# Patient Record
Sex: FEMALE | Race: WHITE | NOT HISPANIC OR LATINO | Employment: FULL TIME | URBAN - METROPOLITAN AREA
[De-identification: names, ages, dates, MRNs, and addresses within clinical notes are randomized per-mention and may not be internally consistent; named-entity substitution may affect disease eponyms.]

---

## 2017-02-06 ENCOUNTER — RADIANT APPOINTMENT (OUTPATIENT)
Dept: GENERAL RADIOLOGY | Facility: CLINIC | Age: 62
End: 2017-02-06
Attending: FAMILY MEDICINE
Payer: COMMERCIAL

## 2017-02-06 ENCOUNTER — OFFICE VISIT (OUTPATIENT)
Dept: FAMILY MEDICINE | Facility: CLINIC | Age: 62
End: 2017-02-06
Payer: COMMERCIAL

## 2017-02-06 VITALS
DIASTOLIC BLOOD PRESSURE: 70 MMHG | WEIGHT: 193.2 LBS | OXYGEN SATURATION: 98 % | BODY MASS INDEX: 30.32 KG/M2 | HEIGHT: 67 IN | SYSTOLIC BLOOD PRESSURE: 130 MMHG | TEMPERATURE: 97.4 F | HEART RATE: 106 BPM

## 2017-02-06 DIAGNOSIS — M25.562 ACUTE PAIN OF LEFT KNEE: Primary | ICD-10-CM

## 2017-02-06 DIAGNOSIS — M17.11 PRIMARY OSTEOARTHRITIS OF RIGHT KNEE: ICD-10-CM

## 2017-02-06 DIAGNOSIS — M25.561 ACUTE PAIN OF RIGHT KNEE: ICD-10-CM

## 2017-02-06 DIAGNOSIS — M25.562 ACUTE PAIN OF LEFT KNEE: ICD-10-CM

## 2017-02-06 PROCEDURE — 99214 OFFICE O/P EST MOD 30 MIN: CPT | Performed by: FAMILY MEDICINE

## 2017-02-06 PROCEDURE — 73560 X-RAY EXAM OF KNEE 1 OR 2: CPT | Mod: LT

## 2017-02-06 ASSESSMENT — ANXIETY QUESTIONNAIRES
3. WORRYING TOO MUCH ABOUT DIFFERENT THINGS: NOT AT ALL
GAD7 TOTAL SCORE: 1
5. BEING SO RESTLESS THAT IT IS HARD TO SIT STILL: SEVERAL DAYS
2. NOT BEING ABLE TO STOP OR CONTROL WORRYING: NOT AT ALL
6. BECOMING EASILY ANNOYED OR IRRITABLE: NOT AT ALL
1. FEELING NERVOUS, ANXIOUS, OR ON EDGE: NOT AT ALL
7. FEELING AFRAID AS IF SOMETHING AWFUL MIGHT HAPPEN: NOT AT ALL
IF YOU CHECKED OFF ANY PROBLEMS ON THIS QUESTIONNAIRE, HOW DIFFICULT HAVE THESE PROBLEMS MADE IT FOR YOU TO DO YOUR WORK, TAKE CARE OF THINGS AT HOME, OR GET ALONG WITH OTHER PEOPLE: SOMEWHAT DIFFICULT

## 2017-02-06 ASSESSMENT — PATIENT HEALTH QUESTIONNAIRE - PHQ9: 5. POOR APPETITE OR OVEREATING: NOT AT ALL

## 2017-02-06 NOTE — PROGRESS NOTES
SUBJECTIVE:                                                    Lola Magaña is a 62 year old female who presents to clinic today for the following health issues:      Musculoskeletal problem/pain      Duration: ongoing x 6 months     Description  Location: bilateral knee, slipped yesterday and injured left knee     Intensity:  Severe at worse, Current pain  of left knee- 10/10., Current pain of right knee- 0/10    Accompanying signs and symptoms: swelling    History  Previous similar problem: no   Previous evaluation:  Did PT about 6-8 months ago     Precipitating or alleviating factors:  Trauma or overuse: YES- slipped yesterday  Aggravating factors include: climbing stairs    Therapies tried and outcome: heat, massage, stretching, acetaminophen and NSAID - ibuprofen        Lola has bilateral leg pain L>R, described as shooting pain into patella, also has sharp pain (states feels like wood splitting) in shin bone when weight bearing, no pain while leg is at rest. If weight bearing, the pain shoots into her shin bone. She has been to physical therapy for this leg pain and states the tightness in her legs have improved. Pain prevents her from having a good nights sleep. Mentions she slipped on ice yesterday and subjectively hyperextended her left leg, now unable to bend her leg and cannot go up and down stairs. Before the fall, her pain level is somewhat still the same but more in the left leg. She also mentions having meniscus surgery about 5 years ago. She mentions that she is unable to sleep with her leg straight and needs to keep it slightly bent. She has no past history of knee x-rays.     Of note, she had a previously low hemoglobin but states that she regularly donates blood. She takes iron and calcium supplement daily (not at the same time due to interaction).     Problem list and histories reviewed & adjusted, as indicated.  Additional history: as documented    Patient Active Problem List   Diagnosis      "Chronic rhinitis     Esophageal reflux     Menopause     Menopausal syndrome (hot flashes)     Bilateral leg cramps     Chronic radicular low back pain     Anemia, unspecified type     Past Surgical History   Procedure Laterality Date     Tubal ligation       Hc tooth extraction w/forcep       Arthroscopy knee Right      Vulvar biopsy       lesion       Social History   Substance Use Topics     Smoking status: Never Smoker      Smokeless tobacco: Never Used     Alcohol Use: 0.0 oz/week     0 Standard drinks or equivalent per week      Comment: per week, 2-3 drinks 1-2 times per week     Family History   Problem Relation Age of Onset     CANCER Father      liver     Hyperlipidemia Father      Colon Polyps Brother      MENTAL ILLNESS Sister      Depression & anxiety         BP Readings from Last 3 Encounters:   02/06/17 130/70   06/30/16 136/78   05/25/16 144/72    Wt Readings from Last 3 Encounters:   02/06/17 193 lb 3.2 oz (87.635 kg)   06/30/16 194 lb (87.998 kg)   05/25/16 191 lb (86.637 kg)                  Labs reviewed in EPIC  Problem list, Medication list, Allergies, and Medical/Social/Surgical histories reviewed in [facility] and updated as appropriate.    ROS:  Constitutional, HEENT, cardiovascular, pulmonary, GI, , musculoskeletal, neuro, skin, endocrine and psych systems are negative, except as otherwise noted.    This document serves as a record of the services and decisions personally performed and made by Nasrin Dumont MD. It was created on his/her behalf by Jillian Azul, a trained medical scribe. The creation of this document is based the provider's statements to the medical scribe.  Sai Azul 2:53 PM, February 6, 2017    OBJECTIVE:                                                    /70 mmHg  Pulse 106  Temp(Src) 97.4  F (36.3  C) (Oral)  Ht 5' 6.73\" (1.695 m)  Wt 193 lb 3.2 oz (87.635 kg)  BMI 30.50 kg/m2  SpO2 98%  Body mass index is 30.5 kg/(m^2).  GENERAL: healthy, alert and no " distress  MS: RLE exam shows knee with no effusion, no warmth, full flexion, no laxity with varius valgus stress. , Lachman negative, Matt negative. LLE exam shows knee with slight warmth on lateral side of knee, small effusion    Diagnostic Test Results:  Xray knee bilateral-   1. Tricompartmental right knee osteoarthritis with moderate to severe  medial compartmental joint space loss.  2. No acute bone abnormality in either knee.     ASSESSMENT/PLAN:                                                          ICD-10-CM    1. Acute pain of left knee M25.562 XR Knee Bilateral 1/2 Views     order for DME     SPORTS MEDICINE REFERRAL   2. Acute pain of right knee M25.561 XR Knee Bilateral 1/2 Views     SPORTS MEDICINE REFERRAL   3. Primary osteoarthritis of right knee M17.11 SPORTS MEDICINE REFERRAL     Acute left knee pain: fairly normal xray, suspect hamstring tendons injury vs meniscal tear vs other. No significant effusion. Knee immobilizer, ice, topical pain meds. See ortho to discuss  Chronic right knee pain with OA, severe. Not that bothersome today. Have eval by ortho- may benefit from steroid vs synvisc injection.     Patient Instructions     Use ice to the knee 3 times a day for 10 min  Icy hot or bianca snider to help with pain  Use the knee immobilizer-- take it off to stretch periodically  Follow up with the sports medicine specialist in the next 1 week to check the left knee and help you make a plan.   You can use tylenol 1000 mg (2 over the counter pills) three times a day and ibuprofen 400 mg (2 over the counter pills) 3 times a day as needed for pain.   Nasrin Dumont MD       The information in this document, created by the medical scribe for me, accurately reflects the services I personally performed and the decisions made by me. I have reviewed and approved this document for accuracy prior to leaving the patient care area.  Nasrin Dumont MD  2:53 PM, 02/06/2017]    Nasrin Dumont MD  [facility]

## 2017-02-06 NOTE — MR AVS SNAPSHOT
After Visit Summary   2/6/2017    Lola Magaña    MRN: [id number]           Patient Information     Date Of Birth          1955        Visit Information        Provider Department      2/6/2017 2:45 PM Nasrin Dumont MD [facility]        Today's Diagnoses     Acute pain of left knee    -  1     Acute pain of right knee         Primary osteoarthritis of right knee           Care Instructions    Use ice to the knee 3 times a day for 10 min  Icy hot or bianca snider to help with pain  Use the knee immobilizer-- take it off to stretch periodically  Follow up with the sports medicine specialist in the next 1 week to check the left knee and help you make a plan.   You can use tylenol 1000 mg (2 over the counter pills) three times a day and ibuprofen 400 mg (2 over the counter pills) 3 times a day as needed for pain.   Nasrin Dumont MD     [facility]    If you have any questions regarding to your visit please contact your care team:       Team Purple:   Clinic Hours Telephone Number   [address]REW [address] Dr., Dr.   7am-7pm  Monday - Thursday   7am-5pm  Fridays  [phone]  (Appointment scheduling available 24/7)    Questions about your Visit?   Team Line:  [phone]   [facility] [place] [facility] 11am-9pm Monday-Friday Saturday-Sunday- 9am-5pm   [place] - 5pm-9pm Monday-Friday Saturday-Sunday- 9am-5pm  [phone] - [place]   [phone] [facility]       What options do I have for visits at the clinic other than the traditional office visit?  To expand how we care for you, many of our providers are utilizing electronic visits (e-visits) and telephone visits, when medically appropriate, for interactions with their patients rather than a visit in the clinic.   We also offer nurse visits for many medical concerns. Just like any other service, we will bill your insurance company for this  type of visit based on time spent on the phone with your provider. Not all insurance companies cover these visits. Please check with your medical insurance if this type of visit is covered. You will be responsible for any charges that are not paid by your insurance.      E-visits via Weibuhart:  generally incur a $35.00 fee.  Telephone visits:  Time spent on the phone: *charged based on time that is spent on the phone in increments of 10 minutes. Estimated cost:   5-10 mins $30.00   11-20 mins. $59.00   21-30 mins. $85.00     Use [employer] (secure email communication and access to your chart) to send your primary care provider a message or make an appointment. Ask someone on your Team how to sign up for [employer].  For a Price Quote for your services, please call our [employer] Price Line at [phone].  As always, Thank you for trusting us with your health care needs!    Discharged by Fay Clark CMA          Follow-ups after your visit        Additional Services     SPORTS MEDICINE REFERRAL       Your provider has referred you to:  [facility]: [facility] [phone]   [email or web address]    Please be aware that coverage of these services is subject to the terms and limitations of your health insurance plan.  Call member services at your health plan with any benefit or coverage questions.      Please bring the following to your appointment:    >>   Any x-rays, CTs or MRIs which have been performed.  Contact the facility where they were done to arrange for  prior to your scheduled appointment.    >>   List of current medications   >>   This referral request   >>   Any documents/labs given to you for this referral                  Who to contact     If you have questions or need follow up information about today's clinic visit or your schedule please contact [facility] directly at [phone].  Normal or  "non-critical lab and imaging results will be communicated to you by MyChart, letter or phone within 4 business days after the clinic has received the results. If you do not hear from us within 7 days, please contact the clinic through ViClone or phone. If you have a critical or abnormal lab result, we will notify you by phone as soon as possible.  Submit refill requests through ViClone or call your pharmacy and they will forward the refill request to us. Please allow 3 business days for your refill to be completed.          Additional Information About Your Visit        ViClone Information     ViClone gives you secure access to your electronic health record. If you see a primary care provider, you can also send messages to your care team and make appointments. If you have questions, please call your primary care clinic.  If you do not have a primary care provider, please call [phone] and they will assist you.        Care EveryWhere ID     This is your Care EveryWhere ID. This could be used by other organizations to access your [facility] medical records  [id number]        Your Vitals Were     Pulse Temperature Height BMI (Body Mass Index) Pulse Oximetry       106 97.4  F (36.3  C) (Oral) 5' 6.73\" (1.695 m) 30.50 kg/m2 98%        Blood Pressure from Last 3 Encounters:   02/06/17 130/70   06/30/16 136/78   05/25/16 144/72    Weight from Last 3 Encounters:   02/06/17 193 lb 3.2 oz (87.635 kg)   06/30/16 194 lb (87.998 kg)   05/25/16 191 lb (86.637 kg)              We Performed the Following     SPORTS MEDICINE REFERRAL          Today's Medication Changes          These changes are accurate as of: 2/6/17  3:54 PM.  If you have any questions, ask your nurse or doctor.               Start taking these medicines.        Dose/Directions    order for DME   Used for:  Acute pain of left knee   Started by:  Nasrin Dumont MD        Knee immobilizer   Quantity:  1 Device   Refills:  0            Where to get your " medicines      Some of these will need a paper prescription and others can be bought over the counter.  Ask your nurse if you have questions.     Bring a paper prescription for each of these medications    - order for DME             Primary Care Provider Office Phone # Fax #    Nasrin Dumont -[phone]       [address]  [facility] [address]        Thank you!     Thank you for choosing [facility]  for your care. Our goal is always to provide you with excellent care. Hearing back from our patients is one way we can continue to improve our services. Please take a few minutes to complete the written survey that you may receive in the mail after your visit with us. Thank you!             Your Updated Medication List - Protect others around you: Learn how to safely use, store and throw away your medicines at www.disposemymeds.org.          This list is accurate as of: 2/6/17  3:54 PM.  Always use your most recent med list.                   Brand Name Dispense Instructions for use    fluticasone 50 MCG/ACT spray    FLONASE     Spray 2 sprays into both nostrils daily       gabapentin 300 MG capsule    NEURONTIN    90 capsule    Take 1 capsule (300 mg) by mouth At Bedtime       OMEPRAZOLE PO      Take by mouth daily       order for DME     1 Device    Knee immobilizer

## 2017-02-06 NOTE — NURSING NOTE
"Chief Complaint   Patient presents with     Knee Pain     bilateral knee pain - ongoing for 6 months      Fall     slipped  yesterday- injured left knee        Initial /70 mmHg  Pulse 106  Temp(Src) 97.4  F (36.3  C) (Oral)  Ht 5' 6.73\" (1.695 m)  Wt 193 lb 3.2 oz (87.635 kg)  BMI 30.50 kg/m2  SpO2 98% Estimated body mass index is 30.5 kg/(m^2) as calculated from the following:    Height as of this encounter: 5' 6.73\" (1.695 m).    Weight as of this encounter: 193 lb 3.2 oz (87.635 kg).  Medication Reconciliation: complete  An YOSVANY Ruvalcaba    "

## 2017-02-06 NOTE — PATIENT INSTRUCTIONS
Use ice to the knee 3 times a day for 10 min  Icy hot or bianca snider to help with pain  Use the knee immobilizer-- take it off to stretch periodically  Follow up with the sports medicine specialist in the next 1 week to check the left knee and help you make a plan.   You can use tylenol 1000 mg (2 over the counter pills) three times a day and ibuprofen 400 mg (2 over the counter pills) 3 times a day as needed for pain.   Nasrin Dumont MD     [facility]    If you have any questions regarding to your visit please contact your care team:       Team Purple:   Clinic Hours Telephone Number   ANDREW Alvarez Dr., Dr.   7am-7pm  Monday - Thursday   7am-5pm  Fridays  [phone]  (Appointment scheduling available 24/7)    Questions about your Visit?   Team Line:  [phone]   [facility] [place] [facility] - 11am-9pm Monday-Friday Saturday-Sunday- 9am-5pm   [place] - 5pm-9pm Monday-Friday Saturday-Sunday- 9am-5pm  [phone] - [place]   [phone] - [place]       What options do I have for visits at the clinic other than the traditional office visit?  To expand how we care for you, many of our providers are utilizing electronic visits (e-visits) and telephone visits, when medically appropriate, for interactions with their patients rather than a visit in the clinic.   We also offer nurse visits for many medical concerns. Just like any other service, we will bill your insurance company for this type of visit based on time spent on the phone with your provider. Not all insurance companies cover these visits. Please check with your medical insurance if this type of visit is covered. You will be responsible for any charges that are not paid by your insurance.      E-visits via [email or web address]:  generally incur a $35.00 fee.  Telephone visits:  Time spent on the phone: *charged based on time that is spent on the phone in increments of  10 minutes. Estimated cost:   5-10 mins $30.00   11-20 mins. $59.00   21-30 mins. $85.00     Use Click [employer] (secure email communication and access to your chart) to send your primary care provider a message or make an appointment. Ask someone on your Team how to sign up for [employer].  For a Price Quote for your services, please call our [employer] Line at [phone].  As always, Thank you for trusting us with your health care needs!    Discharged by Fay Clark CMA

## 2017-02-07 ASSESSMENT — ANXIETY QUESTIONNAIRES: GAD7 TOTAL SCORE: 1

## 2017-02-07 ASSESSMENT — PATIENT HEALTH QUESTIONNAIRE - PHQ9: SUM OF ALL RESPONSES TO PHQ QUESTIONS 1-9: 7

## 2017-02-14 ENCOUNTER — OFFICE VISIT (OUTPATIENT)
Dept: ORTHOPEDICS | Facility: CLINIC | Age: 62
End: 2017-02-14
Payer: COMMERCIAL

## 2017-02-14 VITALS — SYSTOLIC BLOOD PRESSURE: 153 MMHG | DIASTOLIC BLOOD PRESSURE: 84 MMHG | HEIGHT: 67 IN | HEART RATE: 86 BPM

## 2017-02-14 DIAGNOSIS — S83.8X2A SPRAIN OF OTHER LIGAMENT OF LEFT KNEE, INITIAL ENCOUNTER: ICD-10-CM

## 2017-02-14 DIAGNOSIS — M17.12 PRIMARY OSTEOARTHRITIS OF LEFT KNEE: ICD-10-CM

## 2017-02-14 DIAGNOSIS — M17.11 TRICOMPARTMENT OSTEOARTHRITIS OF RIGHT KNEE: Primary | ICD-10-CM

## 2017-02-14 PROCEDURE — 99204 OFFICE O/P NEW MOD 45 MIN: CPT | Performed by: PHYSICAL MEDICINE & REHABILITATION

## 2017-02-14 RX ORDER — MELOXICAM 15 MG/1
15 TABLET ORAL DAILY
Qty: 30 TABLET | Refills: 1 | Status: SHIPPED | OUTPATIENT
Start: 2017-02-14 | End: 2017-08-03

## 2017-02-14 NOTE — MR AVS SNAPSHOT
After Visit Summary   2/14/2017    Lola Magaña    MRN: [id number]           Patient Information     Date Of Birth          1955        Visit Information        Provider Department      2/14/2017 6:00 PM Jeannette Armendariz MD [facility]        Today's Diagnoses     Tricompartment osteoarthritis of right knee    -  1    Primary osteoarthritis of left knee        Sprain of other ligament of left knee, initial encounter          Care Instructions    Today's Plan of Care:  -Topical Treatments: Ice 15-20 minutes for pain relief as needed  -Prescription Medication as directed: meloxicam. Please take this medication with food.  DO NOT take any other nonsteroidal anti-inflammatory drugs (NSAIDs) such as Advil, ibuprofen, Aleve, naproxen, etc while on this medication. You may take Tylenol with this medication.    We also discussed other future treatment options:  -Rehab: Physical Therapy    Follow Up:  3 weeks or sooner if symptoms fail to improve or worsen. Call if you have any questions or concerns.             Follow-ups after your visit        Who to contact     If you have questions or need follow up information about today's clinic visit or your schedule please contact [facility] directly at [phone].  Normal or non-critical lab and imaging results will be communicated to you by MyChart, letter or phone within 4 business days after the clinic has received the results. If you do not hear from us within 7 days, please contact the clinic through Samba Energyhart or phone. If you have a critical or abnormal lab result, we will notify you by phone as soon as possible.  Submit refill requests through Bright Things or call your pharmacy and they will forward the refill request to us. Please allow 3 business days for your refill to be completed.          Additional Information About Your Visit        MyChart Information     Bright Things gives you  "secure access to your electronic health record. If you see a primary care provider, you can also send messages to your care team and make appointments. If you have questions, please call your primary care clinic.  If you do not have a primary care provider, please call [phone] and they will assist you.        Care EveryWhere ID     This is your Care EveryWhere ID. This could be used by other organizations to access your [facility] medical records  [id number]        Your Vitals Were     Pulse Height                86 5' 6.73\" (1.695 m)           Blood Pressure from Last 3 Encounters:   02/14/17 153/84   02/06/17 130/70   06/30/16 136/78    Weight from Last 3 Encounters:   02/06/17 193 lb 3.2 oz (87.6 kg)   06/30/16 194 lb (88 kg)   05/25/16 191 lb (86.6 kg)              Today, you had the following     No orders found for display         Today's Medication Changes          These changes are accurate as of: 2/14/17  6:23 PM.  If you have any questions, ask your nurse or doctor.               Start taking these medicines.        Dose/Directions    meloxicam 15 MG tablet   Commonly known as:  MOBIC   Used for:  Tricompartment osteoarthritis of right knee, Primary osteoarthritis of left knee   Started by:  Jeannette Armendariz MD        Dose:  15 mg   Take 1 tablet (15 mg) by mouth daily   Quantity:  30 tablet   Refills:  1            Where to get your medicines      These medications were sent to [place] [address] [place] [facility] [address]  [address] [facility] [address]     Phone:  [phone]     meloxicam 15 MG tablet                Primary Care Provider Office Phone # Fax #    Nasrin Dumont -[phone]       [address] [facility] [address]        Thank you!     Thank you for choosing [facility]  for your care. Our goal is always to provide you with excellent care. Hearing back from our patients is one way we " can continue to improve our services. Please take a few minutes to complete the written survey that you may receive in the mail after your visit with us. Thank you!             Your Updated Medication List - Protect others around you: Learn how to safely use, store and throw away your medicines at www.disposemymeds.org.          This list is accurate as of: 2/14/17  6:23 PM.  Always use your most recent med list.                   Brand Name Dispense Instructions for use    fluticasone 50 MCG/ACT spray    FLONASE     Spray 2 sprays into both nostrils daily       gabapentin 300 MG capsule    NEURONTIN    90 capsule    Take 1 capsule (300 mg) by mouth At Bedtime       meloxicam 15 MG tablet    MOBIC    30 tablet    Take 1 tablet (15 mg) by mouth daily       OMEPRAZOLE PO      Take by mouth daily       order for DME     1 Device    Knee immobilizer

## 2017-02-14 NOTE — PROGRESS NOTES
Sports Medicine Clinic Visit    PCP: Nasrin Dumont    CC: Patient presents with:  Knee Pain: bilateral      HPI:  Lola Magaña is a 62 year old female who is seen in consultation at the request of Nasrin Dumont MD. She notes pain that began years ago but the pain has gotten worse over the past 6 months ago. She did have a hyperextension injury of the left knee 9 days ago. Today she reports more pain in the left knee. Pain is located on the left posterior knee. She reports previous pain through the joint line as well as a shooting pain through the shin with standing for long periods of time.  She rates the pain at a 10/10 at its worst and a 0/10 currently.  Symptoms are relieved with ice, Tylenol, Ibuprofen and bracing and worsened by standing, stairs and walking in the past. She endorses popping and denies swelling, bruising, grinding, catching, locking, instability, numbness, tingling, weakness, pain in other joints and fever, chills.  Other treatment has included physical therapy (6-8 months) with no relief of her knee pain.  She notes difficulty with stairs.       Review of Systems:  Musculoskeletal: as above  Remainder of review of systems is negative including constitutional, eyes, ENT, CV, pulmonary, GI, , endocrine, skin, hematologic, and neurologic except as noted in HPI or medical history.    History reviewed. No pertinent past surgical/medical/family/social history.    Past Medical History   Diagnosis Date     Colon polyp      GERD (gastroesophageal reflux disease)      Rhinitis      Past Surgical History   Procedure Laterality Date     Tubal ligation       Hc tooth extraction w/forcep       Arthroscopy knee Right      Vulvar biopsy       lesion     Family History   Problem Relation Age of Onset     CANCER Father      liver     Hyperlipidemia Father      MENTAL ILLNESS Sister      Depression & anxiety     Colon Polyps Brother      Social History     Social History     Marital status:       "Spouse name: N/A     Number of children: N/A     Years of education: N/A     Occupational History     Not on file.     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  At baseline, she does not need assistance with ambulation      Current Outpatient Prescriptions   Medication     meloxicam (MOBIC) 15 MG tablet     gabapentin (NEURONTIN) 300 MG capsule     OMEPRAZOLE PO     fluticasone (FLONASE) 50 MCG/ACT nasal spray     order for DME     No current facility-administered medications for this visit.      No Known Allergies      Objective:  /84  Pulse 86  Ht 5' 6.73\" (1.695 m)    General: healthy, alert and in no distress    Head: Normocephalic, atraumatic  Eyes: no scleral icterus or conjunctival erythema   Oropharynx:  Mucous membranes moist  Skin: no erythema, ecchymosis, petechiae, or jaundice  CV: regular rhythm by palpation, 2+ distal pulses, no pedal edema    Resp: normal respiratory effort without conversational dyspnea   Psych: normal mood and affect    Gait: Non-antalgic, appropriate coordination and balance   Neuro: normal light touch sensory exam of the extremities. Motor strength as noted below    Musculoskeletal:    Bilateral Knee exam    Inspection:  No erythema, ecchymosis, warmth, or edema    Palpation: No tenderness to palpation of either knee    Knee ROM: Full active and passive ROM without pain    Hip ROM: Full active and passive ROM without pain    Patellar Motion:      Normal patellar tracking noted through range of motion    Strength:  5/5 Hip " flexion/abduction/adduction, knee extension/flexion, ankle plantarflexion/dorsiflexion, great toe extension, toe flexion    Special Tests: Negative log roll, negative Efe's compression test, negative anterior/posterior drawer, negative Lachman's, no varus/valgus instability at 0 and 30 degrees, Rose's test negative for pain or popping at the lateral/medial joint line    Radiology:  Independent visualization of images performed.    Recent Results (from the past 744 hour(s))   XR Knee Bilateral 1/2 Views    Narrative    XR KNEE BILATERAL 1/2 VW 2/6/2017 3:30 PM    COMPARISON: None.    HISTORY: Bilateral knee pain.    FINDINGS:  Right knee: Tricompartmental osteoarthritis with moderate to severe  medial compartmental joint space loss and a moderate effusion. No  fractures are seen.    Left knee: Minimal degenerative changes are seen without significant  joint space loss. No fractures are seen. No significant effusion.      Impression    IMPRESSION:   1. Tricompartmental right knee osteoarthritis with moderate to severe  medial compartmental joint space loss.  2. No acute bone abnormality in either knee.    WILFRIDO RUIZ       Assessment:  1. Tricompartment osteoarthritis of right knee    2. Primary osteoarthritis of left knee    3. Sprain of other ligament of left knee, initial encounter        Plan:  Discussed the assessment with the patient. We discussed the following treatment options: symptom treatment, activity modification/rest, imaging and rehab. Following discussion, plan:  -Lola is improving since her hyperextension injury of the left knee 9 days ago.  She has been wearing a brace which seems to be helping.  -She does have bilateral knee osteoarthritis which causes her pain.  Recommend starting meloxicam daily.  Informed her that she should take this with food and not take any other NSAIDs while on this.  -Ice for 15-20 minutes after activity as needed for pain/soreness.  -We also discussed physical  therapy and steroid injections in the future if the above are ineffective.    Lisseth Armendariz MD, CAQ  [facility]

## 2017-02-15 NOTE — PATIENT INSTRUCTIONS
Today's Plan of Care:  -Topical Treatments: Ice 15-20 minutes for pain relief as needed  -Prescription Medication as directed: meloxicam. Please take this medication with food.  DO NOT take any other nonsteroidal anti-inflammatory drugs (NSAIDs) such as Advil, ibuprofen, Aleve, naproxen, etc while on this medication. You may take Tylenol with this medication.    We also discussed other future treatment options:  -Rehab: Physical Therapy    Follow Up:  3 weeks or sooner if symptoms fail to improve or worsen. Call if you have any questions or concerns.

## 2017-02-15 NOTE — NURSING NOTE
"Chief Complaint   Patient presents with     Knee Pain     bilateral       Initial Ht 5' 6.73\" (1.695 m) Estimated body mass index is 30.5 kg/(m^2) as calculated from the following:    Height as of 2/6/17: 5' 6.73\" (1.695 m).    Weight as of 2/6/17: 193 lb 3.2 oz (87.6 kg).  Medication Reconciliation: complete      Abena Wells M.Ed., ATC    "

## 2017-03-07 ENCOUNTER — OFFICE VISIT (OUTPATIENT)
Dept: ORTHOPEDICS | Facility: CLINIC | Age: 62
End: 2017-03-07
Payer: COMMERCIAL

## 2017-03-07 VITALS
HEART RATE: 75 BPM | BODY MASS INDEX: 31.55 KG/M2 | SYSTOLIC BLOOD PRESSURE: 162 MMHG | HEIGHT: 67 IN | DIASTOLIC BLOOD PRESSURE: 84 MMHG | WEIGHT: 201 LBS

## 2017-03-07 DIAGNOSIS — M17.12 PRIMARY OSTEOARTHRITIS OF LEFT KNEE: ICD-10-CM

## 2017-03-07 DIAGNOSIS — M17.11 TRICOMPARTMENT OSTEOARTHRITIS OF RIGHT KNEE: Primary | ICD-10-CM

## 2017-03-07 DIAGNOSIS — S83.8X2D SPRAIN OF OTHER LIGAMENT OF LEFT KNEE, SUBSEQUENT ENCOUNTER: ICD-10-CM

## 2017-03-07 PROCEDURE — 99213 OFFICE O/P EST LOW 20 MIN: CPT | Performed by: PHYSICAL MEDICINE & REHABILITATION

## 2017-03-07 NOTE — PROGRESS NOTES
Sports Medicine Clinic Visit - Interim History March 7, 2017    Initial Visit Date 2/14/2017    PCP: Nasrin Dumont    Lola Magaña is a 62 year old female who is seen in f/u up for bilateral knee pain. Since last visit on 2/14/2017 patient has been doing very well. She rates the pain at a 0/10 currently.  Symptoms are relieved with meloxicam and worsened by  Nothing. She continues to have some clicking in the back of her left knee.       Review of Systems  Musculoskeletal: as above  Remainder of review of systems is negative including constitutional, eyes, ENT, CV, pulmonary, GI, , endocrine, skin, hematologic, and neurologic except as noted in HPI or medical history.    History reviewed. No pertinent past surgical/medical/family/social history.    Past Medical History   Diagnosis Date     Colon polyp      GERD (gastroesophageal reflux disease)      Rhinitis      Past Surgical History   Procedure Laterality Date     Tubal ligation       Hc tooth extraction w/forcep       Arthroscopy knee Right      Vulvar biopsy       lesion     Family History   Problem Relation Age of Onset     CANCER Father      liver     Hyperlipidemia Father      MENTAL ILLNESS Sister      Depression & anxiety     Colon Polyps Brother      Social History     Social History     Marital status:      Spouse name: N/A     Number of children: N/A     Years of education: N/A     Occupational History     Not on file.     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  At baseline, she does not need assistance with  "ambulation    Current Outpatient Prescriptions   Medication     meloxicam (MOBIC) 15 MG tablet     order for DME     gabapentin (NEURONTIN) 300 MG capsule     OMEPRAZOLE PO     fluticasone (FLONASE) 50 MCG/ACT nasal spray     No current facility-administered medications for this visit.      No Known Allergies      Objective:  /84  Pulse 75  Ht 5' 6.73\" (1.695 m)  Wt 201 lb (91.2 kg)  BMI 31.74 kg/m2    General: healthy, alert and in no distress    Head: Normocephalic, atraumatic  Eyes: no scleral icterus or conjunctival erythema   Oropharynx:  Mucous membranes moist  Skin: no erythema, ecchymosis, petechiae, or jaundice  CV: regular rhythm by palpation, 2+ distal pulses, no pedal edema    Resp: normal respiratory effort without conversational dyspnea   Psych: normal mood and affect    Gait: Non-antalgic, appropriate coordination and balance   Neuro: normal light touch sensory exam of the extremities. Motor strength as noted below    Musculoskeletal:    Bilateral Knee exam    Inspection:  No warmth or edema.  Bandaid over left knee due to fall.    Palpation: No tenderness to palpation of either knee    Patellar Motion:      Normal patellar tracking noted through range of motion    Strength:  5/5 Hip flexion/abduction/adduction, knee extension/flexion, ankle plantarflexion/dorsiflexion, great toe extension, toe flexion      Radiology:  No imaging today    Assessment:  1. Tricompartment osteoarthritis of right knee    2. Primary osteoarthritis of left knee    3. Sprain of other ligament of left knee, subsequent encounter        Plan:  Discussed the assessment with the patient. We discussed the following treatment options: symptom treatment, activity modification/rest, imaging and rehab. Following discussion, plan:  -Lola is doing much better and feels the meloxicam has been quite helpful.    -Continue meloxicam 15 mg daily with food.  -Avoid activities that aggravate the pain.  -Ice for 15-20 minutes as needed " for soreness.    -Follow up in 3 months or sooner if symptoms resume.  Will plan on refilling meloxicam in the interim.      Lisseth Armendariz MD, CAQ  [facility]

## 2017-03-07 NOTE — MR AVS SNAPSHOT
After Visit Summary   3/7/2017    Lola Magaña    MRN: [id number]           Patient Information     Date Of Birth          1955        Visit Information        Provider Department      3/7/2017 5:40 PM Jeannette Armendariz MD [facility] [place]        Today's Diagnoses     Tricompartment osteoarthritis of right knee    -  1    Primary osteoarthritis of left knee        Sprain of other ligament of left knee, subsequent encounter          Care Instructions    Continue meloxicam 15 mg daily with food.  Avoid activities that aggravate the pain.  Ice for 15-20 minutes as needed for soreness.    Follow up in 3 months or sooner if symptoms come back.          Follow-ups after your visit        Who to contact     If you have questions or need follow up information about today's clinic visit or your schedule please contact [facility] [place] directly at [phone].  Normal or non-critical lab and imaging results will be communicated to you by YUPPTVhart, letter or phone within 4 business days after the clinic has received the results. If you do not hear from us within 7 days, please contact the clinic through BookBottlest or phone. If you have a critical or abnormal lab result, we will notify you by phone as soon as possible.  Submit refill requests through Tiger Pistol or call your pharmacy and they will forward the refill request to us. Please allow 3 business days for your refill to be completed.          Additional Information About Your Visit        MyChart Information     Tiger Pistol gives you secure access to your electronic health record. If you see a primary care provider, you can also send messages to your care team and make appointments. If you have questions, please call your primary care clinic.  If you do not have a primary care provider, please call [phone] and they will assist you.        Care EveryWhere ID     This is your Care EveryWhere ID.  "This could be used by other organizations to access your [place] medical records  [id number]        Your Vitals Were     Pulse Height BMI (Body Mass Index)             75 5' 6.73\" (1.695 m) 31.74 kg/m2          Blood Pressure from Last 3 Encounters:   03/07/17 162/84   02/14/17 153/84   02/06/17 130/70    Weight from Last 3 Encounters:   03/07/17 201 lb (91.2 kg)   02/06/17 193 lb 3.2 oz (87.6 kg)   06/30/16 194 lb (88 kg)              Today, you had the following     No orders found for display       Primary Care Provider Office Phone # Fax #    Nasrin Dumont [phone]       [place] [id number] [facility] [address]        Thank you!     Thank you for choosing [facility]  for your care. Our goal is always to provide you with excellent care. Hearing back from our patients is one way we can continue to improve our services. Please take a few minutes to complete the written survey that you may receive in the mail after your visit with us. Thank you!             Your Updated Medication List - Protect others around you: Learn how to safely use, store and throw away your medicines at www.disposemymeds.org.          This list is accurate as of: 3/7/17  6:03 PM.  Always use your most recent med list.                   Brand Name Dispense Instructions for use    fluticasone 50 MCG/ACT spray    FLONASE     Spray 2 sprays into both nostrils daily       gabapentin 300 MG capsule    NEURONTIN    90 capsule    Take 1 capsule (300 mg) by mouth At Bedtime       meloxicam 15 MG tablet    MOBIC    30 tablet    Take 1 tablet (15 mg) by mouth daily       OMEPRAZOLE PO      Take by mouth daily       order for DME     1 Device    Knee immobilizer         "

## 2017-03-08 NOTE — PATIENT INSTRUCTIONS
Continue meloxicam 15 mg daily with food.  Avoid activities that aggravate the pain.  Ice for 15-20 minutes as needed for soreness.    Follow up in 3 months or sooner if symptoms come back.

## 2017-03-09 ENCOUNTER — TELEPHONE (OUTPATIENT)
Dept: ORTHOPEDICS | Facility: OTHER | Age: 62
End: 2017-03-09

## 2017-03-09 NOTE — TELEPHONE ENCOUNTER
Spoke with pt regarding her PCPs concern of NSAID's due to her hypertension. The pt is concerned that her pain will increase once she weans off of the meloxicam. I reassured her that we can address that if her pain does return with plans of keeping her hypertension in mind. She has 1 week of meloxicam left. The pt will begin every other day of meloxicam for 4 days and then every 2 days until she runs out of her prescription.     She will contact her PCP to discuss hypertension management. However reluctant, she was in agreement with this plan and thankful for the call.     Abena Wells M.Ed., ATC

## 2017-03-10 ENCOUNTER — TELEPHONE (OUTPATIENT)
Dept: FAMILY MEDICINE | Facility: CLINIC | Age: 62
End: 2017-03-10

## 2017-03-10 NOTE — TELEPHONE ENCOUNTER
Please call pt  Blood pressure was high with sports medicine, also high at last visit with me    BP Readings from Last 3 Encounters:   03/07/17 162/84   02/14/17 153/84   02/06/17 130/70     Recommend visit to check blood pressure and discuss starting meds for hypertension.  Please schedule 15 min with me in next week or 2 OR can see another provider (Dr. Elizabeth Gomes or Samira)  .[email or web address]

## 2017-03-14 ENCOUNTER — OFFICE VISIT (OUTPATIENT)
Dept: FAMILY MEDICINE | Facility: CLINIC | Age: 62
End: 2017-03-14
Payer: COMMERCIAL

## 2017-03-14 VITALS
HEIGHT: 67 IN | HEART RATE: 90 BPM | BODY MASS INDEX: 30.92 KG/M2 | SYSTOLIC BLOOD PRESSURE: 124 MMHG | DIASTOLIC BLOOD PRESSURE: 72 MMHG | TEMPERATURE: 98 F | WEIGHT: 197 LBS

## 2017-03-14 DIAGNOSIS — M17.12 PRIMARY OSTEOARTHRITIS OF LEFT KNEE: ICD-10-CM

## 2017-03-14 DIAGNOSIS — R03.0 ELEVATED BLOOD PRESSURE READING WITHOUT DIAGNOSIS OF HYPERTENSION: Primary | ICD-10-CM

## 2017-03-14 PROCEDURE — 99213 OFFICE O/P EST LOW 20 MIN: CPT | Performed by: NURSE PRACTITIONER

## 2017-03-14 ASSESSMENT — PAIN SCALES - GENERAL: PAINLEVEL: NO PAIN (0)

## 2017-03-14 NOTE — MR AVS SNAPSHOT
After Visit Summary   3/14/2017    Lola Magaña    MRN: [id number]           Patient Information     Date Of Birth          1955        Visit Information        Provider Department      3/14/2017 2:00 PM Samira Francis APRN [facility]        Today's Diagnoses     Primary osteoarthritis of left knee    -  1      Care Instructions    [facility]    If you have any questions regarding to your visit please contact your care team:     Team Pink:   Clinic Hours Telephone Number   Internal Medicine:  Dr. Ligia Francis, NP       7am-7pm  Monday - Thursday   7am-5pm  Fridays  [phone]  (Appointment scheduling available 24/7)    Questions about your visit?  Team Line  [phone]   Urgent Care - [place] [facility] - 11am-9pm Monday-Friday Saturday-Sunday- 9am-5pm   [place] - 5pm-9pm Monday-Friday Saturday-Sunday- 9am-5pm  [phone] - [facility]  [phone] - [place]       What options do I have for visits at the clinic other than the traditional office visit?  To expand how we care for you, many of our providers are utilizing electronic visits (e-visits) and telephone visits, when medically appropriate, for interactions with their patients rather than a visit in the clinic.   We also offer nurse visits for many medical concerns. Just like any other service, we will bill your insurance company for this type of visit based on time spent on the phone with your provider. Not all insurance companies cover these visits. Please check with your medical insurance if this type of visit is covered. You will be responsible for any charges that are not paid by your insurance.      E-visits via [employer]  generally incur a $35.00 fee.  Telephone visits:  Time spent on the phone: *charged based on time that is spent on the phone in increments of 10 minutes. Estimated cost:   5-10 mins $30.00   11-20 mins.  $59.00   21-30 mins. $85.00   Use Zigmot (secure email communication and access to your chart) to send your primary care provider a message or make an appointment. Ask someone on your Team how to sign up for Zigmot.    For a Price Quote for your services, please call our Consumer Price Line at [phone].    As always, Thank you for trusting us with your health care needs!    Discharged By:  YOSVANY RANGEL          Follow-ups after your visit        Your next 10 appointments already scheduled     Jun 24, 2017 11:00 AM CDT   Return Visit with Jeannette Armendariz MD   [facility] [place] ([facility] [place])    [address] [address]  [place] [address] [phone]              Who to contact     If you have questions or need follow up information about today's clinic visit or your schedule please contact [facility] [place] [facility]"" directly at [phone].  Normal or non-critical lab and imaging results will be communicated to you by Movinto Funhart, letter or phone within 4 business days after the clinic has received the results. If you do not hear from us within 7 days, please contact the clinic through Zigmot or phone. If you have a critical or abnormal lab result, we will notify you by phone as soon as possible.  Submit refill requests through [employer] or call your pharmacy and they will forward the refill request to us. Please allow 3 business days for your refill to be completed.          Additional Information About Your Visit        Crossing Automation Information     [employer]ation gives you secure access to your electronic health record. If you see a primary care provider, you can also send messages to your care team and make appointments. If you have questions, please call your primary care clinic.  If you do not have a primary care provider, please call [phone] and they will assist you.        Care EveryWhere ID     This is your Care EveryWhere ID. This could be used by other  "organizations to access your [place] medical records  [id number]        Your Vitals Were     Pulse Temperature Height BMI (Body Mass Index)          90 98  F (36.7  C) (Oral) 5' 6.5\" (1.689 m) 31.32 kg/m2         Blood Pressure from Last 3 Encounters:   03/14/17 124/72   03/07/17 162/84   02/14/17 153/84    Weight from Last 3 Encounters:   03/14/17 197 lb (89.4 kg)   03/07/17 201 lb (91.2 kg)   02/06/17 193 lb 3.2 oz (87.6 kg)              Today, you had the following     No orders found for display         Today's Medication Changes          These changes are accurate as of: 3/14/17  2:28 PM.  If you have any questions, ask your nurse or doctor.               Start taking these medicines.        Dose/Directions    diclofenac 1 % Gel topical gel   Commonly known as:  VOLTAREN   Used for:  Primary osteoarthritis of left knee   Started by:  Samira Francis APRN CNP        Apply 4 grams to knees four times daily as needed using enclosed dosing card.   Quantity:  100 g   Refills:  1            Where to get your medicines      These medications were sent to [place] [address] [place] [facility] [address] [facility] [place] [address]     Phone:  [phone]     diclofenac 1 % Gel topical gel                Primary Care Provider Office Phone # Fax #    Nasrin Dumont [phone]       [address] [address]        Thank you!     Thank you for choosing [facility]  for your care. Our goal is always to provide you with excellent care. Hearing back from our patients is one way we can continue to improve our services. Please take a few minutes to complete the written survey that you may receive in the mail after your visit with us. Thank you!             Your Updated Medication List - Protect others around you: Learn how to safely use, store and throw away your medicines at www.disposemymeds.org.          This list is accurate " as of: 3/14/17  2:28 PM.  Always use your most recent med list.                   Brand Name Dispense Instructions for use    diclofenac 1 % Gel topical gel    VOLTAREN    100 g    Apply 4 grams to knees four times daily as needed using enclosed dosing card.       fluticasone 50 MCG/ACT spray    FLONASE     Spray 2 sprays into both nostrils daily       gabapentin 300 MG capsule    NEURONTIN    90 capsule    Take 1 capsule (300 mg) by mouth At Bedtime       meloxicam 15 MG tablet    MOBIC    30 tablet    Take 1 tablet (15 mg) by mouth daily       OMEPRAZOLE PO      Take by mouth daily       order for DME     1 Device    Knee immobilizer

## 2017-03-14 NOTE — NURSING NOTE
"Chief Complaint   Patient presents with     Hypertension     Elevated Blood Pressure when seen by Orthopedist       Initial /72  Pulse 90  Temp 98  F (36.7  C) (Oral)  Ht 5' 6.5\" (1.689 m)  Wt 197 lb (89.4 kg)  BMI 31.32 kg/m2 Estimated body mass index is 31.32 kg/(m^2) as calculated from the following:    Height as of this encounter: 5' 6.5\" (1.689 m).    Weight as of this encounter: 197 lb (89.4 kg).  Medication Reconciliation: complete   Bree Mark MA    "

## 2017-03-14 NOTE — PATIENT INSTRUCTIONS
[facility]    If you have any questions regarding to your visit please contact your care team:     Team Pink:   Clinic Hours Telephone Number   Internal Medicine:  Dr. Ligia Francis NP       7am-7pm  Monday - Thursday   7am-5pm  Fridays  [phone]  (Appointment scheduling available 24/7)    Questions about your visit?  Team Line  [phone]   Urgent Care - [place] [facility] - 11am-9pm Monday-Friday Saturday-Sunday- 9am-5pm   [place] - 5pm-9pm Monday-Friday Saturday-Sunday- 9am-5pm  [phone] - [place]   [phone] - [place]       What options do I have for visits at the clinic other than the traditional office visit?  To expand how we care for you, many of our providers are utilizing electronic visits (e-visits) and telephone visits, when medically appropriate, for interactions with their patients rather than a visit in the clinic.   We also offer nurse visits for many medical concerns. Just like any other service, we will bill your insurance company for this type of visit based on time spent on the phone with your provider. Not all insurance companies cover these visits. Please check with your medical insurance if this type of visit is covered. You will be responsible for any charges that are not paid by your insurance.      E-visits via [employer]:  generally incur a $35.00 fee.  Telephone visits:  Time spent on the phone: *charged based on time that is spent on the phone in increments of 10 minutes. Estimated cost:   5-10 mins $30.00   11-20 mins. $59.00   21-30 mins. $85.00   Use [employer] (secure email communication and access to your chart) to send your primary care provider a message or make an appointment. Ask someone on your Team how to sign up for [employer].    For a Price Quote for your services, please call our Consumer Price Line at [phone].    As always, Thank you for trusting us with your health care  needs!    Discharged By:  YOSVANY RANGEL

## 2017-03-14 NOTE — PROGRESS NOTES
SUBJECTIVE:                                                    Lola Magaña is a 62 year old female who presents to clinic today for the following health issues:    Chief Complaint   Patient presents with     Hypertension     Elevated Blood Pressure when seen by Orthopedist     Patient was seen at Sports Medicine for knee pain.  She was started on meloxicam for pain and her blood pressure was noted to be elevated.  Meloxicam was stopped and patient was asked to follow-up with primary care.  Patient denies chest pain, shortness of breath, headache, lower extremity edema.  She wonders what else she can take for pain to help with inflammation.    Problem list and histories reviewed & adjusted, as indicated.  Additional history: as documented    Patient Active Problem List   Diagnosis     Chronic rhinitis     Esophageal reflux     Menopause     Menopausal syndrome (hot flashes)     Bilateral leg cramps     Chronic radicular low back pain     Anemia, unspecified type     Past Surgical History   Procedure Laterality Date     Tubal ligation       Hc tooth extraction w/forcep       Arthroscopy knee Right      Vulvar biopsy       lesion       Social History   Substance Use Topics     Smoking status: Never Smoker     Smokeless tobacco: Never Used     Alcohol use 0.0 oz/week     0 Standard drinks or equivalent per week      Comment: per week, 2-3 drinks 1-2 times per week     Family History   Problem Relation Age of Onset     CANCER Father      liver     Hyperlipidemia Father      MENTAL ILLNESS Sister      Depression & anxiety     Colon Polyps Brother          Current Outpatient Prescriptions   Medication Sig Dispense Refill     diclofenac (VOLTAREN) 1 % GEL topical gel Apply 4 grams to knees four times daily as needed using enclosed dosing card. 100 g 1     gabapentin (NEURONTIN) 300 MG capsule Take 1 capsule (300 mg) by mouth At Bedtime 90 capsule 1     OMEPRAZOLE PO Take by mouth daily       fluticasone (FLONASE) 50  "MCG/ACT nasal spray Spray 2 sprays into both nostrils daily       meloxicam (MOBIC) 15 MG tablet Take 1 tablet (15 mg) by mouth daily (Patient not taking: Reported on 3/14/2017) 30 tablet 1     order for DME Knee immobilizer (Patient not taking: Reported on 2/14/2017) 1 Device 0     No Known Allergies  BP Readings from Last 3 Encounters:   03/14/17 124/72   03/07/17 162/84   02/14/17 153/84    Wt Readings from Last 3 Encounters:   03/14/17 197 lb (89.4 kg)   03/07/17 201 lb (91.2 kg)   02/06/17 193 lb 3.2 oz (87.6 kg)                  Labs reviewed in EPIC    Reviewed and updated as needed this visit by clinical staff       Reviewed and updated as needed this visit by Provider         ROS:  Constitutional, HEENT, cardiovascular, pulmonary, gi and gu systems are negative, except as otherwise noted.    OBJECTIVE:                                                    /72  Pulse 90  Temp 98  F (36.7  C) (Oral)  Ht 5' 6.5\" (1.689 m)  Wt 197 lb (89.4 kg)  BMI 31.32 kg/m2  Body mass index is 31.32 kg/(m^2).  GENERAL: healthy, alert and no distress  RESP: lungs clear to auscultation - no rales, rhonchi or wheezes  CV: regular rate and rhythm, normal S1 S2, no S3 or S4, no murmur, click or rub, no peripheral edema and peripheral pulses strong  MS: no gross musculoskeletal defects noted, no edema    Diagnostic Test Results:  none      ASSESSMENT/PLAN:                                                      1. Elevated blood pressure reading without diagnosis of hypertension  Possibly due to meloxicam use.  Patient to enroll in pharmacy blood pressure program and recheck again in 2-3 weeks.    2. Primary osteoarthritis of left knee  Will try topical meds to see if we can improve pain without increasing blood pressure.  - diclofenac (VOLTAREN) 1 % GEL topical gel; Apply 4 grams to knees four times daily as needed using enclosed dosing card.  Dispense: 100 g; Refill: 1    FUTURE APPOINTMENTS:       - Follow-up for annual visit " or as needed    Samira Francis, MONI PATRICK  [facility]

## 2017-06-05 DIAGNOSIS — J31.0 CHRONIC RHINITIS: Primary | ICD-10-CM

## 2017-06-05 NOTE — TELEPHONE ENCOUNTER
fluticasone (FLONASE) 50 MCG/ACT nasal spray      Last Written Prescription Date: 2/6/17  Last Fill Quantity: 0,  # refills: 0   Last Office Visit with [facility], [facility] or [facility] prescribing provider: 2/6/17

## 2017-06-06 RX ORDER — FLUTICASONE PROPIONATE 50 MCG
2 SPRAY, SUSPENSION (ML) NASAL DAILY
Qty: 1 BOTTLE | Refills: 3 | Status: SHIPPED | OUTPATIENT
Start: 2017-06-06 | End: 2021-03-26

## 2017-06-06 NOTE — TELEPHONE ENCOUNTER
Routing refill request to provider for review/approval because:  Medication is reported/historical      Lizbeth Virk RN - BC

## 2017-06-07 DIAGNOSIS — K21.9 GASTROESOPHAGEAL REFLUX DISEASE, ESOPHAGITIS PRESENCE NOT SPECIFIED: Primary | ICD-10-CM

## 2017-06-07 RX ORDER — NICOTINE POLACRILEX 4 MG/1
20 GUM, CHEWING ORAL DAILY
Qty: 90 TABLET | Refills: 3 | Status: CANCELLED | OUTPATIENT
Start: 2017-06-07

## 2017-06-07 NOTE — TELEPHONE ENCOUNTER
OMEPRAZOLE DR 20 mg capsule      Last Written Prescription Date: unknown  Last Fill Quantity: unknown,  # refills: unknown   Last Office Visit with [facility], [facility] or [facility] prescribing provider: 3/14/17    *Previously prescribed by another provider and now has Dr. Dumont as primary.

## 2017-06-08 RX ORDER — LANSOPRAZOLE 30 MG/1
30 CAPSULE, DELAYED RELEASE ORAL DAILY
Qty: 90 CAPSULE | Refills: 0 | Status: SHIPPED | OUTPATIENT
Start: 2017-06-08 | End: 2017-08-03

## 2017-06-08 NOTE — TELEPHONE ENCOUNTER
OK to fill x 90 d, but omprazole is not covered by insurance  Prevacid is-- recommend change to this  Recommend physical with me-- can address refills at that time  Please schedule now  Nasrin Dumont MD

## 2017-06-09 ENCOUNTER — TELEPHONE (OUTPATIENT)
Dept: FAMILY MEDICINE | Facility: CLINIC | Age: 62
End: 2017-06-09

## 2017-06-09 NOTE — TELEPHONE ENCOUNTER
Has not tried other medication  Is currently on fluticasone  Diagnosis is chronic rhinitis  Nasrin Dumont MD

## 2017-06-09 NOTE — TELEPHONE ENCOUNTER
Received fax from pharmacy stating patient requires Prior Authorization for fluticasone (FLONASE) 50 MCG/ACT spray    Insurance information:  Name: [place]  Phone number: [phone]  ID number: [id number]    Provider to address. Message route to Dr. Leigh. Initiate prior authorization or change medication?  Please advise.      **If a prior authorization is to be initiated, please list the following:    -Any medications the patient has tried and failed or any contraindications. **    -Is the patient currently on this medication, or has tried before? **    -What is the diagnosis? **    - Justification or other information that me by helpful. **

## 2017-06-12 ENCOUNTER — RADIANT APPOINTMENT (OUTPATIENT)
Dept: MAMMOGRAPHY | Facility: CLINIC | Age: 62
End: 2017-06-12
Attending: FAMILY MEDICINE
Payer: COMMERCIAL

## 2017-06-12 DIAGNOSIS — Z12.31 VISIT FOR SCREENING MAMMOGRAM: ICD-10-CM

## 2017-06-12 PROCEDURE — G0202 SCR MAMMO BI INCL CAD: HCPCS | Mod: TC

## 2017-06-13 NOTE — TELEPHONE ENCOUNTER
Tried to do PA thru CoverMyMeds sent to [facility] and they sent back PA not needed. Called Pharmacy and informed them of this information they will call insurance and see what else is needed and will re-send if needed.  Nadya Mann,

## 2017-06-26 DIAGNOSIS — R25.2 BILATERAL LEG CRAMPS: ICD-10-CM

## 2017-06-26 DIAGNOSIS — M54.16 CHRONIC RADICULAR LOW BACK PAIN: ICD-10-CM

## 2017-06-26 DIAGNOSIS — K21.9 GASTROESOPHAGEAL REFLUX DISEASE, ESOPHAGITIS PRESENCE NOT SPECIFIED: ICD-10-CM

## 2017-06-26 DIAGNOSIS — G89.29 CHRONIC RADICULAR LOW BACK PAIN: ICD-10-CM

## 2017-06-26 DIAGNOSIS — M53.3 SACRO ILIAL PAIN: ICD-10-CM

## 2017-06-26 RX ORDER — GABAPENTIN 300 MG/1
300 CAPSULE ORAL AT BEDTIME
Qty: 90 CAPSULE | Refills: 1 | Status: SHIPPED | OUTPATIENT
Start: 2017-06-26 | End: 2017-12-31

## 2017-06-26 NOTE — TELEPHONE ENCOUNTER
Reason for Call:  Other call back    Detailed comments: [facility] pharmacy called to request an refill for the prescription Gabapentin as the patient is currently out, fax number of     Phone Number Patient can be reached at: Other phone number:  [phone]    Best Time: today    Can we leave a detailed message on this number? NO    Call taken on 6/26/2017 at 12:25 PM by Diamond Silverman

## 2017-06-26 NOTE — TELEPHONE ENCOUNTER
gabapentin (NEURONTIN) 300 MG capsule  Last Written Prescription Date: 12/27/2016  Last Fill Quantity: 90,  # refills: 1   Last Office Visit with [facility], [facility] or [facility] prescribing provider: 3/14/2017                                         Next 5 appointments (look out 90 days)     Aug 03, 2017  1:00 PM CDT   PHYSICAL with Nasrin Dumont MD   [facility] [place] ([facility])    [address]  [place] [address]   [phone]

## 2017-06-26 NOTE — TELEPHONE ENCOUNTER
Routing refill request to provider for review/approval because:  Drug not on the [facility] refill protocol       Lizbeth Virk RN - BC

## 2017-07-24 ENCOUNTER — TRANSFERRED RECORDS (OUTPATIENT)
Dept: HEALTH INFORMATION MANAGEMENT | Facility: CLINIC | Age: 62
End: 2017-07-24

## 2017-07-24 NOTE — PATIENT INSTRUCTIONS
Preventive Health Recommendations  Female Ages 50 - 64    Yearly exam: See your health care provider every year in order to  o Review health changes.   o Discuss preventive care.    o Review your medicines if your doctor has prescribed any.      Get a Pap test every three years (unless you have an abnormal result and your provider advises testing more often).    If you get Pap tests with HPV test, you only need to test every 5 years, unless you have an abnormal result.     You do not need a Pap test if your uterus was removed (hysterectomy) and you have not had cancer.    You should be tested each year for STDs (sexually transmitted diseases) if you're at risk.     Have a mammogram every 1 to 2 years.    Have a colonoscopy at age 50, or have a yearly FIT test (stool test). These exams screen for colon cancer.      Have a cholesterol test every 5 years, or more often if advised.    Have a diabetes test (fasting glucose) every three years. If you are at risk for diabetes, you should have this test more often.     If you are at risk for osteoporosis (brittle bone disease), think about having a bone density scan (DEXA).    Shots: Get a flu shot each year. Get a tetanus shot every 10 years.    Nutrition:     Eat at least 5 servings of fruits and vegetables each day.    Eat whole-grain bread, whole-wheat pasta and brown rice instead of white grains and rice.    Talk to your provider about Calcium and Vitamin D.     Lifestyle    Exercise at least 150 minutes a week (30 minutes a day, 5 days a week). This will help you control your weight and prevent disease.    Limit alcohol to one drink per day.    No smoking.     Wear sunscreen to prevent skin cancer.     See your dentist every six months for an exam and cleaning.    See your eye doctor every 1 to 2 years.      We will do blood work today.    PGen Hypertension Study Summary; we will get you signed up today       PGen Hypertension Study   Visit 1 patient  information    Thank you for your interest in the [facility] hypertension research study. In two weeks, your hypertension medication will be prescribed through a free follow-up virtual encounter (via phone or through [employer]).    In the coming days you will be contacted by a research team member to schedule your next office visit. After it is scheduled, be sure to let the research coordinator know as soon as possible if you cannot make it so that the visit can be rescheduled for you.      Please contact the research coordinator if you receive care for your high blood pressure outside of your study visits.    If you have any questions, please contact the research coordinator at [phone]. If you experience any symptoms please call the [facility] 24/7 triage  number at [phone]. If your phone number, email or address changes please alert the research coordinator.      In the meantime, we ask that you complete the online surveys emailed out to you, if completing online, or mailed out to you, if completing paper surveys, before your next visit.    [facility]    If you have any questions regarding to your visit please contact your care team:       Team Purple:   Clinic Hours Telephone Number   Dr. Katerina Dumont     7am-7pm  Monday - Thursday   7am-5pm  Fridays  [phone]  (Appointment scheduling available 24/7)    Questions about your Visit?   Team Line:  [phone]   Urgent Care - [facility] - 11am-9pm Monday-Friday Saturday-Sunday- 9am-5pm   [place] - 5pm-9pm Monday-Friday Saturday-Sunday- 9am-5pm  [phone] - [place]   [phone] - [place]       What options do I have for visits at the clinic other than the traditional office visit?  To expand how we care for you, many of our providers are utilizing electronic visits (e-visits) and telephone visits, when medically appropriate, for interactions with their patients  rather than a visit in the clinic.   We also offer nurse visits for many medical concerns. Just like any other service, we will bill your insurance company for this type of visit based on time spent on the phone with your provider. Not all insurance companies cover these visits. Please check with your medical insurance if this type of visit is covered. You will be responsible for any charges that are not paid by your insurance.      E-visits via [employer]:  generally incur a $35.00 fee.  Telephone visits:  Time spent on the phone: *charged based on time that is spent on the phone in increments of 10 minutes. Estimated cost:   5-10 mins $30.00   11-20 mins. $59.00   21-30 mins. $85.00     Use [employer] (secure email communication and access to your chart) to send your primary care provider a message or make an appointment. Ask someone on your Team how to sign up for [employer].  For a Price Quote for your services, please call our Consumer Price Line at [phone].  As always, Thank you for trusting us with your health care needs!    Discharged By: Zahira

## 2017-08-03 ENCOUNTER — OFFICE VISIT (OUTPATIENT)
Dept: FAMILY MEDICINE | Facility: CLINIC | Age: 62
End: 2017-08-03
Payer: COMMERCIAL

## 2017-08-03 VITALS
HEIGHT: 67 IN | HEART RATE: 99 BPM | SYSTOLIC BLOOD PRESSURE: 144 MMHG | DIASTOLIC BLOOD PRESSURE: 82 MMHG | OXYGEN SATURATION: 98 % | WEIGHT: 191 LBS | BODY MASS INDEX: 29.98 KG/M2 | TEMPERATURE: 97.2 F

## 2017-08-03 DIAGNOSIS — M17.12 PRIMARY OSTEOARTHRITIS OF LEFT KNEE: ICD-10-CM

## 2017-08-03 DIAGNOSIS — D64.9 ANEMIA, UNSPECIFIED TYPE: ICD-10-CM

## 2017-08-03 DIAGNOSIS — I10 HYPERTENSION GOAL BP (BLOOD PRESSURE) < 140/90: ICD-10-CM

## 2017-08-03 DIAGNOSIS — Z12.4 SCREENING FOR MALIGNANT NEOPLASM OF CERVIX: ICD-10-CM

## 2017-08-03 DIAGNOSIS — Z00.01 ENCOUNTER FOR ROUTINE ADULT HEALTH EXAMINATION WITH ABNORMAL FINDINGS: Primary | ICD-10-CM

## 2017-08-03 LAB
ANION GAP SERPL CALCULATED.3IONS-SCNC: 8 MMOL/L (ref 3–14)
BASOPHILS # BLD AUTO: 0 10E9/L (ref 0–0.2)
BASOPHILS NFR BLD AUTO: 0.4 %
BUN SERPL-MCNC: 17 MG/DL (ref 7–30)
CALCIUM SERPL-MCNC: 9.6 MG/DL (ref 8.5–10.1)
CHLORIDE SERPL-SCNC: 105 MMOL/L (ref 94–109)
CO2 SERPL-SCNC: 28 MMOL/L (ref 20–32)
CREAT SERPL-MCNC: 0.69 MG/DL (ref 0.52–1.04)
DIFFERENTIAL METHOD BLD: NORMAL
EOSINOPHIL # BLD AUTO: 0.2 10E9/L (ref 0–0.7)
EOSINOPHIL NFR BLD AUTO: 3.1 %
ERYTHROCYTE [DISTWIDTH] IN BLOOD BY AUTOMATED COUNT: 13.1 % (ref 10–15)
GFR SERPL CREATININE-BSD FRML MDRD: 87 ML/MIN/1.7M2
GLUCOSE SERPL-MCNC: 108 MG/DL (ref 70–99)
HCT VFR BLD AUTO: 38.6 % (ref 35–47)
HGB BLD-MCNC: 13 G/DL (ref 11.7–15.7)
LYMPHOCYTES # BLD AUTO: 1.7 10E9/L (ref 0.8–5.3)
LYMPHOCYTES NFR BLD AUTO: 22.3 %
MCH RBC QN AUTO: 31.9 PG (ref 26.5–33)
MCHC RBC AUTO-ENTMCNC: 33.7 G/DL (ref 31.5–36.5)
MCV RBC AUTO: 95 FL (ref 78–100)
MONOCYTES # BLD AUTO: 0.6 10E9/L (ref 0–1.3)
MONOCYTES NFR BLD AUTO: 7.5 %
NEUTROPHILS # BLD AUTO: 5.1 10E9/L (ref 1.6–8.3)
NEUTROPHILS NFR BLD AUTO: 66.7 %
PLATELET # BLD AUTO: 257 10E9/L (ref 150–450)
POTASSIUM SERPL-SCNC: 4 MMOL/L (ref 3.4–5.3)
RBC # BLD AUTO: 4.07 10E12/L (ref 3.8–5.2)
SODIUM SERPL-SCNC: 141 MMOL/L (ref 133–144)
WBC # BLD AUTO: 7.6 10E9/L (ref 4–11)

## 2017-08-03 PROCEDURE — 99396 PREV VISIT EST AGE 40-64: CPT | Performed by: FAMILY MEDICINE

## 2017-08-03 PROCEDURE — 85025 COMPLETE CBC W/AUTO DIFF WBC: CPT | Performed by: FAMILY MEDICINE

## 2017-08-03 PROCEDURE — 36415 COLL VENOUS BLD VENIPUNCTURE: CPT | Performed by: FAMILY MEDICINE

## 2017-08-03 PROCEDURE — 87624 HPV HI-RISK TYP POOLED RSLT: CPT | Performed by: FAMILY MEDICINE

## 2017-08-03 PROCEDURE — 80048 BASIC METABOLIC PNL TOTAL CA: CPT | Performed by: FAMILY MEDICINE

## 2017-08-03 PROCEDURE — G0145 SCR C/V CYTO,THINLAYER,RESCR: HCPCS | Performed by: FAMILY MEDICINE

## 2017-08-03 ASSESSMENT — PAIN SCALES - GENERAL: PAINLEVEL: NO PAIN (0)

## 2017-08-03 NOTE — PROGRESS NOTES
SUBJECTIVE:   CC: Lola Magaña is an 62 year old woman who presents for preventive health visit.     Physical   Annual:     Getting at least 3 servings of Calcium per day::  Yes    Bi-annual eye exam::  Yes    Dental care twice a year::  Yes    Sleep apnea or symptoms of sleep apnea::  None    Diet::  Regular (no restrictions)    Taking medications regularly::  Yes    Medication side effects::  None, No muscle aches and No significant flushing    Additional concerns today::  YES      MS/knee pain: patient reports that 2 weeks ago as she was getting on a motorcycle bike, heard a pop out on her L knee. Went into , heard the knee catching a few times, resolved on it's own. Still unable to extend her knee inward, otherwise knee is not bothering her much.   Patient is not on steroid anti-inflammatory medications because she states that they raise her BP. Only using Voltaren gel to her knee.        Today's PHQ-2 Score:   PHQ-2 ( 1999 Pfizer) 8/3/2017   Q1: Little interest or pleasure in doing things 0   Q2: Feeling down, depressed or hopeless 0   PHQ-2 Score 0   Q1: Little interest or pleasure in doing things Not at all   Q2: Feeling down, depressed or hopeless Not at all   PHQ-2 Score 0       Abuse: Current or Past(Physical, Sexual or Emotional)- No  Do you feel safe in your environment - Yes    Social History   Substance Use Topics     Smoking status: Never Smoker     Smokeless tobacco: Never Used     Alcohol use 0.0 oz/week     0 Standard drinks or equivalent per week      Comment: per week, 2-3 drinks 1-2 times per week     The patient does not drink >3 drinks per day nor >7 drinks per week.    Reviewed orders with patient.  Reviewed health maintenance and updated orders accordingly - Yes  BP Readings from Last 3 Encounters:   08/03/17 144/82   03/14/17 124/72   03/07/17 162/84    Wt Readings from Last 3 Encounters:   08/03/17 191 lb (86.6 kg)   03/14/17 197 lb (89.4 kg)   03/07/17 201 lb (91.2 kg)             Patient Active Problem List   Diagnosis     Chronic rhinitis     Esophageal reflux     Menopause     Menopausal syndrome (hot flashes)     Bilateral leg cramps     Chronic radicular low back pain     Hypertension goal BP (blood pressure) < 140/90     Past Surgical History:   Procedure Laterality Date     ARTHROSCOPY KNEE Right      HC TOOTH EXTRACTION W/FORCEP       TUBAL LIGATION       vulvar biopsy      lesion       Social History   Substance Use Topics     Smoking status: Never Smoker     Smokeless tobacco: Never Used     Alcohol use 0.0 oz/week     0 Standard drinks or equivalent per week      Comment: per week, 2-3 drinks 1-2 times per week     Family History   Problem Relation Age of Onset     CANCER Father      liver     Hyperlipidemia Father      MENTAL ILLNESS Sister      Depression & anxiety     Colon Polyps Brother          Current Outpatient Prescriptions   Medication Sig Dispense Refill     diclofenac (VOLTAREN) 1 % GEL topical gel Apply 4 grams to knees four times daily as needed using enclosed dosing card. 100 g 1     gabapentin (NEURONTIN) 300 MG capsule Take 1 capsule (300 mg) by mouth At Bedtime 90 capsule 1     fluticasone (FLONASE) 50 MCG/ACT spray Spray 2 sprays into both nostrils daily 1 Bottle 3     OMEPRAZOLE PO Take by mouth daily       No Known Allergies  Recent Labs   Lab Test  08/03/17   1406  05/25/16   1343  04/09/14   LDL   --    --    --   101   HDL   --    --    --   77   TRIG   --    --    --   81   ALT   --   25   --    --    CR  0.69  0.63   < >   --    GFRESTIMATED  87  >90  Non  GFR Calc     < >   --    GFRESTBLACK  >90   GFR Calc    >90   GFR Calc     < >   --    POTASSIUM  4.0  4.1   < >   --    TSH   --   0.40   --    --     < > = values in this interval not displayed.      Patient over age 50, mutual decision to screen reflected in health maintenance.      Pertinent mammograms are reviewed under the imaging tab.  History of  "abnormal Pap smear: NO - age 30- 65 PAP every 3 years recommended  Last 3 Pap Results: No results found for: PAP    Reviewed and updated as needed this visit by clinical staffTobacco  Allergies  Meds         Reviewed and updated as needed this visit by Provider              ROS:  C: NEGATIVE for fever, chills, change in weight  I: NEGATIVE for worrisome rashes, moles or lesions  E: NEGATIVE for vision changes or irritation  ENT: NEGATIVE for ear, mouth and throat problems  R: NEGATIVE for significant cough or SOB  B: NEGATIVE for masses, tenderness or discharge  CV: NEGATIVE for chest pain, palpitations or peripheral edema  GI: NEGATIVE for nausea, abdominal pain, heartburn, or change in bowel habits  : NEGATIVE for unusual urinary or vaginal symptoms. No vaginal bleeding.  MUSCULOSKELETAL:as above  N: NEGATIVE for weakness, dizziness or paresthesias  P: NEGATIVE for changes in mood or affect      This document serves as a record of the services and decisions personally performed and made by Nasrin Dumont MD. It was created on her behalf by Serenity Yeh, a trained medical scribe. The creation of this document is based the provider's statements to the medical scribe.    Serenity Yeh August 3, 2017 1:14 PM    OBJECTIVE:   /82  Pulse 99  Temp 97.2  F (36.2  C) (Oral)  Ht 5' 6.65\" (1.693 m)  Wt 191 lb (86.6 kg)  SpO2 98%  BMI 30.23 kg/m2  EXAM:  GENERAL: healthy, alert, no distress and over weight  EYES: Eyes grossly normal to inspection, PERRL and conjunctivae and sclerae normal  HENT: ear canals and TM's normal, nose and mouth without ulcers or lesions  NECK: no adenopathy, no asymmetry, masses, or scars and thyroid normal to palpation  RESP: lungs clear to auscultation - no rales, rhonchi or wheezes  BREAST: normal without masses, tenderness or nipple discharge and no palpable axillary masses or adenopathy  CV: regular rate and rhythm, normal S1 S2, no S3 or S4, no murmur, click or rub, no peripheral " "edema and peripheral pulses strong  ABDOMEN: soft, nontender, no hepatosplenomegaly, no masses and bowel sounds normal   (female): normal female external genitalia, normal urethral meatus, vaginal mucosal atrophy and normal cervix/adnexa/uterus without masses or discharge  MS: left knee with mild effusion, full rom, minimal ttp.   SKIN: no suspicious lesions or rashes  NEURO: Normal strength and tone, mentation intact and speech normal  PSYCH: mentation appears normal, affect normal/bright    ASSESSMENT/PLAN:       ICD-10-CM    1. Encounter for routine adult health examination with abnormal findings Z00.01    2. Hypertension goal BP (blood pressure) < 140/90 I10 BASIC METABOLIC PANEL   3. Primary osteoarthritis of left knee M17.12 diclofenac (VOLTAREN) 1 % GEL topical gel   4. Anemia, unspecified type D64.9 CBC with platelets and differential   5. Screening for malignant neoplasm of cervix Z12.4 Pap imaged thin layer screen with HPV - recommended age 30 - 65 years (select HPV order below)     HPV High Risk Types DNA Cervical       New diagnosis of hypertension today  After discussion of benefits and risks, agrees to PGen study. Management deferred at this time, until results known  OA left knee-- stable.   Anemia in past-- recheck today  Pap today.       COUNSELING:  Special attention given to:        Regular exercise       Healthy diet/nutrition       Vaccines utd        Colon cancer screening              reports that she has never smoked. She has never used smokeless tobacco.  Estimated body mass index is 30.23 kg/(m^2) as calculated from the following:    Height as of this encounter: 5' 6.65\" (1.693 m).    Weight as of this encounter: 191 lb (86.6 kg).   Weight management plan: Discussed healthy diet and exercise guidelines and patient will follow up in 12 months in clinic to re-evaluate.    Counseling Resources:  ATP IV Guidelines  Pooled Cohorts Equation Calculator  Breast Cancer Risk Calculator  FRAX Risk " Assessment  ICSI Preventive Guidelines  Dietary Guidelines for Americans, 2010  USDA's MyPlate  ASA Prophylaxis  Lung CA Screening    The information in this document, created by the medical scribe for me, accurately reflects the services I personally performed and the decisions made by me. I have reviewed and approved this document for accuracy prior to leaving the patient care area.    Nasrin Dumont MD  [facility]

## 2017-08-03 NOTE — MR AVS SNAPSHOT
After Visit Summary   8/3/2017    Lola Magaña    MRN: [id number]           Patient Information     Date Of Birth          1955        Visit Information        Provider Department      8/3/2017 1:00 PM Nasrin Dumont MD [facility]        Today's Diagnoses     Screening for malignant neoplasm of cervix    -  1    Primary osteoarthritis of left knee        Hypertension goal BP (blood pressure) < 140/90        Anemia, unspecified type          Care Instructions      Preventive Health Recommendations  Female Ages 50 - 64    Yearly exam: See your health care provider every year in order to  o Review health changes.   o Discuss preventive care.    o Review your medicines if your doctor has prescribed any.      Get a Pap test every three years (unless you have an abnormal result and your provider advises testing more often).    If you get Pap tests with HPV test, you only need to test every 5 years, unless you have an abnormal result.     You do not need a Pap test if your uterus was removed (hysterectomy) and you have not had cancer.    You should be tested each year for STDs (sexually transmitted diseases) if you're at risk.     Have a mammogram every 1 to 2 years.    Have a colonoscopy at age 50, or have a yearly FIT test (stool test). These exams screen for colon cancer.      Have a cholesterol test every 5 years, or more often if advised.    Have a diabetes test (fasting glucose) every three years. If you are at risk for diabetes, you should have this test more often.     If you are at risk for osteoporosis (brittle bone disease), think about having a bone density scan (DEXA).    Shots: Get a flu shot each year. Get a tetanus shot every 10 years.    Nutrition:     Eat at least 5 servings of fruits and vegetables each day.    Eat whole-grain bread, whole-wheat pasta and brown rice instead of white grains and rice.    Talk to your provider about Calcium and Vitamin D.      Lifestyle    Exercise at least 150 minutes a week (30 minutes a day, 5 days a week). This will help you control your weight and prevent disease.    Limit alcohol to one drink per day.    No smoking.     Wear sunscreen to prevent skin cancer.     See your dentist every six months for an exam and cleaning.    See your eye doctor every 1 to 2 years.      We will do blood work today.    [facility] Hypertension Study Summary; we will get you signed up today       [facility]   Visit 1 patient information    Thank you for your interest in the [facility] hypertension research study. In two weeks, your hypertension medication will be prescribed through a free follow-up virtual encounter (via phone or through [employer]).    In the coming days you will be contacted by a research team member to schedule your next office visit. After it is scheduled, be sure to let the research coordinator know as soon as possible if you cannot make it so that the visit can be rescheduled for you.      Please contact the research coordinator if you receive care for your high blood pressure outside of your study visits.    If you have any questions, please contact the research coordinator at [phone]. If you experience any symptoms please call the [place] 24/7 triage  number at [phone]. If your phone number, email or address changes please alert the research coordinator.      In the meantime, we ask that you complete the online surveys emailed out to you, if completing online, or mailed out to you, if completing paper surveys, before your next visit.    [facility]    If you have any questions regarding to your visit please contact your care team:       Team Purple:   Clinic Hours Telephone Number   Dr. Katerina Dumont     7am-7pm  Monday - Thursday   7am-5pm  Fridays  [phone]  (Appointment scheduling available 24/7)    Questions about your Visit?   Team Line:  [phone]    Urgent Care - [place] and [place] - 11am-9pm Monday-Friday Saturday-Sunday- 9am-5pm   [place] - 5pm-9pm Monday-Friday Saturday-Sunday- 9am-5pm  [phone] - Barb   [phone] - [place]       What options do I have for visits at the clinic other than the traditional office visit?  To expand how we care for you, many of our providers are utilizing electronic visits (e-visits) and telephone visits, when medically appropriate, for interactions with their patients rather than a visit in the clinic.   We also offer nurse visits for many medical concerns. Just like any other service, we will bill your insurance company for this type of visit based on time spent on the phone with your provider. Not all insurance companies cover these visits. Please check with your medical insurance if this type of visit is covered. You will be responsible for any charges that are not paid by your insurance.      E-visits via [employer]:  generally incur a $35.00 fee.  Telephone visits:  Time spent on the phone: *charged based on time that is spent on the phone in increments of 10 minutes. Estimated cost:   5-10 mins $30.00   11-20 mins. $59.00   21-30 mins. $85.00     Use [employer] (secure email communication and access to your chart) to send your primary care provider a message or make an appointment. Ask someone on your Team how to sign up for [employer].  For a Price Quote for your services, please call our Consumer Price Line at [phone].  As always, Thank you for trusting us with your health care needs!    Discharged By: An            Follow-ups after your visit        Who to contact     If you have questions or need follow up information about today's clinic visit or your schedule please contact [facility] directly at [phone].  Normal or non-critical lab and imaging results will be communicated to you by MyChart, letter or phone within 4 business days after the clinic has received the  "results. If you do not hear from us within 7 days, please contact the clinic through [employer] or phone. If you have a critical or abnormal lab result, we will notify you by phone as soon as possible.  Submit refill requests through [employer] or call your pharmacy and they will forward the refill request to us. Please allow 3 business days for your refill to be completed.          Additional Information About Your Visit        [employer] Information     [employer] gives you secure access to your electronic health record. If you see a primary care provider, you can also send messages to your care team and make appointments. If you have questions, please call your primary care clinic.  If you do not have a primary care provider, please call [phone] and they will assist you.        Care EveryWhere ID     This is your Care EveryWhere ID. This could be used by other organizations to access your [facility] medical records  [id number]        Your Vitals Were     Pulse Temperature Height Pulse Oximetry BMI (Body Mass Index)       99 97.2  F (36.2  C) (Oral) 5' 6.65\" (1.693 m) 98% 30.23 kg/m2        Blood Pressure from Last 3 Encounters:   08/03/17 144/82   03/14/17 124/72   03/07/17 162/84    Weight from Last 3 Encounters:   08/03/17 191 lb (86.6 kg)   03/14/17 197 lb (89.4 kg)   03/07/17 201 lb (91.2 kg)              We Performed the Following     BASIC METABOLIC PANEL     CBC with platelets and differential     HPV High Risk Types DNA Cervical     Pap imaged thin layer screen with HPV - recommended age 30 - 65 years (select HPV order below)          Today's Medication Changes          These changes are accurate as of: 8/3/17  2:04 PM.  If you have any questions, ask your nurse or doctor.               Stop taking these medicines if you haven't already. Please contact your care team if you have questions.     LANsoprazole 30 MG CR capsule   Commonly known as:  PREVACID   Stopped by:  Nasrin Dumont MD           meloxicam 15 " MG tablet   Commonly known as:  MOBIC   Stopped by:  Nasrin Dumont MD           order for DME   Stopped by:  Nasrin Dumont MD                Where to get your medicines      These medications were sent to [facility] [address] [facility] - [place], [place] - [address]  [address] [place] [address]     Phone:  [phone]     diclofenac 1 % Gel topical gel                Primary Care Provider Office Phone # Fax #    Nasrin Dumont [phone]       [facility] [address]  [place] [address]        Equal Access to Services     [facility]: Hadii aad ku hadasho Soomaali, waaxda luqadaha, qaybta kaalmada adeegyada, waxay idiin hayaan adetiffany szymanski . So [facility] [phone].    ATENCIÓN: Si habla español, tiene a gonzalez disposición servicios gratuitos de asistencia lingüística. [facility] [phone].    We comply with applicable federal civil rights laws and [place] laws. We do not discriminate on the basis of race, color, national origin, age, disability sex, sexual orientation or gender identity.            Thank you!     Thank you for choosing [facility]  for your care. Our goal is always to provide you with excellent care. Hearing back from our patients is one way we can continue to improve our services. Please take a few minutes to complete the written survey that you may receive in the mail after your visit with us. Thank you!             Your Updated Medication List - Protect others around you: Learn how to safely use, store and throw away your medicines at www.disposemymeds.org.          This list is accurate as of: 8/3/17  2:04 PM.  Always use your most recent med list.                   Brand Name Dispense Instructions for use Diagnosis    diclofenac 1 % Gel topical gel    VOLTAREN    100 g    Apply 4 grams to knees four times daily as needed using enclosed dosing card.    Primary osteoarthritis of left knee       fluticasone 50 MCG/ACT spray     FLONASE    1 Bottle    Spray 2 sprays into both nostrils daily    Chronic rhinitis       gabapentin 300 MG capsule    NEURONTIN    90 capsule    Take 1 capsule (300 mg) by mouth At Bedtime    Bilateral leg cramps, Chronic radicular low back pain, Sacro ilial pain       OMEPRAZOLE PO      Take by mouth daily

## 2017-08-03 NOTE — NURSING NOTE
"Chief Complaint   Patient presents with     Physical       Initial /82  Pulse 99  Temp 97.2  F (36.2  C) (Oral)  Ht 5' 6.65\" (1.693 m)  Wt 191 lb (86.6 kg)  SpO2 98%  BMI 30.23 kg/m2 Estimated body mass index is 30.23 kg/(m^2) as calculated from the following:    Height as of this encounter: 5' 6.65\" (1.693 m).    Weight as of this encounter: 191 lb (86.6 kg).  Medication Reconciliation: complete    "

## 2017-08-07 LAB
COPATH REPORT: NORMAL
PAP: NORMAL

## 2017-08-08 LAB
FINAL DIAGNOSIS: NORMAL
HPV HR 12 DNA CVX QL NAA+PROBE: NEGATIVE
HPV16 DNA SPEC QL NAA+PROBE: NEGATIVE
HPV18 DNA SPEC QL NAA+PROBE: NEGATIVE
SPECIMEN DESCRIPTION: NORMAL

## 2017-08-11 ENCOUNTER — OFFICE VISIT (OUTPATIENT)
Dept: ORTHOPEDICS | Facility: CLINIC | Age: 62
End: 2017-08-11
Payer: COMMERCIAL

## 2017-08-11 VITALS
WEIGHT: 191 LBS | HEART RATE: 88 BPM | SYSTOLIC BLOOD PRESSURE: 148 MMHG | BODY MASS INDEX: 29.98 KG/M2 | DIASTOLIC BLOOD PRESSURE: 83 MMHG | HEIGHT: 67 IN

## 2017-08-11 DIAGNOSIS — M17.12 PRIMARY OSTEOARTHRITIS OF LEFT KNEE: ICD-10-CM

## 2017-08-11 DIAGNOSIS — S83.412A SPRAIN OF MEDIAL COLLATERAL LIGAMENT OF LEFT KNEE, INITIAL ENCOUNTER: Primary | ICD-10-CM

## 2017-08-11 PROCEDURE — 99213 OFFICE O/P EST LOW 20 MIN: CPT | Performed by: PHYSICAL MEDICINE & REHABILITATION

## 2017-08-11 NOTE — MR AVS SNAPSHOT
After Visit Summary   8/11/2017    Lola Magaña    MRN: [id number]           Patient Information     Date Of Birth          1955        Visit Information        Provider Department      8/11/2017 3:40 PM Jeannette Armendariz MD [facility]        Care Instructions    Today's Plan of Care:  -Topical Treatments: Ice as needed  -Continue with Voltaren gel as needed  -Recommend light cardiovascular exercise (walking, stationary biking, elliptical, aquatic exercise)  -Follow Up: as needed            Follow-ups after your visit        Who to contact     If you have questions or need follow up information about today's clinic visit or your schedule please contact [facility] directly at [phone].  Normal or non-critical lab and imaging results will be communicated to you by MyChart, letter or phone within 4 business days after the clinic has received the results. If you do not hear from us within 7 days, please contact the clinic through [employer] or phone. If you have a critical or abnormal lab result, we will notify you by phone as soon as possible.  Submit refill requests through [employer] or call your pharmacy and they will forward the refill request to us. Please allow 3 business days for your refill to be completed.          Additional Information About Your Visit        MyChart Information     [employer] gives you secure access to your electronic health record. If you see a primary care provider, you can also send messages to your care team and make appointments. If you have questions, please call your primary care clinic.  If you do not have a primary care provider, please call [phone] and they will assist you.        Care EveryWhere ID     This is your Care EveryWhere ID. This could be used by other organizations to access your [facility] medical records  [id number]        Your Vitals Were     Pulse Height BMI (Body Mass  "Index)             88 5' 6.5\" (1.689 m) 30.37 kg/m2          Blood Pressure from Last 3 Encounters:   08/11/17 148/83   08/03/17 144/82   03/14/17 124/72    Weight from Last 3 Encounters:   08/11/17 191 lb (86.6 kg)   08/03/17 191 lb (86.6 kg)   03/14/17 197 lb (89.4 kg)              Today, you had the following     No orders found for display       Primary Care Provider Office Phone # Fax #    Nasrin Dumont [phone] [address] [facility] [address]        Equal Access to Services     [facility] : Hadii beny rubin [place], wahudson arellano, qaybta kaalmada maria del carmenda, nhung szymanski . So [facility] [phone].    ATENCIÓN: Si habla español, tiene a gonzalez disposición servicios gratuitos de asistencia lingüística. Llame al [phone].    We comply with applicable federal civil rights laws and [place] laws. We do not discriminate on the basis of race, color, national origin, age, disability sex, sexual orientation or gender identity.            Thank you!     Thank you for choosing [facility] [place]  for your care. Our goal is always to provide you with excellent care. Hearing back from our patients is one way we can continue to improve our services. Please take a few minutes to complete the written survey that you may receive in the mail after your visit with us. Thank you!             Your Updated Medication List - Protect others around you: Learn how to safely use, store and throw away your medicines at www.disposemymeds.org.          This list is accurate as of: 8/11/17  4:25 PM.  Always use your most recent med list.                   Brand Name Dispense Instructions for use Diagnosis    diclofenac 1 % Gel topical gel    VOLTAREN    100 g    Apply 4 grams to knees four times daily as needed using enclosed dosing card.    Primary osteoarthritis of left knee       fluticasone 50 MCG/ACT spray    FLONASE    1 Bottle    Spray 2 " sprays into both nostrils daily    Chronic rhinitis       gabapentin 300 MG capsule    NEURONTIN    90 capsule    Take 1 capsule (300 mg) by mouth At Bedtime    Bilateral leg cramps, Chronic radicular low back pain, Sacro ilial pain       OMEPRAZOLE PO      Take by mouth daily

## 2017-08-11 NOTE — PATIENT INSTRUCTIONS
Today's Plan of Care:  -Topical Treatments: Ice as needed  -Continue with Voltaren gel as needed  -Recommend light cardiovascular exercise (walking, stationary biking, elliptical, aquatic exercise)  -Follow Up: as needed

## 2017-08-11 NOTE — NURSING NOTE
"Chief Complaint   Patient presents with     Musculoskeletal Problem     left knee pain       Initial /83  Pulse 88  Ht 5' 6.5\" (1.689 m)  Wt 191 lb (86.6 kg)  BMI 30.37 kg/m2 Estimated body mass index is 30.37 kg/(m^2) as calculated from the following:    Height as of this encounter: 5' 6.5\" (1.689 m).    Weight as of this encounter: 191 lb (86.6 kg).  Medication Reconciliation: complete     Shamir Qureshi MS, ATC      "

## 2017-08-11 NOTE — PROGRESS NOTES
Sports Medicine Clinic Visit - Interim History August 11, 2017    Initial Visit Date 3/7/2017  Initial Injury Date 2/2017    PCP: Nasrin Dumont    Lola Magaña is a 62 year old female who is seen in f/u up for left knee pain. Since last visit on 3/7/2017 patient had been feeling very good until about 2.5 weeks ago.  Was swinging leg over motorcycle and felt pop over lateral side, was seen in Urgent Care, not margarito instability or catching/clicking noted.  Had two days of shooting pain over medial lower leg starting five days ago, has spontaneously resolved two days ago.  She rates the pain at a  0/10 currently.  Symptoms are relieved with Ibuprofen and Voltaren gel and worsened by nothing in particular.     Review of Systems  Musculoskeletal: as above  Remainder of review of systems is negative including constitutional, eyes, ENT, CV, pulmonary, GI, , endocrine, skin, hematologic, and neurologic except as noted in HPI or medical history.    History reviewed. No pertinent past surgical/medical/family/social history.    Past Medical History:   Diagnosis Date     Anemia, unspecified type 5/27/2016    Regularly gives blood. Now on iron regularly      Colon polyp      GERD (gastroesophageal reflux disease)      Rhinitis      Past Surgical History:   Procedure Laterality Date     ARTHROSCOPY KNEE Right      HC TOOTH EXTRACTION W/FORCEP       TUBAL LIGATION       vulvar biopsy      lesion     Family History   Problem Relation Age of Onset     CANCER Father      liver     Hyperlipidemia Father      MENTAL ILLNESS Sister      Depression & anxiety     Colon Polyps Brother      Social History     Social History     Marital status:      Spouse name: N/A     Number of children: N/A     Years of education: N/A     Occupational History     Not on file.     Social History Main Topics     Smoking status: Never Smoker     Smokeless tobacco: Never Used     Alcohol use 0.0 oz/week     0 Standard drinks or equivalent per  "week      Comment: per week, 2-3 drinks 1-2 times per week     Drug use: No     Sexual activity: Yes     Partners: Male     Birth control/ protection: Female Surgical     Other Topics Concern     Not on file     Social History Narrative       Current Outpatient Prescriptions   Medication     diclofenac (VOLTAREN) 1 % GEL topical gel     gabapentin (NEURONTIN) 300 MG capsule     fluticasone (FLONASE) 50 MCG/ACT spray     OMEPRAZOLE PO     No current facility-administered medications for this visit.      No Known Allergies      Objective:  /83  Pulse 88  Ht 5' 6.5\" (1.689 m)  Wt 191 lb (86.6 kg)  BMI 30.37 kg/m2    General: Alert and in no distress    Head: Normocephalic, atraumatic  Eyes: no scleral icterus or conjunctival erythema   Oropharynx:  Mucous membranes moist  Skin: no erythema, ecchymosis, petechiae, or jaundice  CV: regular rhythm by palpation, 2+ distal pulses  Resp: normal respiratory effort without conversational dyspnea   Psych: normal mood and affect    Gait: Non-antalgic, appropriate coordination and balance   Neuro: Motor strength and sensation as noted below    Musculoskeletal:    Bilateral Knee exam    Inspection:  No erythema, ecchymosis, warmth, or edema    Palpation: No tenderness to palpation of either knee    Knee ROM: Full active and passive ROM without pain    Hip ROM: Full active and passive ROM without pain    Patellar Motion:      Normal patellar tracking noted through range of motion    Strength:  5/5 Hip flexion/abduction/adduction, knee extension/flexion, ankle plantarflexion/dorsiflexion, great toe extension, toe flexion    Special Tests: Negative log roll, negative anterior/posterior drawer, negative Lachman's, no varus/valgus instability at 0 and 30 degrees, Rose's test negative for pain or popping at the lateral/medial joint line    Sensation:  Intact to light touch in the bilateral lower extremities    Radiology:  No imaging today    Assessment:  1. Sprain of " medial collateral ligament of left knee, initial encounter    2. Primary osteoarthritis of left knee        Plan:  Discussed the assessment with the patient. We discussed the following treatment options: symptom treatment, activity modification/rest, imaging and rehab. Following discussion, plan:  -Lola has been doing better the last few days and really hasn't had any pain.  Therefore, I do not think x-rays are needed.  -Recommend continuing the Voltaren gel as needed.  -Ice for 15-20 minutes as needed for soreness.  -Recommend light cardiovascular exercise (walking, stationary biking, elliptical, aquatic exercise)  -Follow up as needed.  Please call with questions or concerns.      Lisseth Armendariz MD, CAQ  [facility]

## 2017-08-18 ENCOUNTER — TRANSFERRED RECORDS (OUTPATIENT)
Dept: HEALTH INFORMATION MANAGEMENT | Facility: CLINIC | Age: 62
End: 2017-08-18

## 2017-08-20 ENCOUNTER — TELEPHONE (OUTPATIENT)
Dept: FAMILY MEDICINE | Facility: CLINIC | Age: 62
End: 2017-08-20

## 2017-08-20 DIAGNOSIS — I10 HYPERTENSION GOAL BP (BLOOD PRESSURE) < 140/90: Primary | ICD-10-CM

## 2017-08-21 NOTE — TELEPHONE ENCOUNTER
Dr. Dumont    Your patient's PGEN results have been received and are in the process of being scanned into the media tab.  Based on this patient's genetic profile a prioritized list of blood pressure medications is suggested.  I have pended the order to reflect this standing order as per our study protocol.     If you agree with the recommendations in the order:  1.  Please sign the pended PGEN RN HTN MGNT order and route this back to Nupur Mascorro (PGEN ) so she can contact the patient.  2. Please sign the pended  prescription and send to the patient's preferred pharmacy and Nupur will advise the patient the prescription has been sent. She will also arrange for study follow-up visit.    If you do NOT agree with the standing order, please route back to me with the changes you would like to make to these orders    Marilyn Carrillo MD  PGEN study

## 2017-08-22 ENCOUNTER — MYC MEDICAL ADVICE (OUTPATIENT)
Dept: FAMILY MEDICINE | Facility: CLINIC | Age: 62
End: 2017-08-22

## 2017-08-22 RX ORDER — METOPROLOL SUCCINATE 25 MG/1
25 TABLET, EXTENDED RELEASE ORAL DAILY
Qty: 30 TABLET | Refills: 1 | Status: SHIPPED | OUTPATIENT
Start: 2017-08-22 | End: 2017-11-02

## 2017-09-05 ENCOUNTER — ALLIED HEALTH/NURSE VISIT (OUTPATIENT)
Dept: NURSING | Facility: CLINIC | Age: 62
End: 2017-09-05

## 2017-09-05 VITALS — DIASTOLIC BLOOD PRESSURE: 68 MMHG | SYSTOLIC BLOOD PRESSURE: 112 MMHG | HEART RATE: 68 BPM

## 2017-09-05 DIAGNOSIS — I10 HTN (HYPERTENSION): Primary | ICD-10-CM

## 2017-09-05 NOTE — PATIENT INSTRUCTIONS
[facility] Hypertension Study  Visit 2     Thank you for your continued participation in the hypertension study!  Please continue taking your hypertension medications as directed. Your next visit will be in four weeks and will be scheduled for you in the coming days. After it is scheduled, be sure to let the research coordinator know as soon as possible if you cannot make it so that the visit can be rescheduled for you.     Please contact the research coordinator if you receive care for your hypertension outside of your study visits.    If you have any questions, please contact the research coordinator at [phone]. If you experience any symptoms please call the [facility] 24/7 triage number at [phone]. If your phone number, email or address changes please alert the research coordinator.     In the meantime, we ask that you complete the online surveys emailed out to you, if completing online, or mailed out to you, if completing paper surveys, before your next visit.

## 2017-09-05 NOTE — MR AVS SNAPSHOT
After Visit Summary   9/5/2017    Lola Magaña    MRN: [id number]           Patient Information     Date Of Birth          1955        Visit Information        Provider Department      9/5/2017 12:00 PM FZ HC/HTN [facility]        Today's Diagnoses     HTN (hypertension)    -  1      Care Instructions    PGen Hypertension Study  Visit 2     Thank you for your continued participation in the hypertension study!  Please continue taking your hypertension medications as directed. Your next visit will be in four weeks and will be scheduled for you in the coming days. After it is scheduled, be sure to let the research coordinator know as soon as possible if you cannot make it so that the visit can be rescheduled for you.     Please contact the research coordinator if you receive care for your hypertension outside of your study visits.    If you have any questions, please contact the research coordinator at [phone]. If you experience any symptoms please call the [place] 24/7 triage number at [phone]. If your phone number, email or address changes please alert the research coordinator.     In the meantime, we ask that you complete the online surveys emailed out to you, if completing online, or mailed out to you, if completing paper surveys, before your next visit.          Follow-ups after your visit        Follow-up notes from your care team     Return for f/u with research coordinator to schedule next visit.      Who to contact     If you have questions or need follow up information about today's clinic visit or your schedule please contact [facility] directly at [phone].  Normal or non-critical lab and imaging results will be communicated to you by MyChart, letter or phone within 4 business days after the clinic has received the results. If you do not hear from us within 7 days, please contact the clinic through MyChart or phone. If you have a critical or  abnormal lab result, we will notify you by phone as soon as possible.  Submit refill requests through [employer] or call your pharmacy and they will forward the refill request to us. Please allow 3 business days for your refill to be completed.          Additional Information About Your Visit        [employer] Information     [employer] gives you secure access to your electronic health record. If you see a primary care provider, you can also send messages to your care team and make appointments. If you have questions, please call your primary care clinic.  If you do not have a primary care provider, please call [phone] and they will assist you.        Care EveryWhere ID     This is your Care EveryWhere ID. This could be used by other organizations to access your [place] medical records  [id number]        Your Vitals Were     Pulse                   68            Blood Pressure from Last 3 Encounters:   09/05/17 112/68   08/11/17 148/83   08/03/17 144/82    Weight from Last 3 Encounters:   08/11/17 191 lb (86.6 kg)   08/03/17 191 lb (86.6 kg)   03/14/17 197 lb (89.4 kg)              Today, you had the following     No orders found for display       Primary Care Provider Office Phone # Fax #    Nasrin Dumont [phone]       [address] [facility] [address]        Equal Access to Services     [facility]: Hadii aad ku hadasho Soomaali, waaxda luqadaha, qaybta kaalmada adeegyada, waxay idiin hayaan javed martino'elieser . So [facility] [phone].    ATENCIÓN: Si habla español, tiene a gonzalez disposición servicios gratuitos de asistencia lingüística. Llame al [phone].    We comply with applicable federal civil rights laws and [place] laws. We do not discriminate on the basis of race, color, national origin, age, disability sex, sexual orientation or gender identity.            Thank you!     Thank you for choosing [facility]  for your care. Our goal is always to provide you  with excellent care. Hearing back from our patients is one way we can continue to improve our services. Please take a few minutes to complete the written survey that you may receive in the mail after your visit with us. Thank you!             Your Updated Medication List - Protect others around you: Learn how to safely use, store and throw away your medicines at www.disposemymeds.org.          This list is accurate as of: 9/5/17 12:21 PM.  Always use your most recent med list.                   Brand Name Dispense Instructions for use Diagnosis    diclofenac 1 % Gel topical gel    VOLTAREN    100 g    Apply 4 grams to knees four times daily as needed using enclosed dosing card.    Primary osteoarthritis of left knee       fluticasone 50 MCG/ACT spray    FLONASE    1 Bottle    Spray 2 sprays into both nostrils daily    Chronic rhinitis       gabapentin 300 MG capsule    NEURONTIN    90 capsule    Take 1 capsule (300 mg) by mouth At Bedtime    Bilateral leg cramps, Chronic radicular low back pain, Sacro ilial pain       metoprolol 25 MG 24 hr tablet    TOPROL-XL    30 tablet    Take 1 tablet (25 mg) by mouth daily    Hypertension goal BP (blood pressure) < 140/90       OMEPRAZOLE PO      Take by mouth daily

## 2017-09-05 NOTE — PROGRESS NOTES
PGEN study visit  NEW HYPERTENSION diagnosis visit 2    SUBJECTIVE:  Lola is here for 2nd PGEN research study visit. Please refer to research tab in the header and today's flow sheet for further details. Patient had new onset hypertension at enrollment and has a goal of <  140/90 (per Problem list target chosen by PCP)     Prior research note reviewed. Patient is on blood pressure medication(s) at this time.  she has been taking Metoprolol for 2 weeks and is tolerating the medication well.       BP Readings from Last 3 Encounters:   08/11/17 148/83   08/03/17 144/82   03/14/17 124/72       Current Outpatient Prescriptions   Medication Sig Dispense Refill     metoprolol (TOPROL-XL) 25 MG 24 hr tablet Take 1 tablet (25 mg) by mouth daily 30 tablet 1     diclofenac (VOLTAREN) 1 % GEL topical gel Apply 4 grams to knees four times daily as needed using enclosed dosing card. 100 g 1     gabapentin (NEURONTIN) 300 MG capsule Take 1 capsule (300 mg) by mouth At Bedtime 90 capsule 1     fluticasone (FLONASE) 50 MCG/ACT spray Spray 2 sprays into both nostrils daily 1 Bottle 3     OMEPRAZOLE PO Take by mouth daily       Potassium   Date Value Ref Range Status   08/03/2017 4.0 3.4 - 5.3 mmol/L Final     Creatinine   Date Value Ref Range Status   08/03/2017 0.69 0.52 - 1.04 mg/dL Final     Urea Nitrogen   Date Value Ref Range Status   08/03/2017 17 7 - 30 mg/dL Final     GFR Estimate   Date Value Ref Range Status   08/03/2017 87 >60 mL/min/1.7m2 Final     Comment:     Non  GFR Calc      A baseline potassium, creatinine, BUN, GFR has been done within past 12 months      OBJECTIVE:  Patient in in no apparent distress and able to provide full history for today's encounter. she denies pain or any current illness   Vitals: There were no vitals taken for this visit.  Today's BP completed using cuff size: regular on right side arm.    Pulse Readings from Last 1 Encounters:   08/11/17 88     Is pulse 55 or greater? -  Yes    BMI= There is no height or weight on file to calculate BMI.  See PGEN flow sheet for other exam findings.       ASSESSMENT/PLAN:   Blood pressure reading today is at the provider specified goal of <140/90.      PGEN Flowsheet has been  'filed' ) for this visit (this saves flowsheet data)    Current blood pressure medication(s):  Metoprolol 25 mg daily   1.  Based on PGEN RN HTN MGMT standing order the patient will be advised continue current medication regimen unchanged.     2.  We will not be checking a metabolic lab panel today.  Patient had BMP done on 8/3/17 and is taking beta blocker     3.  Follow up instructions include:    [facility]      See avs for more details.  Full research packet also provide to patient previously  Our research team will reach out to patient to schedule follow up visit as well.     Education:  written materials handout  Limit dietary sodium intake between 1500-2000mg every day  Regular aerobic exercise.  Education material provided and patient was given an opportunity to ask questions.    Patient verbalized understanding of this plan and is agreeable to continuing with this research study        Whitney Ng

## 2017-10-03 ENCOUNTER — ALLIED HEALTH/NURSE VISIT (OUTPATIENT)
Dept: NURSING | Facility: CLINIC | Age: 62
End: 2017-10-03

## 2017-10-03 VITALS — HEART RATE: 78 BPM | SYSTOLIC BLOOD PRESSURE: 122 MMHG | DIASTOLIC BLOOD PRESSURE: 70 MMHG

## 2017-10-03 DIAGNOSIS — I10 HYPERTENSION GOAL BP (BLOOD PRESSURE) < 140/90: Primary | ICD-10-CM

## 2017-10-03 NOTE — MR AVS SNAPSHOT
After Visit Summary   10/3/2017    Lola Magaña    MRN: [id number]           Patient Information     Date Of Birth          1955        Visit Information        Provider Department      10/3/2017 12:00 PM FZ HC/HTN [facility]        Today's Diagnoses     Hypertension goal BP (blood pressure) < 140/90    -  1      Care Instructions    Continue current medication  PGen Hypertension Study   Visit 3     Thank you for your continued participation in the hypertension study!  Please continue taking your hypertension medication as directed. Your next visit will be in four weeks and will be scheduled for you in the coming days. After scheduled, be sure to let the research coordinator know as soon as possible if you cannot make it so that the visit can be rescheduled for you.     Please contact the research coordinator if you receive care for your hypertension outside of your study visits.    If you have any questions, please contact the research coordinator at [phone]. If you experience any symptoms please call the [place] 24/7 triage number at [phone]. If your phone number, email or address changes please alert the research coordinator.     In the meantime, we ask that you complete the online surveys emailed out to you, if completing online, or mailed out to you, if completing paper surveys, before your next visit.          Follow-ups after your visit        Your next 10 appointments already scheduled     Oct 31, 2017 12:00 PM CDT   Nurse Only with FZ HC/HTN   [facility] ([facility]    [address] [address]   [phone]            Dec 12, 2017 12:00 PM CST   Nurse Only with  HC/HTN   [facility] ([facility]    [address] [address]   [phone]            Jan 23, 2018 12:00 PM CST   Nurse Only with  HC/HTN   [facility]    [address] [place] [address]   [phone]            Apr 17, 2018 12:00 PM CDT   [facility] Only with [facility]Z [facility]   [facility]    [address] [place] [address]   [phone]            Aug 07, 2018 12:00 PM CDT   Nurse Only with FZ HC/HTN   [facility]    [address]  [place] [address] [phone]              Who to contact     If you have questions or need follow up information about today's clinic visit or your schedule please contact [facility] directly at [phone].  Normal or non-critical lab and imaging results will be communicated to you by MyChart, letter or phone within 4 business days after the clinic has received the results. If you do not hear from us within 7 days, please contact the clinic through [employer] or phone. If you have a critical or abnormal lab result, we will notify you by phone as soon as possible.  Submit refill requests through [employer] or call your pharmacy and they will forward the refill request to us. Please allow 3 business days for your refill to be completed.          Additional Information About Your Visit        [employer] Information     [employer] gives you secure access to your electronic health record. If you see a primary care provider, you can also send messages to your care team and make appointments. If you have questions, please call your primary care clinic.  If you do not have a primary care provider, please call [phone] and they will assist you.        Care EveryWhere ID     This is your Care EveryWhere ID. This could be used by other organizations to access your [place] medical records  [id number]        Your Vitals Were     Pulse                   78            Blood Pressure from Last 3 Encounters:   10/03/17 122/70   09/05/17 112/68   08/11/17 148/83    Weight from Last 3 Encounters:   08/11/17 191 lb (86.6 kg)   08/03/17 191 lb  (86.6 kg)   03/14/17 197 lb (89.4 kg)              Today, you had the following     No orders found for display       Primary Care Provider Office Phone # Fax #    Nasrin Dumont [phone] [address] [address]        Equal Access to Services     [facility]: Hadii aad ku hadasho Soomaali, waaxda luqadaha, qaybta kaalmada adeegyada, waxay idiin hayaan adeeg kharash la'aan . So [facility] [phone].    ATENCIÓN: Si habla español, tiene a gonzalez disposición servicios gratuitos de asistencia lingüística. Llame al [phone].    We comply with applicable federal civil rights laws and [place] laws. We do not discriminate on the basis of race, color, national origin, age, disability, sex, sexual orientation, or gender identity.            Thank you!     Thank you for choosing [facility]  for your care. Our goal is always to provide you with excellent care. Hearing back from our patients is one way we can continue to improve our services. Please take a few minutes to complete the written survey that you may receive in the mail after your visit with us. Thank you!             Your Updated Medication List - Protect others around you: Learn how to safely use, store and throw away your medicines at www.disposemymeds.org.          This list is accurate as of: 10/3/17 12:11 PM.  Always use your most recent med list.                   Brand Name Dispense Instructions for use Diagnosis    diclofenac 1 % Gel topical gel    VOLTAREN    100 g    Apply 4 grams to knees four times daily as needed using enclosed dosing card.    Primary osteoarthritis of left knee       fluticasone 50 MCG/ACT spray    FLONASE    1 Bottle    Spray 2 sprays into both nostrils daily    Chronic rhinitis       gabapentin 300 MG capsule    NEURONTIN    90 capsule    Take 1 capsule (300 mg) by mouth At Bedtime    Bilateral leg cramps, Chronic radicular low back pain, Sacro ilial pain       metoprolol  25 MG 24 hr tablet    TOPROL-XL    30 tablet    Take 1 tablet (25 mg) by mouth daily    Hypertension goal BP (blood pressure) < 140/90       OMEPRAZOLE PO      Take by mouth daily

## 2017-10-03 NOTE — PROGRESS NOTES
PGEN study visit  NEW HYPERTENSION diagnosis visit 3    SUBJECTIVE:  Lola is here for 3rd PGEN research study visit. Please refer to research tab in the header and today's flow sheet for further details. Patient had new onset hypertension at enrollment and has a goal of <  140/90 (per Problem list target chosen by PCP)     Prior research note reviewed. Patient is on blood pressure medication(s) at this time.  she has been taking Metoprolol 25mg and is tolerating the medication well.      Current diet & lifestyle:   Smoking: no  Alcohol consumption few times a week  Other pertinent history no     BP Readings from Last 3 Encounters:   09/05/17 112/68   08/11/17 148/83   08/03/17 144/82       Current Outpatient Prescriptions   Medication Sig Dispense Refill     metoprolol (TOPROL-XL) 25 MG 24 hr tablet Take 1 tablet (25 mg) by mouth daily 30 tablet 1     diclofenac (VOLTAREN) 1 % GEL topical gel Apply 4 grams to knees four times daily as needed using enclosed dosing card. 100 g 1     gabapentin (NEURONTIN) 300 MG capsule Take 1 capsule (300 mg) by mouth At Bedtime 90 capsule 1     fluticasone (FLONASE) 50 MCG/ACT spray Spray 2 sprays into both nostrils daily 1 Bottle 3     OMEPRAZOLE PO Take by mouth daily       Potassium   Date Value Ref Range Status   08/03/2017 4.0 3.4 - 5.3 mmol/L Final     Creatinine   Date Value Ref Range Status   08/03/2017 0.69 0.52 - 1.04 mg/dL Final     Urea Nitrogen   Date Value Ref Range Status   08/03/2017 17 7 - 30 mg/dL Final     GFR Estimate   Date Value Ref Range Status   08/03/2017 87 >60 mL/min/1.7m2 Final     Comment:     Non  GFR Calc      A baseline potassium, creatinine, BUN, GFR has been done within past 12 months      OBJECTIVE:  Patient in in no apparent distress and able to provide full history for today's encounter. she denies pain or any current illness   Vitals: There were no vitals taken for this visit.  Today's BP completed using cuff size: regular on  right side arm.  122/70    Pulse Readings from Last 1 Encounters:   09/05/17 68     Is pulse 55 or greater? - Yes    BMI= There is no height or weight on file to calculate BMI.  [facility].       ASSESSMENT/PLAN:   Blood pressure reading today is at the provider specified goal of <140/90.      PGEN Flowsheet has been  'filed' ) for this visit (this saves flowsheet data)      1.  Based on PGEN RN HTN MGMT standing order the patient will be advised continue current medication regimen unchanged.     2.  We will not be checking a metabolic lab panel today.      3.  Follow up instructions include:       See avs for more details.  Full research packet also provided to patient previously  Our research team will reach out to patient to schedule follow up visit as well.     Patient was counseled regarding the lifestyle changes (listed below)  to help with BP management Yes  Lifestyle changes that can help control high blood pressure:  Even though PGEN is a study to test effectiveness of genetically guided medications for managing high blood pressure, there are several things you can do to ensure your blood pressure stays in good control:    Maintain a healthy weight (BMI<26). A modest amount of weight loss can be helpful    Limit salt intake to under 2400mg daily    Follow the DASH diet (lean meats, low salt, whole grains, lots of fruits/vegies)    Stay active, try to get in 30 minutes of exercise daily.    Manage your daily stress.    Do not smoke cigarettes (or cut back)    Limit alcohol (2 drinks/day for men, 1 drink/day for women)  Was AVS  provided to patient with the relevant <dot>PGENPI dot phrase pulled into patient instructions Yes

## 2017-10-03 NOTE — PATIENT INSTRUCTIONS
Continue current medication  PGen Hypertension Study   Visit 3     Thank you for your continued participation in the hypertension study!  Please continue taking your hypertension medication as directed. Your next visit will be in four weeks and will be scheduled for you in the coming days. After scheduled, be sure to let the research coordinator know as soon as possible if you cannot make it so that the visit can be rescheduled for you.     Please contact the research coordinator if you receive care for your hypertension outside of your study visits.    If you have any questions, please contact the research coordinator at [phone]. If you experience any symptoms please call the [employer] 24/7 triage number at [phone]. If your phone number, email or address changes please alert the research coordinator.     In the meantime, we ask that you complete the online surveys emailed out to you, if completing online, or mailed out to you, if completing paper surveys, before your next visit.

## 2017-11-10 ENCOUNTER — ALLIED HEALTH/NURSE VISIT (OUTPATIENT)
Dept: FAMILY MEDICINE | Facility: CLINIC | Age: 62
End: 2017-11-10
Payer: COMMERCIAL

## 2017-11-10 VITALS — DIASTOLIC BLOOD PRESSURE: 78 MMHG | SYSTOLIC BLOOD PRESSURE: 132 MMHG

## 2017-11-10 DIAGNOSIS — I10 HYPERTENSION GOAL BP (BLOOD PRESSURE) < 140/90: Primary | ICD-10-CM

## 2017-11-10 PROCEDURE — 99207 ZZC NO CHARGE NURSE ONLY: CPT | Performed by: FAMILY MEDICINE

## 2017-11-10 NOTE — PROGRESS NOTES
Lola Magaña is enrolled/participating in the retail pharmacy Blood Pressure Goals Achievement Program (BPGAP).  Lola Magaña was evaluated at [facility] on November 10, 2017 at which time her blood pressure was:    BP Readings from Last 3 Encounters:   11/10/17 132/78   10/03/17 122/70   09/05/17 112/68     Reviewed lifestyle modifications for blood pressure control and reduction: including making healthy food choices, managing weight, getting regular exercise, smoking cessation, reducing alcohol consumption, monitoring blood pressure regularly.     Lola Magaña is not experiencing symptoms.    Follow-Up: BP is at goal of < 140/90mmHg (patient 18+ years of age with or without diabetes).  Recommended follow-up at pharmacy in 6 months.     Recommendation to Provider: Keep with current treatment     Lola Magaña was evaluated for enrollment into the PGEN study today.    Patient eligible for enrollment:  No  Patient interested in enrollment:  No    Completed by: Thank you,  Linda Mckeon, PharmD  [facility]  [phone]

## 2017-11-10 NOTE — MR AVS SNAPSHOT
After Visit Summary   11/10/2017    Lola Magaña    MRN: [id number]           Patient Information     Date Of Birth          1955        Visit Information        Provider Department      11/10/2017 3:26 PM Nasrin Dumont MD [facility]        Today's Diagnoses     Hypertension goal BP (blood pressure) < 140/90    -  1       Follow-ups after your visit        Your next 10 appointments already scheduled     Dec 22, 2017  2:30 PM CST   Nurse Only with FZ HC/HTN   [facility]    [address]  [place] [address]   [phone]            Feb 02, 2018  3:30 PM CST   Nurse Only with FZ HC/HTN   [facility] ([facility]    [address]  [place] [address]   [phone]            Apr 27, 2018  3:30 PM CDT   Nurse Only with FZ HC/HTN   [facility] ([facility]    [address]  [place] [address]   [phone]            Aug 17, 2018  3:30 PM CDT   Nurse Only with FZ HC/HTN   [facility] ([facility]    [address]  [place] [address]   [phone]              Who to contact     If you have questions or need follow up information about today's clinic visit or your schedule please contact [facility] directly at [phone].  Normal or non-critical lab and imaging results will be communicated to you by MyChart, letter or phone within 4 business days after the clinic has received the results. If you do not hear from us within 7 days, please contact the clinic through [employer] or phone. If you have a critical or abnormal lab result, we will notify you by phone as soon as possible.  Submit refill requests through [employer] or call your pharmacy and they will forward the refill request to us. Please allow 3 business days for your refill to be completed.          Additional Information About Your Visit         [employer] Information     [employer] gives you secure access to your electronic health record. If you see a primary care provider, you can also send messages to your care team and make appointments. If you have questions, please call your primary care clinic.  If you do not have a primary care provider, please call [phone] and they will assist you.        Care EveryWhere ID     This is your Care EveryWhere ID. This could be used by other organizations to access your [place] medical records  [id number]         Blood Pressure from Last 3 Encounters:   11/10/17 132/78   10/03/17 122/70   09/05/17 112/68    Weight from Last 3 Encounters:   08/11/17 191 lb (86.6 kg)   08/03/17 191 lb (86.6 kg)   03/14/17 197 lb (89.4 kg)              Today, you had the following     No orders found for display       Primary Care Provider Office Phone # Fax #    Nasrin Dumont [phone]       [facility] [address]        Equal Access to Services     [facility]: Hadii aad ku hadasho Soomaali, waaxda luqadaha, qaybta kaalmada adeegyada, waxay idiin haydeyaniran javed szymanski . So [facility] [phone].    ATENCIÓN: Si habla español, tiene a gonzalez disposición servicios gratuitos de asistencia lingüística. Llame al [phone].    We comply with applicable federal civil rights laws and [place] laws. We do not discriminate on the basis of race, color, national origin, age, disability, sex, sexual orientation, or gender identity.            Thank you!     Thank you for choosing [facility]  for your care. Our goal is always to provide you with excellent care. Hearing back from our patients is one way we can continue to improve our services. Please take a few minutes to complete the written survey that you may receive in the mail after your visit with us. Thank you!             Your Updated Medication List - Protect others around you: Learn how to safely use, store and throw away your  medicines at www.disposemymeds.org.          This list is accurate as of: 11/10/17 11:59 PM.  Always use your most recent med list.                   Brand Name Dispense Instructions for use Diagnosis    diclofenac 1 % Gel topical gel    VOLTAREN    100 g    Apply 4 grams to knees four times daily as needed using enclosed dosing card.    Primary osteoarthritis of left knee       fluticasone 50 MCG/ACT spray    FLONASE    1 Bottle    Spray 2 sprays into both nostrils daily    Chronic rhinitis       gabapentin 300 MG capsule    NEURONTIN    90 capsule    Take 1 capsule (300 mg) by mouth At Bedtime    Bilateral leg cramps, Chronic radicular low back pain, Sacro ilial pain       OMEPRAZOLE PO      Take by mouth daily

## 2017-11-20 ENCOUNTER — TELEPHONE (OUTPATIENT)
Dept: FAMILY MEDICINE | Facility: CLINIC | Age: 62
End: 2017-11-20

## 2017-11-20 DIAGNOSIS — I10 HYPERTENSION GOAL BP (BLOOD PRESSURE) < 140/90: ICD-10-CM

## 2017-11-20 RX ORDER — METOPROLOL SUCCINATE 25 MG/1
25 TABLET, EXTENDED RELEASE ORAL DAILY
Qty: 90 TABLET | Refills: 1 | Status: SHIPPED | OUTPATIENT
Start: 2017-11-20 | End: 2018-06-04

## 2017-11-20 NOTE — TELEPHONE ENCOUNTER
I did call and spoke to patient by phone just now.  Blood pressure on 11/10 by pharmacist was in range.  flowsheet completed.  Patient tolerating medications well.  No changes in lifestyl.  I sent new prescription   [facility] research team  Will contact her and arrange follow-up study visit  Marilyn Carrillo MD

## 2017-12-11 NOTE — PROGRESS NOTES
SUBJECTIVE:   Lola Magaña is a 62 year old female who presents to clinic today for the following health issues:      Patient presents with:  Knee Pain: Right Knee Pain- having shooting pain in right knee cap,have Sx on Right Knee about 5-6 yrs ago             Problem list and histories reviewed & adjusted, as indicated.  Additional history: as documented    Patient Active Problem List   Diagnosis     Chronic rhinitis     Esophageal reflux     Menopause     Menopausal syndrome (hot flashes)     Bilateral leg cramps     Chronic radicular low back pain     Hypertension goal BP (blood pressure) < 140/90     Past Surgical History:   Procedure Laterality Date     ARTHROSCOPY KNEE Right      HC TOOTH EXTRACTION W/FORCEP       TUBAL LIGATION       vulvar biopsy  2005    LORI III       Social History   Substance Use Topics     Smoking status: Never Smoker     Smokeless tobacco: Never Used     Alcohol use 0.0 oz/week     0 Standard drinks or equivalent per week      Comment: per week, 2-3 drinks 1-2 times per week     Family History   Problem Relation Age of Onset     CANCER Father      liver     Hyperlipidemia Father      MENTAL ILLNESS Sister      Depression & anxiety     Colon Polyps Brother          Current Outpatient Prescriptions   Medication Sig Dispense Refill     diclofenac (VOLTAREN) 1 % GEL topical gel Apply 4 grams to knees four times daily as needed using enclosed dosing card. 100 g 1     metoprolol (TOPROL-XL) 25 MG 24 hr tablet Take 1 tablet (25 mg) by mouth daily 90 tablet 1     gabapentin (NEURONTIN) 300 MG capsule Take 1 capsule (300 mg) by mouth At Bedtime 90 capsule 1     fluticasone (FLONASE) 50 MCG/ACT spray Spray 2 sprays into both nostrils daily 1 Bottle 3     OMEPRAZOLE PO Take by mouth daily       BP Readings from Last 3 Encounters:   12/12/17 156/70   11/10/17 132/78   10/03/17 122/70    Wt Readings from Last 3 Encounters:   12/12/17 190 lb 9.6 oz (86.5 kg)   08/11/17 191 lb (86.6 kg)   08/03/17  "191 lb (86.6 kg)                  Labs reviewed in EPIC        Reviewed and updated as needed this visit by clinical staff       Reviewed and updated as needed this visit by Provider         ROS:  This 62 year old female is here today because of right knee pain. She had surgery for her right knee pain and a baker's cyst 5 years ago at a different facility. She never did completely get rid of her pain. She has hypertension now and can't take NSAIDS so she has been given voltaren gel for her knee pain. It isn't working. 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 Right knee is getting more painful and swollen. Would like more tests. All other review of systems are negative  Personal, family, and social history reviewed with patient and revised.         OBJECTIVE:     /70  Pulse 78  Temp 97.3  F (36.3  C) (Oral)  Ht 5' 6.5\" (1.689 m)  Wt 190 lb 9.6 oz (86.5 kg)  SpO2 98%  BMI 30.3 kg/m2  Body mass index is 30.3 kg/(m^2).  Patient has very swollen and enlarged right knee from osteoarthritis. X-ray was done here 2/2017 that showed moderate to severe medial compartmental joint space loss. She does not have full flexion or extension of her right knee anymore.   She walks with a limp favoring her right knee  Well hydrated  Well nourished  Well groomed      Diagnostic Test Results:  none     ASSESSMENT/PLAN:              1. Primary osteoarthritis of right  knee  As above   - diclofenac (VOLTAREN) 1 % GEL topical gel; Apply 4 grams to knees four times daily as needed using enclosed dosing card.  Dispense: 100 g; Refill: 1  - MR Knee Right w/o Contrast; Future  - ORTHOPEDICS ADULT REFERRAL    2. Hypertension goal BP (blood pressure) < 140/90  As above   - diclofenac (VOLTAREN) 1 % GEL topical gel; Apply 4 grams to knees four times daily as needed using enclosed dosing card.  Dispense: 100 g; Refill: 1    Return to clinic if no improvement     CHRIS MORROW, " MD  [facility] [place]

## 2017-12-12 ENCOUNTER — OFFICE VISIT (OUTPATIENT)
Dept: FAMILY MEDICINE | Facility: CLINIC | Age: 62
End: 2017-12-12
Payer: COMMERCIAL

## 2017-12-12 VITALS
DIASTOLIC BLOOD PRESSURE: 70 MMHG | HEART RATE: 78 BPM | SYSTOLIC BLOOD PRESSURE: 156 MMHG | WEIGHT: 190.6 LBS | OXYGEN SATURATION: 98 % | TEMPERATURE: 97.3 F | BODY MASS INDEX: 29.91 KG/M2 | HEIGHT: 67 IN

## 2017-12-12 DIAGNOSIS — I10 HYPERTENSION GOAL BP (BLOOD PRESSURE) < 140/90: ICD-10-CM

## 2017-12-12 DIAGNOSIS — M17.11 PRIMARY OSTEOARTHRITIS OF RIGHT KNEE: Primary | ICD-10-CM

## 2017-12-12 PROCEDURE — 99213 OFFICE O/P EST LOW 20 MIN: CPT | Performed by: FAMILY MEDICINE

## 2017-12-12 NOTE — MR AVS SNAPSHOT
After Visit Summary   12/12/2017    Lola Magaña    MRN: [id number]           Patient Information     Date Of Birth          1955        Visit Information        Provider Department      12/12/2017 1:30 PM Katerina Meyer MD [facility]        Today's Diagnoses     Hypertension goal BP (blood pressure) < 140/90    -  1    Primary osteoarthritis of left knee          Care Instructions    [facility]    If you have any questions regarding to your visit please contact your care team:       Team Purple:   Clinic Hours Telephone Number   Dr. Katerina Andres   7am-7pm  Monday - Thursday   7am-5pm  Fridays  [phone]  (Appointment scheduling available 24/7)    Questions about your Visit?   Team Line:  [phone]   Urgent Care - [place] and [facility] - 11am-9pm Monday-Friday Saturday-Sunday- 9am-5pm   [place] - 5pm-9pm Monday-Friday Saturday-Sunday- 9am-5pm  [phone] - [facility] [employer] [facility]  [phone] - [place]       What options do I have for visits at the clinic other than the traditional office visit?  To expand how we care for you, many of our providers are utilizing electronic visits (e-visits) and telephone visits, when medically appropriate, for interactions with their patients rather than a visit in the clinic.   We also offer nurse visits for many medical concerns. Just like any other service, we will bill your insurance company for this type of visit based on time spent on the phone with your provider. Not all insurance companies cover these visits. Please check with your medical insurance if this type of visit is covered. You will be responsible for any charges that are not paid by your insurance.      E-visits via [employer]:  generally incur a $35.00 fee.  Telephone visits:  Time spent on the phone: *charged based on time that is spent on the phone in increments of 10 minutes.  Estimated cost:   5-10 mins $30.00   11-20 mins. $59.00   21-30 mins. $85.00     Use citiservihart (secure email communication and access to your chart) to send your primary care provider a message or make an appointment. Ask someone on your Team how to sign up for [employer].  For a Price Quote for your services, please call our Consumer Price Line at [phone].  As always, Thank you for trusting us with your health care needs!              Follow-ups after your visit        Additional Services     ORTHOPEDICS ADULT REFERRAL       Your provider has referred you to: [facility]: [facility] [phone]    [email or web address]    Please be aware that coverage of these services is subject to the terms and limitations of your health insurance plan.  Call member services at your health plan with any benefit or coverage questions.      Please bring the following to your appointment:    >>   Any x-rays, CTs or MRIs which have been performed.  Contact the facility where they were done to arrange for  prior to your scheduled appointment.    >>   List of current medications   >>   This referral request   >>   Any documents/labs given to you for this referral                  Your next 10 appointments already scheduled     Dec 22, 2017  2:30 PM CST   Nurse Only with FZ HC/HTN   [facility]    [address]  [place] [address]   [phone]            Feb 02, 2018  3:30 PM CST   Nurse Only with FZ HC/HTN   [facility]    [address]  [place] [address]   [phone]            Apr 27, 2018  3:30 PM CDT   Nurse Only with FZ HC/HTN   [facility]    [address]  [place] [address]   [phone]            Aug 17, 2018  3:30 PM CDT   Nurse Only with FZ HC/HTN   [facility]    [address]  "[place] [address]   [phone]              Future tests that were ordered for you today     Open Future Orders        Priority Expected Expires Ordered    MR Knee Right w/o Contrast Routine  12/12/2018 12/12/2017            Who to contact     If you have questions or need follow up information about today's clinic visit or your schedule please contact [facility] directly at [phone].  Normal or non-critical lab and imaging results will be communicated to you by TempMinehart, letter or phone within 4 business days after the clinic has received the results. If you do not hear from us within 7 days, please contact the clinic through [employer] or phone. If you have a critical or abnormal lab result, we will notify you by phone as soon as possible.  Submit refill requests through [employer] or call your pharmacy and they will forward the refill request to us. Please allow 3 business days for your refill to be completed.          Additional Information About Your Visit        [employer] Information     [employer] gives you secure access to your electronic health record. If you see a primary care provider, you can also send messages to your care team and make appointments. If you have questions, please call your primary care clinic.  If you do not have a primary care provider, please call [phone] and they will assist you.        Care EveryWhere ID     This is your Care EveryWhere ID. This could be used by other organizations to access your [place] medical records  [id number]        Your Vitals Were     Pulse Temperature Height Pulse Oximetry BMI (Body Mass Index)       78 97.3  F (36.3  C) (Oral) 5' 6.5\" (1.689 m) 98% 30.3 kg/m2        Blood Pressure from Last 3 Encounters:   12/12/17 156/70   11/10/17 132/78   10/03/17 122/70    Weight from Last 3 Encounters:   12/12/17 190 lb 9.6 oz (86.5 kg)   08/11/17 191 lb (86.6 kg)   08/03/17 191 lb (86.6 kg)              We Performed the Following     " ORTHOPEDICS ADULT REFERRAL          Where to get your medicines      These medications were sent to [place] [facility] [address] [facility] - [place], [place] - [address]  [address], [place] [address]     Phone:  [phone]     diclofenac 1 % Gel topical gel          Primary Care Provider Office Phone # Fax #    Nasrin Dumont [phone] [address]  [place] [address]        Equal Access to Services     [facility]: Hadii aad ku hadasho Soomaali, waaxda luqadaha, qaybta kaalmada adeegyada, waxay idiin hayaan adeeg kharash la'aan . So [facility] [phone].    ATENCIÓN: Si habla español, tiene a gonzalez disposición servicios gratuitos de asistencia lingüística. [facility] [phone].    We comply with applicable federal civil rights laws and [place] laws. We do not discriminate on the basis of race, color, national origin, age, disability, sex, sexual orientation, or gender identity.            Thank you!     Thank you for choosing [facility]  for your care. Our goal is always to provide you with excellent care. Hearing back from our patients is one way we can continue to improve our services. Please take a few minutes to complete the written survey that you may receive in the mail after your visit with us. Thank you!             Your Updated Medication List - Protect others around you: Learn how to safely use, store and throw away your medicines at www.disposemymeds.org.          This list is accurate as of: 12/12/17  1:51 PM.  Always use your most recent med list.                   Brand Name Dispense Instructions for use Diagnosis    diclofenac 1 % Gel topical gel    VOLTAREN    100 g    Apply 4 grams to knees four times daily as needed using enclosed dosing card.    Primary osteoarthritis of left knee, Hypertension goal BP (blood pressure) < 140/90       fluticasone 50 MCG/ACT spray    FLONASE    1 Bottle    Spray 2 sprays into both nostrils daily    Chronic rhinitis        gabapentin 300 MG capsule    NEURONTIN    90 capsule    Take 1 capsule (300 mg) by mouth At Bedtime    Bilateral leg cramps, Chronic radicular low back pain, Sacro ilial pain       metoprolol 25 MG 24 hr tablet    TOPROL-XL    90 tablet    Take 1 tablet (25 mg) by mouth daily    Hypertension goal BP (blood pressure) < 140/90       OMEPRAZOLE PO      Take by mouth daily

## 2017-12-12 NOTE — NURSING NOTE
"Chief Complaint   Patient presents with     Knee Pain     Right Knee Pain- having shooting pain in right knee cap,have Sx on Right Knee about 5-6 yrs ago       Initial /70  Pulse 78  Temp 97.3  F (36.3  C) (Oral)  Ht 5' 6.5\" (1.689 m)  Wt 190 lb 9.6 oz (86.5 kg)  SpO2 98%  BMI 30.3 kg/m2 Estimated body mass index is 30.3 kg/(m^2) as calculated from the following:    Height as of this encounter: 5' 6.5\" (1.689 m).    Weight as of this encounter: 190 lb 9.6 oz (86.5 kg).  Medication Reconciliation: complete     An YOSVANY Ruvalcaba    "

## 2017-12-12 NOTE — PATIENT INSTRUCTIONS
[facility]    If you have any questions regarding to your visit please contact your care team:       Team Purple:   Clinic Hours Telephone Number   Dr. Katerina Andres   7am-7pm  Monday - Thursday   7am-5pm  Fridays  [phone]  (Appointment scheduling available 24/7)    Questions about your Visit?   Team Line:  [phone]   Urgent Care - [place] and [facility] - 11am-9pm Monday-Friday Saturday-Sunday- 9am-5pm   [place] - 5pm-9pm Monday-Friday Saturday-Sunday- 9am-5pm  [phone] - [facility]  [phone] - [place]       What options do I have for visits at the clinic other than the traditional office visit?  To expand how we care for you, many of our providers are utilizing electronic visits (e-visits) and telephone visits, when medically appropriate, for interactions with their patients rather than a visit in the clinic.   We also offer nurse visits for many medical concerns. Just like any other service, we will bill your insurance company for this type of visit based on time spent on the phone with your provider. Not all insurance companies cover these visits. Please check with your medical insurance if this type of visit is covered. You will be responsible for any charges that are not paid by your insurance.      E-visits via [employer]:  generally incur a $35.00 fee.  Telephone visits:  Time spent on the phone: *charged based on time that is spent on the phone in increments of 10 minutes. Estimated cost:   5-10 mins $30.00   11-20 mins. $59.00   21-30 mins. $85.00     Use Restalohart (secure email communication and access to your chart) to send your primary care provider a message or make an appointment. Ask someone on your Team how to sign up for [employer].  For a Price Quote for your services, please call our Consumer Price Line at [phone].  As always, Thank you for trusting us with your health care needs!

## 2017-12-19 ENCOUNTER — RADIANT APPOINTMENT (OUTPATIENT)
Dept: MRI IMAGING | Facility: CLINIC | Age: 62
End: 2017-12-19
Attending: FAMILY MEDICINE
Payer: COMMERCIAL

## 2017-12-19 DIAGNOSIS — M17.11 PRIMARY OSTEOARTHRITIS OF RIGHT KNEE: ICD-10-CM

## 2017-12-19 PROCEDURE — 73721 MRI JNT OF LWR EXTRE W/O DYE: CPT | Mod: TC

## 2017-12-19 NOTE — PROGRESS NOTES
HISTORY OF PRESENT ILLNESS    Lola Magaña is a 62 year old female who is seen in consultation at the request of Nasrin Dumont  for evaluation of  right knee pain that has been present approximately  years.  No known injury.  Worse lately.  Present symptoms: pain medially , anteriorly, posteriorly and diffuse, severe pain, swelling, +catching/popping, +locking, and a feeling of giving-way .  Symptoms occur kneeling, sitting for long periods, going up and down stairs, at night and flexing knee.  The symptoms occur frequently.    Treatments tried to this point: Gabapentin for restless legs.  Occasional nsaid. Braces, ice.    Orthopedic PMH: knee arthroscopy 2005.  Previous Baker's cyst rupture 2005 as well.    Other PMH:  has a past medical history of Anemia, unspecified type (5/27/2016); Colon polyp; GERD (gastroesophageal reflux disease); and Rhinitis.    Surgical:  has a past surgical history that includes tubal ligation; TOOTH EXTRACTION W/FORCEP; Arthroscopy knee (Right); and vulvar biopsy (2005).    Family Hx:  family history includes CANCER in her father; Colon Polyps in her brother; Hyperlipidemia in her father; MENTAL ILLNESS in her sister.    Social Hx:  reports that she has never smoked. She has never used smokeless tobacco. She reports that she drinks alcohol. She reports that she does not use illicit drugs.    REVIEW OF SYSTEMS:    CONSTITUTIONAL:  NEGATIVE for fever, chills, change in weight  INTEGUMENTARY/SKIN:  NEGATIVE for worrisome rashes, moles or lesions  EYES:  NEGATIVE for vision changes or irritation  ENT/MOUTH:  NEGATIVE for ear, mouth and throat problems  RESP:  NEGATIVE for significant cough or SOB  BREAST:  NEGATIVE for masses, tenderness or discharge  CV:  NEGATIVE for chest pain, palpitations or peripheral edema  GI:  NEGATIVE for nausea, abdominal pain, heartburn, or change in bowel habits  :  Negative   MUSCULOSKELETAL:  See HPI above  NEURO:  NEGATIVE for weakness, dizziness or  "paresthesias  ENDOCRINE:  NEGATIVE for temperature intolerance, skin/hair changes  HEME/ALLERGY/IMMUNE:  NEGATIVE for bleeding problems  PSYCHIATRIC:  NEGATIVE for changes in mood or affect    PHYSICAL EXAM:  Resp 16  Ht 1.689 m (5' 6.5\")  Wt 86.2 kg (190 lb)  BMI 30.21 kg/m2   GENERAL APPEARANCE: healthy, alert and no distress   SKIN: no suspicious lesions or rashes  NEURO: Normal strength and tone, mentation intact and speech normal  VASCULAR:  good pulses, and cappillary refill   LYMPH: no lymphadenopathy   PSYCH:  mentation appears normal and affect normal/bright  RESP: no increased work of breathing     KNEE EXAM:   Gait: walks with antalgic gait favoring right side  Alignment: normal   Squat: 40 % painful.    Patellofemoral joint: no crepitations in the patellofemoral joint.  Effusion: moderate  ROM: 3-95*  Tender: medial joint line and medial facet of the patella  Masses: mild prominence popliteal fossa  Ligaments:  Lachman's Stable, Anterior and posterior drawer stable, stable to varus and valgus stress.  no pain with Varus/Valgus stress testing.    McMurrays: positive medially  Lateral retinaculum is not tight  Facet Tenderness: medial  Apprehension: negative  Q-angle: within normal limits   Tubercle-sulcus angle: normal     X-RAY:  From 2/6//2017: shows severe medial joint space narrowing, shows mild lateral joint space narrowing and shows mild narrowing of the patellofemoral joint, with tricompartmental osteophytes.  No sunrise view.    MRI:  1. Ill-defined degenerative tearing versus previous partial meniscus  ectomy posterior horn medial meniscus. Moderate medial extrusion of  the body of the medial meniscus.  I don't see a definite tear.  2. Probable ACL cyst formation or mucoid degeneration without evidence  of ACL tear.  3. Abnormal increased signal in the posterior cruciate ligament is  likely due to a sprain that could be acute or chronic.  4. Diffuse grade 4 medial compartment chondromalacia. " Small 5 mm focus  of cartilage loss posterior lateral femoral condyle.  5. Large joint effusion. Small Baker's cyst     Impression: Right Knee:  Severe Osteoarthritis involving the medial compartment mainly.    Plan:  Injection therapy: Discussed findings and diagnosis with patient.  We talked about treatment options.    Non-Operative: Based on my findings today, and discussing them with the patient, the preferred treatment is:    activity modification  NSAIDS  Strengthening  BRACING:  brace ordered.  We decided that corticosteroid injection would be a good option.  Thus, With the patient's consent, right knee(s) injected intra-articularly with 80mg of Depomedrol and 4cc of local anesthetic after sterile prep.      Return to clinic ZEYAD Oconnor MD  Dept. Orthopedic Surgery  [facility]

## 2017-12-20 ENCOUNTER — OFFICE VISIT (OUTPATIENT)
Dept: ORTHOPEDICS | Facility: CLINIC | Age: 62
End: 2017-12-20
Payer: COMMERCIAL

## 2017-12-20 VITALS — WEIGHT: 190 LBS | HEIGHT: 67 IN | RESPIRATION RATE: 16 BRPM | BODY MASS INDEX: 29.82 KG/M2

## 2017-12-20 DIAGNOSIS — M17.11 PRIMARY OSTEOARTHRITIS OF RIGHT KNEE: Primary | ICD-10-CM

## 2017-12-20 PROCEDURE — 20610 DRAIN/INJ JOINT/BURSA W/O US: CPT | Mod: RT | Performed by: ORTHOPAEDIC SURGERY

## 2017-12-20 PROCEDURE — 99243 OFF/OP CNSLTJ NEW/EST LOW 30: CPT | Mod: 25 | Performed by: ORTHOPAEDIC SURGERY

## 2017-12-20 ASSESSMENT — PAIN SCALES - GENERAL: PAINLEVEL: SEVERE PAIN (7)

## 2017-12-20 NOTE — NURSING NOTE
"A steroid and or Hylan G - F 20 injection was performed on 12/20/2017 at 1:50 PM. Risks,benefits and complications of the injection were discussed with the patient and the patient has elected to proceed after verbal consent. Using sterile technique, the area was prepped with betadine . A 22 Gauge 1.5\" was used to inject the medication(s) listed below.This was well tolerated. No apparent complications. . Did also discuss that if diabetic, recommend close monitoring of blood sugars over the next week as cortisone injections can temporarily elevate blood sugar.    The following medication(s) was given:     MEDICATION: Depo Medrol 80mg per mL  ROUTE: intraarticular  SITE:Right Knee   : HipLogic (Depo-Medrol)  DOSE: 1 ml  LOT #: [id number]  EXPIRATION DATE:  3/2020    MEDICATION: Lidocaine HCL  1% per mL  : Hospira (Marcaine,Lidoncaine)  DOSE: 4 ml  LOT #: [id number]  EXPIRATION DATE:  1 aug 2019    Harrison MIJARES    "

## 2017-12-20 NOTE — PATIENT INSTRUCTIONS
Cortisone Injections  Cortisone is a type of steroid. It can greatly reduce inflammation (swelling, redness, and irritation). Being injected with cortisone is simple and doesn t take long. But your doctor may ask you questions about your health. Certain medical conditions, such as diabetes, can be affected by cortisone.     Your pain may be relieved by a cortisone injection.    Why Have a Cortisone Injection?  Injecting cortisone can relieve pain for anything from a sports injury to arthritis. Your doctor may suggest an injection if rest, splints, or oral medication doesn t relieve your pain. Injecting cortisone is simpler than having surgery. And cortisone may provide the lasting pain relief that can help you get out and enjoy life again.  Getting the Injection  Your injection will  start by cleaning and numbing your skin at the injection site. Next, you ll be injected with local anesthetics (for short-term pain relief) and cortisone. The injection may last a few moments. A small bandage will be applied over the injection site. You ll then be ready to go home.  After Your Injection  After being injected, make sure you don t injure the treated region. But stay active. Enjoy a walk or some other mild activity. Just be careful not to strain the region that gave you trouble.  The Next Day or Two  Some patients feel more pain after being injected. This is normal, and it will go away soon. Applying ice for 20 minutes at a time to your injury may reduce the increased pain. Rest for the first day or two. You don t need to stay in bed. But avoid tasks that may strain the injured region.  If You Have Diabetes  Cortisone injections can cause blood sugar to be increased for several days after the injection. Follow your regular plan for what to do when your blood sugar is elevated.     You may have the area injected, in general, every three to four months.  This is the estimated amount of time that the body takes to metabolize  "the \"cortisone.\"  Getting injections too frequently may result in a softening of the cartilage and cause the joint to actually wear out more quickly.  "

## 2017-12-20 NOTE — NURSING NOTE
"Chief Complaint   Patient presents with     Consult     Right knee pain. Pt states in 2005 she had surgery for menisces tear and since then she has fallen a few times and her knee now is stiff, swollen, hard to extend or bend. Does not hurt so much through the day [facility]. She had just moved downtown and does a lot more walking ow then before. Therapies Voltaren gel        Initial Resp 16  Ht 1.689 m (5' 6.5\")  Wt 86.2 kg (190 lb)  BMI 30.21 kg/m2 Estimated body mass index is 30.21 kg/(m^2) as calculated from the following:    Height as of this encounter: 1.689 m (5' 6.5\").    Weight as of this encounter: 86.2 kg (190 lb).  Medication Reconciliation: david Chowdary, WILLIAM 12/20/2017 1:24 PM      "

## 2017-12-20 NOTE — LETTER
12/20/2017         RE: Lola Magaña  [address] [facility] [address]        Dear Colleague,    Thank you for referring your patient, Lola Magaña, to the [facility]. Please see a copy of my visit note below.    HISTORY OF PRESENT ILLNESS    Lola Magaña is a 62 year old female who is seen in consultation at the request of Nasrin Dumont  for evaluation of  right knee pain that has been present approximately  years.  No known injury.  Worse lately.  Present symptoms: pain medially , anteriorly, posteriorly and diffuse, severe pain, swelling, +catching/popping, +locking, and a feeling of giving-way .  Symptoms occur kneeling, sitting for long periods, going up and down stairs, at night and flexing knee.  The symptoms occur frequently.    Treatments tried to this point: Gabapentin for restless legs.  Occasional nsaid. Braces, ice.    Orthopedic PMH: knee arthroscopy 2005.  Previous Baker's cyst rupture 2005 as well.    Other PMH:  has a past medical history of Anemia, unspecified type (5/27/2016); Colon polyp; GERD (gastroesophageal reflux disease); and Rhinitis.    Surgical:  has a past surgical history that includes tubal ligation; TOOTH EXTRACTION W/FORCEP; Arthroscopy knee (Right); and vulvar biopsy (2005).    Family Hx:  family history includes CANCER in her father; Colon Polyps in her brother; Hyperlipidemia in her father; MENTAL ILLNESS in her sister.    Social Hx:  reports that she has never smoked. She has never used smokeless tobacco. She reports that she drinks alcohol. She reports that she does not use illicit drugs.    REVIEW OF SYSTEMS:    CONSTITUTIONAL:  NEGATIVE for fever, chills, change in weight  INTEGUMENTARY/SKIN:  NEGATIVE for worrisome rashes, moles or lesions  EYES:  NEGATIVE for vision changes or irritation  ENT/MOUTH:  NEGATIVE for ear, mouth and throat problems  RESP:  NEGATIVE for significant cough or SOB  BREAST:  NEGATIVE for masses, tenderness  "or discharge  CV:  NEGATIVE for chest pain, palpitations or peripheral edema  GI:  NEGATIVE for nausea, abdominal pain, heartburn, or change in bowel habits  :  Negative   MUSCULOSKELETAL:  See HPI above  NEURO:  NEGATIVE for weakness, dizziness or paresthesias  ENDOCRINE:  NEGATIVE for temperature intolerance, skin/hair changes  HEME/ALLERGY/IMMUNE:  NEGATIVE for bleeding problems  PSYCHIATRIC:  NEGATIVE for changes in mood or affect    PHYSICAL EXAM:  Resp 16  Ht 1.689 m (5' 6.5\")  Wt 86.2 kg (190 lb)  BMI 30.21 kg/m2   GENERAL APPEARANCE: healthy, alert and no distress   SKIN: no suspicious lesions or rashes  NEURO: Normal strength and tone, mentation intact and speech normal  VASCULAR:  good pulses, and cappillary refill   LYMPH: no lymphadenopathy   PSYCH:  mentation appears normal and affect normal/bright  RESP: no increased work of breathing     KNEE EXAM:   Gait: walks with antalgic gait favoring right side  Alignment: normal   Squat: 40 % painful.    Patellofemoral joint: no crepitations in the patellofemoral joint.  Effusion: moderate  ROM: 3-95*  Tender: medial joint line and medial facet of the patella  Masses: mild prominence popliteal fossa  Ligaments:  Lachman's Stable, Anterior and posterior drawer stable, stable to varus and valgus stress.  no pain with Varus/Valgus stress testing.    McMurrays: positive medially  Lateral retinaculum is not tight  Facet Tenderness: medial  Apprehension: negative  Q-angle: within normal limits   Tubercle-sulcus angle: normal     X-RAY:  From 2/6//2017: shows severe medial joint space narrowing, shows mild lateral joint space narrowing and shows mild narrowing of the patellofemoral joint, with tricompartmental osteophytes.  No sunrise view.    MRI:  1. Ill-defined degenerative tearing versus previous partial meniscus  ectomy posterior horn medial meniscus. Moderate medial extrusion of  the body of the medial meniscus.  I don't see a definite tear.  2. Probable " ACL cyst formation or mucoid degeneration without evidence  of ACL tear.  3. Abnormal increased signal in the posterior cruciate ligament is  likely due to a sprain that could be acute or chronic.  4. Diffuse grade 4 medial compartment chondromalacia. Small 5 mm focus  of cartilage loss posterior lateral femoral condyle.  5. Large joint effusion. Small Baker's cyst     Impression: Right Knee:  Severe Osteoarthritis involving the medial compartment mainly.    Plan:  Injection therapy: Discussed findings and diagnosis with patient.  We talked about treatment options.    Non-Operative: Based on my findings today, and discussing them with the patient, the preferred treatment is:    activity modification  NSAIDS  Strengthening  BRACING:  brace ordered.  We decided that corticosteroid injection would be a good option.  Thus, With the patient's consent, right knee(s) injected intra-articularly with 80mg of Depomedrol and 4cc of local anesthetic after sterile prep.      Return to clinic ZEYAD Oconnor MD  Dept. Orthopedic Surgery  [facility]     Again, thank you for allowing me to participate in the care of your patient.        Sincerely,        Buck Oconnor MD

## 2017-12-20 NOTE — MR AVS SNAPSHOT
After Visit Summary   12/20/2017    Lola Magaña    MRN: [id number]           Patient Information     Date Of Birth          1955        Visit Information        Provider Department      12/20/2017 1:30 PM Buck Oconnor MD [facility]        Today's Diagnoses     Primary osteoarthritis of right knee    -  1      Care Instructions    Cortisone Injections  Cortisone is a type of steroid. It can greatly reduce inflammation (swelling, redness, and irritation). Being injected with cortisone is simple and doesn t take long. But your doctor may ask you questions about your health. Certain medical conditions, such as diabetes, can be affected by cortisone.     Your pain may be relieved by a cortisone injection.    Why Have a Cortisone Injection?  Injecting cortisone can relieve pain for anything from a sports injury to arthritis. Your doctor may suggest an injection if rest, splints, or oral medication doesn t relieve your pain. Injecting cortisone is simpler than having surgery. And cortisone may provide the lasting pain relief that can help you get out and enjoy life again.  Getting the Injection  Your injection will  start by cleaning and numbing your skin at the injection site. Next, you ll be injected with local anesthetics (for short-term pain relief) and cortisone. The injection may last a few moments. A small bandage will be applied over the injection site. You ll then be ready to go home.  After Your Injection  After being injected, make sure you don t injure the treated region. But stay active. Enjoy a walk or some other mild activity. Just be careful not to strain the region that gave you trouble.  The Next Day or Two  Some patients feel more pain after being injected. This is normal, and it will go away soon. Applying ice for 20 minutes at a time to your injury may reduce the increased pain. Rest for the first day or two. You don t need to stay in bed. But avoid tasks  "that may strain the injured region.  If You Have Diabetes  Cortisone injections can cause blood sugar to be increased for several days after the injection. Follow your regular plan for what to do when your blood sugar is elevated.     You may have the area injected, in general, every three to four months.  This is the estimated amount of time that the body takes to metabolize the \"cortisone.\"  Getting injections too frequently may result in a softening of the cartilage and cause the joint to actually wear out more quickly.          Follow-ups after your visit        Additional Services     ORTHOTICS REFERRAL       **This referral order prints off in the [facility]  (Orthotics & Prosthetics) Central Scheduling Office**    The [facility] Central Scheduling Staff will contact the patient to schedule appointments.     Central Scheduling Contact Information: [phone] ([place])     style RIGHT knee brace to offload medial compartment.    Please be aware that coverage of these services is subject to the terms and limitations of your health insurance plan.  Call member services at your health plan with any benefit or coverage questions.      Please bring the following to your appointment:    >>   Any x-rays, CTs or MRIs which have been performed.  Contact the facility where they were done to arrange for  prior to your scheduled appointment.    >>   List of current medications   >>   This referral request   >>   Any documents/labs given to you for this referral                  Your next 10 appointments already scheduled     Dec 22, 2017  2:30 PM CST   Nurse Only with [facility]   [facility]    [address] [address]   [phone]            Feb 02, 2018  3:30 PM CST   Nurse Only with [facility]   [facility]    [address] [address]   [phone]            " "Apr 27, 2018  3:30 PM CDT   [facility] with [facility]   [facility]    [address]  [place] [id number] [phone]            Aug 17, 2018  3:30 PM CDT   Nurse Only with [facility]   [facility]    [address]  [place] [address] [phone]              Who to contact     If you have questions or need follow up information about today's clinic visit or your schedule please contact [facility] directly at [phone].  Normal or non-critical lab and imaging results will be communicated to you by [employer], letter or phone within 4 business days after the clinic has received the results. If you do not hear from us within 7 days, please contact the clinic through [employer] or phone. If you have a critical or abnormal lab result, we will notify you by phone as soon as possible.  Submit refill requests through [email or web address] or call your pharmacy and they will forward the refill request to us. Please allow 3 business days for your refill to be completed.          Additional Information About Your Visit        [email or web address] Information     [email or web address] gives you secure access to your electronic health record. If you see a primary care provider, you can also send messages to your care team and make appointments. If you have questions, please call your primary care clinic.  If you do not have a primary care provider, please call [phone] and they will assist you.        Care EveryWhere ID     This is your Care EveryWhere ID. This could be used by other organizations to access your [place] medical records  [id number]        Your Vitals Were     Respirations Height BMI (Body Mass Index)             16 1.689 m (5' 6.5\") 30.21 kg/m2          Blood Pressure from Last 3 Encounters:   12/12/17 156/70   11/10/17 132/78   10/03/17 122/70    Weight from Last 3 Encounters:   12/20/17 86.2 kg (190 lb)   12/12/17 86.5 kg (190 " lb 9.6 oz)   08/11/17 86.6 kg (191 lb)              We Performed the Following     DRAIN/INJECT LARGE JOINT/BURSA     METHYLPREDNISOLONE 80 MG INJ     ORTHOTICS REFERRAL        Primary Care Provider Office Phone # Fax #    Nasrin Dumont [phone] [address]  [place] [address]        Equal Access to Services     [facility]: Hadii aad ku hadasho Soomaali, waaxda luqadaha, qaybta kaalmada adeegyada, waxay idiin hayaan adeeg kharash la'aan ah. So [facility] [phone].    ATENCIÓN: Si habla español, tiene a gonzalez disposición servicios gratuitos de asistencia lingüística. Rossana al [phone].    We comply with applicable federal civil rights laws and [place] laws. We do not discriminate on the basis of race, color, national origin, age, disability, sex, sexual orientation, or gender identity.            Thank you!     Thank you for choosing [facility]  for your care. Our goal is always to provide you with excellent care. Hearing back from our patients is one way we can continue to improve our services. Please take a few minutes to complete the written survey that you may receive in the mail after your visit with us. Thank you!             Your Updated Medication List - Protect others around you: Learn how to safely use, store and throw away your medicines at www.disposemymeds.org.          This list is accurate as of: 12/20/17  1:58 PM.  Always use your most recent med list.                   Brand Name Dispense Instructions for use Diagnosis    diclofenac 1 % Gel topical gel    VOLTAREN    100 g    Apply 4 grams to knees four times daily as needed using enclosed dosing card.    Hypertension goal BP (blood pressure) < 140/90       fluticasone 50 MCG/ACT spray    FLONASE    1 Bottle    Spray 2 sprays into both nostrils daily    Chronic rhinitis       gabapentin 300 MG capsule    NEURONTIN    90 capsule    Take 1 capsule (300 mg) by mouth At Bedtime    Bilateral leg  cramps, Chronic radicular low back pain, Sacro ilial pain       metoprolol 25 MG 24 hr tablet    TOPROL-XL    90 tablet    Take 1 tablet (25 mg) by mouth daily    Hypertension goal BP (blood pressure) < 140/90       OMEPRAZOLE PO      Take by mouth daily

## 2017-12-22 ENCOUNTER — ALLIED HEALTH/NURSE VISIT (OUTPATIENT)
Dept: NURSING | Facility: CLINIC | Age: 62
End: 2017-12-22
Payer: COMMERCIAL

## 2017-12-22 VITALS — HEART RATE: 72 BPM | DIASTOLIC BLOOD PRESSURE: 78 MMHG | SYSTOLIC BLOOD PRESSURE: 130 MMHG

## 2017-12-22 DIAGNOSIS — I10 HYPERTENSION GOAL BP (BLOOD PRESSURE) < 140/90: Primary | ICD-10-CM

## 2017-12-22 PROCEDURE — 99207 ZZC NO CHARGE NURSE ONLY: CPT

## 2017-12-22 NOTE — MR AVS SNAPSHOT
After Visit Summary   12/22/2017    Lola Magaña    MRN: [id number]           Patient Information     Date Of Birth          1955        Visit Information        Provider Department      12/22/2017 2:30 PM FZ HC/HTN [facility]        Today's Diagnoses     Hypertension goal BP (blood pressure) < 140/90    -  1      Care Instructions    PGen Hypertension Study  Visit 5    Thank you for your continued participation in the hypertension study!  Please continue taking your hypertension medications as directed. Your next visit will be in one and a half months and will be scheduled for you in the coming days. After it is scheduled, be sure to let the research coordinator know as soon as possible if you cannot make it so that the visit can be rescheduled for you.     Please contact the research coordinator if you receive care for your hypertension outside of your study visits.    If you have any questions, please contact the research coordinator at [phone]. If you experience any symptoms please call the [place] 24/7 triage number at [phone]. If your phone number, email or address changes please alert the research coordinator.     In the meantime, we ask that you complete the online surveys emailed out to you, if completing online, or mailed out to you, if completing paper surveys, before your next visit.              Follow-ups after your visit        Your next 10 appointments already scheduled     Dec 22, 2017  2:30 PM CST   Nurse Only with FZ HC/HTN   [facility] ([facility]    [address] [address]   [phone]            Feb 02, 2018  3:30 PM CST   Nurse Only with FZ HC/HTN   [facility] ([facility]    [address] [address]   [phone]            Apr 27, 2018  3:30 PM CDT   Nurse Only with  HC/HTN   [facility] ([facility]    [address] [place] [address] [phone]            Aug 17, 2018  3:30 PM CDT   Nurse Only with FZ HC/HTN   [facility]    [address] [place] [address] [phone]              Who to contact     If you have questions or need follow up information about today's clinic visit or your schedule please contact [facility] directly at [phone].  Normal or non-critical lab and imaging results will be communicated to you by Gruppo MutuiOnlinehart, letter or phone within 4 business days after the clinic has received the results. If you do not hear from us within 7 days, please contact the clinic through [employer] or phone. If you have a critical or abnormal lab result, we will notify you by phone as soon as possible.  Submit refill requests through Playnery or call your pharmacy and they will forward the refill request to us. Please allow 3 business days for your refill to be completed.          Additional Information About Your Visit        Playnery Information     Playnery gives you secure access to your electronic health record. If you see a primary care provider, you can also send messages to your care team and make appointments. If you have questions, please call your primary care clinic.  If you do not have a primary care provider, please call [phone] and they will assist you.        Care EveryWhere ID     This is your Care EveryWhere ID. This could be used by other organizations to access your [place] medical records  [id number]        Your Vitals Were     Pulse                   72            Blood Pressure from Last 3 Encounters:   12/22/17 130/78   12/12/17 156/70   11/10/17 132/78    Weight from Last 3 Encounters:   12/20/17 190 lb (86.2 kg)   12/12/17 190 lb 9.6 oz (86.5 kg)   08/11/17 191 lb (86.6 kg)              Today, you had the following     No orders found for display       Primary Care Provider Office Phone # Fax #    Nasrin CLEVELAND  MD Tim [phone]       [facility] [address]        Equal Access to Services     YARI MALDONADO : Hadderrek beny vigil scottie Wynne, wamohanda tereqfreida, jinta katishda michelle, nhung raymundo labhavnafranklin joseph. So [facility] [place] [phone].    ATENCIÓN: Si habla español, tiene a gonzalez disposición servicios gratuitos de asistencia lingüística. Llame al [phone].    We comply with applicable federal civil rights laws and [place] laws. We do not discriminate on the basis of race, color, national origin, age, disability, sex, sexual orientation, or gender identity.            Thank you!     Thank you for choosing [facility]  for your care. Our goal is always to provide you with excellent care. Hearing back from our patients is one way we can continue to improve our services. Please take a few minutes to complete the written survey that you may receive in the mail after your visit with us. Thank you!             Your Updated Medication List - Protect others around you: Learn how to safely use, store and throw away your medicines at www.disposemymeds.org.          This list is accurate as of: 12/22/17  2:13 PM.  Always use your most recent med list.                   Brand Name Dispense Instructions for use Diagnosis    diclofenac 1 % Gel topical gel    VOLTAREN    100 g    Apply 4 grams to knees four times daily as needed using enclosed dosing card.    Hypertension goal BP (blood pressure) < 140/90       fluticasone 50 MCG/ACT spray    FLONASE    1 Bottle    Spray 2 sprays into both nostrils daily    Chronic rhinitis       gabapentin 300 MG capsule    NEURONTIN    90 capsule    Take 1 capsule (300 mg) by mouth At Bedtime    Bilateral leg cramps, Chronic radicular low back pain, Sacro ilial pain       metoprolol 25 MG 24 hr tablet    TOPROL-XL    90 tablet    Take 1 tablet (25 mg) by mouth daily    Hypertension goal BP (blood pressure) < 140/90       OMEPRAZOLE PO       Take by mouth daily

## 2017-12-22 NOTE — PATIENT INSTRUCTIONS
[facility] Hypertension Study  Visit 5    Thank you for your continued participation in the hypertension study!  Please continue taking your hypertension medications as directed. Your next visit will be in one and a half months and will be scheduled for you in the coming days. After it is scheduled, be sure to let the research coordinator know as soon as possible if you cannot make it so that the visit can be rescheduled for you.     Please contact the research coordinator if you receive care for your hypertension outside of your study visits.    If you have any questions, please contact the research coordinator at [phone]. If you experience any symptoms please call the [facility] 24/7 triage number at [phone]. If your phone number, email or address changes please alert the research coordinator.     In the meantime, we ask that you complete the online surveys emailed out to you, if completing online, or mailed out to you, if completing paper surveys, before your next visit.

## 2017-12-22 NOTE — PROGRESS NOTES
PGEN study visit  NEW HYPERTENSION diagnosis visit 5    SUBJECTIVE:  Lola is here for 5th PGEN research study visit. Please refer to research tab in the header and today's flow sheet for further details. Patient had new onset hypertension at enrollment and has a goal of <  140/90 (per Problem list target chosen by PCP)     Prior research note reviewed. Patient is on blood pressure medication(s) at this time.  she has been taking Metoprolol XL and is tolerating the medication well.      Current diet & lifestyle: no salt use   Smoking: no   Alcohol consumption 3 drinks during week  Other pertinent history:     BP Readings from Last 3 Encounters:   12/12/17 156/70   11/10/17 132/78   10/03/17 122/70       Current Outpatient Prescriptions   Medication Sig Dispense Refill     diclofenac (VOLTAREN) 1 % GEL topical gel Apply 4 grams to knees four times daily as needed using enclosed dosing card. 100 g 1     metoprolol (TOPROL-XL) 25 MG 24 hr tablet Take 1 tablet (25 mg) by mouth daily 90 tablet 1     gabapentin (NEURONTIN) 300 MG capsule Take 1 capsule (300 mg) by mouth At Bedtime 90 capsule 1     fluticasone (FLONASE) 50 MCG/ACT spray Spray 2 sprays into both nostrils daily 1 Bottle 3     OMEPRAZOLE PO Take by mouth daily       Potassium   Date Value Ref Range Status   08/03/2017 4.0 3.4 - 5.3 mmol/L Final     Creatinine   Date Value Ref Range Status   08/03/2017 0.69 0.52 - 1.04 mg/dL Final     Urea Nitrogen   Date Value Ref Range Status   08/03/2017 17 7 - 30 mg/dL Final     GFR Estimate   Date Value Ref Range Status   08/03/2017 87 >60 mL/min/1.7m2 Final     Comment:     Non  GFR Calc      A baseline potassium, creatinine, BUN, GFR has been done within past 12 months      OBJECTIVE:  Patient in in no apparent distress and able to provide full history for today's encounter. she denies pain or any current illness   Vitals: There were no vitals taken for this visit.  Today's BP completed using cuff size:  regular on right side arm.  130/78    Pulse Readings from Last 1 Encounters:   12/12/17 78     Is pulse 55 or greater? - Yes    BMI= There is no height or weight on file to calculate BMI.  See PGEN flow sheet for other exam findings.       ASSESSMENT/PLAN:   Blood pressure reading today is at the provider specified goal of <140/90.      PGEN Flowsheet has been  'filed' ) for this visit (this saves flowsheet data)      1.  Based on PGEN RN HTN MGMT standing order the patient will be advised continue current medication regimen unchanged.     2.  We will not be checking a metabolic lab panel today.      3.  Follow up instructions include:       See avs for more details.  Full research packet also provided to patient previously  Our research team will reach out to patient to schedule follow up visit as well.     Patient was counseled regarding the lifestyle changes (listed below)  to help with BP management Yes  Lifestyle changes that can help control high blood pressure:  Even though PGEN is a study to test effectiveness of genetically guided medications for managing high blood pressure, there are several things you can do to ensure your blood pressure stays in good control:    Maintain a healthy weight (BMI<26). A modest amount of weight loss can be helpful    Limit salt intake to under 2400mg daily    Follow the DASH diet (lean meats, low salt, whole grains, lots of fruits/vegies)    Stay active, try to get in 30 minutes of exercise daily.    Manage your daily stress.    Do not smoke cigarettes (or cut back)    Limit alcohol (2 drinks/day for men, 1 drink/day for women)  Was AVS  provided to patient with the relevant <dot>PGENPI dot phrase pulled into patient instructions Yes    Whitney Ng RN

## 2017-12-31 DIAGNOSIS — R25.2 BILATERAL LEG CRAMPS: ICD-10-CM

## 2017-12-31 DIAGNOSIS — M54.16 CHRONIC RADICULAR LOW BACK PAIN: ICD-10-CM

## 2017-12-31 DIAGNOSIS — G89.29 CHRONIC RADICULAR LOW BACK PAIN: ICD-10-CM

## 2017-12-31 DIAGNOSIS — M53.3 SACRO ILIAL PAIN: ICD-10-CM

## 2018-01-02 RX ORDER — GABAPENTIN 300 MG/1
CAPSULE ORAL
Qty: 90 CAPSULE | Refills: 1 | Status: SHIPPED | OUTPATIENT
Start: 2018-01-02 | End: 2018-07-16

## 2018-02-02 ENCOUNTER — ALLIED HEALTH/NURSE VISIT (OUTPATIENT)
Dept: FAMILY MEDICINE | Facility: CLINIC | Age: 63
End: 2018-02-02

## 2018-02-02 VITALS — SYSTOLIC BLOOD PRESSURE: 128 MMHG | HEART RATE: 64 BPM | DIASTOLIC BLOOD PRESSURE: 70 MMHG

## 2018-02-02 DIAGNOSIS — I10 HYPERTENSION GOAL BP (BLOOD PRESSURE) < 140/90: Primary | ICD-10-CM

## 2018-02-02 PROCEDURE — 99207 ZZC NO CHARGE NURSE ONLY: CPT | Performed by: FAMILY MEDICINE

## 2018-02-02 NOTE — PROGRESS NOTES
Lola Magaña is enrolled/participating in the retail pharmacy Blood Pressure Goals Achievement Program (BPGAP).  Lola Magaña was evaluated at [facility] on February 2, 2018 at which time her blood pressure was:    BP Readings from Last 3 Encounters:   02/02/18 128/70   12/22/17 130/78   12/12/17 156/70     Reviewed lifestyle modifications for blood pressure control and reduction: including making healthy food choices, managing weight, getting regular exercise, smoking cessation, reducing alcohol consumption, monitoring blood pressure regularly.     Lola Magaña is not experiencing symptoms.    Follow-Up: BP is at goal of < 140/90mmHg (patient 18+ years of age with or without diabetes).  Recommended follow-up at pharmacy in 6 months.     Recommendation to Provider: None at this time.  Enrolled in PGEN study.     Lola Magaña was evaluated for enrollment into the PGEN study today.    Patient eligible for enrollment:  No  Patient interested in enrollment:  No    Completed by: Thank you,  Linda Mckeon, PharmD  [facility]  [phone]    .

## 2018-02-02 NOTE — MR AVS SNAPSHOT
After Visit Summary   2/2/2018    Lola Magaña    MRN: [id number]           Patient Information     Date Of Birth          1955        Visit Information        Provider Department      2/2/2018 2:14 PM Nasrin Dumont MD [facility]        Today's Diagnoses     Hypertension goal BP (blood pressure) < 140/90    -  1       Follow-ups after your visit        Your next 10 appointments already scheduled     Apr 27, 2018  3:30 PM CDT   Nurse Only with FZ HC/HTN   [facility]    [address] [address] [phone]            Aug 17, 2018  3:30 PM CDT   Nurse Only with FZ HC/HTN   [facility] ([facility]    [address] [address] [phone]              Who to contact     If you have questions or need follow up information about today's clinic visit or your schedule please contact [facility] directly at [phone].  Normal or non-critical lab and imaging results will be communicated to you by centrosehart, letter or phone within 4 business days after the clinic has received the results. If you do not hear from us within 7 days, please contact the clinic through [employer] or phone. If you have a critical or abnormal lab result, we will notify you by phone as soon as possible.  Submit refill requests through YieldPlanet or call your pharmacy and they will forward the refill request to us. Please allow 3 business days for your refill to be completed.          Additional Information About Your Visit        centrosehart Information     [employer] gives you secure access to your electronic health record. If you see a primary care provider, you can also send messages to your care team and make appointments. If you have questions, please call your primary care clinic.  If you do not have a primary care provider, please call [phone] and they will assist you.        Care  EveryWhere ID     This is your Care EveryWhere ID. This could be used by other organizations to access your [place] medical records  [id number]        Your Vitals Were     Pulse                   64            Blood Pressure from Last 3 Encounters:   02/02/18 128/70   12/22/17 130/78   12/12/17 156/70    Weight from Last 3 Encounters:   12/20/17 190 lb (86.2 kg)   12/12/17 190 lb 9.6 oz (86.5 kg)   08/11/17 191 lb (86.6 kg)              Today, you had the following     No orders found for display       Primary Care Provider Office Phone # Fax #    Nasrin Dumont -[phone]       [address] [address]        Equal Access to Services     LIZA MALDONADO : Hadii beny vigil hadasho Soomaali, waaxda luqadaha, qaybta kaalmada adeegyada, nhung carterin hayelieser szymanski . So [facility] [phone].    ATENCIÓN: Si habla español, tiene a gonzalez disposición servicios gratuitos de asistencia lingüística. Rossana al [phone].    We comply with applicable federal civil rights laws and [place] laws. We do not discriminate on the basis of race, color, national origin, age, disability, sex, sexual orientation, or gender identity.            Thank you!     Thank you for choosing [facility]  for your care. Our goal is always to provide you with excellent care. Hearing back from our patients is one way we can continue to improve our services. Please take a few minutes to complete the written survey that you may receive in the mail after your visit with us. Thank you!             Your Updated Medication List - Protect others around you: Learn how to safely use, store and throw away your medicines at www.disposemymeds.org.          This list is accurate as of 2/2/18 11:59 PM.  Always use your most recent med list.                   Brand Name Dispense Instructions for use Diagnosis    diclofenac 1 % Gel topical gel    VOLTAREN    100 g    Apply 4 grams to knees four times daily as  needed using enclosed dosing card.    Hypertension goal BP (blood pressure) < 140/90       fluticasone 50 MCG/ACT spray    FLONASE    1 Bottle    Spray 2 sprays into both nostrils daily    Chronic rhinitis       gabapentin 300 MG capsule    NEURONTIN    90 capsule    TAKE 1 CAPSULE BY MOUTH AT BEDTIME    Bilateral leg cramps, Chronic radicular low back pain, Sacro ilial pain       metoprolol succinate 25 MG 24 hr tablet    TOPROL-XL    90 tablet    Take 1 tablet (25 mg) by mouth daily    Hypertension goal BP (blood pressure) < 140/90       OMEPRAZOLE PO      Take by mouth daily

## 2018-02-20 NOTE — PROGRESS NOTES
"HISTORY OF PRESENT ILLNESS    Lola Magaña is a 63 year old female seen for follow up of severe right knee osteoarthritis mainly involving the medial compartment. She was doing extremely well with the knee, and didn't feel the need to get the  brace.  2 days ago was walking in her kitchen, and developed severe pain in the knee.  Pain with extension, flexion and pivoting.    Last injection(s):  1. RIGHT KNEE intra-articular steroid injection on 12/20/17. Length of effectiveness: 2 months.    PHYSICAL EXAM:  Temp 98  F (36.7  C) (Tympanic)  Ht 1.689 m (5' 6.5\")  Wt 85.3 kg (188 lb)  BMI 29.89 kg/m2  GENERAL APPEARANCE: healthy, alert and no distress   SKIN: no suspicious lesions or rashes  NEURO: Normal strength and tone, mentation intact and speech normal  VASCULAR: good pulses, and cappillary refill   LYMPH: no lymphadenopathy   PSYCH:  mentation appears normal and affect normal/bright  RESP: no increased work of breathing    RIGHT KNEE EXAM:   Gait: walks with antalgic gait favoring right side  Alignment: normal   .  Pain weight bearing   ROM: 5*-100* (similar to last exam)  Effusion: moderate  Tender: medial joint line and patellar tendon insertion  not tested nadine's  Ligaments:  Lachman's Stable, Anterior and posterior drawer stable, stable to varus and valgus stress.  no pain with varus-valgus  stress testing.    Patellofemoral joint: no crepitations in the patellofemoral joint.    Lateral retinaculum   Facet Tenderness: none  Apprehension: negative    Xray: no change    IMPRESSION:   Severe OA mainly involving the medial compartment, right knee, with increase pain spontaneously.    PLAN: Injection therapy: Discussed findings and diagnosis with patient.  We talked about treatment options.  We decided that repeat injection would be the best option.  Thus, With the patient's consent, right knee(s) injected intra-articularly with 80mg of Depomedrol and 4cc of local anesthetic after sterile prep.    "   Strengthening  BRACING: consider getting the  brace.     KNEE ARTHROPLASTY was briefly discussed, but she didn't want to consider that yet.    Return to clinic: as needed     TG Oconnor MD  Dept. Orthopedic Surgery  [facility]

## 2018-02-21 ENCOUNTER — RADIANT APPOINTMENT (OUTPATIENT)
Dept: GENERAL RADIOLOGY | Facility: CLINIC | Age: 63
End: 2018-02-21
Attending: ORTHOPAEDIC SURGERY
Payer: COMMERCIAL

## 2018-02-21 ENCOUNTER — OFFICE VISIT (OUTPATIENT)
Dept: ORTHOPEDICS | Facility: CLINIC | Age: 63
End: 2018-02-21
Payer: COMMERCIAL

## 2018-02-21 VITALS — HEIGHT: 67 IN | TEMPERATURE: 98 F | WEIGHT: 188 LBS | BODY MASS INDEX: 29.51 KG/M2

## 2018-02-21 DIAGNOSIS — M17.11 PRIMARY OSTEOARTHRITIS OF RIGHT KNEE: Primary | ICD-10-CM

## 2018-02-21 DIAGNOSIS — M17.11 PRIMARY OSTEOARTHRITIS OF RIGHT KNEE: ICD-10-CM

## 2018-02-21 PROCEDURE — 99213 OFFICE O/P EST LOW 20 MIN: CPT | Mod: 25 | Performed by: ORTHOPAEDIC SURGERY

## 2018-02-21 PROCEDURE — 20610 DRAIN/INJ JOINT/BURSA W/O US: CPT | Mod: RT | Performed by: ORTHOPAEDIC SURGERY

## 2018-02-21 PROCEDURE — 73562 X-RAY EXAM OF KNEE 3: CPT | Mod: RT

## 2018-02-21 ASSESSMENT — PAIN SCALES - GENERAL: PAINLEVEL: WORST PAIN (10)

## 2018-02-21 NOTE — LETTER
"    2/21/2018         RE: Lola Magaña  [address] [place] [address]        Dear Colleague,    Thank you for referring your patient, Lola Magaña, to the [facility]. Please see a copy of my visit note below.    HISTORY OF PRESENT ILLNESS    Lola Magaña is a 63 year old female seen for follow up of severe right knee osteoarthritis mainly involving the medial compartment. She was doing extremely well with the knee, and didn't feel the need to get the  brace.  2 days ago was walking in her kitchen, and developed severe pain in the knee.  Pain with extension, flexion and pivoting.    Last injection(s):  1. RIGHT KNEE intra-articular steroid injection on 12/20/17. Length of effectiveness: 2 months.    PHYSICAL EXAM:  Temp 98  F (36.7  C) (Tympanic)  Ht 1.689 m (5' 6.5\")  Wt 85.3 kg (188 lb)  BMI 29.89 kg/m2  GENERAL APPEARANCE: healthy, alert and no distress   SKIN: no suspicious lesions or rashes  NEURO: Normal strength and tone, mentation intact and speech normal  VASCULAR: good pulses, and cappillary refill   LYMPH: no lymphadenopathy   PSYCH:  mentation appears normal and affect normal/bright  RESP: no increased work of breathing    RIGHT KNEE EXAM:   Gait: walks with antalgic gait favoring right side  Alignment: normal   .  Pain weight bearing   ROM: 5*-100* (similar to last exam)  Effusion: moderate  Tender: medial joint line and patellar tendon insertion  not tested nadine's  Ligaments:  Lachman's Stable, Anterior and posterior drawer stable, stable to varus and valgus stress.  no pain with varus-valgus  stress testing.    Patellofemoral joint: no crepitations in the patellofemoral joint.    Lateral retinaculum   Facet Tenderness: none  Apprehension: negative    Xray: no change    IMPRESSION:   Severe OA mainly involving the medial compartment, right knee, with increase pain spontaneously.    PLAN: Injection therapy: Discussed findings and diagnosis with " patient.  We talked about treatment options.  We decided that repeat injection would be the best option.  Thus, With the patient's consent, right knee(s) injected intra-articularly with 80mg of Depomedrol and 4cc of local anesthetic after sterile prep.      Strengthening  BRACING: consider getting the  brace.     KNEE ARTHROPLASTY was briefly discussed, but she didn't want to consider that yet.    Return to clinic: as needed     TG Oconnor MD  Dept. Orthopedic Surgery  [facility]    Again, thank you for allowing me to participate in the care of your patient.        Sincerely,        Buck Oconnor MD

## 2018-02-21 NOTE — NURSING NOTE
"A steroid and or Hylan G - F 20 injection was performed on 2/21/2018 at 2:55 PM. Risks,benefits and complications of the injection were discussed with the patient and the patient has elected to proceed after verbal consent. Using sterile technique, the area was prepped with betadine . A 22 Gauge 1.5\" was used to inject the medication(s) listed below.This was well tolerated. No apparent complications. . Did also discuss that if diabetic, recommend close monitoring of blood sugars over the next week as cortisone injections can temporarily elevate blood sugar.    The following medication(s) was given:     MEDICATION: Depo Medrol 80mg per mL  ROUTE: intraarticular  SITE:Right Knee   : [employer] (Depo-Medrol)  DOSE: 1 ml  LOT #: [id number]  EXPIRATION DATE:  3/2020    MEDICATION: Lidocaine HCL  1% per mL  : Hosp[employer] (Marcaine,Lidoncaine)  DOSE: 4 ml  LOT #: [id number]  EXPIRATION DATE:  1 oct 2019    Harrison IMJARES    "

## 2018-02-21 NOTE — MR AVS SNAPSHOT
After Visit Summary   2/21/2018    Lola Magaña    MRN: [id number]           Patient Information     Date Of Birth          1955        Visit Information        Provider Department      2/21/2018 2:00 PM Buck Oconnor MD [facility]        Today's Diagnoses     Primary osteoarthritis of right knee    -  1       Follow-ups after your visit        Your next 10 appointments already scheduled     Apr 27, 2018  3:30 PM CDT   Nurse Only with FZ HC/HTN   [facility] ([facility]    [address] [address] [phone]            Aug 17, 2018  3:30 PM CDT   Nurse Only with FZ HC/HTN   [facility] ([facility])    [address] [facility]  [place] [address] [phone]              Who to contact     If you have questions or need follow up information about today's clinic visit or your schedule please contact [facility] directly at [phone].  Normal or non-critical lab and imaging results will be communicated to you by Jambohart, letter or phone within 4 business days after the clinic has received the results. If you do not hear from us within 7 days, please contact the clinic through Jambohart or phone. If you have a critical or abnormal lab result, we will notify you by phone as soon as possible.  Submit refill requests through Qian Xiaoâ€™er or call your pharmacy and they will forward the refill request to us. Please allow 3 business days for your refill to be completed.          Additional Information About Your Visit        Jambohart Information     Qian Xiaoâ€™er gives you secure access to your electronic health record. If you see a primary care provider, you can also send messages to your care team and make appointments. If you have questions, please call your primary care clinic.  If you do not have a primary care provider, please call [phone] and they will assist you.        Care EveryWhere ID   "   This is your Care EveryWhere ID. This could be used by other organizations to access your [facility] medical records  [id number]        Your Vitals Were     Temperature Height BMI (Body Mass Index)             98  F (36.7  C) (Tympanic) 1.689 m (5' 6.5\") 29.89 kg/m2          Blood Pressure from Last 3 Encounters:   02/02/18 128/70   12/22/17 130/78   12/12/17 156/70    Weight from Last 3 Encounters:   02/21/18 85.3 kg (188 lb)   12/20/17 86.2 kg (190 lb)   12/12/17 86.5 kg (190 lb 9.6 oz)              We Performed the Following     DRAIN/INJECT LARGE JOINT/BURSA     METHYLPREDNISOLONE 80 MG INJ        Primary Care Provider Office Phone # Fax #    Nasrin Dumont [phone] [address] [address]        Equal Access to Services     LIZA MALDONADO : Hadii aad ku hadasho Soomaali, waaxda luqadaha, qaybta kaalmada adeegyada, waxay emmain haydeyaniran javed szymanski . So [facility] [phone].    ATENCIÓN: Si colten porter, tiene a gonzalez disposición servicios gratuitos de asistencia lingüística. Llame al [phone].    We comply with applicable federal civil rights laws and [place] laws. We do not discriminate on the basis of race, color, national origin, age, disability, sex, sexual orientation, or gender identity.            Thank you!     Thank you for choosing [facility]  for your care. Our goal is always to provide you with excellent care. Hearing back from our patients is one way we can continue to improve our services. Please take a few minutes to complete the written survey that you may receive in the mail after your visit with us. Thank you!             Your Updated Medication List - Protect others around you: Learn how to safely use, store and throw away your medicines at www.disposemymeds.org.          This list is accurate as of 2/21/18 11:59 PM.  Always use your most recent med list.                   Brand Name Dispense Instructions for use Diagnosis "    diclofenac 1 % Gel topical gel    VOLTAREN    100 g    Apply 4 grams to knees four times daily as needed using enclosed dosing card.    Hypertension goal BP (blood pressure) < 140/90       fluticasone 50 MCG/ACT spray    FLONASE    1 Bottle    Spray 2 sprays into both nostrils daily    Chronic rhinitis       gabapentin 300 MG capsule    NEURONTIN    90 capsule    TAKE 1 CAPSULE BY MOUTH AT BEDTIME    Bilateral leg cramps, Chronic radicular low back pain, Sacro ilial pain       metoprolol succinate 25 MG 24 hr tablet    TOPROL-XL    90 tablet    Take 1 tablet (25 mg) by mouth daily    Hypertension goal BP (blood pressure) < 140/90       OMEPRAZOLE PO      Take by mouth daily

## 2018-04-25 ENCOUNTER — ALLIED HEALTH/NURSE VISIT (OUTPATIENT)
Dept: FAMILY MEDICINE | Facility: CLINIC | Age: 63
End: 2018-04-25
Payer: COMMERCIAL

## 2018-04-25 VITALS — DIASTOLIC BLOOD PRESSURE: 82 MMHG | HEART RATE: 76 BPM | SYSTOLIC BLOOD PRESSURE: 139 MMHG

## 2018-04-25 DIAGNOSIS — I10 HYPERTENSION GOAL BP (BLOOD PRESSURE) < 140/90: Primary | ICD-10-CM

## 2018-04-25 PROCEDURE — 99207 ZZC NO CHARGE NURSE ONLY: CPT | Performed by: FAMILY MEDICINE

## 2018-04-25 NOTE — PROGRESS NOTES
Lola Magaña is enrolled/participating in the retail pharmacy Blood Pressure Goals Achievement Program (BPGAP).  Lola Magaña was evaluated at [facility] on April 25, 2018 at which time her blood pressure was:    BP Readings from Last 3 Encounters:   04/25/18 139/82   02/02/18 128/70   12/22/17 130/78     Reviewed lifestyle modifications for blood pressure control and reduction: including making healthy food choices, managing weight, getting regular exercise, smoking cessation, reducing alcohol consumption, monitoring blood pressure regularly.     Lola Magaña is not experiencing symptoms.    Follow-Up: BP is at goal of < 140/90mmHg (patient 18+ years of age with or without diabetes).  Recommended follow-up at pharmacy in 6 months.     Recommendation to Provider: None at this time, patient stated she felt very good.     Lola Magaña was evaluated for enrollment into the PGEN study today.    Patient eligible for enrollment:  Yes  Patient interested in enrollment:  Yes- in study already     Completed by: Thank you,  Linda Mckeon, PharmD  [facility]  [phone]

## 2018-04-25 NOTE — MR AVS SNAPSHOT
After Visit Summary   4/25/2018    Lola Magaña    MRN: [id number]           Patient Information     Date Of Birth          1955        Visit Information        Provider Department      4/25/2018 4:28 PM Nasrin Dumont MD [facility]        Today's Diagnoses     Hypertension goal BP (blood pressure) < 140/90    -  1       Follow-ups after your visit        Your next 10 appointments already scheduled     Aug 17, 2018  3:30 PM CDT   Nurse Only with FZ HC/HTN   [facility] ([facility]    [address] [facility] [address] [phone]              Who to contact     If you have questions or need follow up information about today's clinic visit or your schedule please contact [facility] directly at [phone].  Normal or non-critical lab and imaging results will be communicated to you by MyChart, letter or phone within 4 business days after the clinic has received the results. If you do not hear from us within 7 days, please contact the clinic through MyChart or phone. If you have a critical or abnormal lab result, we will notify you by phone as soon as possible.  Submit refill requests through ahoyDoc or call your pharmacy and they will forward the refill request to us. Please allow 3 business days for your refill to be completed.          Additional Information About Your Visit        MyChart Information     ahoyDoc gives you secure access to your electronic health record. If you see a primary care provider, you can also send messages to your care team and make appointments. If you have questions, please call your primary care clinic.  If you do not have a primary care provider, please call [phone] and they will assist you.        Care EveryWhere ID     This is your Care EveryWhere ID. This could be used by other organizations to access your [facility] medical records  [id number]        Your Vitals Were     Pulse                    76            Blood Pressure from Last 3 Encounters:   04/25/18 139/82   02/02/18 128/70   12/22/17 130/78    Weight from Last 3 Encounters:   02/21/18 188 lb (85.3 kg)   12/20/17 190 lb (86.2 kg)   12/12/17 190 lb 9.6 oz (86.5 kg)              Today, you had the following     No orders found for display       Primary Care Provider Office Phone # Fax #    Nasrin Dumont [phone]        HOME CARE HOSPICE Count includes the [facility] [address] [address]        Equal Access to Services     [facility]: Hadii aad ku hadasho Soomaali, waaxda luqadaha, qaybta kaalmada adetiffanyyada, nhung szymanski . So [facility] [phone].    ATENCIÓN: Si habla español, tiene a gonzalez disposición servicios gratuitos de asistencia lingüística. Llame al [phone].    We comply with applicable federal civil rights laws and [place] laws. We do not discriminate on the basis of race, color, national origin, age, disability, sex, sexual orientation, or gender identity.            Thank you!     Thank you for choosing [facility]  for your care. Our goal is always to provide you with excellent care. Hearing back from our patients is one way we can continue to improve our services. Please take a few minutes to complete the written survey that you may receive in the mail after your visit with us. Thank you!             Your Updated Medication List - Protect others around you: Learn how to safely use, store and throw away your medicines at www.disposemymeds.org.          This list is accurate as of 4/25/18 11:59 PM.  Always use your most recent med list.                   Brand Name Dispense Instructions for use Diagnosis    diclofenac 1 % Gel topical gel    VOLTAREN    100 g    Apply 4 grams to knees four times daily as needed using enclosed dosing card.    Hypertension goal BP (blood pressure) < 140/90       fluticasone 50 MCG/ACT spray    FLONASE    1 Bottle    Spray 2 sprays into  both nostrils daily    Chronic rhinitis       gabapentin 300 MG capsule    NEURONTIN    90 capsule    TAKE 1 CAPSULE BY MOUTH AT BEDTIME    Bilateral leg cramps, Chronic radicular low back pain, Sacro ilial pain       metoprolol succinate 25 MG 24 hr tablet    TOPROL-XL    90 tablet    Take 1 tablet (25 mg) by mouth daily    Hypertension goal BP (blood pressure) < 140/90       OMEPRAZOLE PO      Take by mouth daily

## 2018-05-29 ENCOUNTER — TELEPHONE (OUTPATIENT)
Dept: FAMILY MEDICINE | Facility: CLINIC | Age: 63
End: 2018-05-29

## 2018-05-29 NOTE — TELEPHONE ENCOUNTER
Patient contacted study team with questions regarding medication side effects. She states she has had a cough for the last 3 weeks and her boyfriend suggested it may be related to her blood pressure medication. She currently takes 25mg metoprolol XL for htn. She was advised this is not a common side effect of this medication and she should follow up with her PCP or other provider regarding this concern. She verbalized understanding of this plan. She will keep her final [facility] visit scheduled for August and will update the study team if there are any other concerns that arise.    Roxane Mcfarland

## 2018-06-04 DIAGNOSIS — I10 HYPERTENSION GOAL BP (BLOOD PRESSURE) < 140/90: ICD-10-CM

## 2018-06-05 RX ORDER — METOPROLOL SUCCINATE 25 MG/1
25 TABLET, EXTENDED RELEASE ORAL DAILY
Qty: 90 TABLET | Refills: 1 | Status: SHIPPED | OUTPATIENT
Start: 2018-06-05 | End: 2018-11-29

## 2018-07-03 ENCOUNTER — OFFICE VISIT (OUTPATIENT)
Dept: ORTHOPEDICS | Facility: CLINIC | Age: 63
End: 2018-07-03
Payer: COMMERCIAL

## 2018-07-03 ENCOUNTER — RADIANT APPOINTMENT (OUTPATIENT)
Dept: MAMMOGRAPHY | Facility: CLINIC | Age: 63
End: 2018-07-03
Payer: COMMERCIAL

## 2018-07-03 VITALS
TEMPERATURE: 98.1 F | BODY MASS INDEX: 30.56 KG/M2 | HEART RATE: 68 BPM | SYSTOLIC BLOOD PRESSURE: 129 MMHG | DIASTOLIC BLOOD PRESSURE: 79 MMHG | OXYGEN SATURATION: 98 % | WEIGHT: 192.2 LBS

## 2018-07-03 DIAGNOSIS — Z12.31 VISIT FOR SCREENING MAMMOGRAM: ICD-10-CM

## 2018-07-03 DIAGNOSIS — M17.11 PRIMARY OSTEOARTHRITIS OF RIGHT KNEE: Primary | ICD-10-CM

## 2018-07-03 PROCEDURE — 77067 SCR MAMMO BI INCL CAD: CPT | Mod: TC

## 2018-07-03 PROCEDURE — 99213 OFFICE O/P EST LOW 20 MIN: CPT | Mod: 25 | Performed by: ORTHOPAEDIC SURGERY

## 2018-07-03 PROCEDURE — 20610 DRAIN/INJ JOINT/BURSA W/O US: CPT | Mod: RT | Performed by: ORTHOPAEDIC SURGERY

## 2018-07-03 RX ORDER — LIDOCAINE HYDROCHLORIDE 10 MG/ML
4 INJECTION, SOLUTION INFILTRATION; PERINEURAL
Status: SHIPPED | OUTPATIENT
Start: 2018-07-03

## 2018-07-03 RX ORDER — METHYLPREDNISOLONE ACETATE 80 MG/ML
80 INJECTION, SUSPENSION INTRA-ARTICULAR; INTRALESIONAL; INTRAMUSCULAR; SOFT TISSUE
Status: SHIPPED | OUTPATIENT
Start: 2018-07-03

## 2018-07-03 RX ADMIN — LIDOCAINE HYDROCHLORIDE 4 ML: 10 INJECTION, SOLUTION INFILTRATION; PERINEURAL at 10:58

## 2018-07-03 RX ADMIN — METHYLPREDNISOLONE ACETATE 80 MG: 80 INJECTION, SUSPENSION INTRA-ARTICULAR; INTRALESIONAL; INTRAMUSCULAR; SOFT TISSUE at 10:58

## 2018-07-03 ASSESSMENT — PAIN SCALES - GENERAL: PAINLEVEL: NO PAIN (0)

## 2018-07-03 NOTE — MR AVS SNAPSHOT
After Visit Summary   7/3/2018    Lola Magaña    MRN: [id number]           Patient Information     Date Of Birth          1955        Visit Information        Provider Department      7/3/2018 10:00 AM Buck Oconnor MD [facility]        Today's Diagnoses     Primary osteoarthritis of right knee    -  1       Follow-ups after your visit        Additional Services     ORTHOTICS REFERRAL       **This referral order prints off in the [facility]  (Orthotics & Prosthetics) Central Scheduling Office**    The [facility] Scheduling Staff will contact the patient to schedule appointments.     Central Scheduling Contact Information: [phone] ([employer])    Orthotics:  Knee Brace:   osteoarthritis brace to unload the medial compartment.    Please be aware that coverage of these services is subject to the terms and limitations of your health insurance plan.  Call member services at your health plan with any benefit or coverage questions.      Please bring the following to your appointment:    >>   Any x-rays, CTs or MRIs which have been performed.  Contact the facility where they were done to arrange for  prior to your scheduled appointment.    >>   List of current medications   >>   This referral request   >>   Any documents/labs given to you for this referral                  Your next 10 appointments already scheduled     Aug 17, 2018  3:30 PM CDT   Nurse Only with FZ RN   [facility]    [address] [facility]  [place] [address] [phone]              Who to contact     If you have questions or need follow up information about today's clinic visit or your schedule please contact [facility] directly at [phone].  Normal or non-critical lab and imaging results will be communicated to you by MyChart, letter or phone within 4 business days after the clinic has received the  results. If you do not hear from us within 7 days, please contact the clinic through SoftRun or phone. If you have a critical or abnormal lab result, we will notify you by phone as soon as possible.  Submit refill requests through SoftRun or call your pharmacy and they will forward the refill request to us. Please allow 3 business days for your refill to be completed.          Additional Information About Your Visit        CoverooharCentrePath Information     SoftRun gives you secure access to your electronic health record. If you see a primary care provider, you can also send messages to your care team and make appointments. If you have questions, please call your primary care clinic.  If you do not have a primary care provider, please call [phone] and they will assist you.        Care EveryWhere ID     This is your Care EveryWhere ID. This could be used by other organizations to access your [facility] medical records  [id number]        Your Vitals Were     Pulse Temperature Pulse Oximetry BMI (Body Mass Index)          68 98.1  F (36.7  C) (Oral) 98% 30.56 kg/m2         Blood Pressure from Last 3 Encounters:   07/03/18 129/79   04/25/18 139/82   02/02/18 128/70    Weight from Last 3 Encounters:   07/03/18 87.2 kg (192 lb 3.2 oz)   02/21/18 85.3 kg (188 lb)   12/20/17 86.2 kg (190 lb)              We Performed the Following     Large Joint Injection/Arthocentesis     ORTHOTICS REFERRAL        Primary Care Provider Office Phone # Fax #    Nasrin Dumont [phone]       [facility] [facility] [address]        Equal Access to Services     [facility]: Hadii aad ku hadasho Soomaali, waaxda luqadaha, qaybta kaalmada michelle, nhung ruiz. So [facility] [phone].    ATENCIÓN: Si habla español, tiene a gonzalez disposición servicios gratuitos de asistencia lingüística. Llame al [phone].    We comply with applicable federal civil rights laws and [place] laws. We do not  discriminate on the basis of race, color, national origin, age, disability, sex, sexual orientation, or gender identity.            Thank you!     Thank you for choosing [facility]  for your care. Our goal is always to provide you with excellent care. Hearing back from our patients is one way we can continue to improve our services. Please take a few minutes to complete the written survey that you may receive in the mail after your visit with us. Thank you!             Your Updated Medication List - Protect others around you: Learn how to safely use, store and throw away your medicines at www.disposemymeds.org.          This list is accurate as of 7/3/18  4:02 PM.  Always use your most recent med list.                   Brand Name Dispense Instructions for use Diagnosis    diclofenac 1 % Gel topical gel    VOLTAREN    100 g    Apply 4 grams to knees four times daily as needed using enclosed dosing card.    Hypertension goal BP (blood pressure) < 140/90       fluticasone 50 MCG/ACT spray    FLONASE    1 Bottle    Spray 2 sprays into both nostrils daily    Chronic rhinitis       gabapentin 300 MG capsule    NEURONTIN    90 capsule    TAKE 1 CAPSULE BY MOUTH AT BEDTIME    Bilateral leg cramps, Chronic radicular low back pain, Sacro ilial pain       metoprolol succinate 25 MG 24 hr tablet    TOPROL-XL    90 tablet    Take 1 tablet (25 mg) by mouth daily    Hypertension goal BP (blood pressure) < 140/90       OMEPRAZOLE PO      Take by mouth daily

## 2018-07-03 NOTE — PROGRESS NOTES
Large Joint Injection/Arthocentesis  Date/Time: 7/3/2018 10:58 AM  Performed by: SOHAN SMITH  Authorized by: SOHAN SMITH     Indications:  Pain and osteoarthritis  Guidance: landmark guided    Approach:  Anterolateral  Location:  Knee  Site:  R knee joint  Medications:  4 mL lidocaine 1 %; 80 mg methylPREDNISolone acetate 80 MG/ML; 30 mg cross-Linked Hyaluronate 30 MG/3ML  Outcome:  Tolerated well, no immediate complications  Procedure discussed: discussed risks, benefits, and alternatives    Consent Given by:  Patient  Timeout: timeout called immediately prior to procedure    Prep: patient was prepped and draped in usual sterile fashion

## 2018-07-03 NOTE — LETTER
7/3/2018         RE: Lola Magaña  [facility] [address] [facility] [address]        Dear Colleague,    Thank you for referring your patient, Lola Magaña, to the [facility]. Please see a copy of my visit note below.    SUBJECTIVE:   Lola Magaña is here in follow up of severe right knee osteoarthritis mainly involving the medial compartment. The patient reports a feeling of catching in her knee. She is not particularly painful today.     Last injection(s):  1. RIGHT KNEE intra-articular steroid injection on 12/20/17. Length of effectiveness: 2 months.  2. RIGHT KNEE intra-articular steroid injection on 02/21/18. Length of effectiveness: 3.5 months    Present symptoms: anterior knee pain, swelling, catching, and difficulty flexing the knee which makes it difficult to walk  Treatments up to this point: knee sleeve and steroid injections    Review of Systems:  Constitutional/General: Negative for fever, chills, change in weight  Integumentary/Skin: Negative for worrisome rashes, moles, or lesions  Neuro: Negative for weakness, dizziness, or paresthesias   Psychiatric: negative for changes in mood or affect    OBJECTIVE:  Physical Exam:  /79 (BP Location: Left arm, Patient Position: Sitting, Cuff Size: Adult Regular)  Pulse 68  Temp 98.1  F (36.7  C) (Oral)  Wt 87.2 kg (192 lb 3.2 oz)  SpO2 98%  BMI 30.56 kg/m2  General Appearance: healthy, alert and no distress   Skin: no suspicious lesions or rashes  Neuro: Normal strength and tone, mentation intact and speech normal  Vascular: good pulses, and capillary refill   Lymph: no lymphadenopathy   Psych:  mentation appears normal and affect normal/bright  Resp: no increased work of breathing    Right Knee Exam:  ROM: 0-100*  Effusion: mild  Negative nadine's    ASSESSMENT:   Severe OA mainly involving the medial compartment, right knee.    PLAN:  We talked about the options. Arthroscopy has limited utility in her situation.  Her  pain is not severe, and is mainly an popping or instability, and she does have maybe some joint space preserved. Knee arthroplasty is an option, but I don't suggest it if the pain is not bad. We decided to proceed with a combination injection today. The patient should consider the  brace as discussed last visit. This was reordered and I emphasized the patient check on her insurance before proceeding with the brace. All questions were answered.     Informed consent obtained. 1% Lidocaine injected after sterile prep. This was followed by an intra-articular injection of 80mg of Depomedrol + 4cc of local anesthetic, and an injection of GEL ONE. She tolerated the procedure well.    Return to clinic: ZEYAD Smith MD  Dept. Orthopedic Surgery  [facility]    This document serves as a record of the services and decisions personally performed and made by Dr. TG Smith MD. It was created on his behalf by Lisandro Taveras, a trained medical scribe. The creation of this record is based on the provider's personal observations and the statements of the patient. This document has been checked and approved by the attending provider.   Lisandro Taveras July 3, 2018 10:56 AM    Large Joint Injection/Arthocentesis  Date/Time: 7/3/2018 10:58 AM  Performed by: SOHAN SMITH  Authorized by: SOHAN SMITH     Indications:  Pain and osteoarthritis  Guidance: landmark guided    Approach:  Anterolateral  Location:  Knee  Site:  R knee joint  Medications:  4 mL lidocaine 1 %; 80 mg methylPREDNISolone acetate 80 MG/ML; 30 mg cross-Linked Hyaluronate 30 MG/3ML  Outcome:  Tolerated well, no immediate complications  Procedure discussed: discussed risks, benefits, and alternatives    Consent Given by:  Patient  Timeout: timeout called immediately prior to procedure    Prep: patient was prepped and draped in usual sterile fashion              Again, thank you for allowing me to participate in the care  of your patient.        Sincerely,        Buck Oconnor MD

## 2018-07-03 NOTE — PROGRESS NOTES
SUBJECTIVE:   Lola Magaña is here in follow up of severe right knee osteoarthritis mainly involving the medial compartment. The patient reports a feeling of catching in her knee. She is not particularly painful today.     Last injection(s):  1. RIGHT KNEE intra-articular steroid injection on 12/20/17. Length of effectiveness: 2 months.  2. RIGHT KNEE intra-articular steroid injection on 02/21/18. Length of effectiveness: 3.5 months    Present symptoms: anterior knee pain, swelling, catching, and difficulty flexing the knee which makes it difficult to walk  Treatments up to this point: knee sleeve and steroid injections    Review of Systems:  Constitutional/General: Negative for fever, chills, change in weight  Integumentary/Skin: Negative for worrisome rashes, moles, or lesions  Neuro: Negative for weakness, dizziness, or paresthesias   Psychiatric: negative for changes in mood or affect    OBJECTIVE:  Physical Exam:  /79 (BP Location: Left arm, Patient Position: Sitting, Cuff Size: Adult Regular)  Pulse 68  Temp 98.1  F (36.7  C) (Oral)  Wt 87.2 kg (192 lb 3.2 oz)  SpO2 98%  BMI 30.56 kg/m2  General Appearance: healthy, alert and no distress   Skin: no suspicious lesions or rashes  Neuro: Normal strength and tone, mentation intact and speech normal  Vascular: good pulses, and capillary refill   Lymph: no lymphadenopathy   Psych:  mentation appears normal and affect normal/bright  Resp: no increased work of breathing    Right Knee Exam:  ROM: 0-100*  Effusion: mild  Negative nadine's    ASSESSMENT:   Severe OA mainly involving the medial compartment, right knee.    PLAN:  We talked about the options. Arthroscopy has limited utility in her situation.  Her pain is not severe, and is mainly an popping or instability, and she does have maybe some joint space preserved. Knee arthroplasty is an option, but I don't suggest it if the pain is not bad. We decided to proceed with a combination injection today.  The patient should consider the  brace as discussed last visit. This was reordered and I emphasized the patient check on her insurance before proceeding with the brace. All questions were answered.     Informed consent obtained. 1% Lidocaine injected after sterile prep. This was followed by an intra-articular injection of 80mg of Depomedrol + 4cc of local anesthetic, and an injection of GEL ONE. She tolerated the procedure well.    Return to clinic: ZEYAD Oconnor MD  Dept. Orthopedic Surgery  [facility]    This document serves as a record of the services and decisions personally performed and made by Dr. TG Oconnor MD. It was created on his behalf by Lisandro Taveras, a trained medical scribe. The creation of this record is based on the provider's personal observations and the statements of the patient. This document has been checked and approved by the attending provider.   Lisandro Taveras July 3, 2018 10:56 AM

## 2018-07-16 DIAGNOSIS — M53.3 SACRO ILIAL PAIN: ICD-10-CM

## 2018-07-16 DIAGNOSIS — M54.16 CHRONIC RADICULAR LOW BACK PAIN: ICD-10-CM

## 2018-07-16 DIAGNOSIS — R25.2 BILATERAL LEG CRAMPS: ICD-10-CM

## 2018-07-16 DIAGNOSIS — G89.29 CHRONIC RADICULAR LOW BACK PAIN: ICD-10-CM

## 2018-07-16 NOTE — TELEPHONE ENCOUNTER
Requested Prescriptions   Pending Prescriptions Disp Refills     gabapentin (NEURONTIN) 300 MG capsule 90 capsule 1    There is no refill protocol information for this order        Controlled Substance Refill Request for gabapentin (NEURONTIN) 300 MG capsule  Problem List Complete:  Yes   checked in past 3 months?  No, route to RN

## 2018-07-16 NOTE — TELEPHONE ENCOUNTER
Unable to check  d/t provider has not granted access to [place]  account.    Lizbeth Virk RN - BC

## 2018-07-17 NOTE — TELEPHONE ENCOUNTER
Okay for one 90 day refill.  Please have patient establish with a new provider for additional fills.

## 2018-07-18 RX ORDER — GABAPENTIN 300 MG/1
CAPSULE ORAL
Qty: 90 CAPSULE | Refills: 0 | Status: SHIPPED | OUTPATIENT
Start: 2018-07-18 | End: 2018-08-28

## 2018-07-31 ENCOUNTER — MYC MEDICAL ADVICE (OUTPATIENT)
Dept: INTERNAL MEDICINE | Facility: CLINIC | Age: 63
End: 2018-07-31

## 2018-08-17 ENCOUNTER — TRANSFERRED RECORDS (OUTPATIENT)
Dept: HEALTH INFORMATION MANAGEMENT | Facility: CLINIC | Age: 63
End: 2018-08-17

## 2018-08-17 ENCOUNTER — ALLIED HEALTH/NURSE VISIT (OUTPATIENT)
Dept: NURSING | Facility: CLINIC | Age: 63
End: 2018-08-17
Payer: COMMERCIAL

## 2018-08-17 VITALS — DIASTOLIC BLOOD PRESSURE: 68 MMHG | SYSTOLIC BLOOD PRESSURE: 124 MMHG | HEART RATE: 68 BPM

## 2018-08-17 DIAGNOSIS — I10 HYPERTENSION GOAL BP (BLOOD PRESSURE) < 140/90: Primary | ICD-10-CM

## 2018-08-17 PROCEDURE — 99207 ZZC NO CHARGE NURSE ONLY: CPT

## 2018-08-17 NOTE — PROGRESS NOTES
PGEN study visit  NEW HYPERTENSION diagnosis visit 8    SUBJECTIVE:  Lola is here for 8th(Final)  PGEN research study visit. Please refer to research tab in the header and today's flow sheet for further details. Patient had new onset hypertension at enrollment and has a goal of <  140/90 (per Problem list target chosen by PCP)     Prior research note reviewed. Patient is on blood pressure medication(s) at this time.  she has been taking Metoprolol 25mg and is tolerating the medication well.      Current diet & lifestyle: no salt use  Smoking: no   Alcohol consumption:  About 3 drinks per week  Other pertinent history denies stress in life     BP Readings from Last 3 Encounters:   07/31/18 136/72   07/03/18 129/79   04/25/18 139/82       Current Outpatient Prescriptions   Medication Sig Dispense Refill     diclofenac (VOLTAREN) 1 % GEL topical gel Apply 4 grams to knees four times daily as needed using enclosed dosing card. 100 g 1     fluticasone (FLONASE) 50 MCG/ACT spray Spray 2 sprays into both nostrils daily 1 Bottle 3     gabapentin (NEURONTIN) 300 MG capsule TAKE 1 CAPSULE BY MOUTH AT BEDTIME 90 capsule 0     metoprolol succinate (TOPROL-XL) 25 MG 24 hr tablet Take 1 tablet (25 mg) by mouth daily 90 tablet 1     OMEPRAZOLE PO Take by mouth daily       Potassium   Date Value Ref Range Status   08/03/2017 4.0 3.4 - 5.3 mmol/L Final     Creatinine   Date Value Ref Range Status   08/03/2017 0.69 0.52 - 1.04 mg/dL Final     Urea Nitrogen   Date Value Ref Range Status   08/03/2017 17 7 - 30 mg/dL Final     GFR Estimate   Date Value Ref Range Status   08/03/2017 87 >60 mL/min/1.7m2 Final     Comment:     Non  GFR Calc      A baseline potassium, creatinine, BUN, GFR has been done within past 12 months    124/  OBJECTIVE:  Patient in in no apparent distress and able to provide full history for today's encounter. she denies pain or any current illness   Vitals: There were no vitals taken for this  visit.  Today's BP completed using cuff size: regular on right side arm.  124/68    Pulse Readings from Last 1 Encounters:   07/31/18 87     Is pulse 55 or greater? - Yes    BMI= There is no height or weight on file to calculate BMI.  [facility] for other exam findings.       ASSESSMENT/PLAN:   Blood pressure reading today is at the provider specified goal of <140/90.      PGEN Flowsheet has been  'filed' ) for this visit (this saves flowsheet data)      1.  Based on PGEN RN HTN MGMT standing order the patient will be advised continue current medication regimen unchanged.     2.  We will be checking a metabolic lab panel today.  Patient was already scheduled with lab    3.  Follow up instructions include:     Next Provider visit: follow up per PCP recommendation .    See avs for more details.  Full research packet also provided to patient previously  Our research team will reach out to patient to schedule follow up visit as well.     Patient was counseled regarding the lifestyle changes (listed below)  to help with BP management Yes  Lifestyle changes that can help control high blood pressure:  Even though [facility] is a study to test effectiveness of genetically guided medications for managing high blood pressure, there are several things you can do to ensure your blood pressure stays in good control:    Maintain a healthy weight (BMI<26). A modest amount of weight loss can be helpful    Limit salt intake to under 2400mg daily    Follow the DASH diet (lean meats, low salt, whole grains, lots of fruits/vegies)    Stay active, try to get in 30 minutes of exercise daily.    Manage your daily stress.    Do not smoke cigarettes (or cut back)    Limit alcohol (2 drinks/day for men, 1 drink/day for women)  Was AVS  provided to patient with the relevant <dot>PGENPI dot phrase pulled into patient instructions Yes    Patient was advised to follow up with PCP Samira Francis to continue ongoing care of HTN since study  visits are now complete.        Whitney Ng RN

## 2018-08-17 NOTE — PATIENT INSTRUCTIONS
[facility] Hypertension Study  Visit 8     Thank you for your participation in the hypertension study!     Please continue taking your hypertension medications as directed and follow up with your physician as directed. If you have any questions, please contact your physician s office.    You have been given the results of your genetic profile for your own records.  Please contact the research coordinator at [phone] if you have any questions related to the study.      Please contact your doctor/provider with any other health related questions.     Lifestyle changes that can help control high blood pressure:  Even though [facility] is a study to test effectiveness of genetically guided medications for managing high blood pressure, there are several things you can do to ensure your blood pressure stays in good control:    Maintain a healthy weight (BMI<26). A modest amount of weight loss can be helpful    Limit salt intake to under 2400mg daily    Follow the DASH diet (lean meats, low salt, whole grains, lots of fruits/vegies)    Stay active, try to get in 30 minutes of exercise daily.    Manage your daily stress.    Do not smoke cigarettes (or cut back)    Limit alcohol (2 drinks/day for men, 1 drink/day for women)

## 2018-08-17 NOTE — MR AVS SNAPSHOT
After Visit Summary   8/17/2018    Lola Magaña    MRN: [id number]           Patient Information     Date Of Birth          1955        Visit Information        Provider Department      8/17/2018 3:30 PM FZ RN [facility]        Today's Diagnoses     Hypertension goal BP (blood pressure) < 140/90    -  1      Care Instructions    [facility] Hypertension Study  Visit 8     Thank you for your participation in the hypertension study!     Please continue taking your hypertension medications as directed and follow up with your physician as directed. If you have any questions, please contact your physician s office.    You have been given the results of your genetic profile for your own records.  Please contact the research coordinator at [phone] if you have any questions related to the study.      Please contact your doctor/provider with any other health related questions.     Lifestyle changes that can help control high blood pressure:  Even though [facility] is a study to test effectiveness of genetically guided medications for managing high blood pressure, there are several things you can do to ensure your blood pressure stays in good control:    Maintain a healthy weight (BMI<26). A modest amount of weight loss can be helpful    Limit salt intake to under 2400mg daily    Follow the DASH diet (lean meats, low salt, whole grains, lots of fruits/vegies)    Stay active, try to get in 30 minutes of exercise daily.    Manage your daily stress.    Do not smoke cigarettes (or cut back)    Limit alcohol (2 drinks/day for men, 1 drink/day for women)            Follow-ups after your visit        Your next 10 appointments already scheduled     Aug 17, 2018  3:30 PM CDT   Nurse Only with FZ RN   [facility] ([facility])    [address]  [place] [address]   [phone]            Aug 24, 2018  4:00 PM CDT   Office Visit with MONI Sanchez CNP    [facility] ([facility]    [address]  [place] [address] [phone]           Bring a current list of meds and any records pertaining to this visit. For Physicals, please bring immunization records and any forms needing to be filled out. Please arrive 10 minutes early to complete paperwork.              Who to contact     If you have questions or need follow up information about today's clinic visit or your schedule please contact [facility] directly at [phone].  Normal or non-critical lab and imaging results will be communicated to you by [employer], letter or phone within 4 business days after the clinic has received the results. If you do not hear from us within 7 days, please contact the clinic through [employer] or phone. If you have a critical or abnormal lab result, we will notify you by phone as soon as possible.  Submit refill requests through [employer] or call your pharmacy and they will forward the refill request to us. Please allow 3 business days for your refill to be completed.          Additional Information About Your Visit        MyChart Information     [employer] gives you secure access to your electronic health record. If you see a primary care provider, you can also send messages to your care team and make appointments. If you have questions, please call your primary care clinic.  If you do not have a primary care provider, please call [phone] and they will assist you.        Care EveryWhere ID     This is your Care EveryWhere ID. This could be used by other organizations to access your [facility] medical records  [id number]         Blood Pressure from Last 3 Encounters:   08/17/18 124/68   07/31/18 136/72   07/03/18 129/79    Weight from Last 3 Encounters:   07/31/18 197 lb (89.4 kg)   07/03/18 192 lb 3.2 oz (87.2 kg)   02/21/18 188 lb (85.3 kg)              Today, you had the following     No orders found for display       Primary  Care Provider Office Phone # Fax #    MONI Sanchez [phone] [address] [address]        Equal Access to Services     YARI MALDONADO : Hadii aad ku hadzulyo Soomaali, waaxda luqadaha, qaybta kaalmada adeegyada, waxgrzegorz rennern javed nolanelieser ruiz. So [facility] [phone].    ATENCIÓN: Si habla español, tiene a gonzalez disposición servicios gratuitos de asistencia lingüística. Llame al [phone].    We comply with applicable federal civil rights laws and [place] laws. We do not discriminate on the basis of race, color, national origin, age, disability, sex, sexual orientation, or gender identity.            Thank you!     Thank you for choosing [facility]  for your care. Our goal is always to provide you with excellent care. Hearing back from our patients is one way we can continue to improve our services. Please take a few minutes to complete the written survey that you may receive in the mail after your visit with us. Thank you!             Your Updated Medication List - Protect others around you: Learn how to safely use, store and throw away your medicines at www.disposemymeds.org.          This list is accurate as of 8/17/18  2:29 PM.  Always use your most recent med list.                   Brand Name Dispense Instructions for use Diagnosis    diclofenac 1 % Gel topical gel    VOLTAREN    100 g    Apply 4 grams to knees four times daily as needed using enclosed dosing card.    Hypertension goal BP (blood pressure) < 140/90       fluticasone 50 MCG/ACT spray    FLONASE    1 Bottle    Spray 2 sprays into both nostrils daily    Chronic rhinitis       gabapentin 300 MG capsule    NEURONTIN    90 capsule    TAKE 1 CAPSULE BY MOUTH AT BEDTIME    Bilateral leg cramps, Chronic radicular low back pain, Sacro ilial pain       metoprolol succinate 25 MG 24 hr tablet    TOPROL-XL    90 tablet    Take 1 tablet (25 mg) by mouth daily    Hypertension  goal BP (blood pressure) < 140/90       OMEPRAZOLE PO      Take by mouth daily

## 2018-08-27 NOTE — PROGRESS NOTES
SUBJECTIVE:   Lola Magaña is a 63 year old female who presents to clinic today for the following health issues:        Medication Followup of Gabapentin     Taking Medication as prescribed: yes    Side Effects:  None    Medication Helping Symptoms:  yes     Patient uses gabapentin at bedtime for restless legs, chronic knee pain.  Medication is effective.  She denies any side effects.    Patient recently completed PGEN study for hypertension.  She is doing well on metoprolol.    Problem list and histories reviewed & adjusted, as indicated.  Additional history: as documented    Patient Active Problem List   Diagnosis     Chronic rhinitis     Esophageal reflux     Menopause     Menopausal syndrome (hot flashes)     Bilateral leg cramps     Chronic radicular low back pain     Hypertension goal BP (blood pressure) < 140/90     Past Surgical History:   Procedure Laterality Date     ARTHROSCOPY KNEE Right      HC TOOTH EXTRACTION W/FORCEP       TUBAL LIGATION       vulvar biopsy  2005    LORI III       Social History   Substance Use Topics     Smoking status: Never Smoker     Smokeless tobacco: Never Used     Alcohol use 0.0 oz/week     0 Standard drinks or equivalent per week      Comment: per week, 2-3 drinks 1-2 times per week     Family History   Problem Relation Age of Onset     Cancer Father      liver     Hyperlipidemia Father      Mental Illness Sister      Depression & anxiety     Colon Polyps Brother          Current Outpatient Prescriptions   Medication Sig Dispense Refill     diclofenac (VOLTAREN) 1 % GEL topical gel Apply 4 grams to knees four times daily as needed using enclosed dosing card. 100 g 1     fluticasone (FLONASE) 50 MCG/ACT spray Spray 2 sprays into both nostrils daily 1 Bottle 3     gabapentin (NEURONTIN) 300 MG capsule TAKE 1 CAPSULE BY MOUTH AT BEDTIME 90 capsule 3     metoprolol succinate (TOPROL-XL) 25 MG 24 hr tablet Take 1 tablet (25 mg) by mouth daily 90 tablet 1     OMEPRAZOLE PO Take  "by mouth daily       [DISCONTINUED] gabapentin (NEURONTIN) 300 MG capsule TAKE 1 CAPSULE BY MOUTH AT BEDTIME 90 capsule 0     No Known Allergies  BP Readings from Last 3 Encounters:   08/28/18 136/78   08/17/18 124/68   07/31/18 136/72    Wt Readings from Last 3 Encounters:   08/28/18 202 lb 9.6 oz (91.9 kg)   07/31/18 197 lb (89.4 kg)   07/03/18 192 lb 3.2 oz (87.2 kg)                  Labs reviewed in EPIC    Reviewed and updated as needed this visit by clinical staff       Reviewed and updated as needed this visit by Provider         ROS:  Constitutional, HEENT, cardiovascular, pulmonary, gi and gu systems are negative, except as otherwise noted.    OBJECTIVE:     /78  Pulse 78  Temp 97.4  F (36.3  C) (Oral)  Resp 16  Ht 5' 6.5\" (1.689 m)  Wt 202 lb 9.6 oz (91.9 kg)  SpO2 99%  BMI 32.21 kg/m2  Body mass index is 32.21 kg/(m^2).  GENERAL: healthy, alert and no distress  RESP: lungs clear to auscultation - no rales, rhonchi or wheezes  CV: regular rate and rhythm, normal S1 S2, no S3 or S4, no murmur, click or rub, no peripheral edema and peripheral pulses strong  MS: no gross musculoskeletal defects noted, no edema    Diagnostic Test Results:  pending    ASSESSMENT/PLAN:     1. Bilateral leg cramps  Stable.  Continue current treatment plan and medications.    - gabapentin (NEURONTIN) 300 MG capsule; TAKE 1 CAPSULE BY MOUTH AT BEDTIME  Dispense: 90 capsule; Refill: 3    2. Chronic radicular low back pain  Stable.  Continue current treatment plan and medications.    - gabapentin (NEURONTIN) 300 MG capsule; TAKE 1 CAPSULE BY MOUTH AT BEDTIME  Dispense: 90 capsule; Refill: 3    3. Sacro ilial pain  Stable.  Continue current treatment plan and medications.    - gabapentin (NEURONTIN) 300 MG capsule; TAKE 1 CAPSULE BY MOUTH AT BEDTIME  Dispense: 90 capsule; Refill: 3    4. Hypertension goal BP (blood pressure) < 140/90  Stable.  Continue current treatment plan and medications.    - BASIC METABOLIC " PANEL    FUTURE APPOINTMENTS:       - Follow-up for annual visit or as needed    MONI Martin [facility]

## 2018-08-28 ENCOUNTER — OFFICE VISIT (OUTPATIENT)
Dept: FAMILY MEDICINE | Facility: CLINIC | Age: 63
End: 2018-08-28
Payer: COMMERCIAL

## 2018-08-28 VITALS
DIASTOLIC BLOOD PRESSURE: 78 MMHG | HEART RATE: 78 BPM | TEMPERATURE: 97.4 F | SYSTOLIC BLOOD PRESSURE: 136 MMHG | BODY MASS INDEX: 31.8 KG/M2 | HEIGHT: 67 IN | WEIGHT: 202.6 LBS | RESPIRATION RATE: 16 BRPM | OXYGEN SATURATION: 99 %

## 2018-08-28 DIAGNOSIS — M53.3 SACRO ILIAL PAIN: ICD-10-CM

## 2018-08-28 DIAGNOSIS — G89.29 CHRONIC RADICULAR LOW BACK PAIN: ICD-10-CM

## 2018-08-28 DIAGNOSIS — R25.2 BILATERAL LEG CRAMPS: Primary | ICD-10-CM

## 2018-08-28 DIAGNOSIS — I10 HYPERTENSION GOAL BP (BLOOD PRESSURE) < 140/90: ICD-10-CM

## 2018-08-28 DIAGNOSIS — M54.16 CHRONIC RADICULAR LOW BACK PAIN: ICD-10-CM

## 2018-08-28 PROCEDURE — 99213 OFFICE O/P EST LOW 20 MIN: CPT | Performed by: NURSE PRACTITIONER

## 2018-08-28 PROCEDURE — 36415 COLL VENOUS BLD VENIPUNCTURE: CPT | Performed by: NURSE PRACTITIONER

## 2018-08-28 PROCEDURE — 80048 BASIC METABOLIC PNL TOTAL CA: CPT | Performed by: NURSE PRACTITIONER

## 2018-08-28 RX ORDER — GABAPENTIN 300 MG/1
CAPSULE ORAL
Qty: 90 CAPSULE | Refills: 3 | Status: SHIPPED | OUTPATIENT
Start: 2018-08-28 | End: 2019-07-09

## 2018-08-28 NOTE — PATIENT INSTRUCTIONS
Consider Shingrix vaccine.  Check with insurance first to see where it is best covered.    [facility]    If you have any questions regarding to your visit please contact your care team:     Team Pink:   Clinic Hours Telephone Number   Internal Medicine:  Dr. Ligia Francis, NP       7am-7pm  Monday - Thursday   7am-5pm  Fridays  [phone]  (Appointment scheduling available 24/7)    Questions about your recent visit?  Team Line  [phone]   Urgent Care - [facility] - 11am-9pm Monday-Friday Saturday-Sunday- 9am-5pm   [place] - 5pm-9pm Monday-Friday Saturday-Sunday- 9am-5pm  [phone] - [facility]  [phone] - [place]       What options do I have for a visit other than an office visit? We offer electronic visits (e-visits) and telephone visits, when medically appropriate.  Please check with your medical insurance to see if these types of visits are covered, as you will be responsible for any charges that are not paid by your insurance.      You can use [employer] (secure electronic communication) to access to your chart, send your primary care provider a message, or make an appointment. Ask a team member how to get started.     For a price quote for your services, please call our Consumer Price Line at [phone] or our Imaging Cost estimation line at [phone] (for imaging tests).

## 2018-08-28 NOTE — MR AVS SNAPSHOT
After Visit Summary   8/28/2018    Lola Magaña    MRN: [id number]           Patient Information     Date Of Birth          1955        Visit Information        Provider Department      8/28/2018 4:20 PM Samira Francis APRN [facility]        Today's Diagnoses     Bilateral leg cramps    -  1    Chronic radicular low back pain        Sacro ilial pain        Hypertension goal BP (blood pressure) < 140/90          Care Instructions    Consider Shingrix vaccine.  Check with insurance first to see where it is best covered.    [facility]    If you have any questions regarding to your visit please contact your care team:     Team Pink:   Clinic Hours Telephone Number   Internal Medicine:  Dr. Ligia Francis, NP       7am-7pm  Monday - Thursday   7am-5pm  Fridays  [phone]  (Appointment scheduling available 24/7)    Questions about your recent visit?  Team Line  [phone]   Urgent Care - [place] and [place] - 11am-9pm Monday-Friday Saturday-Sunday- 9am-5pm   [place] - 5pm-9pm Monday-Friday Saturday-Sunday- 9am-5pm  [phone] - [place]  [phone] - [place]       What options do I have for a visit other than an office visit? We offer electronic visits (e-visits) and telephone visits, when medically appropriate.  Please check with your medical insurance to see if these types of visits are covered, as you will be responsible for any charges that are not paid by your insurance.      You can use [employer] (secure electronic communication) to access to your chart, send your primary care provider a message, or make an appointment. Ask a team member how to get started.     For a price quote for your services, please call our Consumer Price Line at [phone] or our Imaging Cost estimation line at [phone] (for imaging tests).            Follow-ups after your visit        Your next 10  "appointments already scheduled     Aug 28, 2018  4:20 PM CDT   Office Visit with MONI Sanchez CNP   [facility] ([facility])    [address]  [place] [address] [phone]           Bring a current list of meds and any records pertaining to this visit. For Physicals, please bring immunization records and any forms needing to be filled out. Please arrive 10 minutes early to complete paperwork.              Who to contact     If you have questions or need follow up information about today's clinic visit or your schedule please contact [facility] directly at [phone].  Normal or non-critical lab and imaging results will be communicated to you by MyChart, letter or phone within 4 business days after the clinic has received the results. If you do not hear from us within 7 days, please contact the clinic through [email or web address] or phone. If you have a critical or abnormal lab result, we will notify you by phone as soon as possible.  Submit refill requests through valuescope or call your pharmacy and they will forward the refill request to us. Please allow 3 business days for your refill to be completed.          Additional Information About Your Visit        MyChart Information     valuescope gives you secure access to your electronic health record. If you see a primary care provider, you can also send messages to your care team and make appointments. If you have questions, please call your primary care clinic.  If you do not have a primary care provider, please call [phone] and they will assist you.        Care EveryWhere ID     This is your Care EveryWhere ID. This could be used by other organizations to access your [facility] medical records  [id number]        Your Vitals Were     Pulse Temperature Respirations Height Pulse Oximetry BMI (Body Mass Index)    78 97.4  F (36.3  C) (Oral) 16 5' 6.5\" (1.689 m) 99% 32.21 kg/m2       Blood Pressure from Last " 3 Encounters:   08/28/18 136/78   08/17/18 124/68   07/31/18 136/72    Weight from Last 3 Encounters:   08/28/18 202 lb 9.6 oz (91.9 kg)   07/31/18 197 lb (89.4 kg)   07/03/18 192 lb 3.2 oz (87.2 kg)              We Performed the Following     BASIC METABOLIC PANEL          Where to get your medicines      These medications were sent to [place] [address] [facility] [place] - [address]  [address] [place] [address]     Phone:  [phone]     gabapentin 300 MG capsule          Primary Care Provider Office Phone # Fax #    Samira Madrigal Fortino Frank, APRN [facility] [phone] [address]  [place] [address]        Equal Access to Services     [facility]: Hadii aad ku hadasho Soomaali, waaxda luqadaha, qaybta kaalmada adeegyada, nhung peace hayelieser szymanski . So [facility] [phone].    ATENCIÓN: Si habla español, tiene a gonzalez disposición servicios gratuitos de asistencia lingüística. Rossana al [phone].    We comply with applicable federal civil rights laws and [place] laws. We do not discriminate on the basis of race, color, national origin, age, disability, sex, sexual orientation, or gender identity.            Thank you!     Thank you for choosing [facility]  for your care. Our goal is always to provide you with excellent care. Hearing back from our patients is one way we can continue to improve our services. Please take a few minutes to complete the written survey that you may receive in the mail after your visit with us. Thank you!             Your Updated Medication List - Protect others around you: Learn how to safely use, store and throw away your medicines at www.disposemymeds.org.          This list is accurate as of 8/28/18  4:17 PM.  Always use your most recent med list.                   Brand Name Dispense Instructions for use Diagnosis    diclofenac 1 % Gel topical gel    VOLTAREN    100 g    Apply 4 grams to knees four times daily as needed  using enclosed dosing card.    Hypertension goal BP (blood pressure) < 140/90       fluticasone 50 MCG/ACT spray    FLONASE    1 Bottle    Spray 2 sprays into both nostrils daily    Chronic rhinitis       gabapentin 300 MG capsule    NEURONTIN    90 capsule    TAKE 1 CAPSULE BY MOUTH AT BEDTIME    Bilateral leg cramps, Chronic radicular low back pain, Sacro ilial pain       metoprolol succinate 25 MG 24 hr tablet    TOPROL-XL    90 tablet    Take 1 tablet (25 mg) by mouth daily    Hypertension goal BP (blood pressure) < 140/90       OMEPRAZOLE PO      Take by mouth daily

## 2018-08-29 LAB
ANION GAP SERPL CALCULATED.3IONS-SCNC: 6 MMOL/L (ref 3–14)
BUN SERPL-MCNC: 15 MG/DL (ref 7–30)
CALCIUM SERPL-MCNC: 9 MG/DL (ref 8.5–10.1)
CHLORIDE SERPL-SCNC: 107 MMOL/L (ref 94–109)
CO2 SERPL-SCNC: 28 MMOL/L (ref 20–32)
CREAT SERPL-MCNC: 0.73 MG/DL (ref 0.52–1.04)
GFR SERPL CREATININE-BSD FRML MDRD: 81 ML/MIN/1.7M2
GLUCOSE SERPL-MCNC: 95 MG/DL (ref 70–99)
POTASSIUM SERPL-SCNC: 4.3 MMOL/L (ref 3.4–5.3)
SODIUM SERPL-SCNC: 141 MMOL/L (ref 133–144)

## 2018-08-29 NOTE — PROGRESS NOTES
Dear Lola,    Your recent test results are attached.      Normal kidney function and electrolytes.      If you have any questions please feel free to contact [phone] or myself via [employer].    Sincerely,  Samira Francis, CNP

## 2018-09-17 ENCOUNTER — TELEPHONE (OUTPATIENT)
Dept: INTERNAL MEDICINE | Facility: CLINIC | Age: 63
End: 2018-09-17

## 2018-09-17 DIAGNOSIS — Z13.6 ENCOUNTER FOR LIPID SCREENING FOR CARDIOVASCULAR DISEASE: ICD-10-CM

## 2018-09-17 DIAGNOSIS — Z13.6 ENCOUNTER FOR LIPID SCREENING FOR CARDIOVASCULAR DISEASE: Primary | ICD-10-CM

## 2018-09-17 DIAGNOSIS — Z13.220 ENCOUNTER FOR LIPID SCREENING FOR CARDIOVASCULAR DISEASE: ICD-10-CM

## 2018-09-17 DIAGNOSIS — Z13.220 ENCOUNTER FOR LIPID SCREENING FOR CARDIOVASCULAR DISEASE: Primary | ICD-10-CM

## 2018-09-17 LAB
CHOLEST SERPL-MCNC: 200 MG/DL
HDLC SERPL-MCNC: 45 MG/DL
LDLC SERPL CALC-MCNC: 91 MG/DL
NONHDLC SERPL-MCNC: 155 MG/DL
TRIGL SERPL-MCNC: 319 MG/DL

## 2018-09-17 PROCEDURE — 80061 LIPID PANEL: CPT | Performed by: NURSE PRACTITIONER

## 2018-09-17 PROCEDURE — 36415 COLL VENOUS BLD VENIPUNCTURE: CPT | Performed by: NURSE PRACTITIONER

## 2018-09-17 NOTE — PROGRESS NOTES
Dear Lola,    Your recent test results are attached.      The 2013 American College of Cardiology/ American Heart Association Guideline on the Treatment of Blood Cholesterol is the guideline that I use to determine if cholesterol lowering medication is needed.  Your estimated 10-year risk of atherosclerotic cardiovascular disease (i.e. Blocked arteries of the heart) is 6.3%.  According to your 10-year risk percentage, you DO NOT qualify for treatment with a cholesterol lowering medication (risk must be greater than 7.5%).    Your triglycerides are elevated.  Please work on decreasing intake of carbohydrates, sugars, alcohol to lower triglycerides.  Exercise also helps lower triglycerides.      If you have any questions please feel free to contact [phone] or myself via [employer].    Sincerely,  Samira Francis, CNP

## 2018-09-17 NOTE — TELEPHONE ENCOUNTER
Received request from lab to enter orders for fasting lipid panel.  Order entered.    Samira Francis, CNP

## 2018-11-29 DIAGNOSIS — I10 HYPERTENSION GOAL BP (BLOOD PRESSURE) < 140/90: ICD-10-CM

## 2018-11-29 NOTE — TELEPHONE ENCOUNTER
"E refill request sent to [facility], no [facility] office visit, routing to PCP refill pool    Requested Prescriptions   Pending Prescriptions Disp Refills     metoprolol succinate ER (TOPROL-XL) 25 MG 24 hr tablet [Pharmacy Med Name: METOPROLOL SUCC ER 25 MG TAB] 90 tablet 1    Last Written Prescription Date:  06/05/2018 #90 x 1  Last filled 09/01/2018  Last office visit: 8/28/2018 [facility] [place] [facility]   Future Office Visit:  None   Sig: TAKE 1 TABLET BY MOUTH EVERY DAY    Beta-Blockers Protocol Failed    11/29/2018  2:11 AM       Failed - Recent (12 mo) or future (30 days) visit within the authorizing provider's specialty    Patient had office visit in the last 12 months or has a visit in the next 30 days with authorizing provider or within the authorizing provider's specialty.  See \"Patient Info\" tab in inbasket, or \"Choose Columns\" in Meds & Orders section of the refill encounter.             Passed - Blood pressure under 140/90 in past 12 months    BP Readings from Last 3 Encounters:   08/28/18 136/78   08/17/18 124/68   07/31/18 136/72                Passed - Patient is age 6 or older          "

## 2018-11-30 RX ORDER — METOPROLOL SUCCINATE 25 MG/1
TABLET, EXTENDED RELEASE ORAL
Qty: 90 TABLET | Refills: 1 | Status: SHIPPED | OUTPATIENT
Start: 2018-11-30 | End: 2019-05-26

## 2019-03-20 ENCOUNTER — ALLIED HEALTH/NURSE VISIT (OUTPATIENT)
Dept: FAMILY MEDICINE | Facility: CLINIC | Age: 64
End: 2019-03-20
Payer: COMMERCIAL

## 2019-03-20 VITALS — DIASTOLIC BLOOD PRESSURE: 82 MMHG | SYSTOLIC BLOOD PRESSURE: 138 MMHG

## 2019-03-20 DIAGNOSIS — I10 HYPERTENSION GOAL BP (BLOOD PRESSURE) < 140/90: Primary | ICD-10-CM

## 2019-03-20 PROCEDURE — 99207 ZZC NO CHARGE NURSE ONLY: CPT | Performed by: NURSE PRACTITIONER

## 2019-03-20 NOTE — PROGRESS NOTES
Lola Magaña is enrolled/participating in the retail pharmacy Blood Pressure Goals Achievement Program (BPGAP).  Lola Magaña was evaluated at [facility] on March 20, 2019 at which time her blood pressure was:    BP Readings from Last 3 Encounters:   03/20/19 138/82   08/28/18 136/78   08/17/18 124/68     Reviewed lifestyle modifications for blood pressure control and reduction: including making healthy food choices, managing weight, getting regular exercise, smoking cessation, reducing alcohol consumption, monitoring blood pressure regularly.     Lola Magaña is not experiencing symptoms.    Follow-Up: BP is at goal of < 140/90mmHg (patient 18+ years of age with or without diabetes).  Recommended follow-up at pharmacy in 6 months.     Recommendation to Provider: None at this time.     This note completed by: Thank you,  Linda Mckeon, PharmD  [facility]  [phone]

## 2019-05-26 DIAGNOSIS — I10 HYPERTENSION GOAL BP (BLOOD PRESSURE) < 140/90: ICD-10-CM

## 2019-05-29 RX ORDER — METOPROLOL SUCCINATE 25 MG/1
TABLET, EXTENDED RELEASE ORAL
Qty: 90 TABLET | Refills: 0 | Status: SHIPPED | OUTPATIENT
Start: 2019-05-29 | End: 2019-07-09

## 2019-07-09 ENCOUNTER — OFFICE VISIT (OUTPATIENT)
Dept: FAMILY MEDICINE | Facility: CLINIC | Age: 64
End: 2019-07-09
Payer: COMMERCIAL

## 2019-07-09 ENCOUNTER — ANCILLARY PROCEDURE (OUTPATIENT)
Dept: GENERAL RADIOLOGY | Facility: CLINIC | Age: 64
End: 2019-07-09
Attending: FAMILY MEDICINE
Payer: COMMERCIAL

## 2019-07-09 ENCOUNTER — ANCILLARY PROCEDURE (OUTPATIENT)
Dept: MAMMOGRAPHY | Facility: CLINIC | Age: 64
End: 2019-07-09
Attending: FAMILY MEDICINE
Payer: COMMERCIAL

## 2019-07-09 VITALS
HEIGHT: 67 IN | TEMPERATURE: 97.8 F | DIASTOLIC BLOOD PRESSURE: 72 MMHG | BODY MASS INDEX: 29.03 KG/M2 | SYSTOLIC BLOOD PRESSURE: 136 MMHG | WEIGHT: 185 LBS | HEART RATE: 85 BPM | RESPIRATION RATE: 14 BRPM | OXYGEN SATURATION: 98 %

## 2019-07-09 DIAGNOSIS — M54.16 CHRONIC RADICULAR LOW BACK PAIN: ICD-10-CM

## 2019-07-09 DIAGNOSIS — M54.50 ACUTE RIGHT-SIDED LOW BACK PAIN WITHOUT SCIATICA: ICD-10-CM

## 2019-07-09 DIAGNOSIS — M25.551 HIP PAIN, RIGHT: ICD-10-CM

## 2019-07-09 DIAGNOSIS — G47.00 INSOMNIA, UNSPECIFIED TYPE: ICD-10-CM

## 2019-07-09 DIAGNOSIS — K21.9 GASTROESOPHAGEAL REFLUX DISEASE, ESOPHAGITIS PRESENCE NOT SPECIFIED: ICD-10-CM

## 2019-07-09 DIAGNOSIS — R25.2 BILATERAL LEG CRAMPS: ICD-10-CM

## 2019-07-09 DIAGNOSIS — Z00.01 ENCOUNTER FOR ROUTINE ADULT PHYSICAL EXAM WITH ABNORMAL FINDINGS: Primary | ICD-10-CM

## 2019-07-09 DIAGNOSIS — G89.29 CHRONIC RADICULAR LOW BACK PAIN: ICD-10-CM

## 2019-07-09 DIAGNOSIS — Z12.31 VISIT FOR SCREENING MAMMOGRAM: ICD-10-CM

## 2019-07-09 DIAGNOSIS — I10 HYPERTENSION GOAL BP (BLOOD PRESSURE) < 140/90: ICD-10-CM

## 2019-07-09 LAB
ANION GAP SERPL CALCULATED.3IONS-SCNC: 8 MMOL/L (ref 3–14)
BUN SERPL-MCNC: 17 MG/DL (ref 7–30)
CALCIUM SERPL-MCNC: 8.7 MG/DL (ref 8.5–10.1)
CHLORIDE SERPL-SCNC: 107 MMOL/L (ref 94–109)
CHOLEST SERPL-MCNC: 196 MG/DL
CO2 SERPL-SCNC: 26 MMOL/L (ref 20–32)
CREAT SERPL-MCNC: 0.76 MG/DL (ref 0.52–1.04)
GFR SERPL CREATININE-BSD FRML MDRD: 83 ML/MIN/{1.73_M2}
GLUCOSE SERPL-MCNC: 100 MG/DL (ref 70–99)
HDLC SERPL-MCNC: 62 MG/DL
LDLC SERPL CALC-MCNC: 56 MG/DL
NONHDLC SERPL-MCNC: 134 MG/DL
POTASSIUM SERPL-SCNC: 4 MMOL/L (ref 3.4–5.3)
SODIUM SERPL-SCNC: 141 MMOL/L (ref 133–144)
TRIGL SERPL-MCNC: 388 MG/DL

## 2019-07-09 PROCEDURE — 73502 X-RAY EXAM HIP UNI 2-3 VIEWS: CPT

## 2019-07-09 PROCEDURE — 77067 SCR MAMMO BI INCL CAD: CPT | Mod: TC

## 2019-07-09 PROCEDURE — 99213 OFFICE O/P EST LOW 20 MIN: CPT | Mod: 25 | Performed by: FAMILY MEDICINE

## 2019-07-09 PROCEDURE — 99396 PREV VISIT EST AGE 40-64: CPT | Performed by: FAMILY MEDICINE

## 2019-07-09 PROCEDURE — 36415 COLL VENOUS BLD VENIPUNCTURE: CPT | Performed by: FAMILY MEDICINE

## 2019-07-09 PROCEDURE — 80048 BASIC METABOLIC PNL TOTAL CA: CPT | Performed by: FAMILY MEDICINE

## 2019-07-09 PROCEDURE — 80061 LIPID PANEL: CPT | Performed by: FAMILY MEDICINE

## 2019-07-09 RX ORDER — METHYLPREDNISOLONE 4 MG/1
TABLET ORAL
Qty: 21 TABLET | Refills: 0 | Status: SHIPPED | OUTPATIENT
Start: 2019-07-09 | End: 2021-03-26

## 2019-07-09 RX ORDER — IBUPROFEN 600 MG/1
600 TABLET, FILM COATED ORAL EVERY 6 HOURS PRN
Qty: 30 TABLET | Refills: 0 | Status: SHIPPED | OUTPATIENT
Start: 2019-07-09 | End: 2021-03-26

## 2019-07-09 RX ORDER — METOPROLOL SUCCINATE 25 MG/1
25 TABLET, EXTENDED RELEASE ORAL DAILY
Qty: 90 TABLET | Refills: 3 | Status: SHIPPED | OUTPATIENT
Start: 2019-07-09 | End: 2020-09-02

## 2019-07-09 RX ORDER — GABAPENTIN 300 MG/1
CAPSULE ORAL
Qty: 90 CAPSULE | Refills: 3 | Status: SHIPPED | OUTPATIENT
Start: 2019-07-09 | End: 2020-09-02

## 2019-07-09 RX ORDER — TRAZODONE HYDROCHLORIDE 50 MG/1
TABLET, FILM COATED ORAL
Qty: 30 TABLET | Refills: 0 | Status: SHIPPED | OUTPATIENT
Start: 2019-07-09 | End: 2019-08-03

## 2019-07-09 RX ORDER — CYCLOBENZAPRINE HCL 10 MG
10 TABLET ORAL 3 TIMES DAILY PRN
Qty: 25 TABLET | Refills: 0 | Status: SHIPPED | OUTPATIENT
Start: 2019-07-09 | End: 2021-03-26

## 2019-07-09 ASSESSMENT — ANXIETY QUESTIONNAIRES
1. FEELING NERVOUS, ANXIOUS, OR ON EDGE: NOT AT ALL
7. FEELING AFRAID AS IF SOMETHING AWFUL MIGHT HAPPEN: NOT AT ALL
2. NOT BEING ABLE TO STOP OR CONTROL WORRYING: NOT AT ALL
6. BECOMING EASILY ANNOYED OR IRRITABLE: NOT AT ALL
IF YOU CHECKED OFF ANY PROBLEMS ON THIS QUESTIONNAIRE, HOW DIFFICULT HAVE THESE PROBLEMS MADE IT FOR YOU TO DO YOUR WORK, TAKE CARE OF THINGS AT HOME, OR GET ALONG WITH OTHER PEOPLE: NOT DIFFICULT AT ALL
3. WORRYING TOO MUCH ABOUT DIFFERENT THINGS: NOT AT ALL
GAD7 TOTAL SCORE: 0
5. BEING SO RESTLESS THAT IT IS HARD TO SIT STILL: NOT AT ALL

## 2019-07-09 ASSESSMENT — ENCOUNTER SYMPTOMS
ARTHRALGIAS: 0
HEARTBURN: 1
HEADACHES: 0
EYE PAIN: 0
DYSURIA: 0
PALPITATIONS: 0
HEMATURIA: 0
BREAST MASS: 0
DIARRHEA: 0
CONSTIPATION: 0
FREQUENCY: 0
SHORTNESS OF BREATH: 0
DIZZINESS: 0
NAUSEA: 0
PARESTHESIAS: 0
ABDOMINAL PAIN: 0
NERVOUS/ANXIOUS: 0
CHILLS: 0
FEVER: 0
COUGH: 0
JOINT SWELLING: 0
MYALGIAS: 1
HEMATOCHEZIA: 0
SORE THROAT: 0

## 2019-07-09 ASSESSMENT — PATIENT HEALTH QUESTIONNAIRE - PHQ9
5. POOR APPETITE OR OVEREATING: NOT AT ALL
SUM OF ALL RESPONSES TO PHQ QUESTIONS 1-9: 2

## 2019-07-09 ASSESSMENT — MIFFLIN-ST. JEOR: SCORE: 1423.84

## 2019-07-09 NOTE — PROGRESS NOTES
SUBJECTIVE:   CC: Lola Magaña is an 64 year old woman who presents for preventive health visit.     Healthy Habits:     Getting at least 3 servings of Calcium per day:  Yes    Bi-annual eye exam:  NO    Dental care twice a year:  Yes    Sleep apnea or symptoms of sleep apnea:  None    Diet:  Regular (no restrictions)    Frequency of exercise:  None    Taking medications regularly:  Yes    Barriers to taking medications:  None    Medication side effects:  None    PHQ-2 Total Score: 0    Additional concerns today:  Yes          -------------------------------------    Today's PHQ-2 Score:   PHQ-2 ( 1999 Pfizer) 7/9/2019   Q1: Little interest or pleasure in doing things 0   Q2: Feeling down, depressed or hopeless 0   PHQ-2 Score 0   Q1: Little interest or pleasure in doing things Not at all   Q2: Feeling down, depressed or hopeless Not at all   PHQ-2 Score 0       Abuse: Current or Past(Physical, Sexual or Emotional)- No  Do you feel safe in your environment? Yes    Social History     Tobacco Use     Smoking status: Never Smoker     Smokeless tobacco: Never Used   Substance Use Topics     Alcohol use: Yes     Alcohol/week: 0.0 oz     Comment: per week, 2-3 drinks 1-2 times per week     If you drink alcohol do you typically have >3 drinks per day or >7 drinks per week? No    Alcohol Use 7/9/2019   Prescreen: >3 drinks/day or >7 drinks/week? No   Prescreen: >3 drinks/day or >7 drinks/week? -   No flowsheet data found.    Reviewed orders with patient.  Reviewed health maintenance and updated orders accordingly - Yes  Lab work is in process  Labs reviewed in EPIC  BP Readings from Last 3 Encounters:   07/09/19 136/72   03/20/19 138/82   08/28/18 136/78    Wt Readings from Last 3 Encounters:   07/09/19 83.9 kg (185 lb)   08/28/18 91.9 kg (202 lb 9.6 oz)   07/31/18 89.4 kg (197 lb)                  Patient Active Problem List   Diagnosis     Chronic rhinitis     Esophageal reflux     Menopause     Menopausal syndrome  (hot flashes)     Bilateral leg cramps     Chronic radicular low back pain     Hypertension goal BP (blood pressure) < 140/90     Past Surgical History:   Procedure Laterality Date     ARTHROSCOPY KNEE Right      HC TOOTH EXTRACTION W/FORCEP       TUBAL LIGATION       vulvar biopsy  2005    LORI III       Social History     Tobacco Use     Smoking status: Never Smoker     Smokeless tobacco: Never Used   Substance Use Topics     Alcohol use: Yes     Alcohol/week: 0.0 oz     Comment: per week, 2-3 drinks 1-2 times per week     Family History   Problem Relation Age of Onset     Cancer Father         liver     Hyperlipidemia Father      Mental Illness Sister         Depression & anxiety     Colon Polyps Brother          Current Outpatient Medications   Medication Sig Dispense Refill     cyclobenzaprine (FLEXERIL) 10 MG tablet Take 1 tablet (10 mg) by mouth 3 times daily as needed for muscle spasms 25 tablet 0     diclofenac (VOLTAREN) 1 % GEL topical gel Apply 4 grams to knees four times daily as needed using enclosed dosing card. 100 g 1     fluticasone (FLONASE) 50 MCG/ACT spray Spray 2 sprays into both nostrils daily 1 Bottle 3     gabapentin (NEURONTIN) 300 MG capsule TAKE 1 CAPSULE BY MOUTH AT BEDTIME 90 capsule 3     ibuprofen (ADVIL/MOTRIN) 600 MG tablet Take 1 tablet (600 mg) by mouth every 6 hours as needed for moderate pain 30 tablet 0     methylPREDNISolone (MEDROL) 4 MG tablet Medrol dose pack-take as directed 21 tablet 0     metoprolol succinate ER (TOPROL-XL) 25 MG 24 hr tablet Take 1 tablet (25 mg) by mouth daily 90 tablet 3     OMEPRAZOLE PO Take by mouth daily       traZODone (DESYREL) 50 MG tablet 1/2-1 tablet 1 hour before sleeping 30 tablet 0     No Known Allergies  Recent Labs   Lab Test 09/17/18  0920 08/28/18  1628 08/03/17  1406 05/25/16  1343  04/09/14   LDL 91  --   --   --   --  101   HDL 45*  --   --   --   --  77   TRIG 319*  --   --   --   --  81   ALT  --   --   --  25  --   --    CR  --   0.73 0.69 0.63   < >  --    GFRESTIMATED  --  81 87 >90  Non  GFR Calc     < >  --    GFRESTBLACK  --  >90 >90   GFR Calc   >90   GFR Calc     < >  --    POTASSIUM  --  4.3 4.0 4.1   < >  --    TSH  --   --   --  0.40  --   --     < > = values in this interval not displayed.        Mammogram Screening: Patient over age 50, mutual decision to screen reflected in health maintenance.    Pertinent mammograms are reviewed under the imaging tab.  History of abnormal Pap smear: NO - age 30- 65 PAP every 3 years recommended  PAP / HPV Latest Ref Rng & Units 8/3/2017   PAP - NIL   HPV 16 DNA NEG Negative   HPV 18 DNA NEG Negative   OTHER HR HPV NEG Negative     Reviewed and updated as needed this visit by clinical staff  Tobacco  Allergies  Meds  Med Hx  Surg Hx  Fam Hx  Soc Hx        Reviewed and updated as needed this visit by Provider  Tobacco  Med Hx  Surg Hx  Soc Hx       Past Medical History:   Diagnosis Date     Anemia, unspecified type 5/27/2016    Regularly gives blood. Now on iron regularly      Colon polyp      GERD (gastroesophageal reflux disease)      Rhinitis       Past Surgical History:   Procedure Laterality Date     ARTHROSCOPY KNEE Right      HC TOOTH EXTRACTION W/FORCEP       TUBAL LIGATION       vulvar biopsy  2005    LORI III       Review of Systems   Constitutional: Negative for chills and fever.   HENT: Negative for congestion, ear pain, hearing loss and sore throat.    Eyes: Negative for pain and visual disturbance.   Respiratory: Negative for cough and shortness of breath.    Cardiovascular: Negative for chest pain, palpitations and peripheral edema.   Gastrointestinal: Positive for heartburn. Negative for abdominal pain, constipation, diarrhea, hematochezia and nausea.   Breasts:  Negative for tenderness, breast mass and discharge.   Genitourinary: Positive for pelvic pain. Negative for dysuria, frequency, genital sores, hematuria, urgency,  "vaginal bleeding and vaginal discharge.   Musculoskeletal: Positive for myalgias. Negative for arthralgias and joint swelling.   Skin: Negative for rash.   Neurological: Negative for dizziness, headaches and paresthesias.   Psychiatric/Behavioral: Negative for mood changes. The patient is not nervous/anxious.    Back Pain       Duration: Right Lower back        Specific cause: none    Description:   Location of pain: gluteus right  Character of pain: sharp  Pain radiation:none  New numbness or weakness in legs, not attributed to pain:  no     Intensity: pain on sitting    History:   Pain interferes with job: YES  History of back problems: no  Any previous MRI or X-rays: None  Sees a specialist for back pain:  No  Therapies tried without relief: none    Alleviating factors:   Improved by: standing      Precipitating factors:  Worsened by: Sitting          Accompanying Signs & Symptoms:  Risk of Fracture:  None  Risk of Cauda Equina:  None  Risk of Infection:  None  Risk of Cancer:  None  Risk of Ankylosing Spondylitis:  Onset at age <35, male, AND morning back stiffness. no       Hypertension Follow-up      Do you check your blood pressure regularly outside of the clinic? Yes     Are you following a low salt diet? No    Are your blood pressures ever more than 140 on the top number (systolic) OR more   than 90 on the bottom number (diastolic), for example 140/90? No      Pt has GERD  The patient denies abdominal or flank pain, anorexia, nausea or vomiting, dysphagia, change in bowel habits or black or bloody stools.     Pt has leg Cramps and takes gabapentin and doing well on this         OBJECTIVE:   /72   Pulse 85   Temp 97.8  F (36.6  C) (Oral)   Resp 14   Ht 1.689 m (5' 6.5\")   Wt 83.9 kg (185 lb)   SpO2 98%   BMI 29.41 kg/m    Physical Exam  GENERAL APPEARANCE: healthy, alert and no distress  EYES: Eyes grossly normal to inspection, PERRL and conjunctivae and sclerae normal  HENT: ear canals and TM's " normal, nose and mouth without ulcers or lesions, oropharynx clear and oral mucous membranes moist  NECK: no adenopathy, no asymmetry, masses, or scars and thyroid normal to palpation  RESP: lungs clear to auscultation - no rales, rhonchi or wheezes  BREAST: normal without masses, tenderness or nipple discharge and no palpable axillary masses or adenopathy  CV: regular rate and rhythm, normal S1 S2, no S3 or S4, no murmur, click or rub, no peripheral edema and peripheral pulses strong  ABDOMEN: soft, nontender, no hepatosplenomegaly, no masses and bowel sounds normal  MS: no musculoskeletal defects are noted and gait is age appropriate without ataxia  SKIN: no suspicious lesions or rashes  NEURO: Normal strength and tone, sensory exam grossly normal, mentation intact and speech normal  PSYCH: mentation appears normal and affect normal/bright  Mild tenderness Right Hip and Right Lower back  Pain with movement some  Straight leg raise is negative    Diagnostic Test Results:  Labs reviewed in Epic  Pending     ASSESSMENT/PLAN:   1. Routine general medical examination at a health care facility with abnormalities  Labs pending   - Lipid panel reflex to direct LDL Non-fasting    2. Hypertension goal BP (blood pressure) < 140/90  Stable   - metoprolol succinate ER (TOPROL-XL) 25 MG 24 hr tablet; Take 1 tablet (25 mg) by mouth daily  Dispense: 90 tablet; Refill: 3  - Basic metabolic panel  - OFFICE/OUTPT VISIT,EST,LEVL III    3. Gastroesophageal reflux disease, esophagitis presence not specified  Stable   - OFFICE/OUTPT VISIT,EST,LEVL III    4. Chronic radicular low back pain  refilled  - gabapentin (NEURONTIN) 300 MG capsule; TAKE 1 CAPSULE BY MOUTH AT BEDTIME  Dispense: 90 capsule; Refill: 3    5. Bilateral leg cramps  refilled  - gabapentin (NEURONTIN) 300 MG capsule; TAKE 1 CAPSULE BY MOUTH AT BEDTIME  Dispense: 90 capsule; Refill: 3  - OFFICE/OUTPT VISIT,EST,LEVL III    6. Insomnia, unspecified type  Stable   -  traZODone (DESYREL) 50 MG tablet; 1/2-1 tablet 1 hour before sleeping  Dispense: 30 tablet; Refill: 0  - OFFICE/OUTPT VISIT,EST,LEVL III    7. Hip pain, right  Advised PT  - XR Hip Right 2-3 Views; Future  - [facility] AND [facility]IROPRACT[facility] REFERRAL; Future  - methylPREDNISolone (MEDROL) 4 MG tablet; Medrol dose pack-take as directed  Dispense: 21 tablet; Refill: 0  - cyclobenzaprine (FLEXERIL) 10 MG tablet; Take 1 tablet (10 mg) by mouth 3 times daily as needed for muscle spasms  Dispense: 25 tablet; Refill: 0  - ibuprofen (ADVIL/MOTRIN) 600 MG tablet; Take 1 tablet (600 mg) by mouth every 6 hours as needed for moderate pain  Dispense: 30 tablet; Refill: 0  - OFFICE/OUTPT VISIT,EST,LEVL III  Follow up 6 weeks  8. Acute right-sided low back pain without sciatica  Advised PT  - methylPREDNISolone (MEDROL) 4 MG tablet; Medrol dose pack-take as directed  Dispense: 21 tablet; Refill: 0  - cyclobenzaprine (FLEXERIL) 10 MG tablet; Take 1 tablet (10 mg) by mouth 3 times daily as needed for muscle spasms  Dispense: 25 tablet; Refill: 0  - ibuprofen (ADVIL/MOTRIN) 600 MG tablet; Take 1 tablet (600 mg) by mouth every 6 hours as needed for moderate pain  Dispense: 30 tablet; Refill: 0  - OFFICE/OUTPT VISIT,EST,LEVL III    COUNSELING:  Reviewed preventive health counseling, as reflected in patient instructions       Regular exercise       Healthy diet/nutrition       Vision screening       Hearing screening       Osteoporosis Prevention/Bone Health       Colon cancer screening       The 10-year ASCVD risk score (Jossymj REDDING Jr., et al., 2013) is: 6.9%    Values used to calculate the score:      Age: 64 years      Sex: Female      Is Non- : No      Diabetic: No      Tobacco smoker: No      Systolic Blood Pressure: 136 mmHg      Is BP treated: Yes      HDL Cholesterol: 45 mg/dL      Total Cholesterol: 200 mg/dL       Advance Care Planning    Estimated body mass index is 29.41 kg/m  as calculated from the  "following:    Height as of this encounter: 1.689 m (5' 6.5\").    Weight as of this encounter: 83.9 kg (185 lb).         reports that she has never smoked. She has never used smokeless tobacco.      Counseling Resources:  ATP IV Guidelines  Pooled Cohorts Equation Calculator  Breast Cancer Risk Calculator  FRAX Risk Assessment  ICSI Preventive Guidelines  Dietary Guidelines for Americans, 2010  USDA's MyPlate  ASA Prophylaxis  Lung CA Screening    Christine Young MD  [facility]  "

## 2019-07-10 ASSESSMENT — ANXIETY QUESTIONNAIRES: GAD7 TOTAL SCORE: 0

## 2019-08-03 DIAGNOSIS — G47.00 INSOMNIA, UNSPECIFIED TYPE: ICD-10-CM

## 2019-08-05 RX ORDER — TRAZODONE HYDROCHLORIDE 50 MG/1
TABLET, FILM COATED ORAL
Qty: 30 TABLET | Refills: 0 | Status: SHIPPED | OUTPATIENT
Start: 2019-08-05 | End: 2019-09-05

## 2019-08-06 ENCOUNTER — OFFICE VISIT (OUTPATIENT)
Dept: OPTOMETRY | Facility: CLINIC | Age: 64
End: 2019-08-06
Payer: COMMERCIAL

## 2019-08-06 DIAGNOSIS — H52.221 REGULAR ASTIGMATISM OF RIGHT EYE: ICD-10-CM

## 2019-08-06 DIAGNOSIS — Z01.01 ENCOUNTER FOR EXAMINATION OF EYES AND VISION WITH ABNORMAL FINDINGS: Primary | ICD-10-CM

## 2019-08-06 DIAGNOSIS — H52.4 PRESBYOPIA: ICD-10-CM

## 2019-08-06 DIAGNOSIS — H52.03 HYPERMETROPIA, BILATERAL: ICD-10-CM

## 2019-08-06 DIAGNOSIS — Z98.890 HX OF LASIK: ICD-10-CM

## 2019-08-06 PROCEDURE — 92015 DETERMINE REFRACTIVE STATE: CPT | Performed by: OPTOMETRIST

## 2019-08-06 PROCEDURE — 92004 COMPRE OPH EXAM NEW PT 1/>: CPT | Performed by: OPTOMETRIST

## 2019-08-06 ASSESSMENT — REFRACTION_MANIFEST
OS_ADD: +2.00
OD_CYLINDER: +1.00
OS_SPHERE: +0.75
OD_ADD: +2.00
OS_CYLINDER: SPHERE
OD_SPHERE: +0.75
OD_AXIS: 020

## 2019-08-06 ASSESSMENT — TONOMETRY
OD_IOP_MMHG: 14
OS_IOP_MMHG: 12
IOP_METHOD: APPLANATION

## 2019-08-06 ASSESSMENT — VISUAL ACUITY
METHOD: SNELLEN - LINEAR
OD_SC: 20/60 -1
OS_SC: 20/25
OD_SC: 20/30
OS_SC+: -2
OS_SC: 20/80 -1

## 2019-08-06 ASSESSMENT — SLIT LAMP EXAM - LIDS
COMMENTS: NORMAL
COMMENTS: NORMAL

## 2019-08-06 ASSESSMENT — EXTERNAL EXAM - LEFT EYE: OS_EXAM: NORMAL

## 2019-08-06 ASSESSMENT — CONF VISUAL FIELD
OD_NORMAL: 1
OS_NORMAL: 1

## 2019-08-06 ASSESSMENT — EXTERNAL EXAM - RIGHT EYE: OD_EXAM: NORMAL

## 2019-08-06 ASSESSMENT — CUP TO DISC RATIO
OD_RATIO: 0.3
OS_RATIO: 0.2

## 2019-08-06 NOTE — LETTER
8/6/2019         RE: Lola Magaña  [address] [address]        Dear Colleague,    Thank you for referring your patient, Lola Magaña, to the [facility]. Please see a copy of my visit note below.    Chief Complaint   Patient presents with     Annual Eye Exam      Accompanied by self  Last Eye Exam: 1 year ago, lasik 10 years ago, mono vision  Dilated Previously: Yes    What are you currently using to see?  readers       Distance Vision Acuity: Satisfied with vision    Near Vision Acuity: Satisfied with vision while reading  with readers    Eye Comfort: good  Do you use eye drops? : No  Occupation or Hobbies: senior on     [employer] Florentino, Optometric Tech          Medical, surgical and family histories reviewed and updated 8/6/2019.       OBJECTIVE: See Ophthalmology exam    ASSESSMENT:    ICD-10-CM    1. Encounter for examination of eyes and vision with abnormal findings Z01.01    2. Hx of LASIK - Both Eyes Z98.890    3. Hypermetropia, bilateral H52.03    4. Regular astigmatism of right eye H52.221    5. Presbyopia H52.4       PLAN:     Patient Instructions   Lola was advised of today's exam findings.  Optional to fill new glasses prescription, minimal change  Copy of glasses Rx provided today. Reading-only, per patient request.   Return in 2 years for eye exam, or sooner if needed.    The effects of the dilating drops last for 4- 6 hours.  You will be more sensitive to light and vision will be blurry up close.  Mydriatic sunglasses were given if needed.      Stephon Estrada O.D.  [address]  [address] [phone]           Again, thank you for allowing me to participate in the care of your patient.        Sincerely,        Stephon Estrada, RADHA

## 2019-08-06 NOTE — PATIENT INSTRUCTIONS
Lola was advised of today's exam findings.  Optional to fill new glasses prescription, minimal change  Copy of glasses Rx provided today. Reading-only, per patient request.   Return in 2 years for eye exam, or sooner if needed.    The effects of the dilating drops last for 4- 6 hours.  You will be more sensitive to light and vision will be blurry up close.  Mydriatic sunglasses were given if needed.      Stephon Estrada O.D.  [facility]  [place]  [address]    [phone]

## 2019-08-06 NOTE — PROGRESS NOTES
Chief Complaint   Patient presents with     Annual Eye Exam      Accompanied by self  Last Eye Exam: 1 year ago, lasik 10 years ago, mono vision  Dilated Previously: Yes    What are you currently using to see?  readers       Distance Vision Acuity: Satisfied with vision    Near Vision Acuity: Satisfied with vision while reading  with readers    Eye Comfort: good  Do you use eye drops? : No  Occupation or Hobbies: senior on     [employer] Florentino, Optometric Tech          Medical, surgical and family histories reviewed and updated 8/6/2019.       OBJECTIVE: See Ophthalmology exam    ASSESSMENT:    ICD-10-CM    1. Encounter for examination of eyes and vision with abnormal findings Z01.01    2. Hx of LASIK - Both Eyes Z98.890    3. Hypermetropia, bilateral H52.03    4. Regular astigmatism of right eye H52.221    5. Presbyopia H52.4       PLAN:     Patient Instructions   Lola was advised of today's exam findings.  Optional to fill new glasses prescription, minimal change  Copy of glasses Rx provided today. Reading-only, per patient request.   Return in 2 years for eye exam, or sooner if needed.    The effects of the dilating drops last for 4- 6 hours.  You will be more sensitive to light and vision will be blurry up close.  Mydriatic sunglasses were given if needed.      Stephon Estrada O.D.  [facility] [place]  [address]    [phone]

## 2019-08-14 ENCOUNTER — OFFICE VISIT (OUTPATIENT)
Dept: OPHTHALMOLOGY | Facility: CLINIC | Age: 64
End: 2019-08-14
Payer: COMMERCIAL

## 2019-08-14 DIAGNOSIS — D23.10 PAPILLOMA OF EYELID, UNSPECIFIED LATERALITY: ICD-10-CM

## 2019-08-14 DIAGNOSIS — D23.10 PAPILLOMA OF EYELID, UNSPECIFIED LATERALITY: Primary | ICD-10-CM

## 2019-08-14 PROCEDURE — 11200 RMVL SKIN TAGS UP TO&INC 15: CPT | Performed by: STUDENT IN AN ORGANIZED HEALTH CARE EDUCATION/TRAINING PROGRAM

## 2019-08-14 ASSESSMENT — VISUAL ACUITY
OS_SC+: -1
METHOD: SNELLEN - LINEAR
OS_SC: 20/30
OD_SC: 20/25

## 2019-08-14 NOTE — LETTER
8/14/2019       RE: Lola Magaña  [facility] [facility] [id number]      Dear Dr. Estrada    I saw a mutual patient, Lola Magaña, in the [facility] today. Please see a copy of my visit note below.     Current Eye Medications:  none     Subjective:  Patient would like skin tags removed near both eyes. Bump slowly keeps getting larger left eye for last year. Bump on right eye for about 6 months. No eye pain or discomfort in either eye.      Objective:  See Ophthalmology Exam.       Assessment:  Lola Magaña is a 64 year old female who presents with:   Encounter Diagnosis   Name Primary?     Papilloma of eyelid, RLL, CHIKIS, LLL Excision of multiple papilloma (1 right lower lid, 3 left upper lid, 1 left lower lid) performed today without complication.       Plan:  Return if any problems after today's procedure, otherwise continue routine eye exams with Dr. Sean Marcos MD  [phone]        Again, thank you for allowing me to participate in the care of your patient.        Sincerely,        Lana Marcos MD

## 2019-08-14 NOTE — PROGRESS NOTES
Current Eye Medications:  none     Subjective:  Patient would like skin tags removed near both eyes. Bump slowly keeps getting larger left eye for last year. Bump on right eye for about 6 months. No eye pain or discomfort in either eye.      Objective:  See Ophthalmology Exam.       Assessment:  Lola Magaña is a 64 year old female who presents with:   Encounter Diagnosis   Name Primary?     Papilloma of eyelid, RLL, CHIKIS, LLL Excision of multiple papilloma (1 right lower lid, 3 left upper lid, 1 left lower lid) performed today without complication.       Plan:  Return if any problems after today's procedure, otherwise continue routine eye exams with Dr. Sean Marcos MD  [phone]

## 2019-08-15 ASSESSMENT — EXTERNAL EXAM - RIGHT EYE: OD_EXAM: NORMAL

## 2019-08-15 ASSESSMENT — EXTERNAL EXAM - LEFT EYE: OS_EXAM: NORMAL

## 2019-08-15 NOTE — PATIENT INSTRUCTIONS
Return if any problems after today's procedure, otherwise continue routine eye exams with Dr. Sean Marcos MD  [phone]

## 2019-09-05 DIAGNOSIS — G47.00 INSOMNIA, UNSPECIFIED TYPE: ICD-10-CM

## 2019-09-06 RX ORDER — TRAZODONE HYDROCHLORIDE 50 MG/1
TABLET, FILM COATED ORAL
Qty: 30 TABLET | Refills: 3 | Status: SHIPPED | OUTPATIENT
Start: 2019-09-06 | End: 2021-03-26

## 2020-07-17 ENCOUNTER — ANCILLARY PROCEDURE (OUTPATIENT)
Dept: MAMMOGRAPHY | Facility: CLINIC | Age: 65
End: 2020-07-17
Attending: FAMILY MEDICINE
Payer: COMMERCIAL

## 2020-07-17 DIAGNOSIS — Z12.31 VISIT FOR SCREENING MAMMOGRAM: ICD-10-CM

## 2020-07-17 PROCEDURE — 77067 SCR MAMMO BI INCL CAD: CPT | Mod: TC

## 2020-09-02 ENCOUNTER — VIRTUAL VISIT (OUTPATIENT)
Dept: FAMILY MEDICINE | Facility: CLINIC | Age: 65
End: 2020-09-02
Payer: COMMERCIAL

## 2020-09-02 DIAGNOSIS — R25.2 BILATERAL LEG CRAMPS: ICD-10-CM

## 2020-09-02 DIAGNOSIS — M54.16 CHRONIC RADICULAR LOW BACK PAIN: ICD-10-CM

## 2020-09-02 DIAGNOSIS — G89.29 CHRONIC RADICULAR LOW BACK PAIN: ICD-10-CM

## 2020-09-02 DIAGNOSIS — I10 HYPERTENSION GOAL BP (BLOOD PRESSURE) < 140/90: ICD-10-CM

## 2020-09-02 PROCEDURE — 99213 OFFICE O/P EST LOW 20 MIN: CPT | Mod: 95 | Performed by: PHYSICIAN ASSISTANT

## 2020-09-02 RX ORDER — METOPROLOL SUCCINATE 25 MG/1
25 TABLET, EXTENDED RELEASE ORAL DAILY
Qty: 30 TABLET | Refills: 1 | Status: SHIPPED | OUTPATIENT
Start: 2020-09-02 | End: 2020-10-02

## 2020-09-02 RX ORDER — GABAPENTIN 300 MG/1
CAPSULE ORAL
Qty: 30 CAPSULE | Refills: 0
Start: 2020-09-02

## 2020-09-02 RX ORDER — GABAPENTIN 300 MG/1
CAPSULE ORAL
Qty: 90 CAPSULE | Refills: 3 | Status: SHIPPED | OUTPATIENT
Start: 2020-09-02 | End: 2021-03-26

## 2020-09-02 RX ORDER — METOPROLOL SUCCINATE 25 MG/1
TABLET, EXTENDED RELEASE ORAL
Qty: 90 TABLET | Refills: 0
Start: 2020-09-02

## 2020-09-02 NOTE — TELEPHONE ENCOUNTER
Called and spoke to patient. Informed patient of message and patient verbally understand. Appointment scheduled for today with Santa Gleason PA-C for telephone visit med check.  Zahira Ruvalcaba CMA

## 2020-09-02 NOTE — PROGRESS NOTES
"Lola Partida is a 65 year old female who is being evaluated via a billable telephone visit.      The patient has been notified of following:     \"This telephone visit will be conducted via a call between you and your physician/provider. We have found that certain health care needs can be provided without the need for a physical exam.  This service lets us provide the care you need with a short phone conversation.  If a prescription is necessary we can send it directly to your pharmacy.  If lab work is needed we can place an order for that and you can then stop by our lab to have the test done at a later time.    Telephone visits are billed at different rates depending on your insurance coverage. During this emergency period, for some insurers they may be billed the same as an in-person visit.  Please reach out to your insurance provider with any questions.    If during the course of the call the physician/provider feels a telephone visit is not appropriate, you will not be charged for this service.\"    Patient has given verbal consent for Telephone visit?  Yes    What phone number would you like to be contacted at? [phone]    How would you like to obtain your AVS? April Saucdea     Lola Partida is a 65 year old female who presents via phone visit today for the following health issues:    HPI    Hypertension Follow-up      Do you check your blood pressure regularly outside of the clinic? No, but did have it check at dentist office on Monday and it was 147/81     Are you following a low salt diet? Yes    Are your blood pressures ever more than 140 on the top number (systolic) OR more   than 90 on the bottom number (diastolic), for example 140/90? Yes      How many servings of fruits and vegetables do you eat daily?  0-1    On average, how many sweetened beverages do you drink each day (Examples: soda, juice, sweet tea, etc.  Do NOT count diet or artificially sweetened beverages)?   0    How many days per " week do you exercise enough to make your heart beat faster? 2-3    How many minutes a day do you exercise enough to make your heart beat faster? 10 - 19    How many days per week do you miss taking your medication? 0        Patient's  does have a home BP cuff.  She will check 3-4 x per week and follow up in 1 month. HM reviewed and Patient encouraged to schedule CPE.    Review of Systems   Constitutional, HEENT, cardiovascular, pulmonary, gi and gu systems are negative, except as otherwise noted.       Objective          Vitals:  No vitals were obtained today due to virtual visit.    healthy, alert and no distress  PSYCH: Alert and oriented times 3; coherent speech, normal   rate and volume, able to articulate logical thoughts, able   to abstract reason, no tangential thoughts, no hallucinations   or delusions  Her affect is normal  RESP: No cough, no audible wheezing, able to talk in full sentences  Remainder of exam unable to be completed due to telephone visits            Assessment/Plan:    Assessment & Plan     Lola was seen today for recheck medication.    Diagnoses and all orders for this visit:    Bilateral leg cramps  -     gabapentin (NEURONTIN) 300 MG capsule; TAKE 1 CAPSULE BY MOUTH AT BEDTIME    Chronic radicular low back pain  -     gabapentin (NEURONTIN) 300 MG capsule; TAKE 1 CAPSULE BY MOUTH AT BEDTIME    Hypertension goal BP (blood pressure) < 140/90  -     metoprolol succinate ER (TOPROL-XL) 25 MG 24 hr tablet; Take 1 tablet (25 mg) by mouth daily  -     BASIC METABOLIC PANEL; Future  -     Lipid panel reflex to direct LDL Fasting; Future               Return in about 4 weeks (around 9/30/2020) for Phone Visit.    Santa Gleason PA-C  [facility]    Phone call duration:  10 minutes

## 2020-09-02 NOTE — TELEPHONE ENCOUNTER
gabapentin (NEURONTIN) 300 MG capsule       Last Written Prescription Date:  07/09/2019  Last Fill Quantity: 90,   # refills: 3  Last Office Visit: 07/09/2019  Future Office visit:       Routing refill request to provider for review/approval because:  Drug not on the [facility], [facility] or [facility] refill protocol or controlled substance  An Ruvalcaba, CMA

## 2020-09-04 DIAGNOSIS — G89.29 CHRONIC RADICULAR LOW BACK PAIN: ICD-10-CM

## 2020-09-04 DIAGNOSIS — M54.16 CHRONIC RADICULAR LOW BACK PAIN: ICD-10-CM

## 2020-09-04 DIAGNOSIS — I10 HYPERTENSION GOAL BP (BLOOD PRESSURE) < 140/90: ICD-10-CM

## 2020-09-04 DIAGNOSIS — R25.2 BILATERAL LEG CRAMPS: ICD-10-CM

## 2020-09-04 RX ORDER — GABAPENTIN 300 MG/1
CAPSULE ORAL
Qty: 90 CAPSULE | Refills: 3 | Status: CANCELLED | OUTPATIENT
Start: 2020-09-04

## 2020-09-04 RX ORDER — METOPROLOL SUCCINATE 25 MG/1
25 TABLET, EXTENDED RELEASE ORAL DAILY
Qty: 30 TABLET | Refills: 1 | Status: CANCELLED | OUTPATIENT
Start: 2020-09-04

## 2020-09-04 NOTE — TELEPHONE ENCOUNTER
Spoke with pharmacy both medications are ready for .  Notified patient and she verbalized understanding, she also asked if her name was updated in chart.  Confirmed Helga is new last name.   Whitney Ng RN

## 2020-09-04 NOTE — TELEPHONE ENCOUNTER
Reason for Call:  Other prescription    Detailed comments: Patient calling, states she called her pharmacy regarding her medications below. But they have not received anything from the clinic yet.    metoprolol succinate ER (TOPROL-XL) 25 MG 24 hr tablet   gabapentin (NEURONTIN) 300 MG capsule     Please send to [id number] - [place], [place] - [address]    Phone Number Patient can be reached at: Cell number on file:    Telephone Information:   Mobile [phone]       Best Time: any    Can we leave a detailed message on this number? YES    Call taken on 9/4/2020 at 8:48 AM by Dante Ruvalcaba

## 2020-09-14 DIAGNOSIS — I10 HYPERTENSION GOAL BP (BLOOD PRESSURE) < 140/90: ICD-10-CM

## 2020-09-14 LAB
ANION GAP SERPL CALCULATED.3IONS-SCNC: 9 MMOL/L (ref 3–14)
BUN SERPL-MCNC: 18 MG/DL (ref 7–30)
CALCIUM SERPL-MCNC: 9 MG/DL (ref 8.5–10.1)
CHLORIDE SERPL-SCNC: 106 MMOL/L (ref 94–109)
CHOLEST SERPL-MCNC: 235 MG/DL
CO2 SERPL-SCNC: 25 MMOL/L (ref 20–32)
CREAT SERPL-MCNC: 0.84 MG/DL (ref 0.52–1.04)
GFR SERPL CREATININE-BSD FRML MDRD: 74 ML/MIN/{1.73_M2}
GLUCOSE SERPL-MCNC: 118 MG/DL (ref 70–99)
HDLC SERPL-MCNC: 71 MG/DL
LDLC SERPL CALC-MCNC: 135 MG/DL
NONHDLC SERPL-MCNC: 164 MG/DL
POTASSIUM SERPL-SCNC: 4.4 MMOL/L (ref 3.4–5.3)
SODIUM SERPL-SCNC: 140 MMOL/L (ref 133–144)
TRIGL SERPL-MCNC: 147 MG/DL

## 2020-09-14 PROCEDURE — 80061 LIPID PANEL: CPT | Performed by: PHYSICIAN ASSISTANT

## 2020-09-14 PROCEDURE — 80048 BASIC METABOLIC PNL TOTAL CA: CPT | Performed by: PHYSICIAN ASSISTANT

## 2020-09-14 PROCEDURE — 36415 COLL VENOUS BLD VENIPUNCTURE: CPT | Performed by: PHYSICIAN ASSISTANT

## 2020-10-02 ENCOUNTER — VIRTUAL VISIT (OUTPATIENT)
Dept: FAMILY MEDICINE | Facility: CLINIC | Age: 65
End: 2020-10-02
Payer: COMMERCIAL

## 2020-10-02 DIAGNOSIS — I10 HYPERTENSION GOAL BP (BLOOD PRESSURE) < 140/90: ICD-10-CM

## 2020-10-02 PROCEDURE — 99213 OFFICE O/P EST LOW 20 MIN: CPT | Mod: 95 | Performed by: FAMILY MEDICINE

## 2020-10-02 RX ORDER — METOPROLOL SUCCINATE 25 MG/1
25 TABLET, EXTENDED RELEASE ORAL DAILY
Qty: 90 TABLET | Refills: 1 | Status: SHIPPED | OUTPATIENT
Start: 2020-10-02 | End: 2021-03-26

## 2020-10-02 NOTE — PROGRESS NOTES
"Lola Partida is a 65 year old female who is being evaluated via a billable telephone visit.      The patient has been notified of following:     \"This telephone visit will be conducted via a call between you and your physician/provider. We have found that certain health care needs can be provided without the need for a physical exam.  This service lets us provide the care you need with a short phone conversation.  If a prescription is necessary we can send it directly to your pharmacy.  If lab work is needed we can place an order for that and you can then stop by our lab to have the test done at a later time.    Telephone visits are billed at different rates depending on your insurance coverage. During this emergency period, for some insurers they may be billed the same as an in-person visit.  Please reach out to your insurance provider with any questions.    If during the course of the call the physician/provider feels a telephone visit is not appropriate, you will not be charged for this service.\"    Patient has given verbal consent for Telephone visit?  Yes    What phone number would you like to be contacted at? [phone]    How would you like to obtain your AVS? April Sauceda     Lola Partida is a 65 year old female who presents via phone visit today for the following health issues:    HPI     Hypertension Follow-up      Do you check your blood pressure regularly outside of the clinic? Yes     Are you following a low salt diet? Yes    Are your blood pressures ever more than 140 on the top number (systolic) OR more   than 90 on the bottom number (diastolic), for example 140/90? Yes 113-151/58-79      How many servings of fruits and vegetables do you eat daily?  2-3    On average, how many sweetened beverages do you drink each day (Examples: soda, juice, sweet tea, etc.  Do NOT count diet or artificially sweetened beverages)?   0    How many days per week do you exercise enough to make your heart beat " faster? 3 or less    How many minutes a day do you exercise enough to make your heart beat faster? 9 or less    How many days per week do you miss taking your medication? 0    Lola presents for HTN visit.    Currently taking metoprolol and has been taking medications as prescribed.  Side effects:  none.  Blood pressure ranges are as above, most often 130's systolic.  Patient has been exercising on a regular basis and has been monitoring sodium intake.  Associated symptoms:  (-) Chest pain, (-) shortness of breath, (-) leg swelling, (-) headache, (-) vision changes.     Review of Systems   Constitutional, HEENT, cardiovascular, pulmonary, gi and gu systems are negative, except as otherwise noted.       Objective          Vitals:  No vitals were obtained today due to virtual visit.    healthy, alert and no distress  PSYCH: Alert and oriented times 3; coherent speech, normal   rate and volume, able to articulate logical thoughts, able   to abstract reason, no tangential thoughts, no hallucinations   or delusions  Her affect is normal  RESP: No cough, no audible wheezing, able to talk in full sentences  Remainder of exam unable to be completed due to telephone visits            Assessment/Plan:    Assessment & Plan       ICD-10-CM    1. Hypertension goal BP (blood pressure) < 140/90  I10 metoprolol succinate ER (TOPROL-XL) 25 MG 24 hr tablet     Medication refilled  2nd shingrix @ pharmacy        Return in about 2 weeks (around 10/16/2020) for for 2nd shingrix.    Gume Andres MD  [facility]    Phone call duration:  7 minutes

## 2021-03-24 ENCOUNTER — TELEPHONE (OUTPATIENT)
Dept: ORTHOPEDICS | Facility: CLINIC | Age: 66
End: 2021-03-24

## 2021-03-24 ENCOUNTER — NURSE TRIAGE (OUTPATIENT)
Dept: FAMILY MEDICINE | Facility: CLINIC | Age: 66
End: 2021-03-24

## 2021-03-24 ENCOUNTER — OFFICE VISIT (OUTPATIENT)
Dept: ORTHOPEDICS | Facility: CLINIC | Age: 66
End: 2021-03-24
Payer: OTHER MISCELLANEOUS

## 2021-03-24 ENCOUNTER — ANCILLARY PROCEDURE (OUTPATIENT)
Dept: GENERAL RADIOLOGY | Facility: CLINIC | Age: 66
End: 2021-03-24
Attending: ORTHOPAEDIC SURGERY
Payer: OTHER MISCELLANEOUS

## 2021-03-24 VITALS — HEART RATE: 88 BPM | OXYGEN SATURATION: 96 % | SYSTOLIC BLOOD PRESSURE: 138 MMHG | DIASTOLIC BLOOD PRESSURE: 79 MMHG

## 2021-03-24 DIAGNOSIS — Z01.818 PRE-OP TESTING: ICD-10-CM

## 2021-03-24 DIAGNOSIS — M17.11 PRIMARY OSTEOARTHRITIS OF RIGHT KNEE: Primary | ICD-10-CM

## 2021-03-24 DIAGNOSIS — M23.91 LOCKING OF RIGHT KNEE: Primary | ICD-10-CM

## 2021-03-24 DIAGNOSIS — M23.91 LOCKING OF RIGHT KNEE: ICD-10-CM

## 2021-03-24 DIAGNOSIS — M17.11 LOCALIZED OSTEOARTHRITIS OF RIGHT KNEE: ICD-10-CM

## 2021-03-24 PROCEDURE — 99214 OFFICE O/P EST MOD 30 MIN: CPT | Performed by: ORTHOPAEDIC SURGERY

## 2021-03-24 PROCEDURE — 73562 X-RAY EXAM OF KNEE 3: CPT | Mod: RT | Performed by: RADIOLOGY

## 2021-03-24 ASSESSMENT — PAIN SCALES - GENERAL: PAINLEVEL: SEVERE PAIN (6)

## 2021-03-24 NOTE — PROGRESS NOTES
SUBJECTIVE:   Lola Partida is a 66 year old female who is seen as self referral for evaluation of right knee pain.  Chronic pain from knee osteoarthritis, but knee popped yesterday at work, and couldn't bear weight after that.  Was moving a heavy box, and bent to adjust position of the box, felt a snap.    Present symptoms: no new swelling, no pain at rest today.   Gabapentin helped pain overnight  Feels like something is stuck in the knee laterally  If rotates leg internally, very painful.    Treatments tried to this point: crutches,    Previous knee issues: osteoarthritis knee,  Several corticosteroid injections in the past. Last one 2018.    Past Medical History:   Past Medical History:   Diagnosis Date     Anemia, unspecified type 5/27/2016    Regularly gives blood. 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     Past Surgical History:   Past Surgical History:   Procedure Laterality Date     ARTHROSCOPY KNEE Right      COLONOSCOPY  ?     HC TOOTH EXTRACTION W/FORCEP       LASIK      10 years ago     TUBAL LIGATION       vulvar biopsy  2005    LORI III     Family History:   Family History   Problem Relation Age of Onset     Cancer Father         liver     Hyperlipidemia Father      Prostate Cancer Father      Mental Illness Sister         Depression & anxiety     Colon Polyps Brother      Glaucoma No family hx of      Macular Degeneration No family hx of      Social History:   Social History     Tobacco Use     Smoking status: Never Smoker     Smokeless tobacco: Never Used   Substance Use Topics     Alcohol use: Yes     Alcohol/week: 0.0 standard drinks     Comment: per week, 2-3 drinks 1-2 times per week       Review of Systems:  Constitutional:  NEGATIVE for fever, chills, change in weight  Integumentary/Skin:  NEGATIVE for worrisome rashes,  moles or lesions  Eyes:  NEGATIVE for vision changes or irritation  ENT/Mouth:  NEGATIVE for ear, mouth and throat problems  Resp:  NEGATIVE for significant cough or SOB  Breast:  NEGATIVE for masses, tenderness or discharge  CV:  NEGATIVE for chest pain, palpitations or peripheral edema  GI:  NEGATIVE for nausea, abdominal pain, heartburn, or change in bowel habits  :  Negative   Musculoskeletal:  See HPI above  Neuro:  NEGATIVE for weakness, dizziness or paresthesias  Endocrine:  NEGATIVE for temperature intolerance, skin/hair changes  Heme/allergy/immune:  NEGATIVE for bleeding problems  Psychiatric:  NEGATIVE for changes in mood or affect      OBJECTIVE:  Physical Exam:  /79 (BP Location: Left arm, Patient Position: Sitting, Cuff Size: Adult Regular)   Pulse 88   SpO2 96%   General Appearance: healthy, alert and no distress   Skin: no suspicious lesions or rashes  Neuro: Normal strength and tone, mentation intact and speech normal  Vascular: good pulses, and cappillary refill   Lymph: no lymphadenopathy   Psych:  mentation appears normal and affect normal/bright  Resp: no increased work of breathing     Right Knee Exam:  Gait: unable to ambulate due to pain  Alignment: normal     Patellofemoral joint: mild crepitations in the patellofemoral joint.  Effusion: moderate  ROM: 3-80* definite clunk near flexion limit   Tender: non-tender   Masses: none  Ligaments:   Lachman's: stable   Anterior/Posterior drawer: stable,   Varus/Valgus stress: stable to varus and valgus stress.  Pain with varus.      X-rays:  Obtained today, reviewed in the office with the patient today:  Moderate medial joint space narrowing on the AP, but appears to be bone-bone on the lateral view. More extensive osteophytes than previous, but no definite loose bodies      ASSESSMENT:   Severe osteoarthritis right knee, probable loose body     PLAN:   * reviewed imaging studies with patient, showing arthritis. Arthritis is wearing of the  "cartilage due to longstanding \"wear and tear\" or can follow an injury to the joint.    Discussed typical symptoms of arthritic pain is pain aggravated with weight bearing activities, stiffness, relieved by sitting or rest. Swelling may be associated. It is common to have some grinding and popping in the knee with arthritis.    Workup for degenerative knee arthrosis and pain, typically starts with plain xrays of the knee. Xrays are usually all that is needed for evaluation of ongoing knee pain for arthritis, as they show the bony anatomy well and underlying degenerative changes. An MRI is not usually needed in cases of degenerative knee pain and arthritis, as degenerative cartilage and meniscal changes seen on MRI are expected, given findings on xray. MRI may be indicated if the arthritis is mild and there are mechanical symptoms such as locking or catching in the knee, or an acute knee injury.    Discussed various treatments for degenerative arthrosis of the knee, including nonoperative and operative approaches. Nonoperative approaches, which are exhausted prior to operative treatment, include doing nothing and living with the pain as patient has been doing, activity modification (avoid aggravating activities), physical therapy and strengthening exercises, weight loss, anti-inflammatory medications, bracing, ambulatory aids (cane, walker) and injections. Once these have been tried and are deemed unsuccessful with a good effort, and the patient is an appropriate candidate, the next treatment would be knee arthroplasty or replacement. Depending on the location of the arthritis, knee replacement can be partial (one side of the knee affected by arthritis) or a total knee arthroplasty (all 3 compartments). The risks, perceived benefits and perioperative rehabilitation expectations of knee arthroplasty were discussed in detail. Also discussed that approximately 10% of patients that undergo knee arthroplasty are not happy " following surgery and may have worse pain or no improvement in pain, contrary to their preoperative expectations.    Due to the longstanding problems with the knee, she would like to consider total knee arthroplasty.    Total Knee Arthroplasty: We talked about the patient's condition and diagnosis.  Because of severe arthritis, and failure of conservative measures, I have suggested total knee arthroplasty of the right  knee(s).  I've talked in depth about the procedure and the risks, which include, but are not limited to blood loss requiring transfusion, infection, neurovascular injury, DVT, PE, continued pain, (both perioperative and persistent post-recovery pain), numbness around the wound, instability, and potential anesthetic and perioperative medical complications, and the possibility of needing additional procedures. We also talked about recovery time, which differs from patient to patient.  We've encouraged attendance at the arthroplasty class at the hospital.  Medical clearance will need to be obtained.  Special considerations:has history of neuropathy.        Work note written for off 2wks. If better she could go back to work sooner.    TG Oconnor MD  Dept. Orthopedic Surgery  [facility]

## 2021-03-24 NOTE — PROGRESS NOTES
"HISTORY OF PRESENT ILLNESS    Lola Partida is a 66 year old female  Seen as a referral from triage nurse for evaluation of a right knee injury that occurred {NUMBERS(MULTIPLE):184841} {DAYS:1002} ago. The injury occurred during ***.    The mechanism was {ORTHO MECHANISM OR INJURY:571062}, and patient was {ABLE:969578::\"Able\"} to continue activity.  A pop {WAS / WAS NO:061394} heard.  The knee swelled {ORHTO SWELLIN}.      Present symptoms: {SYMPTOMS:756805}.  Symptoms occur {PAINWHEN:247352}.  The symptoms occur {ORTHO SYMPTOM FREQUENCY:964842}.    Usual level of recreational activity: {ORTHO REC ACTIVITY:794773}  Usual level of work activity: {ORTHO WORK ACTIVITY:012246}    Orthopedic PMH: ***    Other PMH: See patient history on [facility].    REVIEW OF SYSTEMS:    CONSTITUTIONAL:  {JOYCE CONSTITUTIONAL:534353::\"NEGATIVE for fever, chills, change in weight\"}  INTEGUMENTARY/SKIN:  {JOYCE SKIN:023252::\"NEGATIVE for worrisome rashes, moles or lesions\"}  EYES:  {JOYCE EYES:359088::\"NEGATIVE for vision changes or irritation\"}  ENT/MOUTH:  {JOYCE ENT:795212::\"NEGATIVE for ear, mouth and throat problems\"}  RESP:  {JYOCE RESP ROS:906047::\"NEGATIVE for significant cough or SOB\"}  BREAST:  {JOYCE BREASTS:251174::\"NEGATIVE for masses, tenderness or discharge\"}  CV:  {JOYCE CV:181170::\"NEGATIVE for chest pain, palpitations or peripheral edema\"}  GI:  {JOYCE GI ROS:643967::\"NEGATIVE for nausea, abdominal pain, heartburn, or change in bowel habits\"}  :  Negative   MUSCULOSKELETAL:  See HPI above  NEURO:  {JOYCE NEURO ROS:550522::\"NEGATIVE for weakness, dizziness or paresthesias\"}  ENDOCRINE:  {JOYCE ENDO:502448::\"NEGATIVE for temperature intolerance, skin/hair changes\"}  HEME/ALLERGY/IMMUNE:  {JOYCE HEME ROS:988050::\"NEGATIVE for bleeding problems\"}  PSYCHIATRIC:  {JOYCE PSYCH ROS:154652::\"NEGATIVE for changes in mood or affect\"}      PHYSICAL EXAM:    GENERAL APPEARANCE: {JOYCE GENERAL APPEARANCE:50::\"healthy\",\"alert\",\"no distress\"}   SKIN: " "{JOYCE EXAM CPE - SKIN:743385::\"no suspicious lesions or rashes\"}  NEURO: {JOYCE EXAM CPE - NEURO:257756::\"Normal strength and tone\",\"mentation intact\",\"speech normal\"}  PSYCH:  {JOYCE EXAM CPE - PSYCH:987323::\"mentation appears normal\",\"affect normal/bright\"}    KNEE EXAM:   Gait: {ORTHOGAIT:816719}  Alignment: Normal  Squat: *** % limited by ***.    {MILD MOD:371376} crepitations in the patellofemoral joint.  Tender: {tenderness:678598}  ROM: {ORTHO ROM:965066}  Effusion: {MILD/MODERATE:362513}    McMurrays: {NE}  Ligaments:    Lachman's Stable, Anterior and posterior drawer stable, stable to varus and                   valgus stress.  {MILD MOD:379429} pain with Varus/Valgus stress testing.    Patellofemoral joint:      Lateral retinaculum {tightness:706942::\"is not tight\"}   Facet Tenderness: {MEDIAL:180433}   Apprehension: negative    X-RAY:  From today 3/24/2021: {FINDINGS:584021}    MRI: none     Impression: {ORTHO KNEE IMPRESSION:883715}    Plan: {ORTHO KNEE IMPRESSION:143347}    Return to clinic ***      TG Oconnor MD  Dept. Orthopedic Surgery  [facility]    "

## 2021-03-24 NOTE — TELEPHONE ENCOUNTER
"Patient calling with right knee pain. Was moving a box at work yesterday afternoon and felt a snap in her knee. Very little or no pain while sitting but 10 out of 10 when trying to bear weight. She is using crutches right now. Has not tried icing or otc pain relievers. Triage disposition is to call 911. Patient does not want to go to ER or UC, she would like to be seen in the clinic. She is willing to go to [place] acute injury [facility]. Phone number for that clinic provided. She will call back PCP if needed.     Reason for Disposition    Can't stand (bear weight) or walk    Additional Information    SEVERE pain (e.g. excruciating)    Negative: Wound looks infected    Negative: Leg pain from overuse (e.g., sports, running, physical work)    Negative: Leg pain not from an injury    Negative: Hip injury is main concern    Negative: Knee injury is main concern    Negative: Foot or ankle injury is main concern    Answer Assessment - Initial Assessment Questions  1. MECHANISM: \"How did the injury happen?\" (e.g., twisting injury, direct blow)       Pushing a box  2. ONSET: \"When did the injury happen?\" (Minutes or hours ago)       yesterday  3. LOCATION: \"Where is the injury located?\"       Right knee  4. APPEARANCE of INJURY: \"What does the injury look like?\"  (e.g., deformity of leg)      Looks normal  5. SEVERITY: \"Can you put weight on that leg?\" \"Can you walk?\"       No weight on it  6. SIZE: For cuts, bruises, or swelling, ask: \"How large is it?\" (e.g., inches or centimeters)       none  7. PAIN: \"Is there pain?\" If so, ask: \"How bad is the pain?\"  (Scale 1-10; or mild, moderate, severe)      Sitting none, 10 out of 10 for weight bearing  8. TETANUS: For any breaks in the skin, ask: \"When was the last tetanus booster?\"      na  9. OTHER SYMPTOMS: \"Do you have any other symptoms?\"       none  10. PREGNANCY: \"Is there any chance you are pregnant?\" \"When was your last menstrual period?\"       na    Protocols used: LEG " INJURY-A-OH    Jackelyn Silver RN

## 2021-03-24 NOTE — LETTER
3/24/2021         RE: Lola Partida  [facility] [facility] [address]        Dear Colleague,    Thank you for referring your patient, Lola Partida, to the [facility]. Please see a copy of my visit note below.    SUBJECTIVE:   Lola Partida is a 66 year old female who is seen as self referral for evaluation of right knee pain.  Chronic pain from knee osteoarthritis, but knee popped yesterday at work, and couldn't bear weight after that.  Was moving a heavy box, and bent to adjust position of the box, felt a snap.    Present symptoms: no new swelling, no pain at rest today.   Gabapentin helped pain overnight  Feels like something is stuck in the knee laterally  If rotates leg internally, very painful.    Treatments tried to this point: crutches,    Previous knee issues: osteoarthritis knee,  Several corticosteroid injections in the past. Last one 2018.    Past Medical History:   Past Medical History:   Diagnosis Date     Anemia, unspecified type 5/27/2016    Regularly gives blood. 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     Past Surgical History:   Past Surgical History:   Procedure Laterality Date     ARTHROSCOPY KNEE Right      COLONOSCOPY  ?     HC TOOTH EXTRACTION W/FORCEP       LASIK      10 years ago     TUBAL LIGATION       vulvar biopsy  2005    LORI III     Family History:   Family History   Problem Relation Age of Onset     Cancer Father         liver     Hyperlipidemia Father      Prostate Cancer Father      Mental Illness Sister         Depression & anxiety     Colon Polyps Brother      Glaucoma No family hx of      Macular Degeneration No family hx of      Social History:   Social History     Tobacco Use     Smoking status: Never Smoker     Smokeless tobacco: Never Used   Substance Use  Topics     Alcohol use: Yes     Alcohol/week: 0.0 standard drinks     Comment: per week, 2-3 drinks 1-2 times per week       Review of Systems:  Constitutional:  NEGATIVE for fever, chills, change in weight  Integumentary/Skin:  NEGATIVE for worrisome rashes, moles or lesions  Eyes:  NEGATIVE for vision changes or irritation  ENT/Mouth:  NEGATIVE for ear, mouth and throat problems  Resp:  NEGATIVE for significant cough or SOB  Breast:  NEGATIVE for masses, tenderness or discharge  CV:  NEGATIVE for chest pain, palpitations or peripheral edema  GI:  NEGATIVE for nausea, abdominal pain, heartburn, or change in bowel habits  :  Negative   Musculoskeletal:  See HPI above  Neuro:  NEGATIVE for weakness, dizziness or paresthesias  Endocrine:  NEGATIVE for temperature intolerance, skin/hair changes  Heme/allergy/immune:  NEGATIVE for bleeding problems  Psychiatric:  NEGATIVE for changes in mood or affect      OBJECTIVE:  Physical Exam:  /79 (BP Location: Left arm, Patient Position: Sitting, Cuff Size: Adult Regular)   Pulse 88   SpO2 96%   General Appearance: healthy, alert and no distress   Skin: no suspicious lesions or rashes  Neuro: Normal strength and tone, mentation intact and speech normal  Vascular: good pulses, and cappillary refill   Lymph: no lymphadenopathy   Psych:  mentation appears normal and affect normal/bright  Resp: no increased work of breathing     Right Knee Exam:  Gait: unable to ambulate due to pain  Alignment: normal     Patellofemoral joint: mild crepitations in the patellofemoral joint.  Effusion: moderate  ROM: 3-80* definite clunk near flexion limit   Tender: non-tender   Masses: none  Ligaments:   Lachman's: stable   Anterior/Posterior drawer: stable,   Varus/Valgus stress: stable to varus and valgus stress.  Pain with varus.      X-rays:  Obtained today, reviewed in the office with the patient today:  Moderate medial joint space narrowing on the AP, but appears to be bone-bone on  "the lateral view. More extensive osteophytes than previous, but no definite loose bodies      ASSESSMENT:   Severe osteoarthritis right knee, probable loose body     PLAN:   * reviewed imaging studies with patient, showing arthritis. Arthritis is wearing of the cartilage due to longstanding \"wear and tear\" or can follow an injury to the joint.    Discussed typical symptoms of arthritic pain is pain aggravated with weight bearing activities, stiffness, relieved by sitting or rest. Swelling may be associated. It is common to have some grinding and popping in the knee with arthritis.    Workup for degenerative knee arthrosis and pain, typically starts with plain xrays of the knee. Xrays are usually all that is needed for evaluation of ongoing knee pain for arthritis, as they show the bony anatomy well and underlying degenerative changes. An MRI is not usually needed in cases of degenerative knee pain and arthritis, as degenerative cartilage and meniscal changes seen on MRI are expected, given findings on xray. MRI may be indicated if the arthritis is mild and there are mechanical symptoms such as locking or catching in the knee, or an acute knee injury.    Discussed various treatments for degenerative arthrosis of the knee, including nonoperative and operative approaches. Nonoperative approaches, which are exhausted prior to operative treatment, include doing nothing and living with the pain as patient has been doing, activity modification (avoid aggravating activities), physical therapy and strengthening exercises, weight loss, anti-inflammatory medications, bracing, ambulatory aids (cane, walker) and injections. Once these have been tried and are deemed unsuccessful with a good effort, and the patient is an appropriate candidate, the next treatment would be knee arthroplasty or replacement. Depending on the location of the arthritis, knee replacement can be partial (one side of the knee affected by arthritis) or a " total knee arthroplasty (all 3 compartments). The risks, perceived benefits and perioperative rehabilitation expectations of knee arthroplasty were discussed in detail. Also discussed that approximately 10% of patients that undergo knee arthroplasty are not happy following surgery and may have worse pain or no improvement in pain, contrary to their preoperative expectations.    Due to the longstanding problems with the knee, she would like to consider total knee arthroplasty.    Total Knee Arthroplasty: We talked about the patient's condition and diagnosis.  Because of severe arthritis, and failure of conservative measures, I have suggested total knee arthroplasty of the right  knee(s).  I've talked in depth about the procedure and the risks, which include, but are not limited to blood loss requiring transfusion, infection, neurovascular injury, DVT, PE, continued pain, (both perioperative and persistent post-recovery pain), numbness around the wound, instability, and potential anesthetic and perioperative medical complications, and the possibility of needing additional procedures. We also talked about recovery time, which differs from patient to patient.  We've encouraged attendance at the arthroplasty class at the hospital.  Medical clearance will need to be obtained.  Special considerations:has history of neuropathy.        Work note written for off 2wks. If better she could go back to work sooner.    TG Oconnor MD  Dept. Orthopedic Surgery  [facility]       Again, thank you for allowing me to participate in the care of your patient.        Sincerely,        Buck Oconnor MD

## 2021-03-24 NOTE — TELEPHONE ENCOUNTER
Type of surgery: Right total knee arthroplasty   CPT 04688   Primary osteoarthritis of right knee M17.11     Locking of right knee M23.91    Location of surgery: [facility]   Date and time of surgery: 04/19/2021  Surgeon: Elvin  Pre-Op Appt Date: 04/12/2021  Post-Op Appt Date: 05/05/2021   Packet sent out: Yes  Pre-cert/Authorization completed:    I have started prior auth with [facility]  [id number]    Waiting on response    Date: 03/24/2021    Thank you,   Merlyn Ayoub   Prior Authorization Department  [phone]

## 2021-03-25 NOTE — TELEPHONE ENCOUNTER
Leyla Carreon at [facility],   Surgery has been approved auth # [id number]

## 2021-03-25 NOTE — TELEPHONE ENCOUNTER
I have already submitted clinical notes from yesterdays visit and xray results. When I started prior auth yesterday.     But now I received fax from [facility], asking for more information:  Notes on severity of pain and details on functional disability interfering with activities of daily living, using a standard scale of [place] and [employer] Arthritis Index (WOMAC) or HOOS/KOOS or HOOS Jr/KOOS jr    They are asking that this information be faxed to them ASAP before 03/26/21 at 8am. To [phone]. Yuryn Velma ESPINOSA     If any questions please call [phone] option 1    Ref # [id number]    Thank you,   Merlyn Ayoub   Prior Authorization Department  [phone]

## 2021-03-26 ENCOUNTER — ANCILLARY PROCEDURE (OUTPATIENT)
Dept: GENERAL RADIOLOGY | Facility: CLINIC | Age: 66
End: 2021-03-26
Attending: PHYSICIAN ASSISTANT
Payer: COMMERCIAL

## 2021-03-26 ENCOUNTER — OFFICE VISIT (OUTPATIENT)
Dept: FAMILY MEDICINE | Facility: CLINIC | Age: 66
End: 2021-03-26
Payer: COMMERCIAL

## 2021-03-26 VITALS
HEIGHT: 68 IN | HEART RATE: 76 BPM | OXYGEN SATURATION: 97 % | BODY MASS INDEX: 28.79 KG/M2 | DIASTOLIC BLOOD PRESSURE: 73 MMHG | TEMPERATURE: 98.1 F | WEIGHT: 190 LBS | SYSTOLIC BLOOD PRESSURE: 130 MMHG

## 2021-03-26 DIAGNOSIS — M25.511 ACUTE PAIN OF RIGHT SHOULDER: ICD-10-CM

## 2021-03-26 DIAGNOSIS — R73.09 ELEVATED GLUCOSE: ICD-10-CM

## 2021-03-26 DIAGNOSIS — R25.2 BILATERAL LEG CRAMPS: ICD-10-CM

## 2021-03-26 DIAGNOSIS — Z86.2 HISTORY OF ANEMIA: ICD-10-CM

## 2021-03-26 DIAGNOSIS — I10 HYPERTENSION GOAL BP (BLOOD PRESSURE) < 140/90: ICD-10-CM

## 2021-03-26 DIAGNOSIS — M54.16 CHRONIC RADICULAR LOW BACK PAIN: ICD-10-CM

## 2021-03-26 DIAGNOSIS — Z00.00 ROUTINE GENERAL MEDICAL EXAMINATION AT A HEALTH CARE FACILITY: Primary | ICD-10-CM

## 2021-03-26 DIAGNOSIS — Z12.4 SCREENING FOR MALIGNANT NEOPLASM OF CERVIX: ICD-10-CM

## 2021-03-26 DIAGNOSIS — G89.29 CHRONIC RADICULAR LOW BACK PAIN: ICD-10-CM

## 2021-03-26 LAB
ANION GAP SERPL CALCULATED.3IONS-SCNC: 6 MMOL/L (ref 3–14)
BUN SERPL-MCNC: 19 MG/DL (ref 7–30)
CALCIUM SERPL-MCNC: 9.2 MG/DL (ref 8.5–10.1)
CHLORIDE SERPL-SCNC: 104 MMOL/L (ref 94–109)
CHOLEST SERPL-MCNC: 190 MG/DL
CO2 SERPL-SCNC: 28 MMOL/L (ref 20–32)
CREAT SERPL-MCNC: 0.76 MG/DL (ref 0.52–1.04)
ERYTHROCYTE [DISTWIDTH] IN BLOOD BY AUTOMATED COUNT: 13.5 % (ref 10–15)
FERRITIN SERPL-MCNC: 166 NG/ML (ref 8–252)
GFR SERPL CREATININE-BSD FRML MDRD: 83 ML/MIN/{1.73_M2}
GLUCOSE SERPL-MCNC: 109 MG/DL (ref 70–99)
HBA1C MFR BLD: 5.4 % (ref 0–5.6)
HCT VFR BLD AUTO: 40.8 % (ref 35–47)
HDLC SERPL-MCNC: 75 MG/DL
HGB BLD-MCNC: 13.4 G/DL (ref 11.7–15.7)
LDLC SERPL CALC-MCNC: 92 MG/DL
MAGNESIUM SERPL-MCNC: 2.1 MG/DL (ref 1.6–2.3)
MCH RBC QN AUTO: 31.1 PG (ref 26.5–33)
MCHC RBC AUTO-ENTMCNC: 32.8 G/DL (ref 31.5–36.5)
MCV RBC AUTO: 95 FL (ref 78–100)
NONHDLC SERPL-MCNC: 115 MG/DL
PLATELET # BLD AUTO: 209 10E9/L (ref 150–450)
POTASSIUM SERPL-SCNC: 4 MMOL/L (ref 3.4–5.3)
RBC # BLD AUTO: 4.31 10E12/L (ref 3.8–5.2)
SODIUM SERPL-SCNC: 138 MMOL/L (ref 133–144)
TRIGL SERPL-MCNC: 113 MG/DL
WBC # BLD AUTO: 7.3 10E9/L (ref 4–11)

## 2021-03-26 PROCEDURE — 83735 ASSAY OF MAGNESIUM: CPT | Performed by: PHYSICIAN ASSISTANT

## 2021-03-26 PROCEDURE — 82728 ASSAY OF FERRITIN: CPT | Performed by: PHYSICIAN ASSISTANT

## 2021-03-26 PROCEDURE — G0145 SCR C/V CYTO,THINLAYER,RESCR: HCPCS | Performed by: PHYSICIAN ASSISTANT

## 2021-03-26 PROCEDURE — 80048 BASIC METABOLIC PNL TOTAL CA: CPT | Performed by: PHYSICIAN ASSISTANT

## 2021-03-26 PROCEDURE — 85027 COMPLETE CBC AUTOMATED: CPT | Performed by: PHYSICIAN ASSISTANT

## 2021-03-26 PROCEDURE — 36415 COLL VENOUS BLD VENIPUNCTURE: CPT | Performed by: PHYSICIAN ASSISTANT

## 2021-03-26 PROCEDURE — 83036 HEMOGLOBIN GLYCOSYLATED A1C: CPT | Performed by: PHYSICIAN ASSISTANT

## 2021-03-26 PROCEDURE — 99214 OFFICE O/P EST MOD 30 MIN: CPT | Mod: 25 | Performed by: PHYSICIAN ASSISTANT

## 2021-03-26 PROCEDURE — 80061 LIPID PANEL: CPT | Performed by: PHYSICIAN ASSISTANT

## 2021-03-26 PROCEDURE — 87624 HPV HI-RISK TYP POOLED RSLT: CPT | Performed by: PHYSICIAN ASSISTANT

## 2021-03-26 PROCEDURE — 99396 PREV VISIT EST AGE 40-64: CPT | Performed by: PHYSICIAN ASSISTANT

## 2021-03-26 PROCEDURE — 73030 X-RAY EXAM OF SHOULDER: CPT | Mod: RT | Performed by: RADIOLOGY

## 2021-03-26 RX ORDER — GABAPENTIN 300 MG/1
600 CAPSULE ORAL AT BEDTIME
Qty: 180 CAPSULE | Refills: 3 | Status: SHIPPED | OUTPATIENT
Start: 2021-03-26 | End: 2021-04-05

## 2021-03-26 RX ORDER — METOPROLOL SUCCINATE 25 MG/1
25 TABLET, EXTENDED RELEASE ORAL DAILY
Qty: 90 TABLET | Refills: 3 | Status: SHIPPED | OUTPATIENT
Start: 2021-03-26 | End: 2022-03-30

## 2021-03-26 RX ORDER — CYCLOBENZAPRINE HCL 10 MG
10 TABLET ORAL 3 TIMES DAILY PRN
Qty: 25 TABLET | Refills: 0 | Status: SHIPPED | OUTPATIENT
Start: 2021-03-26 | End: 2022-04-27

## 2021-03-26 ASSESSMENT — ENCOUNTER SYMPTOMS
CHILLS: 0
DIARRHEA: 0
COUGH: 0
HEMATURIA: 0
CONSTIPATION: 0
HEMATOCHEZIA: 0
ABDOMINAL PAIN: 0

## 2021-03-26 ASSESSMENT — KOOS JR
HOW SEVERE IS YOUR KNEE STIFFNESS AFTER FIRST WAKING IN MORNING: EXTREME
RISING FROM SITTING: EXTREME
KOOS JR SCORING: 31.31
TWISING OR PIVOTING ON KNEE: EXTREME
GOING UP OR DOWN STAIRS: SEVERE
STANDING UPRIGHT: MODERATE
STRAIGHTENING KNEE FULLY: SEVERE
BENDING TO THE FLOOR TO PICK UP OBJECT: MODERATE

## 2021-03-26 ASSESSMENT — MIFFLIN-ST. JEOR: SCORE: 1456.46

## 2021-03-26 NOTE — PROGRESS NOTES
SUBJECTIVE:   CC: Lola aPrtida is an 66 year old woman who presents for preventive health visit.       Patient has been advised of split billing requirements and indicates understanding: Yes  Healthy Habits:     Getting at least 3 servings of Calcium per day:  Yes    Bi-annual eye exam:  Yes    Dental care twice a year:  Yes    Sleep apnea or symptoms of sleep apnea:  None    Diet:  Regular (no restrictions)    Frequency of exercise:  None    Taking medications regularly:  Yes    Medication side effects:  Not applicable    PHQ-2 Total Score: 0    Additional concerns today:  Yes    Legs are just jumping at times. Sometimes she has to take 2 gabapentin to calm down. Would like to stay on the higher dose of gabapentin.   Omeprazole taking daily.     Severe muscle cramps throughout her whole body. Often when she wakes up. Arms and legs and hands.   Patient is taking over the counter iron, magneisum, calcium. Does not know dosages.     Right shoulder Can only lift it partially up before it starts to hurt. Catches and clicks. Constant pain. Weakness in the arm. over the counter meds not helpful-only tried one time.   Patient is concerned about the shoulder pain and using crutches after her upcoming knee surgery.     The 10-year ASCVD risk score (Jossy DC Jr., et al., 2013) is: 6.7%    Values used to calculate the score:      Age: 66 years      Sex: Female      Is Non- : No      Diabetic: No      Tobacco smoker: No      Systolic Blood Pressure: 130 mmHg      Is BP treated: Yes      HDL Cholesterol: 71 mg/dL      Total Cholesterol: 235 mg/dL      Today's PHQ-2 Score:   PHQ-2 ( 1999 Pfizer) 3/26/2021   Q1: Little interest or pleasure in doing things 0   Q2: Feeling down, depressed or hopeless 0   PHQ-2 Score 0   Q1: Little interest or pleasure in doing things Not at all   Q2: Feeling down, depressed or hopeless Not at all   PHQ-2 Score 0       Abuse: Current or Past (Physical, Sexual or Emotional)  - No  Do you feel safe in your environment? Yes    Have you ever done Advance Care Planning? (For example, a Health Directive, POLST, or a discussion with a medical provider or your loved ones about your wishes): Yes, patient states has an Advance Care Planning document and will bring a copy to the clinic.    Social History     Tobacco Use     Smoking status: Never Smoker     Smokeless tobacco: Never Used   Substance Use Topics     Alcohol use: Yes     Alcohol/week: 0.0 standard drinks     Comment: per week, 2-3 drinks 1-2 times per week     If you drink alcohol do you typically have >3 drinks per day or >7 drinks per week? No    Alcohol Use 3/26/2021   Prescreen: >3 drinks/day or >7 drinks/week? No   Prescreen: >3 drinks/day or >7 drinks/week? -       Reviewed orders with patient.  Reviewed health maintenance and updated orders accordingly - Yes  Lab work is in process    Breast Cancer Screening:    Breast CA Risk Assessment (FHS-7) 3/26/2021   Do you have a family history of breast, colon, or ovarian cancer? No / Unknown       click delete button to remove this line now  Mammogram Screening: Recommended mammography every 1-2 years with patient discussion and risk factor consideration  Pertinent mammograms are reviewed under the imaging tab.    History of abnormal Pap smear: NO - age 30-65 PAP every 5 years with negative HPV co-testing recommended  PAP / HPV Latest Ref Rng & Units 8/3/2017   PAP - NIL   HPV 16 DNA NEG Negative   HPV 18 DNA NEG Negative   OTHER HR HPV NEG Negative     Reviewed and updated as needed this visit by clinical staff  Tobacco  Allergies  Meds  Problems  Med Hx  Surg Hx  Fam Hx  Soc Hx          Reviewed and updated as needed this visit by Provider  Tobacco  Allergies  Meds  Problems  Med Hx  Surg Hx  Fam Hx             Review of Systems   Constitutional: Negative for chills.   HENT: Negative for congestion.    Respiratory: Negative for cough.    Cardiovascular: Negative for  "chest pain.   Gastrointestinal: Negative for abdominal pain, constipation, diarrhea and hematochezia.   Genitourinary: Negative for hematuria.          OBJECTIVE:   /73 (BP Location: Left arm, Patient Position: Chair, Cuff Size: Adult Large)   Pulse 76   Temp 98.1  F (36.7  C) (Oral)   Ht 1.721 m (5' 7.76\")   Wt 86.2 kg (190 lb)   SpO2 97%   Breastfeeding No   BMI 29.10 kg/m    Physical Exam  GENERAL APPEARANCE: healthy, alert and no distress  EYES: Eyes grossly normal to inspection, PERRL and conjunctivae and sclerae normal  HENT: ear canals and TM's normal, nose and mouth without ulcers or lesions, oropharynx clear and oral mucous membranes moist  NECK: no adenopathy, no asymmetry, masses, or scars and thyroid normal to palpation  RESP: lungs clear to auscultation - no rales, rhonchi or wheezes  BREAST: normal without masses, tenderness or nipple discharge and no palpable axillary masses or adenopathy  CV: regular rate and rhythm, normal S1 S2, no S3 or S4, no murmur, click or rub, no peripheral edema and peripheral pulses strong  ABDOMEN: soft, nontender, no hepatosplenomegaly, no masses and bowel sounds normal   (female): normal female external genitalia, normal urethral meatus, vaginal mucosal atrophy noted, normal cervix, adnexae, and uterus without masses or abnormal discharge  MS: no musculoskeletal defects are noted and gait is age appropriate without ataxia- Right shoulder with reduced ROM in all directions, pain with passive ROM. Normal  strength. Pain with empty can test.   SKIN: no suspicious lesions or rashes  NEURO: Normal strength and tone, sensory exam grossly normal, mentation intact and speech normal  PSYCH: mentation appears normal and affect normal/bright    Diagnostic Test Results:  Labs reviewed in Epic    ASSESSMENT/PLAN:   1. Routine general medical examination at a health care facility    2. Screening for malignant neoplasm of cervix  - Pap imaged thin layer screen with " "HPV - recommended age 30 - 65 years (select HPV order below)    3. Bilateral leg cramps  Increase gabapentin to 600mg at bedtime. Flexeril as needed. Patient to consider changing PPI's as omeprazole can cause muscle cramps.   - gabapentin (NEURONTIN) 300 MG capsule; Take 2 capsules (600 mg) by mouth At Bedtime TAKE 1 CAPSULE BY MOUTH AT BEDTIME  Dispense: 180 capsule; Refill: 3  - Magnesium  - Hemoglobin A1c  - Ferritin    4. Chronic radicular low back pain  - gabapentin (NEURONTIN) 300 MG capsule; Take 2 capsules (600 mg) by mouth At Bedtime TAKE 1 CAPSULE BY MOUTH AT BEDTIME  Dispense: 180 capsule; Refill: 3    5. Hypertension goal BP (blood pressure) < 140/90  Stable.   - metoprolol succinate ER (TOPROL-XL) 25 MG 24 hr tablet; Take 1 tablet (25 mg) by mouth daily  Dispense: 90 tablet; Refill: 3  - Lipid panel reflex to direct LDL Fasting  - Basic metabolic panel  - Magnesium    8. History of anemia  - CBC with platelets    9. Elevated glucose  - Hemoglobin A1c    10. Acute pain of right shoulder  Needs ortho evaluation. X-ray today.   - XR Shoulder Right 2 Views; Future  - Orthopedic & Spine  Referral; Future    Patient has been advised of split billing requirements and indicates understanding: Yes  COUNSELING:  Reviewed preventive health counseling, as reflected in patient instructions       Regular exercise       Healthy diet/nutrition    Estimated body mass index is 29.1 kg/m  as calculated from the following:    Height as of this encounter: 1.721 m (5' 7.76\").    Weight as of this encounter: 86.2 kg (190 lb).    Weight management plan: Discussed healthy diet and exercise guidelines    She reports that she has never smoked. She has never used smokeless tobacco.      Counseling Resources:  ATP IV Guidelines  Pooled Cohorts Equation Calculator  Breast Cancer Risk Calculator  BRCA-Related Cancer Risk Assessment: FHS-7 Tool  FRAX Risk Assessment  ICSI Preventive Guidelines  Dietary Guidelines for " Americans, 2010  USDA's MyPlate  ASA Prophylaxis  Lung CA Screening    JAN Estrada [facility]

## 2021-03-26 NOTE — PATIENT INSTRUCTIONS
Could try pepcid 20mg twice per day or Nexium once per day or Prevacid 30mg once per day.       Patient Education   We are working hard to begin vaccinating more people against COVID-19. Currently, we are vaccinating:    Individuals age 65 and older    Phase 1a healthcare workers, both paid and unpaid, who are unable to do their job remotely.     People 16 and older with rare conditions or disabilities that put them at higher risk listed in Phase 1b tier 2.    People age 45-64 years with one or more underlying medical conditions listed in Phase 1b tier 3.    People age 16 or 18-44 years with two or more underlying medical conditions listed in Phase 1b tier 3.    People age 50 years and older in multigenerational housing (three or more generations living in the household)     If you fit into one of these categories, please log in to [employer] using this link to see if we have an open appointment and schedule an appointment.  Most new appointments are released on Tuesdays at 8 a.m. This is when appointment availability is best. Additional appointments may open up during the week as appointments are canceled or we receive additional vaccine.    If there are no appointments left, you will be unable to schedule.   If you have technical difficulty using [employer], call [phone] for assistance.      You can learn more about the [facility]'s phased approach to administering the vaccine, with details on each phase,?[email or web address].     As vaccine supply increases and we are able to open appointments to more groups, we will share that information on our website:  [email or web address]. Check this website to stay up to date on COVID-19 vaccination information.          Preventive Health Recommendations  Female Ages 50 - 64    Yearly exam: See your health care provider every year in order to  o Review health changes.   o Discuss preventive care.     o Review your medicines if your doctor has prescribed any.      Get a Pap test every three years (unless you have an abnormal result and your provider advises testing more often).    If you get Pap tests with HPV test, you only need to test every 5 years, unless you have an abnormal result.     You do not need a Pap test if your uterus was removed (hysterectomy) and you have not had cancer.    You should be tested each year for STDs (sexually transmitted diseases) if you're at risk.     Have a mammogram every 1 to 2 years.    Have a colonoscopy at age 50, or have a yearly FIT test (stool test). These exams screen for colon cancer.      Have a cholesterol test every 5 years, or more often if advised.    Have a diabetes test (fasting glucose) every three years. If you are at risk for diabetes, you should have this test more often.     If you are at risk for osteoporosis (brittle bone disease), think about having a bone density scan (DEXA).    Shots: Get a flu shot each year. Get a tetanus shot every 10 years.    Nutrition:     Eat at least 5 servings of fruits and vegetables each day.    Eat whole-grain bread, whole-wheat pasta and brown rice instead of white grains and rice.    Get adequate Calcium and Vitamin D.     Lifestyle    Exercise at least 150 minutes a week (30 minutes a day, 5 days a week). This will help you control your weight and prevent disease.    Limit alcohol to one drink per day.    No smoking.     Wear sunscreen to prevent skin cancer.     See your dentist every six months for an exam and cleaning.    See your eye doctor every 1 to 2 years.

## 2021-03-29 NOTE — PROGRESS NOTES
SUBJECTIVE:  Lola Partida is a 66 year old female who is seen in consultation at the request of Merlyn Ness for right shoulder pain that started years ago. Maybe due  To  Repetitious work?  A lot worse the past couple of months.  She has right total knee arthroplasty coming up and is worried about crutches or a walker usage.    Present symptoms: pain with any movements, activities of daily living, behind-back activities, overhead activities all painful.  Night pain.  Throbs  Clicks.  Feels weak-- this is new.   Pain location: mainly posteriorly, occasionally into neck.  Associated symptoms: radiating pain to arm occasionally.    Treatment up to this point:Corticosteroid injection maybe 20 years ago  Prior history of related problems: problems for years in the shoulder.    Significant Orthopedic past medical history:   Bilateral leg cramps  Chronic radicular low back pain  History of neuropathy.  Knee osteoarthritis, considering total knee arthroplasty (we saw her on 3/24/21 for that)  Polyarthralgias/myalgias     Patient Active Problem List   Diagnosis     Chronic rhinitis     Esophageal reflux     Menopause     Menopausal syndrome (hot flashes)     Bilateral leg cramps     Chronic radicular low back pain     Hypertension goal BP (blood pressure) < 140/90      Past Medical History:   Diagnosis Date     Anemia, unspecified type 5/27/2016    Regularly gives blood. 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       Past Surgical History:   Procedure Laterality Date     ARTHROSCOPY KNEE Right      COLONOSCOPY  ?     HC TOOTH EXTRACTION W/FORCEP       LASIK      10 years ago     TUBAL LIGATION       vulvar biopsy  2005    LORI III       REVIEW OF SYSTEMS:  CONSTITUTIONAL:  NEGATIVE for fever, chills, change in weight  INTEGUMENTARY/SKIN:  NEGATIVE for  "worrisome rashes, moles or lesions  EYES:  NEGATIVE for vision changes or irritation  ENT/MOUTH:  NEGATIVE for ear, mouth and throat problems  RESP:  NEGATIVE for significant cough or SOB  BREAST:  NEGATIVE for masses, tenderness or discharge  CV:  NEGATIVE for chest pain, palpitations or peripheral edema  GI:  NEGATIVE for nausea, abdominal pain, heartburn, or change in bowel habits  :  Negative   MUSCULOSKELETAL:  See HPI above  NEURO:  NEGATIVE for weakness, dizziness or paresthesias  ENDOCRINE:  NEGATIVE for temperature intolerance, skin/hair changes  HEME/ALLERGY/IMMUNE:  NEGATIVE for bleeding problems  PSYCHIATRIC:  NEGATIVE for changes in mood or affect    OBJECTIVE:  BP (!) 164/67 (BP Location: Left arm, Patient Position: Sitting, Cuff Size: Adult Regular)   Pulse 65   Ht 1.717 m (5' 7.6\")   Wt 86.2 kg (190 lb)   SpO2 98%   BMI 29.23 kg/m       GENERAL: healthy, alert and no distress  GAIT: normal   SKIN: no suspicious lesions or rashes  NEURO: Normal strength and tone, mentation intact and speech normal  VASCULAR:  normal pulses and cappillary refill   PSYCH:  mentation appears normal and affect normal/bright    MUSCULOSKELETAL:    NECK:  Cervical range of motion: full,  painfree, and does not cause shoulder pain or reproduce shoulder pain.  Sensory deficits:  none  Motor deficits:  none    SHOULDER:  Shoulder Inspection: no swelling, bruising, discoloration, or obvious deformity or asymmetry  no atrophy  Tender: anterior capsule  Non-tender: AC joint and greater tuberosity  Range of Motion:   Active:forward flexion 130 degrees, internal rotation  Back pocket   Passive: forward flexion 130 degrees, external rotation  70 degrees  Impingement: all grade 2 positive  Strength: forward flexion 5/5, painful, External rotation 5/5, painful  Bear Hug: Negative and belly press negative    Special tests: Speed: Negative  Anterior Drawer: 0  Posterior Drawer: 0    X-RAY INTERPRETATION  From 3/26, reviewed with " "the patient today:   Moderate acromioclavicular degenerative changes. Moderate  glenohumeral degenerative changes with large inferior spurring off the  inferior margin of the humerus. ( not the typical \"goat's beard\" appearance, can't rule out loose body on this image.) No evidence of acute fracture.    True AP xray, done today; results reviewed with her via  MyChart:    MRI:    none    ASSESSMENT    ICD-10-CM    1. Primary osteoarthritis of right shoulder  M19.011 Orthopedic & Spine  Referral     X-ray rt Shoulder 1 vw   2. Acute pain of right shoulder  M25.511 Orthopedic & Spine  Referral     Orthopedic & Spine  Referral     X-ray rt Shoulder 1 vw     Rotator cuff seems intact    PLAN:   steroid injection: image guided corticosteroid injection ordered for the glenohumeral joint.  I think she won't need to use crutches for her TOTAL KNEE ARTHROPLASTY, and a walker would be needed briefly, and can switch to a cane in her left hand after that.      .TG Oconnor MD  Dept. Orthopedic Surgery  [facility]    "

## 2021-03-30 ENCOUNTER — ANCILLARY PROCEDURE (OUTPATIENT)
Dept: GENERAL RADIOLOGY | Facility: CLINIC | Age: 66
End: 2021-03-30
Attending: ORTHOPAEDIC SURGERY
Payer: COMMERCIAL

## 2021-03-30 ENCOUNTER — TELEPHONE (OUTPATIENT)
Dept: ORTHOPEDICS | Facility: CLINIC | Age: 66
End: 2021-03-30

## 2021-03-30 ENCOUNTER — OFFICE VISIT (OUTPATIENT)
Dept: ORTHOPEDICS | Facility: CLINIC | Age: 66
End: 2021-03-30
Attending: PHYSICIAN ASSISTANT
Payer: COMMERCIAL

## 2021-03-30 ENCOUNTER — TELEPHONE (OUTPATIENT)
Dept: FAMILY MEDICINE | Facility: CLINIC | Age: 66
End: 2021-03-30

## 2021-03-30 VITALS
SYSTOLIC BLOOD PRESSURE: 164 MMHG | HEIGHT: 68 IN | DIASTOLIC BLOOD PRESSURE: 67 MMHG | HEART RATE: 65 BPM | BODY MASS INDEX: 28.79 KG/M2 | WEIGHT: 190 LBS | OXYGEN SATURATION: 98 %

## 2021-03-30 DIAGNOSIS — M25.511 ACUTE PAIN OF RIGHT SHOULDER: ICD-10-CM

## 2021-03-30 DIAGNOSIS — M19.011 PRIMARY OSTEOARTHRITIS OF RIGHT SHOULDER: Primary | ICD-10-CM

## 2021-03-30 DIAGNOSIS — M19.011 PRIMARY OSTEOARTHRITIS OF RIGHT SHOULDER: ICD-10-CM

## 2021-03-30 DIAGNOSIS — Z12.83 SCREENING FOR SKIN CANCER: Primary | ICD-10-CM

## 2021-03-30 LAB
COPATH REPORT: NORMAL
PAP: NORMAL

## 2021-03-30 PROCEDURE — 73020 X-RAY EXAM OF SHOULDER: CPT | Mod: RT | Performed by: RADIOLOGY

## 2021-03-30 PROCEDURE — 99213 OFFICE O/P EST LOW 20 MIN: CPT | Performed by: ORTHOPAEDIC SURGERY

## 2021-03-30 ASSESSMENT — PAIN SCALES - GENERAL: PAINLEVEL: EXTREME PAIN (9)

## 2021-03-30 ASSESSMENT — MIFFLIN-ST. JEOR: SCORE: 1453.98

## 2021-03-30 NOTE — TELEPHONE ENCOUNTER
Reason for Call: Request for an order or referral:    Order or referral being requested: Referral for Dermatology    Date needed: as soon as possible    Has the patient been seen by the PCP for this problem? YES    Additional comments: Patient meet with Merlyn Ness recently was told that she would put in a referral for Dermatology. Please put in referral, call patient with any questions.     Phone number Patient can be reached at:  Home number on file [phone] (home)    Best Time:  any    Can we leave a detailed message on this number?  YES    Call taken on 3/30/2021 at 8:54 AM by Bhumi Cox

## 2021-03-30 NOTE — TELEPHONE ENCOUNTER
Linda with [employer] disability [facility] requesting a call back to confirm the attending physician form was received     She also needs the following information:  icd-10 code  Cpt codes  Diagnosis codes  Surgery date  Admission and discharge needs  Date of disability  Return to work information  And when her follow up visit is.    Claim# [id number]   # [phone]  Fax# [phone]

## 2021-03-30 NOTE — LETTER
3/30/2021         RE: Lola Partida  [facility] [facility] [address]        Dear Colleague,    Thank you for referring your patient, Lola Partida, to the [facility]. Please see a copy of my visit note below.    SUBJECTIVE:  Lola Partida is a 66 year old female who is seen in consultation at the request of Merlyn Ness for right shoulder pain that started years ago. Maybe due  To  Repetitious work?  A lot worse the past couple of months.  She has right total knee arthroplasty coming up and is worried about crutches or a walker usage.    Present symptoms: pain with any movements, activities of daily living, behind-back activities, overhead activities all painful.  Night pain.  Throbs  Clicks.  Feels weak-- this is new.   Pain location: mainly posteriorly, occasionally into neck.  Associated symptoms: radiating pain to arm occasionally.    Treatment up to this point:Corticosteroid injection maybe 20 years ago  Prior history of related problems: problems for years in the shoulder.    Significant Orthopedic past medical history:   Bilateral leg cramps  Chronic radicular low back pain  History of neuropathy.  Knee osteoarthritis, considering total knee arthroplasty (we saw her on 3/24/21 for that)  Polyarthralgias/myalgias     Patient Active Problem List   Diagnosis     Chronic rhinitis     Esophageal reflux     Menopause     Menopausal syndrome (hot flashes)     Bilateral leg cramps     Chronic radicular low back pain     Hypertension goal BP (blood pressure) < 140/90      Past Medical History:   Diagnosis Date     Anemia, unspecified type 5/27/2016    Regularly gives blood. 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       Past Surgical History:   Procedure Laterality Date     ARTHROSCOPY KNEE Right       "COLONOSCOPY  ?     HC TOOTH EXTRACTION W/FORCEP       LASIK      10 years ago     TUBAL LIGATION       vulvar biopsy  2005    LORI III       REVIEW OF SYSTEMS:  CONSTITUTIONAL:  NEGATIVE for fever, chills, change in weight  INTEGUMENTARY/SKIN:  NEGATIVE for worrisome rashes, moles or lesions  EYES:  NEGATIVE for vision changes or irritation  ENT/MOUTH:  NEGATIVE for ear, mouth and throat problems  RESP:  NEGATIVE for significant cough or SOB  BREAST:  NEGATIVE for masses, tenderness or discharge  CV:  NEGATIVE for chest pain, palpitations or peripheral edema  GI:  NEGATIVE for nausea, abdominal pain, heartburn, or change in bowel habits  :  Negative   MUSCULOSKELETAL:  See HPI above  NEURO:  NEGATIVE for weakness, dizziness or paresthesias  ENDOCRINE:  NEGATIVE for temperature intolerance, skin/hair changes  HEME/ALLERGY/IMMUNE:  NEGATIVE for bleeding problems  PSYCHIATRIC:  NEGATIVE for changes in mood or affect    OBJECTIVE:  BP (!) 164/67 (BP Location: Left arm, Patient Position: Sitting, Cuff Size: Adult Regular)   Pulse 65   Ht 1.717 m (5' 7.6\")   Wt 86.2 kg (190 lb)   SpO2 98%   BMI 29.23 kg/m       GENERAL: healthy, alert and no distress  GAIT: normal   SKIN: no suspicious lesions or rashes  NEURO: Normal strength and tone, mentation intact and speech normal  VASCULAR:  normal pulses and cappillary refill   PSYCH:  mentation appears normal and affect normal/bright    MUSCULOSKELETAL:    NECK:  Cervical range of motion: full,  painfree, and does not cause shoulder pain or reproduce shoulder pain.  Sensory deficits:  none  Motor deficits:  none    SHOULDER:  Shoulder Inspection: no swelling, bruising, discoloration, or obvious deformity or asymmetry  no atrophy  Tender: anterior capsule  Non-tender: AC joint and greater tuberosity  Range of Motion:   Active:forward flexion 130 degrees, internal rotation  Back pocket   Passive: forward flexion 130 degrees, external rotation  70 degrees  Impingement: all " "grade 2 positive  Strength: forward flexion 5/5, painful, External rotation 5/5, painful  Bear Hug: Negative and belly press negative    Special tests: Speed: Negative  Anterior Drawer: 0  Posterior Drawer: 0    X-RAY INTERPRETATION  From 3/26, reviewed with the patient today:   Moderate acromioclavicular degenerative changes. Moderate  glenohumeral degenerative changes with large inferior spurring off the  inferior margin of the humerus. ( not the typical \"goat's beard\" appearance, can't rule out loose body on this image.) No evidence of acute fracture.    True AP xray, done today; results reviewed with her via  [facility]:    MRI:    none    ASSESSMENT    ICD-10-CM    1. Primary osteoarthritis of right shoulder  M19.011 Orthopedic & Spine  Referral     X-ray rt Shoulder 1 vw   2. Acute pain of right shoulder  M25.511 Orthopedic & Spine  Referral     Orthopedic & Spine  Referral     X-ray rt Shoulder 1 vw     Rotator cuff seems intact    PLAN:   steroid injection: image guided corticosteroid injection ordered for the glenohumeral joint.  I think she won't need to use crutches for her TOTAL KNEE ARTHROPLASTY, and a walker would be needed briefly, and can switch to a cane in her left hand after that.      .TG Oconnor MD  Dept. Orthopedic Surgery  [facility]        Again, thank you for allowing me to participate in the care of your patient.        Sincerely,        Buck Oconnor MD    "

## 2021-03-31 LAB
FINAL DIAGNOSIS: NORMAL
HPV HR 12 DNA CVX QL NAA+PROBE: NEGATIVE
HPV16 DNA SPEC QL NAA+PROBE: NEGATIVE
HPV18 DNA SPEC QL NAA+PROBE: NEGATIVE
SPECIMEN DESCRIPTION: NORMAL
SPECIMEN SOURCE CVX/VAG CYTO: NORMAL

## 2021-03-31 NOTE — TELEPHONE ENCOUNTER
Form completed for: Lola Partida  What was done with form: faxed with responses to [facility] attn Linda. Confirmed  Harrison MIJARES

## 2021-04-02 ENCOUNTER — THERAPY VISIT (OUTPATIENT)
Dept: PHYSICAL THERAPY | Facility: CLINIC | Age: 66
End: 2021-04-02
Attending: ORTHOPAEDIC SURGERY
Payer: COMMERCIAL

## 2021-04-02 DIAGNOSIS — M17.11 PRIMARY OSTEOARTHRITIS OF RIGHT KNEE: ICD-10-CM

## 2021-04-02 PROCEDURE — 97161 PT EVAL LOW COMPLEX 20 MIN: CPT | Mod: GP | Performed by: PHYSICAL THERAPIST

## 2021-04-02 PROCEDURE — 97110 THERAPEUTIC EXERCISES: CPT | Mod: GP | Performed by: PHYSICAL THERAPIST

## 2021-04-02 ASSESSMENT — ACTIVITIES OF DAILY LIVING (ADL)
GIVING WAY, BUCKLING OR SHIFTING OF KNEE: THE SYMPTOM PREVENTS ME FROM ALL DAILY ACTIVITIES
WALK: ACTIVITY IS SOMEWHAT DIFFICULT
SIT WITH YOUR KNEE BENT: ACTIVITY IS NOT DIFFICULT
STAND: ACTIVITY IS NOT DIFFICULT
WEAKNESS: THE SYMPTOM PREVENTS ME FROM ALL DAILY ACTIVITIES
LIMPING: THE SYMPTOM PREVENTS ME FROM ALL DAILY ACTIVITIES
RISE FROM A CHAIR: ACTIVITY IS MINIMALLY DIFFICULT
KNEEL ON THE FRONT OF YOUR KNEE: NOT ANSWERED
GO UP STAIRS: ACTIVITY IS FAIRLY DIFFICULT
AS_A_RESULT_OF_YOUR_KNEE_INJURY,_HOW_WOULD_YOU_RATE_YOUR_CURRENT_LEVEL_OF_DAILY_ACTIVITY?: ABNORMAL
SQUAT: ACTIVITY IS VERY DIFFICULT
KNEE_ACTIVITY_OF_DAILY_LIVING_SUM: 28
RAW_SCORE: 30.15
PAIN: THE SYMPTOM AFFECTS MY ACTIVITY SLIGHTLY
HOW_WOULD_YOU_RATE_THE_OVERALL_FUNCTION_OF_YOUR_KNEE_DURING_YOUR_USUAL_DAILY_ACTIVITIES?: ABNORMAL
HOW_WOULD_YOU_RATE_THE_CURRENT_FUNCTION_OF_YOUR_KNEE_DURING_YOUR_USUAL_DAILY_ACTIVITIES_ON_A_SCALE_FROM_0_TO_100_WITH_100_BEING_YOUR_LEVEL_OF_KNEE_FUNCTION_PRIOR_TO_YOUR_INJURY_AND_0_BEING_THE_INABILITY_TO_PERFORM_ANY_OF_YOUR_USUAL_DAILY_ACTIVITIES?: 40
STIFFNESS: THE SYMPTOM AFFECTS MY ACTIVITY SEVERELY
SWELLING: THE SYMPTOM AFFECTS MY ACTIVITY SEVERELY
KNEE_ACTIVITY_OF_DAILY_LIVING_SCORE: 43.07
GO DOWN STAIRS: ACTIVITY IS SOMEWHAT DIFFICULT

## 2021-04-02 NOTE — LETTER
[facility]  [address]  [address]  [place] [address]  [phone]    2021    Re: Lola Partida   :   1955  MRN:  [id number]   REFERRING PHYSICIAN:   Buck Oconnor MD    [facility]  Date of Initial Evaluation:  2021  Visits:  Rxs Used: 1  Reason for Referral:  Primary osteoarthritis of right knee    EVALUATION SUMMARY    Physical Therapy Initial Evaluation  Subjective:    Patient Health History  Lola Partida being seen for PHYSICAL THERAPHY FOR THE RIGHT KNEE.   Problem began: 3/23/2021.   Problem occurred: Removing a heavy box from a cart onto a pallet when a tendon snapped.   Pain is reported as 2/10 on pain scale.  General health as reported by patient is good and excellent.  Pertinent medical history includes: high blood pressure and osteoarthritis.     Surgeries include:  Orthopedic surgery and cancer surgery.    Current medications:  High blood pressure medication and muscle relaxants.    Current occupation is [employer] .   Primary job tasks include:  Lifting/carrying, prolonged sitting, pushing/pulling and repetitive tasks.             Therapist Generated HPI Evaluation  Problem details: Patient reports having right knee pain over the last 5 years following a torn meniscus. She reports intermittent knee pain since then with the most recent exacerbation of right knee pain 2 weeks ago after planting her foot and twisting her knee while moving a box .         Type of problem:  Right knee.    This is a chronic condition.  Condition occurred with:  Degenerative joint disease and a twist.  Where condition occurred: at home.  Patient reports pain:  Lateral.  Pain is described as aching and is constant.  Pain radiates to:  Knee. Pain is worse during the day.  Since onset symptoms are gradually improving.  Associated symptoms:  Buckling/giving out and loss of motion/stiffness  "(\"snaping\"). Symptoms are exacerbated by activity, descending stairs, ascending stairs,   Re: Lola Partida   :   1955    bending/squatting and walking  and relieved by rest.  Special tests included:  X-ray.    Restrictions due to condition include:  Working in normal job without restrictions.  Barriers include:  None as reported by patient.    Objective:  Gait:  Velcro double upright.  Gait Type:  Antalgic   Assistive Devices:  Brace       Knee Evaluation:  ROM:    AROM  Extension: Left:    Right:  5 deg from straight  Flexion: Left:   Right: 110 deg  PROM  Extension: Left:   Right:  3 deg from straight  Flexion: Left:   Right:  120 deg    Strength:   Extension:  Left: 4+/5   Pain:      Right: 4+/5   Pain:  Flexion:  Left: 4+/5   Pain:      Right: 4+/5   Pain:    Quad Set Left: Fair    Pain:   Quad Set Right: Poor and fair    Pain:    Ligament Testing:  Normal (no pain reported with testing)  Special Tests: Not Assessed    Palpation:    Right knee tenderness present at:  Medial Joint Line and Lateral Joint Line    Functional Testing:  not assessed    Assessment/Plan:    Patient is a 66 year old female with right side knee complaints.    Patient has the following significant findings with corresponding treatment plan.                Diagnosis 1:  Right knee pain  Pain -  hot/cold therapy, self management, education and home program  Decreased ROM/flexibility - therapeutic exercise, therapeutic activity and home program  Decreased joint mobility - manual therapy, therapeutic exercise, therapeutic activity and home program  Decreased strength - therapeutic exercise, therapeutic activities and home program  Impaired gait - home program  Impaired muscle performance - neuro re-education and home program  Decreased function - therapeutic activities and home program    Therapy Evaluation Codes:   1) History comprised of:   Personal factors that impact the plan of care:      None.    Comorbidity factors that impact " the plan of care are:    Re: Lola Partida   :   1955      None.     Medications impacting care: None.  2) Examination of Body Systems comprised of:   Body structures and functions that impact the plan of care:      Knee.   Activity limitations that impact the plan of care are:      Bathing, Dressing, Jumping, Lifting, Running, Sports, Squatting/kneeling, Stairs, Standing and Walking.  3) Clinical presentation characteristics are:   Stable/Uncomplicated.  4) Decision-Making    Low complexity using standardized patient assessment instrument and/or measureable assessment of functional outcome.  Cumulative Therapy Evaluation is: Low complexity.    Previous and current functional limitations:  (See Goal Flow Sheet for this information)    Short term and Long term goals: (See Goal Flow Sheet for this information)     Communication ability:  Patient appears to be able to clearly communicate and understand verbal and written communication and follow directions correctly.  Treatment Explanation - The following has been discussed with the patient:   RX ordered/plan of care  Anticipated outcomes  Possible risks and side effects  This patient would benefit from PT intervention to resume normal activities.   Rehab potential is good.    Frequency:  2 X a month, once daily  Duration:  for 1 months  Discharge Plan:  Achieve all LTG.  Independent in home treatment program.  Reach maximal therapeutic benefit.    Please refer to the daily flowsheet for treatment today, total treatment time and time spent performing 1:1 timed codes.       Thank you for your referral.    INQUIRIES  Therapist: Dante Barnett, PT, DPT  [facility] [address]  [address] [place] [id number]  Phone: [phone]  Fax: [phone]

## 2021-04-02 NOTE — PROGRESS NOTES
"Physical Therapy Initial Evaluation  Subjective:    Patient Health History  Lola Partida being seen for PHYSICAL THERAPHY FOR THE RIGHT KNEE.     Problem began: 3/23/2021.   Problem occurred: Removing a heavy box from a cart onto a pallet when a tendon snapped.   Pain is reported as 2/10 on pain scale.  General health as reported by patient is good and excellent.  Pertinent medical history includes: high blood pressure and osteoarthritis.        Surgeries include:  Orthopedic surgery and cancer surgery.    Current medications:  High blood pressure medication and muscle relaxants.    Current occupation is [employer] .   Primary job tasks include:  Lifting/carrying, prolonged sitting, pushing/pulling and repetitive tasks.                  Therapist Generated HPI Evaluation  Problem details: Patient reports having right knee pain over the last 5 years following a torn meniscus. She reports intermittent knee pain since then with the most recent exacerbation of right knee pain 2 weeks ago after planting her foot and twisting her knee while moving a box .         Type of problem:  Right knee.    This is a chronic condition.  Condition occurred with:  Degenerative joint disease and a twist.  Where condition occurred: at home.  Patient reports pain:  Lateral.  Pain is described as aching and is constant.  Pain radiates to:  Knee. Pain is worse during the day.  Since onset symptoms are gradually improving.  Associated symptoms:  Buckling/giving out and loss of motion/stiffness (\"snaping\"). Symptoms are exacerbated by activity, descending stairs, ascending stairs, bending/squatting and walking  and relieved by rest.  Special tests included:  X-ray.    Restrictions due to condition include:  Working in normal job without restrictions.  Barriers include:  None as reported by patient.                        Objective:    Gait:  Velcro double upright.     Gait Type:  Antalgic   Assistive Devices:  Brace             "                                            Knee Evaluation:  ROM:    AROM      Extension: Left:    Right:  5 deg from straight  Flexion: Left:   Right: 110 deg  PROM      Extension: Left:   Right:  3 deg from straight  Flexion: Left:   Right:  120 deg      Strength:     Extension:  Left: 4+/5   Pain:      Right: 4+/5   Pain:  Flexion:  Left: 4+/5   Pain:      Right: 4+/5   Pain:    Quad Set Left: Fair    Pain:   Quad Set Right: Poor and fair    Pain:  Ligament Testing:  Normal (no pain reported with testing)                Special Tests: Not Assessed      Palpation:      Right knee tenderness present at:  Medial Joint Line and Lateral Joint Line      Functional Testing:  not assessed                  General     ROS    Assessment/Plan:    Patient is a 66 year old female with right side knee complaints.    Patient has the following significant findings with corresponding treatment plan.                Diagnosis 1:  Right knee pain  Pain -  hot/cold therapy, self management, education and home program  Decreased ROM/flexibility - therapeutic exercise, therapeutic activity and home program  Decreased joint mobility - manual therapy, therapeutic exercise, therapeutic activity and home program  Decreased strength - therapeutic exercise, therapeutic activities and home program  Impaired gait - home program  Impaired muscle performance - neuro re-education and home program  Decreased function - therapeutic activities and home program    Therapy Evaluation Codes:   1) History comprised of:   Personal factors that impact the plan of care:      None.    Comorbidity factors that impact the plan of care are:      None.     Medications impacting care: None.  2) Examination of Body Systems comprised of:   Body structures and functions that impact the plan of care:      Knee.   Activity limitations that impact the plan of care are:      Bathing, Dressing, Jumping, Lifting, Running, Sports, Squatting/kneeling, Stairs, Standing and  Walking.  3) Clinical presentation characteristics are:   Stable/Uncomplicated.  4) Decision-Making    Low complexity using standardized patient assessment instrument and/or measureable assessment of functional outcome.  Cumulative Therapy Evaluation is: Low complexity.    Previous and current functional limitations:  (See Goal Flow Sheet for this information)    Short term and Long term goals: (See Goal Flow Sheet for this information)     Communication ability:  Patient appears to be able to clearly communicate and understand verbal and written communication and follow directions correctly.  Treatment Explanation - The following has been discussed with the patient:   RX ordered/plan of care  Anticipated outcomes  Possible risks and side effects  This patient would benefit from PT intervention to resume normal activities.   Rehab potential is good.    Frequency:  2 X a month, once daily  Duration:  for 1 months  Discharge Plan:  Achieve all LTG.  Independent in home treatment program.  Reach maximal therapeutic benefit.    Please refer to the daily flowsheet for treatment today, total treatment time and time spent performing 1:1 timed codes.

## 2021-04-05 ENCOUNTER — MYC MEDICAL ADVICE (OUTPATIENT)
Dept: ORTHOPEDICS | Facility: CLINIC | Age: 66
End: 2021-04-05

## 2021-04-05 DIAGNOSIS — I10 HYPERTENSION GOAL BP (BLOOD PRESSURE) < 140/90: ICD-10-CM

## 2021-04-05 RX ORDER — IBUPROFEN 200 MG
1 CAPSULE ORAL 2 TIMES DAILY
COMMUNITY

## 2021-04-05 RX ORDER — GABAPENTIN 300 MG/1
600 CAPSULE ORAL AT BEDTIME
Qty: 180 CAPSULE | Refills: 3 | Status: SHIPPED | OUTPATIENT
Start: 2021-04-05 | End: 2022-03-30

## 2021-04-05 RX ORDER — PLANT STANOL ESTER 450 MG
90 TABLET ORAL
COMMUNITY

## 2021-04-05 RX ORDER — MULTIVITAMIN WITH IRON
1 TABLET ORAL DAILY
COMMUNITY

## 2021-04-07 ENCOUNTER — DOCUMENTATION ONLY (OUTPATIENT)
Dept: ORTHOPEDICS | Facility: CLINIC | Age: 66
End: 2021-04-07

## 2021-04-07 NOTE — PROGRESS NOTES
Form completed for: Lola Partida  What was done with form: Fax form to:  [phone] attn: Rukhsana Cardona. Conformed.  Harrison MIJARES

## 2021-04-08 ENCOUNTER — OFFICE VISIT (OUTPATIENT)
Dept: ORTHOPEDICS | Facility: CLINIC | Age: 66
End: 2021-04-08
Attending: ORTHOPAEDIC SURGERY
Payer: COMMERCIAL

## 2021-04-08 DIAGNOSIS — M25.511 ACUTE PAIN OF RIGHT SHOULDER: ICD-10-CM

## 2021-04-08 DIAGNOSIS — M19.011 PRIMARY OSTEOARTHRITIS OF RIGHT SHOULDER: Primary | ICD-10-CM

## 2021-04-08 PROCEDURE — 20611 DRAIN/INJ JOINT/BURSA W/US: CPT | Mod: RT | Performed by: FAMILY MEDICINE

## 2021-04-08 RX ADMIN — ROPIVACAINE HYDROCHLORIDE 1 ML: 5 INJECTION, SOLUTION EPIDURAL; INFILTRATION; PERINEURAL at 21:52

## 2021-04-08 RX ADMIN — BETAMETHASONE SODIUM PHOSPHATE AND BETAMETHASONE ACETATE 6 MG: 3; 3 INJECTION, SUSPENSION INTRA-ARTICULAR; INTRALESIONAL; INTRAMUSCULAR; SOFT TISSUE at 21:52

## 2021-04-08 NOTE — PATIENT INSTRUCTIONS
[facility] [place]    Procedure: Right Shoulder Steroid Injection      You may shower, however avoid swimming, tub baths or hot tubs for 24 hours following your procedure    You may have a mild to moderate increase in pain for several days following the injection.    It may take up to 14 days for the steroid medication to start working although you may feel the effect as early as a few days after the procedure.    You may use ice packs for 10-15 minutes, 3 to 4 times a day at the injection site for comfort    You may use anti-inflammatory medications (such as Ibuprofen or Aleve or Advil) or Tylenol for pain control if necessary    If you were fasting, you may resume your normal diet and medications after the procedure      If you experience any of the following, call [facility] @ [phone] or [phone]  -Fever over 100 degree F  -Swelling, bleeding, redness, drainage, warmth at the injection site  - New or worsening pain

## 2021-04-08 NOTE — LETTER
4/8/2021         RE: Lola Partida  [address] [facility] [address]        Dear Colleague,    Thank you for referring your patient, Lola Partida, to the [facility]. Please see a copy of my visit note below.    Large Joint Injection/Arthocentesis: R glenohumeral joint    Date/Time: 4/8/2021 9:52 PM  Performed by: Mahendra Sheikh MD  Authorized by: Maehndra Sheikh MD     Indications:  Pain and osteoarthritis  Needle Size:  25 G  Guidance: ultrasound    Approach:  Posterolateral  Location:  Shoulder      Site:  R glenohumeral joint  Medications:  6 mg betamethasone acet & sod phos 6 (3-3) MG/ML; 1 mL ropivacaine 5 MG/ML  Procedure discussed: discussed risks, benefits, and alternatives    Consent Given by:  Patient  Timeout: timeout called immediately prior to procedure    Prep: patient was prepped and draped in usual sterile fashion     Patient reported significant improvement of pain after right glenohumeral steroid injection.  Aftercare instructions given to patient.  Plan to follow-up as previously discussed with referring provider.     Mahendra Sheikh MD [facility]                Again, thank you for allowing me to participate in the care of your patient.        Sincerely,        Mahendra Sheikh MD

## 2021-04-09 NOTE — PROGRESS NOTES
Large Joint Injection/Arthocentesis: R glenohumeral joint    Date/Time: 4/8/2021 9:52 PM  Performed by: Mahendra Sheikh MD  Authorized by: Mahendra Sheikh MD     Indications:  Pain and osteoarthritis  Needle Size:  25 G  Guidance: ultrasound    Approach:  Posterolateral  Location:  Shoulder      Site:  R glenohumeral joint  Medications:  6 mg betamethasone acet & sod phos 6 (3-3) MG/ML; 1 mL ropivacaine 5 MG/ML  Procedure discussed: discussed risks, benefits, and alternatives    Consent Given by:  Patient  Timeout: timeout called immediately prior to procedure    Prep: patient was prepped and draped in usual sterile fashion     Patient reported significant improvement of pain after right glenohumeral steroid injection.  Aftercare instructions given to patient.  Plan to follow-up as previously discussed with referring provider.     Mahendra Sheikh MD [facility]

## 2021-04-10 RX ORDER — ROPIVACAINE HYDROCHLORIDE 5 MG/ML
1 INJECTION, SOLUTION EPIDURAL; INFILTRATION; PERINEURAL
Status: SHIPPED | OUTPATIENT
Start: 2021-04-08

## 2021-04-10 RX ORDER — BETAMETHASONE SODIUM PHOSPHATE AND BETAMETHASONE ACETATE 3; 3 MG/ML; MG/ML
6 INJECTION, SUSPENSION INTRA-ARTICULAR; INTRALESIONAL; INTRAMUSCULAR; SOFT TISSUE
Status: SHIPPED | OUTPATIENT
Start: 2021-04-08

## 2021-04-12 ENCOUNTER — OFFICE VISIT (OUTPATIENT)
Dept: FAMILY MEDICINE | Facility: CLINIC | Age: 66
End: 2021-04-12
Payer: COMMERCIAL

## 2021-04-12 VITALS
BODY MASS INDEX: 28.95 KG/M2 | DIASTOLIC BLOOD PRESSURE: 83 MMHG | HEIGHT: 68 IN | TEMPERATURE: 98.2 F | HEART RATE: 77 BPM | SYSTOLIC BLOOD PRESSURE: 138 MMHG | WEIGHT: 191 LBS | RESPIRATION RATE: 18 BRPM | OXYGEN SATURATION: 99 %

## 2021-04-12 DIAGNOSIS — I10 HYPERTENSION GOAL BP (BLOOD PRESSURE) < 140/90: ICD-10-CM

## 2021-04-12 DIAGNOSIS — Z01.818 PRE-OP EXAM: Primary | ICD-10-CM

## 2021-04-12 LAB
ALBUMIN UR-MCNC: NEGATIVE MG/DL
APPEARANCE UR: CLEAR
BACTERIA #/AREA URNS HPF: ABNORMAL /HPF
BILIRUB UR QL STRIP: NEGATIVE
COLOR UR AUTO: YELLOW
GLUCOSE UR STRIP-MCNC: NEGATIVE MG/DL
HGB BLD-MCNC: 13.6 G/DL (ref 11.7–15.7)
HGB UR QL STRIP: ABNORMAL
KETONES UR STRIP-MCNC: NEGATIVE MG/DL
LEUKOCYTE ESTERASE UR QL STRIP: NEGATIVE
NITRATE UR QL: NEGATIVE
NON-SQ EPI CELLS #/AREA URNS LPF: ABNORMAL /LPF
PH UR STRIP: 5.5 PH (ref 5–7)
RBC #/AREA URNS AUTO: ABNORMAL /HPF
SOURCE: ABNORMAL
SP GR UR STRIP: 1.02 (ref 1–1.03)
UROBILINOGEN UR STRIP-ACNC: 0.2 EU/DL (ref 0.2–1)
WBC #/AREA URNS AUTO: ABNORMAL /HPF

## 2021-04-12 PROCEDURE — 36415 COLL VENOUS BLD VENIPUNCTURE: CPT | Performed by: FAMILY MEDICINE

## 2021-04-12 PROCEDURE — 99214 OFFICE O/P EST MOD 30 MIN: CPT | Performed by: FAMILY MEDICINE

## 2021-04-12 PROCEDURE — 85018 HEMOGLOBIN: CPT | Performed by: FAMILY MEDICINE

## 2021-04-12 PROCEDURE — 93000 ELECTROCARDIOGRAM COMPLETE: CPT | Performed by: FAMILY MEDICINE

## 2021-04-12 PROCEDURE — 84132 ASSAY OF SERUM POTASSIUM: CPT | Performed by: FAMILY MEDICINE

## 2021-04-12 PROCEDURE — 81001 URINALYSIS AUTO W/SCOPE: CPT | Performed by: FAMILY MEDICINE

## 2021-04-12 ASSESSMENT — MIFFLIN-ST. JEOR: SCORE: 1458.52

## 2021-04-12 ASSESSMENT — PAIN SCALES - GENERAL: PAINLEVEL: NO PAIN (0)

## 2021-04-12 NOTE — PROGRESS NOTES
[facility]  [address]  [place] [address]  Phone: [phone]  Primary Provider: Micehlle Mcnair  Pre-op Performing Provider: MICHELLE MCNAIR      PREOPERATIVE EVALUATION:  Today's date: 4/12/2021    Lola Partida is a 66 year old female who presents for a preoperative evaluation.    Surgical Information:  Surgery/Procedure: Right -Total knee replacement  Surgery Location: [facility]  Surgeon: Dr Oconnor  Surgery Date: 4/19/21  Time of Surgery: 10:40  Where patient plans to recover: At home with family  Fax number for surgical facility: Note does not need to be faxed, will be available electronically in Epic.    Type of Anesthesia Anticipated: General    Assessment & Plan     The proposed surgical procedure is considered INTERMEDIATE risk.    Pre-op exam    - EKG 12-lead complete w/read - Clinics  - Hemoglobin  - Potassium  - *UA reflex to Microscopic and Culture ([facility] (except [place] and [place])    Hypertension goal BP (blood pressure) < 140/90  Stable   - *UA reflex to Microscopic and Culture ([facility] (except [place] and [place])         Risks and Recommendations:  The patient has the following additional risks and recommendations for perioperative complications:   - No identified additional risk factors other than previously addressed    Medication Instructions:  Patient is to take all scheduled medications on the day of surgery    RECOMMENDATION:  APPROVAL GIVEN to proceed with proposed procedure, without further diagnostic evaluation.    Review of external notes as documented above                    Subjective     HPI related to upcoming procedure: Pt scheduled for above due to ongoing pain and has failed conservative Treatment    Preop Questions 4/12/2021   1. Have you ever had a heart attack or stroke? No   2. Have you ever had surgery on your heart or blood vessels, such as a stent placement, a coronary artery bypass,  or surgery on an artery in your head, neck, heart, or legs? No   3. Do you have chest pain with activity? No   4. Do you have a history of  heart failure? No   5. Do you currently have a cold, bronchitis or symptoms of other infection? No   6. Do you have a cough, shortness of breath, or wheezing? No   7. Do you or anyone in your family have previous history of blood clots? No   8. Do you or does anyone in your family have a serious bleeding problem such as prolonged bleeding following surgeries or cuts? No   9. Have you ever had problems with anemia or been told to take iron pills? YES -    10. Have you had any abnormal blood loss such as black, tarry or bloody stools, or abnormal vaginal bleeding? No   11. Have you ever had a blood transfusion? No   12. Are you willing to have a blood transfusion if it is medically needed before, during, or after your surgery? Yes   13. Have you or any of your relatives ever had problems with anesthesia? No   14. Do you have sleep apnea, excessive snoring or daytime drowsiness? No   15. Do you have any artifical heart valves or other implanted medical devices like a pacemaker, defibrillator, or continuous glucose monitor? No   16. Do you have artificial joints? No   17. Are you allergic to latex? No       Health Care Directive:      Preoperative Review of :   reviewed - no record of controlled substances prescribed.      Status of Chronic Conditions:  HYPERTENSION - Patient has longstanding history of HTN , currently denies any symptoms referable to elevated blood pressure. Specifically denies chest pain, palpitations, dyspnea, orthopnea, PND or peripheral edema. Blood pressure readings have been in normal range. Current medication regimen is as listed below. Patient denies any side effects of medication.       Review of Systems  CONSTITUTIONAL: NEGATIVE for fever, chills, change in weight  INTEGUMENTARY/SKIN: NEGATIVE for worrisome rashes, moles or lesions  EYES: NEGATIVE  "for vision changes or irritation  ENT/MOUTH: NEGATIVE for ear, mouth and throat problems  RESP: NEGATIVE for significant cough or SOB  BREAST: NEGATIVE for masses, tenderness or discharge  CV: NEGATIVE for chest pain, palpitations or peripheral edema  GI: NEGATIVE for nausea, abdominal pain, heartburn, or change in bowel habits  : NEGATIVE for frequency, dysuria, or hematuria  MUSCULOSKELETAL: NEGATIVE for significant arthralgias or myalgia  NEURO: NEGATIVE for weakness, dizziness or paresthesias  ENDOCRINE: NEGATIVE for temperature intolerance, skin/hair changes  HEME: NEGATIVE for bleeding problems  PSYCHIATRIC: NEGATIVE for changes in mood or affect    Patient Active Problem List    Diagnosis Date Noted     Hypertension goal BP (blood pressure) < 140/90 08/03/2017     Priority: Medium     [facility] research study patient : New diagnosis HTN arm of the study  First study visit 8/3/17     Patients will have regular blood pressure medication adjustments made by the [facility] study team. Under non-emergent/urgent situations where a change in blood pressure medication adjustment is being considered outside of a study visit, please contact the [facility] study team at [email or web address] and they will refer to the [facility] RN HTN MGMT standing order (under 'other orders' ) to guide medication selection. This standing order reflects the IRB approved blood pressure treatment protocol for this study.    Patient should NOT be provided their genetic profile results until the end of the study (this is a single blinded study.   Patients will remain blinded to results during the study but will be given this information at the end of the study)    Click on Research \"Active\" in header for more details about the study protocols    [facility] study team (Marilyn Carrillo MD)            Bilateral leg cramps 05/25/2016     Priority: Medium     Chronic radicular low back pain 05/25/2016     Priority: Medium     Chronic rhinitis 08/28/2015     Priority: " Medium     Esophageal reflux 08/28/2015     Priority: Medium     Menopause 08/28/2015     Priority: Medium     Menopausal syndrome (hot flashes) 08/28/2015     Priority: Medium      Past Medical History:   Diagnosis Date     Anemia, unspecified type 5/27/2016    Regularly gives blood. 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     Past Surgical History:   Procedure Laterality Date     ARTHROSCOPY KNEE Right      COLONOSCOPY  ?     HC TOOTH EXTRACTION W/FORCEP       LASIK      10 years ago     TUBAL LIGATION       vulvar biopsy  2005    LORI III     Current Outpatient Medications   Medication Sig Dispense Refill     calcium 600 MG tablet Take 1 tablet by mouth 2 times daily       cyclobenzaprine (FLEXERIL) 10 MG tablet Take 1 tablet (10 mg) by mouth 3 times daily as needed for muscle spasms 25 tablet 0     Ferrous Sulfate Dried (HIGH POTENCY IRON) 65 MG TABS Take 65 mg by mouth       gabapentin (NEURONTIN) 300 MG capsule Take 2 capsules (600 mg) by mouth At Bedtime 180 capsule 3     magnesium 250 MG tablet Take 1 tablet by mouth daily       metoprolol succinate ER (TOPROL-XL) 25 MG 24 hr tablet Take 1 tablet (25 mg) by mouth daily 90 tablet 3     OMEPRAZOLE PO Take by mouth daily       potassium gluconate 2.5 MEQ tablet Take 90 mEq by mouth         No Known Allergies     Social History     Tobacco Use     Smoking status: Never Smoker     Smokeless tobacco: Never Used   Substance Use Topics     Alcohol use: Yes     Alcohol/week: 0.0 standard drinks     Comment: per week, 2-3 drinks 1-2 times per week     Family History   Problem Relation Age of Onset     Cancer Father         liver     Hyperlipidemia Father      Prostate Cancer Father      Mental Illness Sister         Depression & anxiety     Colon Polyps Brother      Glaucoma No family hx of       Macular Degeneration No family hx of      History   Drug Use No         Objective     There were no vitals taken for this visit.    Physical Exam    GENERAL APPEARANCE: healthy, alert and no distress     EYES: EOMI, PERRL     HENT: ear canals and TM's normal and nose and mouth without ulcers or lesions     NECK: no adenopathy, no asymmetry, masses, or scars and thyroid normal to palpation     RESP: lungs clear to auscultation - no rales, rhonchi or wheezes     CV: regular rates and rhythm, normal S1 S2, no S3 or S4 and no murmur, click or rub     ABDOMEN:  soft, nontender, no HSM or masses and bowel sounds normal     MS: extremities normal- no gross deformities noted, no evidence of inflammation in joints, FROM in all extremities.     SKIN: no suspicious lesions or rashes     NEURO: Normal strength and tone, sensory exam grossly normal, mentation intact and speech normal     PSYCH: mentation appears normal. and affect normal/bright     LYMPHATICS: No cervical adenopathy    Recent Labs   Lab Test 03/26/21  1004 09/14/20  0818   HGB 13.4  --      --     140   POTASSIUM 4.0 4.4   CR 0.76 0.84   A1C 5.4  --         Diagnostics:  Pending   EKG: appears normal, NSR, normal axis, normal intervals, no acute ST/T changes c/w ischemia, no LVH by voltage criteria, unchanged from previous tracings    Revised Cardiac Risk Index (RCRI):  The patient has the following serious cardiovascular risks for perioperative complications:   - No serious cardiac risks = 0 points     RCRI Interpretation: 0 points: Class I (very low risk - 0.4% complication rate)           Signed Electronically by: Christine Young MD  Copy of this evaluation report is provided to requesting physician.

## 2021-04-13 ENCOUNTER — TELEPHONE (OUTPATIENT)
Dept: DERMATOLOGY | Facility: CLINIC | Age: 66
End: 2021-04-13

## 2021-04-13 LAB — POTASSIUM SERPL-SCNC: 4.1 MMOL/L (ref 3.4–5.3)

## 2021-04-13 NOTE — TELEPHONE ENCOUNTER
I spoke with Lola regarding her upcoming visit with Dr. Martinez. She has not seen a dermatologist before.  She does not have a personal hx of skin cancer and there is no family history of skin cancer that she is aware of.    Address provided.  I reminded her to check in 15 min prior to her appt time.    Yesenia Tee RN

## 2021-04-14 ENCOUNTER — THERAPY VISIT (OUTPATIENT)
Dept: PHYSICAL THERAPY | Facility: CLINIC | Age: 66
End: 2021-04-14
Payer: COMMERCIAL

## 2021-04-14 DIAGNOSIS — M17.11 PRIMARY OSTEOARTHRITIS OF RIGHT KNEE: Primary | ICD-10-CM

## 2021-04-14 PROCEDURE — 97112 NEUROMUSCULAR REEDUCATION: CPT | Mod: GP | Performed by: PHYSICAL THERAPIST

## 2021-04-14 PROCEDURE — 97110 THERAPEUTIC EXERCISES: CPT | Mod: GP | Performed by: PHYSICAL THERAPIST

## 2021-04-14 NOTE — PROGRESS NOTES
Subjective:  HPI  Physical Exam                    Objective:  System    Physical Exam    General     ROS    Assessment/Plan:    DISCHARGE REPORT    Progress reporting period is from 4/2/2021 to 4/14/2021.       SUBJECTIVE  Subjective changes noted by patient:   Subjective: Patient reports doing well overall and is preparing for surgery on Monday 19th    Current pain level is 1/10  .     Previous pain level was  5/10  .   Changes in function:  Yes (See Goal flowsheet attached for changes in current functional level)  Adverse reaction to treatment or activity: None    OBJECTIVE  Changes noted in objective findings:  Yes,   Objective: Right knee AROM 0-125 deg. Good quad set on right. Right knee strength grossly 4+/5.    ASSESSMENT/PLAN  Updated problem list and treatment plan: Diagnosis 1:  Right knee pain    STG/LTGs have been met or progress has been made towards goals:  Yes (See Goal flow sheet completed today.)  Assessment of Progress: The patient's condition is improving.  Self Management Plans:  Patient is independent in a home treatment program.  Patient is independent in self management of symptoms.  I have re-evaluated this patient and find that the nature, scope, duration and intensity of the therapy is appropriate for the medical condition of the patient.  Lola continues to require the following intervention to meet STG and LTG's:  PT    Recommendations:  Patient will return to PT following her knee replacement procedure    Please refer to the daily flowsheet for treatment today, total treatment time and time spent performing 1:1 timed codes.

## 2021-04-15 ENCOUNTER — OFFICE VISIT (OUTPATIENT)
Dept: DERMATOLOGY | Facility: CLINIC | Age: 66
End: 2021-04-15
Payer: COMMERCIAL

## 2021-04-15 DIAGNOSIS — D18.01 CHERRY ANGIOMA: ICD-10-CM

## 2021-04-15 DIAGNOSIS — D22.9 MULTIPLE BENIGN NEVI: ICD-10-CM

## 2021-04-15 DIAGNOSIS — Z01.818 PRE-OP TESTING: ICD-10-CM

## 2021-04-15 DIAGNOSIS — M17.11 LOCALIZED OSTEOARTHRITIS OF RIGHT KNEE: ICD-10-CM

## 2021-04-15 DIAGNOSIS — L57.8 SUN-DAMAGED SKIN: Primary | ICD-10-CM

## 2021-04-15 DIAGNOSIS — M23.91 LOCKING OF RIGHT KNEE: ICD-10-CM

## 2021-04-15 DIAGNOSIS — L82.1 SEBORRHEIC KERATOSIS: ICD-10-CM

## 2021-04-15 DIAGNOSIS — L91.8 SKIN TAG: ICD-10-CM

## 2021-04-15 DIAGNOSIS — L81.4 SOLAR LENTIGO: ICD-10-CM

## 2021-04-15 DIAGNOSIS — D23.9 DERMATOFIBROMA: ICD-10-CM

## 2021-04-15 DIAGNOSIS — L73.8 SENILE SEBACEOUS GLAND HYPERPLASIA: ICD-10-CM

## 2021-04-15 LAB
SARS-COV-2 RNA RESP QL NAA+PROBE: NORMAL
SPECIMEN SOURCE: NORMAL

## 2021-04-15 PROCEDURE — U0005 INFEC AGEN DETEC AMPLI PROBE: HCPCS | Performed by: ORTHOPAEDIC SURGERY

## 2021-04-15 PROCEDURE — U0003 INFECTIOUS AGENT DETECTION BY NUCLEIC ACID (DNA OR RNA); SEVERE ACUTE RESPIRATORY SYNDROME CORONAVIRUS 2 (SARS-COV-2) (CORONAVIRUS DISEASE [COVID-19]), AMPLIFIED PROBE TECHNIQUE, MAKING USE OF HIGH THROUGHPUT TECHNOLOGIES AS DESCRIBED BY CMS-2020-01-R: HCPCS | Performed by: ORTHOPAEDIC SURGERY

## 2021-04-15 PROCEDURE — 99203 OFFICE O/P NEW LOW 30 MIN: CPT | Performed by: DERMATOLOGY

## 2021-04-15 ASSESSMENT — PAIN SCALES - GENERAL: PAINLEVEL: NO PAIN (0)

## 2021-04-15 NOTE — LETTER
4/15/2021         RE: Lola Partida  [address] [address]        Dear Colleague,    Thank you for referring your patient, Lola Partida, to the [facility]. Please see a copy of my visit note below.    [facility] Dermatology Note  Encounter Date: Apr 15, 2021  Office Visit     Dermatology Problem List:  1. 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  Family history: Negative for skin cancer.  ____________________________________________    Assessment & Plan:     # Sun damaged skin with solar lentigines: Chronic, stable.  - Recommend sunscreens SPF #30 or greater, protective clothing and avoidance of tanning beds.    # Benign skin findings including: seborrheic keratoses, cherry angioma, dermatofibroma, skin tags, sebaceous hyperplasia - minor, self limited problem  - No further intervention required. Patient to report changes.   - Patient reassured of the benign nature of these lesions.    # Multiple clinically benign nevi: Chronic, stable  - No further intervention required. Patient to report changes.   - Patient reassured of the benign nature of these lesions.      Procedures Performed:   None.    Follow-up: 2 year(s) in-person, or earlier for new or changing lesions    Staff and Scribe:     Scribe Disclosure:   I, Deepthi Silver, am serving as a scribe to document services personally performed by this physician, Dr. Radha Martinez, based on data collection and the provider's statements to me.     Provider Disclosure:   The documentation recorded by the scribe accurately reflects the services I personally performed and the decisions made by me.    Radha Martinez MD    Department of Dermatology  [facility]: Phone: [phone], Fax:[phone]  [facility]: Phone:  [phone], Fax: [phone]    ____________________________________________    CC: Skin Check (no specific spots of concern. No personal or family hx of SC. )    HPI:  Ms. Lola Partida is a(n) 66 year old female who presents today as a new patient for skin check.    She is new to our department. She has not seen a dermatologist prior. She has no history of skin cancer, atypical moles, or precancerous lesions to her knowledge.    Referred by Merlyn Ness PA-C for screening for skin cancer on 3/30/21. Note reviewed. No specific lesions of concern are documented.    Today, patient notes no areas of concern. Notes she tried indoor tanning one or two times in the past, none anymore. Does not like to be outside in general.    Patient is otherwise feeling well, without additional skin concerns.     Labs Reviewed:  N/A    Physical Exam:  Vitals: There were no vitals taken for this visit.  SKIN: Total skin excluding the undergarment areas was performed. The exam included the head/face, neck, both arms, chest, buttocks, back, abdomen, both legs, digits and/or nails.   - Otoole Type II  - There are yellow oily papules with central umbilication located on the face.  - Scattered brown macules on sun exposed areas.  - There are dome shaped bright red papules on the trunk.   - Multiple regular brown pigmented macules and papules are identified on the trunk and extremities. About 50 nevi in all. None are atypical.  - There are waxy stuck on tan to brown papules on the trunk.  - There is a firm tan/flesh colored papule that dimples with lateral pressure on the left upper back.  - There is(are) skin colored pedunculated papules on the neck.   - No other lesions of concern on areas examined.       Medications:  Current Outpatient Medications   Medication     calcium 600 MG tablet     cyclobenzaprine (FLEXERIL) 10 MG tablet     Ferrous Sulfate Dried (HIGH POTENCY IRON) 65 MG TABS     gabapentin (NEURONTIN) 300 MG capsule      magnesium 250 MG tablet     metoprolol succinate ER (TOPROL-XL) 25 MG 24 hr tablet     OMEPRAZOLE PO     potassium gluconate 2.5 MEQ tablet     Current Facility-Administered Medications   Medication     betamethasone acet & sod phos (CELESTONE) injection 6 mg     cross-Linked Hyaluronate (GEL-ONE) injection PRSY 30 mg     lidocaine 1 % injection 4 mL     methylPREDNISolone acetate (DEPO-MEDROL) injection 80 mg     ropivacaine (NAROPIN) injection 1 mL      Past Medical History:   Patient Active Problem List   Diagnosis     Chronic rhinitis     Esophageal reflux     Menopause     Menopausal syndrome (hot flashes)     Bilateral leg cramps     Chronic radicular low back pain     Hypertension goal BP (blood pressure) < 140/90     Past Medical History:   Diagnosis Date     Anemia, unspecified type 5/27/2016    Regularly gives blood. 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       CC Referred Self, MD  No address on file on close of this encounter.        Again, thank you for allowing me to participate in the care of your patient.        Sincerely,        Radha Martinez MD

## 2021-04-15 NOTE — PROGRESS NOTES
[facility] Dermatology Note  Encounter Date: Apr 15, 2021  Office Visit     Dermatology Problem List:  1. 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  Family history: Negative for skin cancer.  ____________________________________________    Assessment & Plan:     # Sun damaged skin with solar lentigines: Chronic, stable.  - Recommend sunscreens SPF #30 or greater, protective clothing and avoidance of tanning beds.    # Benign skin findings including: seborrheic keratoses, cherry angioma, dermatofibroma, skin tags, sebaceous hyperplasia - minor, self limited problem  - No further intervention required. Patient to report changes.   - Patient reassured of the benign nature of these lesions.    # Multiple clinically benign nevi: Chronic, stable  - No further intervention required. Patient to report changes.   - Patient reassured of the benign nature of these lesions.      Procedures Performed:   None.    Follow-up: 2 year(s) in-person, or earlier for new or changing lesions    Staff and Scribe:     Scribe Disclosure:   I, Deepthi Silver, am serving as a scribe to document services personally performed by this physician, Dr. Radha Martinez, based on data collection and the provider's statements to me.     Provider Disclosure:   The documentation recorded by the scribe accurately reflects the services I personally performed and the decisions made by me.    Radha Martinez MD    Department of Dermatology  [facility]: Phone: [phone], Fax:[phone]  [facility]: Phone: [phone], Fax: [phone]    ____________________________________________    CC: Skin Check (no specific spots of concern. No personal or family hx of SC. )    HPI:  Ms. Lola Partida is a(n) 66 year old female who presents today as a new patient for skin  check.    She is new to our department. She has not seen a dermatologist prior. She has no history of skin cancer, atypical moles, or precancerous lesions to her knowledge.    Referred by Merlyn Ness PA-C for screening for skin cancer on 3/30/21. Note reviewed. No specific lesions of concern are documented.    Today, patient notes no areas of concern. Notes she tried indoor tanning one or two times in the past, none anymore. Does not like to be outside in general.    Patient is otherwise feeling well, without additional skin concerns.     Labs Reviewed:  N/A    Physical Exam:  Vitals: There were no vitals taken for this visit.  SKIN: Total skin excluding the undergarment areas was performed. The exam included the head/face, neck, both arms, chest, buttocks, back, abdomen, both legs, digits and/or nails.   - Otoole Type II  - There are yellow oily papules with central umbilication located on the face.  - Scattered brown macules on sun exposed areas.  - There are dome shaped bright red papules on the trunk.   - Multiple regular brown pigmented macules and papules are identified on the trunk and extremities. About 50 nevi in all. None are atypical.  - There are waxy stuck on tan to brown papules on the trunk.  - There is a firm tan/flesh colored papule that dimples with lateral pressure on the left upper back.  - There is(are) skin colored pedunculated papules on the neck.   - No other lesions of concern on areas examined.       Medications:  Current Outpatient Medications   Medication     calcium 600 MG tablet     cyclobenzaprine (FLEXERIL) 10 MG tablet     Ferrous Sulfate Dried (HIGH POTENCY IRON) 65 MG TABS     gabapentin (NEURONTIN) 300 MG capsule     magnesium 250 MG tablet     metoprolol succinate ER (TOPROL-XL) 25 MG 24 hr tablet     OMEPRAZOLE PO     potassium gluconate 2.5 MEQ tablet     Current Facility-Administered Medications   Medication     betamethasone acet & sod phos (CELESTONE) injection 6 mg      cross-Linked Hyaluronate (GEL-ONE) injection PRSY 30 mg     lidocaine 1 % injection 4 mL     methylPREDNISolone acetate (DEPO-MEDROL) injection 80 mg     ropivacaine (NAROPIN) injection 1 mL      Past Medical History:   Patient Active Problem List   Diagnosis     Chronic rhinitis     Esophageal reflux     Menopause     Menopausal syndrome (hot flashes)     Bilateral leg cramps     Chronic radicular low back pain     Hypertension goal BP (blood pressure) < 140/90     Past Medical History:   Diagnosis Date     Anemia, unspecified type 5/27/2016    Regularly gives blood. 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       CC Referred Self, MD  No address on file on close of this encounter.

## 2021-04-15 NOTE — NURSING NOTE
Lola Partida's goals for this visit include:   Chief Complaint   Patient presents with     Skin Check     no specific spots of concern. No personal or family hx of SC.      She requests these members of her care team be copied on today's visit information:     PCP: Christine Young    Referring Provider:  Referred Self, MD  No address on file    There were no vitals taken for this visit.    Do you need any medication refills at today's visit? No    Niya Hi LPN on 4/15/2021 at 12:38 PM

## 2021-04-16 LAB
LABORATORY COMMENT REPORT: NORMAL
SARS-COV-2 RNA RESP QL NAA+PROBE: NEGATIVE
SPECIMEN SOURCE: NORMAL

## 2021-04-19 NOTE — TELEPHONE ENCOUNTER
Patient showed up at [facility] today saying this is work comp. I received information from Taylor at [facility]:  claim# [id number]   doi 3-23-21   and there main phone line [phone]    I called and was told the  is:      I called Therese and she said this has not been approved yet. I will fax her clinicals now.

## 2021-04-21 ENCOUNTER — THERAPY VISIT (OUTPATIENT)
Dept: PHYSICAL THERAPY | Facility: CLINIC | Age: 66
End: 2021-04-21
Payer: COMMERCIAL

## 2021-04-21 DIAGNOSIS — Z96.651 STATUS POST TOTAL RIGHT KNEE REPLACEMENT: ICD-10-CM

## 2021-04-21 DIAGNOSIS — M17.11 PRIMARY OSTEOARTHRITIS OF RIGHT KNEE: Primary | ICD-10-CM

## 2021-04-21 PROCEDURE — 97161 PT EVAL LOW COMPLEX 20 MIN: CPT | Mod: GP | Performed by: PHYSICAL THERAPIST

## 2021-04-21 PROCEDURE — 97530 THERAPEUTIC ACTIVITIES: CPT | Mod: GP | Performed by: PHYSICAL THERAPIST

## 2021-04-21 PROCEDURE — 97110 THERAPEUTIC EXERCISES: CPT | Mod: GP | Performed by: PHYSICAL THERAPIST

## 2021-04-21 ASSESSMENT — ACTIVITIES OF DAILY LIVING (ADL)
PAIN: THE SYMPTOM AFFECTS MY ACTIVITY SEVERELY
GIVING WAY, BUCKLING OR SHIFTING OF KNEE: I DO NOT HAVE THE SYMPTOM
GO DOWN STAIRS: ACTIVITY IS VERY DIFFICULT
HOW_WOULD_YOU_RATE_THE_CURRENT_FUNCTION_OF_YOUR_KNEE_DURING_YOUR_USUAL_DAILY_ACTIVITIES_ON_A_SCALE_FROM_0_TO_100_WITH_100_BEING_YOUR_LEVEL_OF_KNEE_FUNCTION_PRIOR_TO_YOUR_INJURY_AND_0_BEING_THE_INABILITY_TO_PERFORM_ANY_OF_YOUR_USUAL_DAILY_ACTIVITIES?: 20
SQUAT: I AM UNABLE TO DO THE ACTIVITY
LIMPING: THE SYMPTOM AFFECTS MY ACTIVITY SEVERELY
KNEE_ACTIVITY_OF_DAILY_LIVING_SUM: 17
SWELLING: THE SYMPTOM AFFECTS MY ACTIVITY SEVERELY
STAND: ACTIVITY IS FAIRLY DIFFICULT
WALK: ACTIVITY IS VERY DIFFICULT
AS_A_RESULT_OF_YOUR_KNEE_INJURY,_HOW_WOULD_YOU_RATE_YOUR_CURRENT_LEVEL_OF_DAILY_ACTIVITY?: SEVERELY ABNORMAL
RAW_SCORE: 17
SIT WITH YOUR KNEE BENT: I AM UNABLE TO DO THE ACTIVITY
KNEEL ON THE FRONT OF YOUR KNEE: I AM UNABLE TO DO THE ACTIVITY
RISE FROM A CHAIR: ACTIVITY IS FAIRLY DIFFICULT
WEAKNESS: THE SYMPTOM AFFECTS MY ACTIVITY SEVERELY
KNEE_ACTIVITY_OF_DAILY_LIVING_SCORE: 24.29
GO UP STAIRS: ACTIVITY IS VERY DIFFICULT
STIFFNESS: THE SYMPTOM AFFECTS MY ACTIVITY SEVERELY
HOW_WOULD_YOU_RATE_THE_OVERALL_FUNCTION_OF_YOUR_KNEE_DURING_YOUR_USUAL_DAILY_ACTIVITIES?: SEVERELY ABNORMAL

## 2021-04-21 NOTE — PROGRESS NOTES
Physical Therapy Initial Evaluation  Subjective:    Therapist Generated HPI Evaluation  Problem details: Patient reports having her right knee replaced on April 19th 2021. She reports the surgery went well..         Type of problem:  Right knee.    This is a new condition.  Condition occurred with:  Degenerative joint disease.  Where condition occurred: for unknown reasons.  Patient reports pain:  Anterior, in the joint, lateral and medial.  Pain is described as aching and is constant.  Pain is worse during the day.  Since onset symptoms are gradually improving.  Associated symptoms:  Loss of motion/stiffness. Symptoms are exacerbated by descending stairs, ascending stairs and bending/squatting    Special tests included:  X-ray (see epic).    Restrictions due to condition include:  Working in normal job without restrictions.  Barriers include:  None as reported by patient.                        Objective:    Gait:    Gait Type:  Antalgic   Assistive Devices:  Walker                                                        Knee Evaluation:  ROM:  Strength wnl knee: grossly 4/5 bilaterally.  AROM      Extension: Left:    Right:  5 deg from straight  Flexion: Left:   Right: 70 deg           Ligament Testing:  Not Assessed                Special Tests: Not Assessed      Palpation:      Right knee tenderness present at:  Medial Joint Line and Lateral Joint Line  Edema:  Edema of the knee: moderate swelling throughout right knee.    Mobility Testing:          Patellofemoral Superior:  Right: hypomobile  Patellofemoral Inferior:  Right: hypomobile        General     ROS    Assessment/Plan:    Patient is a 66 year old female with right side knee complaints.    Patient has the following significant findings with corresponding treatment plan.                Diagnosis 1:  Right knee pain  Pain -  hot/cold therapy, manual therapy, self management, education and home program  Decreased ROM/flexibility - manual therapy,  therapeutic exercise, therapeutic activity and home program  Decreased joint mobility - manual therapy, therapeutic exercise, therapeutic activity and home program  Decreased strength - therapeutic exercise, therapeutic activities and home program  Inflammation - cold therapy and self management/home program  Impaired gait - gait training and home program  Impaired muscle performance - neuro re-education and home program  Decreased function - therapeutic activities and home program  Impaired posture - neuro re-education, therapeutic activities and home program    Therapy Evaluation Codes:   1) History comprised of:   Personal factors that impact the plan of care:      None.    Comorbidity factors that impact the plan of care are:      None.     Medications impacting care: None.  2) Examination of Body Systems comprised of:   Body structures and functions that impact the plan of care:      Knee.   Activity limitations that impact the plan of care are:      Bathing, Bending, Cooking, Driving, Dressing, Jumping, Lifting, Sitting, Sports, Squatting/kneeling, Stairs, Standing, Throwing, Walking and Sleeping.  3) Clinical presentation characteristics are:   Stable/Uncomplicated.  4) Decision-Making    Low complexity using standardized patient assessment instrument and/or measureable assessment of functional outcome.  Cumulative Therapy Evaluation is: Low complexity.    Previous and current functional limitations:  (See Goal Flow Sheet for this information)    Short term and Long term goals: (See Goal Flow Sheet for this information)     Communication ability:  Patient appears to be able to clearly communicate and understand verbal and written communication and follow directions correctly.  Treatment Explanation - The following has been discussed with the patient:   RX ordered/plan of care  Anticipated outcomes  Possible risks and side effects  This patient would benefit from PT intervention to resume normal activities.    Rehab potential is good.    Frequency:  1 X week, once daily  Duration:  for 6 weeks  Discharge Plan:  Achieve all LTG.  Independent in home treatment program.  Reach maximal therapeutic benefit.    Please refer to the daily flowsheet for treatment today, total treatment time and time spent performing 1:1 timed codes.

## 2021-04-21 NOTE — LETTER
[facility]  [address]  [address]  [place] [address]  [phone]    2021    Re: Lola Partida   :   1955  MRN:  [id number]   REFERRING PHYSICIAN:   MD CATHR[facility]  Date of Initial Evaluation:  2021  Visits:  Rxs Used: 1  Reason for Referral:     Primary osteoarthritis of right knee  Status post total right knee replacement    EVALUATION SUMMARY    Physical Therapy Initial Evaluation  Subjective:  Therapist Generated HPI Evaluation  Problem details: Patient reports having her right knee replaced on 2021. She reports the surgery went well..         Type of problem:  Right knee.    This is a new condition.  Condition occurred with:  Degenerative joint disease.  Where condition occurred: for unknown reasons.  Patient reports pain:  Anterior, in the joint, lateral and medial.  Pain is described as aching and is constant.  Pain is worse during the day.  Since onset symptoms are gradually improving.  Associated symptoms:  Loss of motion/stiffness. Symptoms are exacerbated by descending stairs, ascending stairs and bending/squatting    Special tests included:  X-ray (see epic).    Restrictions due to condition include:  Working in normal job without restrictions.  Barriers include:  None as reported by patient.    Patient Health History  General health as reported by patient is good.  Pertinent medical history includes: cancer, high blood pressure, osteoarthritis and overweight.   Red flags:  None as reported by patient.  Medical allergies: none.   Surgeries include:  Cancer surgery and orthopedic surgery.    Current medications:  Anti-inflammatory, high blood pressure medication, muscle relaxants and pain medication.    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   Primary job tasks include:  Lifting/carrying, prolonged sitting, repetitive tasks and pushing/pulling.    Re: Lola Partida   :   1955                 Objective:  Gait:    Gait Type:  Antalgic   Assistive Devices:  Walker       Knee Evaluation:  ROM:  Strength wnl knee: grossly 4/5 bilaterally.  AROM  Extension: Left:    Right:  5 deg from straight  Flexion: Left:   Right: 70 deg     Ligament Testing:  Not Assessed  Special Tests: Not Assessed    Palpation:    Right knee tenderness present at:  Medial Joint Line and Lateral Joint Line    Edema:  Edema of the knee: moderate swelling throughout right knee.    Mobility Testing:    Patellofemoral Superior:  Right: hypomobile  Patellofemoral Inferior:  Right: hypomobile    Assessment/Plan:    Patient is a 66 year old female with right side knee complaints.    Patient has the following significant findings with corresponding treatment plan.                Diagnosis 1:  Right knee pain  Pain -  hot/cold therapy, manual therapy, self management, education and home program  Decreased ROM/flexibility - manual therapy, therapeutic exercise, therapeutic activity and home program  Decreased joint mobility - manual therapy, therapeutic exercise, therapeutic activity and home program  Decreased strength - therapeutic exercise, therapeutic activities and home program  Inflammation - cold therapy and self management/home program  Impaired gait - gait training and home program  Impaired muscle performance - neuro re-education and home program  Decreased function - therapeutic activities and home program  Impaired posture - neuro re-education, therapeutic activities and home program    Therapy Evaluation Codes:   1) History comprised of:   Personal factors that impact the plan of care:      None.    Comorbidity factors that impact the plan of care are:      None.     Medications impacting care: None.  2) Examination of Body Systems comprised of:   Body structures and functions that impact the plan of care:      Knee.   Activity limitations that impact the plan of care are:       Bathing, Bending, Cooking, Driving, Dressing, Jumping, Lifting, Sitting, Sports, Squatting/kneeling, Stairs, Standing, Throwing, Walking and Sleeping.  Re: Lola Partida   :   1955    3) Clinical presentation characteristics are:   Stable/Uncomplicated.  4) Decision-Making    Low complexity using standardized patient assessment instrument and/or measureable assessment of functional outcome.  Cumulative Therapy Evaluation is: Low complexity.    Previous and current functional limitations:  (See Goal Flow Sheet for this information)    Short term and Long term goals: (See Goal Flow Sheet for this information)     Communication ability:  Patient appears to be able to clearly communicate and understand verbal and written communication and follow directions correctly.  Treatment Explanation - The following has been discussed with the patient:   RX ordered/plan of care  Anticipated outcomes  Possible risks and side effects  This patient would benefit from PT intervention to resume normal activities.   Rehab potential is good.    Frequency:  1 X week, once daily  Duration:  for 6 weeks  Discharge Plan:  Achieve all LTG.  Independent in home treatment program.  Reach maximal therapeutic benefit.    Please refer to the daily flowsheet for treatment today, total treatment time and time spent performing 1:1 timed codes.         Thank you for your referral.    INQUIRIES  Therapist: Dante Barnett, PT, DPT   [address]  [address] [place] [address]  Phone: [phone]  Fax: [phone]

## 2021-04-21 NOTE — PROGRESS NOTES
Physical Therapy Initial Evaluation  Subjective:    Patient Health History           General health as reported by patient is good.  Pertinent medical history includes: cancer, high blood pressure, osteoarthritis and overweight.   Red flags:  None as reported by patient.  Medical allergies: none.   Surgeries include:  Cancer surgery and orthopedic surgery.    Current medications:  Anti-inflammatory, high blood pressure medication, muscle relaxants and pain medication.    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   Primary job tasks include:  Lifting/carrying, prolonged sitting, repetitive tasks and pushing/pulling.                       Knee Activity of Daily Living Score: 24.29            Objective:  System    Physical Exam    General     ROS    Assessment/Plan:

## 2021-04-23 ENCOUNTER — NURSE TRIAGE (OUTPATIENT)
Dept: ORTHOPEDICS | Facility: CLINIC | Age: 66
End: 2021-04-23

## 2021-04-23 DIAGNOSIS — Z96.651 S/P TOTAL KNEE REPLACEMENT, RIGHT: Primary | ICD-10-CM

## 2021-04-23 RX ORDER — HYDROCODONE BITARTRATE AND ACETAMINOPHEN 5; 325 MG/1; MG/1
1-2 TABLET ORAL EVERY 6 HOURS PRN
Qty: 30 TABLET | Refills: 0 | Status: SHIPPED | OUTPATIENT
Start: 2021-04-23 | End: 2021-04-26

## 2021-04-23 NOTE — TELEPHONE ENCOUNTER
Returned call to pt who has c/o rash and itching on face and scalp. She is post op, but she has not signed release for [place] so I cannot view records. She stopped her oxy last night and s/s are improving she denies the following- SOB, difficulty breathing, difficulty swallowing, facial/oral swelling. She is looking for an alternative for her therapy at 0940 today in [place]. Writer let her know the message would be sent to team, but they are doing surgery/procedures today. If writer hears back from them pt will get an update. Pt verbalized understanding.    Linda ORANTES RN Specialty Triage 4/23/2021 8:29 AM

## 2021-04-23 NOTE — TELEPHONE ENCOUNTER
Patient having allergic reaction (rash) to oxycodone.  Substitute?    Please call patient today.  OK to LM on VM

## 2021-04-23 NOTE — TELEPHONE ENCOUNTER
I spoke with Lola and discussed the itching and redness she experienced. She discontinued taking Oxycodone on 4/22 at 4pm. The rash is nearly gone and the itching has gone away. I recommended that she get some OTC benadryl and take that along with the new Rx for Norco that I sent to her pharmacy of choice. She appreciated the call and information.    Surjit Ng PA-C, CAQ-OS  Dept. of Orthopedic Surgery  [facility]

## 2021-04-23 NOTE — TELEPHONE ENCOUNTER
Patient called the clinic.  Patient reports had a right knee replacement on Monday,4/19/21.  She was prescribed Oxycodone for pain management.  She reports developing itching to head scalp and rash across her abdomen yesterday afternoon.  She reports the itching was 10/10 yesterday and 0/10 itching today as patient stopped taking the Oxycodone yesterday.  She has been taking Extra strength Tylenol 2 tablets every 4-6 hours with little to no relief.  Patient rates pain as 8/10 at this time.  She has an appointment with Physical therapy at 9.30am at the [facility] and would like a prescription for an alternative pain medication.  She will  the prescription at the [facility] pharmacy.  Patient denies any other symptoms or concerns at this time.    Patient was advised to call the clinic [phone] or seek medical assistance if the symptoms persist or worsen and will be called with the provider's recommendations.    Patient verbalized understanding and has no further questions or concerns at this time.            Additional Information    Negative: Difficulty breathing or wheezing    Negative: Hoarseness or cough that started soon after 1st dose of drug    Negative: Swollen tongue that started soon after first dose of drug    Negative: Fever and purple or blood-colored spots or dots    Negative: Too weak or sick to stand    Negative: Sounds like a life-threatening emergency to the triager    Negative: Rash is only on 1 part of the body (localized)    Negative: Taking new non-prescription (OTC) antihistamine, decongestant, ear drops, eye drops, or other OTC cough/cold medicine    Negative: Taking new prescription antihistamine, allergy medicine, asthma medicine, eye drops, ear drops or nose drops    Negative: Rash started more than 3 days after stopping new prescription medicine    Negative: Swollen tongue    Negative: Widespread hives and onset < 2 hours of exposure to 1st dose of drug    Negative:  "Patient sounds very sick or weak to the triager    Negative: Fever    Negative: Face or lip swelling    Negative: Purple or blood-colored spots or dots (no fever and sounds well to triager)    Negative: Joint pain or swelling    Negative: Bloody crusts on lips or in mouth    Negative: Large or small blisters on skin (i.e., fluid filled bubbles or sacs)    Negative: Pregnant    Negative: Rash beginning within 4 hours of a new prescription medication    Hives or itching    Answer Assessment - Initial Assessment Questions  1. APPEARANCE of RASH: \"Describe the rash.\" (e.g., spots, blisters, raised areas, skin peeling, scaly)      Small spots to abdominal area and itching to head  2. SIZE: \"How big are the spots?\" (e.g., tip of pen, eraser, coin; inches, centimeters)      Tip of pen  3. LOCATION: \"Where is the rash located?\"      Across her abdominal area  4. COLOR: \"What color is the rash?\" (Note: It is difficult to assess rash color in people with darker-colored skin. When this situation occurs, simply ask the caller to describe what they see.)      red  5. ONSET: \"When did the rash begin?\"      Yesterday afternoon  6. FEVER: \"Do you have a fever?\" If so, ask: \"What is your temperature, how was it measured, and when did it start?\"      No  7. ITCHING: \"Does the rash itch?\" If so, ask: \"How bad is the itch?\" (Scale 1-10; or mild, moderate, severe)      Severe itching yesterday and no itching at this time  8. CAUSE: \"What do you think is causing the rash?\"      New medication  9. NEW MEDICATION: \"What new medication are you taking?\" (e.g., name of antibiotic) \"When did you start taking this medication?\".     Oxycodone  10. OTHER SYMPTOMS: \"Do you have any other symptoms?\" (e.g., sore throat, fever, joint pain)       Right knee replacement    Protocols used: RASH - WIDESPREAD ON DRUGS - DRUG [id number]      "

## 2021-04-24 ENCOUNTER — MYC MEDICAL ADVICE (OUTPATIENT)
Dept: ORTHOPEDICS | Facility: CLINIC | Age: 66
End: 2021-04-24

## 2021-04-26 ENCOUNTER — MYC MEDICAL ADVICE (OUTPATIENT)
Dept: ORTHOPEDICS | Facility: CLINIC | Age: 66
End: 2021-04-26

## 2021-04-26 NOTE — TELEPHONE ENCOUNTER
I advised Lola additional paperwork was faxed to [facility] today : [phone]/ confirmed.  Harrison Luna OPA

## 2021-04-27 ENCOUNTER — THERAPY VISIT (OUTPATIENT)
Dept: PHYSICAL THERAPY | Facility: CLINIC | Age: 66
End: 2021-04-27
Payer: COMMERCIAL

## 2021-04-27 DIAGNOSIS — R22.41 LOCALIZED SWELLING OF RIGHT LOWER LEG: ICD-10-CM

## 2021-04-27 DIAGNOSIS — Z96.651 S/P TOTAL KNEE REPLACEMENT, RIGHT: Primary | ICD-10-CM

## 2021-04-27 DIAGNOSIS — Z96.659 HISTORY OF TOTAL KNEE ARTHROPLASTY: ICD-10-CM

## 2021-04-27 DIAGNOSIS — Z96.651 STATUS POST REVISION OF TOTAL KNEE REPLACEMENT, RIGHT: ICD-10-CM

## 2021-04-27 DIAGNOSIS — M17.11 PRIMARY OSTEOARTHRITIS OF RIGHT KNEE: Primary | ICD-10-CM

## 2021-04-27 PROCEDURE — 97110 THERAPEUTIC EXERCISES: CPT | Mod: GP | Performed by: PHYSICAL THERAPIST

## 2021-04-27 PROCEDURE — 97530 THERAPEUTIC ACTIVITIES: CPT | Mod: GP | Performed by: PHYSICAL THERAPIST

## 2021-04-28 ENCOUNTER — ANCILLARY PROCEDURE (OUTPATIENT)
Dept: ULTRASOUND IMAGING | Facility: CLINIC | Age: 66
End: 2021-04-28
Attending: ORTHOPAEDIC SURGERY
Payer: OTHER MISCELLANEOUS

## 2021-04-28 DIAGNOSIS — R22.41 LOCALIZED SWELLING OF RIGHT LOWER LEG: ICD-10-CM

## 2021-04-28 DIAGNOSIS — Z96.651 S/P TOTAL KNEE REPLACEMENT, RIGHT: ICD-10-CM

## 2021-04-28 NOTE — TELEPHONE ENCOUNTER
Left message for patient to confirm appt at [place] today for her US.  April St Lester CMA CMA 4/28/2021 8:09 AM

## 2021-04-30 ENCOUNTER — THERAPY VISIT (OUTPATIENT)
Dept: PHYSICAL THERAPY | Facility: CLINIC | Age: 66
End: 2021-04-30
Payer: COMMERCIAL

## 2021-04-30 DIAGNOSIS — Z96.651 STATUS POST TOTAL RIGHT KNEE REPLACEMENT: ICD-10-CM

## 2021-04-30 DIAGNOSIS — Z96.652 STATUS POST TOTAL LEFT KNEE REPLACEMENT: ICD-10-CM

## 2021-04-30 PROCEDURE — G0283 ELEC STIM OTHER THAN WOUND: HCPCS | Mod: GP | Performed by: PHYSICAL THERAPIST

## 2021-04-30 PROCEDURE — 97530 THERAPEUTIC ACTIVITIES: CPT | Mod: GP | Performed by: PHYSICAL THERAPIST

## 2021-04-30 PROCEDURE — 97110 THERAPEUTIC EXERCISES: CPT | Mod: GP | Performed by: PHYSICAL THERAPIST

## 2021-04-30 NOTE — PROGRESS NOTES
"SUBJECTIVE:  Lola Partida is here in follow up of right total knee arthroplasty on 4/19/21. It has been approximately 2 weeks since the procedure.     Has had some dizziness lately. Episodes of spinning or objects going \"back and forth\"  Had a allergic reaction to percocet. Now on Norco.   Jumpy legs.   A lot of knee swelling.    Review of Systems:  Constitutional/General: Negative for fever, chills, change in weight  Integumentary/Skin: Negative for worrisome rashes, moles, or lesions  Neuro: Negative for weakness, dizziness, or paresthesias   Psychiatric: negative for changes in mood or affect    OBJECTIVE:  Physical Exam:  /77 (BP Location: Left arm, Patient Position: Sitting, Cuff Size: Adult Regular)   Pulse 98   Ht 1.717 m (5' 7.6\")   Wt 86.6 kg (191 lb)   SpO2 98%   BMI 29.39 kg/m    General Appearance: healthy, alert and no distress   Skin: no suspicious lesions or rashes  Neuro: Normal strength and tone, mentation intact and speech normal  Vascular: good pulses, and capillary refill   Lymph: no lymphadenopathy   Psych:  mentation appears normal and affect normal/bright  Resp: no increased work of breathing    Right Knee Exam:  Inspection: Wound looks good. No evidence of infection.   ROM: 5-90 degrees  Good stability.    ASSESSMENT:  Doing fairly well status post right TKA    PLAN:  Continue physical therapy. Start scar massage.   Suture ends cut.  Continue ASA   Discussed holding Norco to see if it resolves the middle of the night spinning.    Return to clinic: 4 weeks, new x-rays at that time    TG Oconnor MD  Dept. Orthopedic Surgery  [facility]    "

## 2021-04-30 NOTE — TELEPHONE ENCOUNTER
Call from work comp adj, she advised that this surgery and all appointments go through patient's medical insurance. She will not have a decision for work comp for a couple of months at least. Patient will have to see [employer] and work comp still is working on getting past medical records for patient.

## 2021-05-03 ENCOUNTER — THERAPY VISIT (OUTPATIENT)
Dept: PHYSICAL THERAPY | Facility: CLINIC | Age: 66
End: 2021-05-03
Payer: COMMERCIAL

## 2021-05-03 DIAGNOSIS — Z96.651 S/P TOTAL KNEE REPLACEMENT, RIGHT: ICD-10-CM

## 2021-05-03 PROCEDURE — 97112 NEUROMUSCULAR REEDUCATION: CPT | Mod: GP | Performed by: PHYSICAL THERAPIST

## 2021-05-03 PROCEDURE — 97530 THERAPEUTIC ACTIVITIES: CPT | Mod: GP | Performed by: PHYSICAL THERAPIST

## 2021-05-03 PROCEDURE — G0283 ELEC STIM OTHER THAN WOUND: HCPCS | Mod: GP | Performed by: PHYSICAL THERAPIST

## 2021-05-03 PROCEDURE — 97110 THERAPEUTIC EXERCISES: CPT | Mod: GP | Performed by: PHYSICAL THERAPIST

## 2021-05-05 ENCOUNTER — OFFICE VISIT (OUTPATIENT)
Dept: ORTHOPEDICS | Facility: CLINIC | Age: 66
End: 2021-05-05
Payer: COMMERCIAL

## 2021-05-05 VITALS
DIASTOLIC BLOOD PRESSURE: 77 MMHG | HEIGHT: 68 IN | OXYGEN SATURATION: 98 % | BODY MASS INDEX: 28.95 KG/M2 | WEIGHT: 191 LBS | SYSTOLIC BLOOD PRESSURE: 139 MMHG | HEART RATE: 98 BPM

## 2021-05-05 DIAGNOSIS — Z96.651 STATUS POST TOTAL RIGHT KNEE REPLACEMENT: Primary | ICD-10-CM

## 2021-05-05 PROCEDURE — 99024 POSTOP FOLLOW-UP VISIT: CPT | Performed by: ORTHOPAEDIC SURGERY

## 2021-05-05 ASSESSMENT — KOOS JR
TWISING OR PIVOTING ON KNEE: MILD
STANDING UPRIGHT: MILD
KOOS JR SCORING: 63.78
RISING FROM SITTING: MILD
GOING UP OR DOWN STAIRS: MILD
BENDING TO THE FLOOR TO PICK UP OBJECT: MILD
STRAIGHTENING KNEE FULLY: MILD
HOW SEVERE IS YOUR KNEE STIFFNESS AFTER FIRST WAKING IN MORNING: SEVERE

## 2021-05-05 ASSESSMENT — PAIN SCALES - GENERAL: PAINLEVEL: NO PAIN (0)

## 2021-05-05 ASSESSMENT — MIFFLIN-ST. JEOR: SCORE: 1458.52

## 2021-05-05 NOTE — LETTER
"    5/5/2021         RE: Lola Partida  [address] [address]        Dear Colleague,    Thank you for referring your patient, Lola Partida, to the [facility]. Please see a copy of my visit note below.    SUBJECTIVE:  Lola Partida is here in follow up of right total knee arthroplasty on 4/19/21. It has been approximately 2 weeks since the procedure.     Has had some dizziness lately. Episodes of spinning or objects going \"back and forth\"  Had a allergic reaction to percocet. Now on Norco.   Jumpy legs.   A lot of knee swelling.    Review of Systems:  Constitutional/General: Negative for fever, chills, change in weight  Integumentary/Skin: Negative for worrisome rashes, moles, or lesions  Neuro: Negative for weakness, dizziness, or paresthesias   Psychiatric: negative for changes in mood or affect    OBJECTIVE:  Physical Exam:  /77 (BP Location: Left arm, Patient Position: Sitting, Cuff Size: Adult Regular)   Pulse 98   Ht 1.717 m (5' 7.6\")   Wt 86.6 kg (191 lb)   SpO2 98%   BMI 29.39 kg/m    General Appearance: healthy, alert and no distress   Skin: no suspicious lesions or rashes  Neuro: Normal strength and tone, mentation intact and speech normal  Vascular: good pulses, and capillary refill   Lymph: no lymphadenopathy   Psych:  mentation appears normal and affect normal/bright  Resp: no increased work of breathing    Right Knee Exam:  Inspection: Wound looks good. No evidence of infection.   ROM: 5-90 degrees  Good stability.    ASSESSMENT:  Doing fairly well status post right TKA    PLAN:  Continue physical therapy. Start scar massage.   Suture ends cut.  Continue ASA   Discussed holding Norco to see if it resolves the middle of the night spinning.    Return to clinic: 4 weeks, new x-rays at that time    TG Oconnor MD  Dept. Orthopedic Surgery  [facility]        Again, thank you for allowing me to participate in the care of your " patient.        Sincerely,        Buck Oconnor MD

## 2021-05-06 ENCOUNTER — THERAPY VISIT (OUTPATIENT)
Dept: PHYSICAL THERAPY | Facility: CLINIC | Age: 66
End: 2021-05-06
Payer: COMMERCIAL

## 2021-05-06 DIAGNOSIS — Z96.651 S/P TOTAL KNEE REPLACEMENT, RIGHT: ICD-10-CM

## 2021-05-06 PROCEDURE — G0283 ELEC STIM OTHER THAN WOUND: HCPCS | Mod: GP | Performed by: PHYSICAL THERAPIST

## 2021-05-06 PROCEDURE — 97110 THERAPEUTIC EXERCISES: CPT | Mod: GP | Performed by: PHYSICAL THERAPIST

## 2021-05-10 ENCOUNTER — MYC MEDICAL ADVICE (OUTPATIENT)
Dept: ORTHOPEDICS | Facility: CLINIC | Age: 66
End: 2021-05-10

## 2021-05-11 ENCOUNTER — THERAPY VISIT (OUTPATIENT)
Dept: PHYSICAL THERAPY | Facility: CLINIC | Age: 66
End: 2021-05-11
Payer: COMMERCIAL

## 2021-05-11 DIAGNOSIS — Z96.651 S/P TOTAL KNEE REPLACEMENT, RIGHT: ICD-10-CM

## 2021-05-11 PROCEDURE — 97110 THERAPEUTIC EXERCISES: CPT | Mod: GP | Performed by: PHYSICAL THERAPIST

## 2021-05-11 PROCEDURE — 97530 THERAPEUTIC ACTIVITIES: CPT | Mod: GP | Performed by: PHYSICAL THERAPIST

## 2021-05-14 ENCOUNTER — THERAPY VISIT (OUTPATIENT)
Dept: PHYSICAL THERAPY | Facility: CLINIC | Age: 66
End: 2021-05-14
Payer: COMMERCIAL

## 2021-05-14 DIAGNOSIS — Z96.651 S/P TOTAL KNEE REPLACEMENT, RIGHT: ICD-10-CM

## 2021-05-14 PROCEDURE — 97110 THERAPEUTIC EXERCISES: CPT | Mod: GP | Performed by: PHYSICAL THERAPIST

## 2021-05-14 PROCEDURE — G0283 ELEC STIM OTHER THAN WOUND: HCPCS | Mod: GP | Performed by: PHYSICAL THERAPIST

## 2021-05-18 ENCOUNTER — THERAPY VISIT (OUTPATIENT)
Dept: PHYSICAL THERAPY | Facility: CLINIC | Age: 66
End: 2021-05-18
Payer: COMMERCIAL

## 2021-05-18 ENCOUNTER — IMMUNIZATION (OUTPATIENT)
Dept: LAB | Facility: CLINIC | Age: 66
End: 2021-05-18
Payer: COMMERCIAL

## 2021-05-18 DIAGNOSIS — Z96.651 S/P TOTAL KNEE REPLACEMENT, RIGHT: ICD-10-CM

## 2021-05-18 PROCEDURE — 97530 THERAPEUTIC ACTIVITIES: CPT | Mod: GP | Performed by: PHYSICAL THERAPIST

## 2021-05-18 PROCEDURE — 97110 THERAPEUTIC EXERCISES: CPT | Mod: GP | Performed by: PHYSICAL THERAPIST

## 2021-05-25 ENCOUNTER — THERAPY VISIT (OUTPATIENT)
Dept: PHYSICAL THERAPY | Facility: CLINIC | Age: 66
End: 2021-05-25
Payer: COMMERCIAL

## 2021-05-25 DIAGNOSIS — Z96.651 S/P TOTAL KNEE REPLACEMENT, RIGHT: ICD-10-CM

## 2021-05-25 PROCEDURE — 97530 THERAPEUTIC ACTIVITIES: CPT | Mod: GP | Performed by: PHYSICAL THERAPIST

## 2021-05-25 PROCEDURE — 97110 THERAPEUTIC EXERCISES: CPT | Mod: GP | Performed by: PHYSICAL THERAPIST

## 2021-05-28 ENCOUNTER — THERAPY VISIT (OUTPATIENT)
Dept: PHYSICAL THERAPY | Facility: CLINIC | Age: 66
End: 2021-05-28
Payer: COMMERCIAL

## 2021-05-28 DIAGNOSIS — Z96.651 S/P TOTAL KNEE REPLACEMENT, RIGHT: ICD-10-CM

## 2021-05-28 PROCEDURE — 97530 THERAPEUTIC ACTIVITIES: CPT | Mod: GP | Performed by: PHYSICAL THERAPIST

## 2021-05-28 PROCEDURE — 97110 THERAPEUTIC EXERCISES: CPT | Mod: GP | Performed by: PHYSICAL THERAPIST

## 2021-06-01 ENCOUNTER — THERAPY VISIT (OUTPATIENT)
Dept: PHYSICAL THERAPY | Facility: CLINIC | Age: 66
End: 2021-06-01
Payer: COMMERCIAL

## 2021-06-01 DIAGNOSIS — Z96.651 S/P TOTAL KNEE REPLACEMENT, RIGHT: ICD-10-CM

## 2021-06-01 PROCEDURE — 97110 THERAPEUTIC EXERCISES: CPT | Mod: GP | Performed by: PHYSICAL THERAPIST

## 2021-06-01 PROCEDURE — 97140 MANUAL THERAPY 1/> REGIONS: CPT | Mod: GP | Performed by: PHYSICAL THERAPIST

## 2021-06-01 PROCEDURE — 97530 THERAPEUTIC ACTIVITIES: CPT | Mod: GP | Performed by: PHYSICAL THERAPIST

## 2021-06-01 NOTE — PROGRESS NOTES
"Subjective:  HPI  Physical Exam                    Objective:  System    Physical Exam    General     ROS    Assessment/Plan:    PROGRESS  REPORT    Progress reporting period is from *** to ***.       SUBJECTIVE  Subjective changes noted by patient:  ***.  Subjective: Patient reports improvments in pain, swelling functional mobility. She no longer reports shooting pain in her lower leg.     Current pain level is {PAIN LEVEL (SCALE OF 10):210855} Current Pain level: 0/10.     Previous pain level was  {PAIN LEVEL (SCALE OF 10):169640} Initial Pain level: 6/10.   Changes in function:  {Changes in Function:008916}  Adverse reaction to treatment or activity: {Adverse reaction to treatment or activity:853753::\"None\"}    OBJECTIVE  Changes noted in objective findings:  {Changes noted in objective findings:114107}        ASSESSMENT/PLAN  Updated problem list and treatment plan: {DIAGNOSIS/PLAN REHAB:697130}  STG/LTGs have been met or progress has been made towards goals:  {Goals met/progress made:987216::\"Yes (See Goal flow sheet completed today.)\"}  Assessment of Progress: {Assessment of progress:388876}  Self Management Plans:  {Self Management Plans:882529}  {Appropriateness of Rx:662883}  Lola continues to require the following intervention to meet STG and LTG's:  {INTERVENTION REHAB:411447}    Recommendations:  {RECOMMENDATIONS REHAB:496545}    Please refer to the daily flowsheet for treatment today, total treatment time and time spent performing 1:1 timed codes.          "

## 2021-06-01 NOTE — PROGRESS NOTES
SUBJECTIVE:  Lola Partida is here in follow up of right total knee arthroplasty on 4/19/21. It has been approximately 6 weeks since the procedure.     Less pain  But has restless legs that keeps her awake.  Has tried quinine water, which doesn't help and isn't sure of the dose.    Review of Systems:  Constitutional/General: Negative for fever, chills, change in weight  Integumentary/Skin: Negative for worrisome rashes, moles, or lesions  Neuro: Negative for weakness, dizziness, or paresthesias   Psychiatric: negative for changes in mood or affect    OBJECTIVE:  Physical Exam:  /60 (BP Location: Left arm, Patient Position: Sitting, Cuff Size: Adult Regular)   Pulse 74   SpO2 97%   General Appearance: healthy, alert and no distress   Skin: no suspicious lesions or rashes  Neuro: Normal strength and tone, mentation intact and speech normal  Vascular: good pulses, and capillary refill   Lymph: no lymphadenopathy   Psych:  mentation appears normal and affect normal/bright  Resp: no increased work of breathing    Right Knee Exam:  Inspection: Wound looks healed. No evidence of infection.   ROM: 5-90 degrees  Good stability.  No extensor lag.    physical therapy from yesterday:  Right knee AROM 0-5-110 deg.   Right knee PROM 0-112 deg  Right knee strength grossly 4+/5 with a fair-good quad set.     X-rays:  Obtained today, 6/2, of the right KNEE: 3-views, reviewed in the office with the patient by myself today and show components in good position, well fixed.    ASSESSMENT:  Doing well  status post right TKA    PLAN:  Continue PT. Scar massage. [place] survey to be performed at 12 week marina  Vistaril for legs  Look for restless legs OTC products   Primary care provider for other suggestions    Return to clinic: 6 weeks    TG Oconnor MD  Dept. Orthopedic Surgery  [facility]

## 2021-06-01 NOTE — PROGRESS NOTES
Subjective:  HPI  Physical Exam                    Objective:  System    Physical Exam    General     ROS    Assessment/Plan:    PROGRESS  REPORT    Progress reporting period is from 5/18/2021 to 6/1/2021.       SUBJECTIVE  Subjective changes noted by patient:   Subjective: Patient reports improvments in pain, swelling functional mobility. She no longer reports shooting pain in her lower leg. She continues to report knee stiffness     Current pain level is  Current Pain level: 0/10.     Previous pain level was   Initial Pain level: 6/10.   Changes in function:  Yes (See Goal flowsheet attached for changes in current functional level)  Adverse reaction to treatment or activity: None    OBJECTIVE  Changes noted in objective findings:  Yes,   Objective:   Right knee AROM 0-5-110 deg.   Right knee PROM 0-112 deg  Right knee strength grossly 4+/5 with a fair-good quad set.   Patient reports pain is the limiting factor with her HEP compliance.    Patient is ambulating without an assistive device and negotiates stairs with a step through pattern.     ASSESSMENT/PLAN  Updated problem list and treatment plan: Diagnosis 1:  S/P Right total knee arthroplasty  Pain -  hot/cold therapy, manual therapy, self management, education and home program  Decreased ROM/flexibility - manual therapy, therapeutic exercise, therapeutic activity and home program  Decreased strength - therapeutic exercise, therapeutic activities and home program  Impaired gait - gait training and home program  Impaired muscle performance - neuro re-education and home program  Decreased function - therapeutic activities and home program  STG/LTGs have been met or progress has been made towards goals:  Yes (See Goal flow sheet completed today.)  Assessment of Progress: The patient's condition is improving.  The patient has met all of their long term goals.  Self Management Plans:  Patient is independent in a home treatment program.  Patient is independent in  self management of symptoms.  I have re-evaluated this patient and find that the nature, scope, duration and intensity of the therapy is appropriate for the medical condition of the patient.  Lola continues to require the following intervention to meet STG and LTG's:  PT    Recommendations:  This patient would benefit from continued therapy.     Frequency:  1 X week, once daily  Duration:  for 6 weeks        Please refer to the daily flowsheet for treatment today, total treatment time and time spent performing 1:1 timed codes.

## 2021-06-02 ENCOUNTER — OFFICE VISIT (OUTPATIENT)
Dept: ORTHOPEDICS | Facility: CLINIC | Age: 66
End: 2021-06-02
Payer: COMMERCIAL

## 2021-06-02 ENCOUNTER — ANCILLARY PROCEDURE (OUTPATIENT)
Dept: GENERAL RADIOLOGY | Facility: CLINIC | Age: 66
End: 2021-06-02
Attending: ORTHOPAEDIC SURGERY
Payer: COMMERCIAL

## 2021-06-02 VITALS — SYSTOLIC BLOOD PRESSURE: 118 MMHG | HEART RATE: 74 BPM | DIASTOLIC BLOOD PRESSURE: 60 MMHG | OXYGEN SATURATION: 97 %

## 2021-06-02 DIAGNOSIS — Z96.651 S/P TOTAL KNEE REPLACEMENT, RIGHT: ICD-10-CM

## 2021-06-02 DIAGNOSIS — Z96.651 S/P TOTAL KNEE REPLACEMENT, RIGHT: Primary | ICD-10-CM

## 2021-06-02 PROCEDURE — 99024 POSTOP FOLLOW-UP VISIT: CPT | Performed by: ORTHOPAEDIC SURGERY

## 2021-06-02 PROCEDURE — 73562 X-RAY EXAM OF KNEE 3: CPT | Mod: RT | Performed by: RADIOLOGY

## 2021-06-02 RX ORDER — HYDROXYZINE PAMOATE 25 MG/1
25 CAPSULE ORAL
Qty: 50 CAPSULE | Refills: 0 | Status: SHIPPED | OUTPATIENT
Start: 2021-06-02 | End: 2021-07-21

## 2021-06-02 ASSESSMENT — PAIN SCALES - GENERAL: PAINLEVEL: MODERATE PAIN (4)

## 2021-06-02 NOTE — LETTER
6/2/2021         RE: Lola Partida  [address] [address]        Dear Colleague,    Thank you for referring your patient, Lola Partida, to the [facility]. Please see a copy of my visit note below.    SUBJECTIVE:  Lola Partida is here in follow up of right total knee arthroplasty on 4/19/21. It has been approximately 6 weeks since the procedure.     Less pain  But has restless legs that keeps here.  Has tried quinine water, which doesn't help and isn't sure of the dose.    Review of Systems:  Constitutional/General: Negative for fever, chills, change in weight  Integumentary/Skin: Negative for worrisome rashes, moles, or lesions  Neuro: Negative for weakness, dizziness, or paresthesias   Psychiatric: negative for changes in mood or affect    OBJECTIVE:  Physical Exam:  /60 (BP Location: Left arm, Patient Position: Sitting, Cuff Size: Adult Regular)   Pulse 74   SpO2 97%   General Appearance: healthy, alert and no distress   Skin: no suspicious lesions or rashes  Neuro: Normal strength and tone, mentation intact and speech normal  Vascular: good pulses, and capillary refill   Lymph: no lymphadenopathy   Psych:  mentation appears normal and affect normal/bright  Resp: no increased work of breathing    Right Knee Exam:  Inspection: Wound looks healed. No evidence of infection.   ROM: 5-90 degrees  Good stability.  No extensor lag.    physical therapy from yesterday:  Right knee AROM 0-5-110 deg.   Right knee PROM 0-112 deg  Right knee strength grossly 4+/5 with a fair-good quad set.     X-rays:  Obtained today, 6/2, of the right KNEE: 3-views, reviewed in the office with the patient by myself today and show components in good position, well fixed.    ASSESSMENT:  Doing well  status post right TKA    PLAN:  Continue PT. Scar massage. [place] survey to be performed at 12 week marina  Vistaril for legs  Look for restless legs OTC products   Primary care  provider for other suggestioons    Return to clinic: 6 weeks    TG Oconnor MD  Dept. Orthopedic Surgery  [facility]          Again, thank you for allowing me to participate in the care of your patient.        Sincerely,        Buck Oconnor MD

## 2021-06-04 ENCOUNTER — THERAPY VISIT (OUTPATIENT)
Dept: PHYSICAL THERAPY | Facility: CLINIC | Age: 66
End: 2021-06-04
Payer: COMMERCIAL

## 2021-06-04 DIAGNOSIS — Z96.651 S/P TOTAL KNEE REPLACEMENT, RIGHT: ICD-10-CM

## 2021-06-04 PROCEDURE — 97110 THERAPEUTIC EXERCISES: CPT | Mod: GP | Performed by: PHYSICAL THERAPIST

## 2021-06-04 PROCEDURE — 97530 THERAPEUTIC ACTIVITIES: CPT | Mod: GP | Performed by: PHYSICAL THERAPIST

## 2021-06-10 ENCOUNTER — THERAPY VISIT (OUTPATIENT)
Dept: PHYSICAL THERAPY | Facility: CLINIC | Age: 66
End: 2021-06-10
Payer: COMMERCIAL

## 2021-06-10 DIAGNOSIS — Z96.651 S/P TOTAL KNEE REPLACEMENT, RIGHT: ICD-10-CM

## 2021-06-10 PROCEDURE — 97110 THERAPEUTIC EXERCISES: CPT | Mod: GP | Performed by: PHYSICAL THERAPIST

## 2021-06-10 PROCEDURE — 97530 THERAPEUTIC ACTIVITIES: CPT | Mod: GP | Performed by: PHYSICAL THERAPIST

## 2021-06-10 ASSESSMENT — ACTIVITIES OF DAILY LIVING (ADL)
GIVING WAY, BUCKLING OR SHIFTING OF KNEE: I DO NOT HAVE THE SYMPTOM
RAW_SCORE: 60.31
KNEE_ACTIVITY_OF_DAILY_LIVING_SUM: 56
WALK: ACTIVITY IS NOT DIFFICULT
KNEEL ON THE FRONT OF YOUR KNEE: NOT ANSWERED
STIFFNESS: I HAVE THE SYMPTOM BUT IT DOES NOT AFFECT MY ACTIVITY
GO DOWN STAIRS: ACTIVITY IS NOT DIFFICULT
GO UP STAIRS: ACTIVITY IS NOT DIFFICULT
SQUAT: ACTIVITY IS NOT DIFFICULT
SIT WITH YOUR KNEE BENT: ACTIVITY IS NOT DIFFICULT
LIMPING: I HAVE THE SYMPTOM BUT IT DOES NOT AFFECT MY ACTIVITY
WALK: ACTIVITY IS NOT DIFFICULT
STAND: ACTIVITY IS NOT DIFFICULT
GO DOWN STAIRS: ACTIVITY IS MINIMALLY DIFFICULT
PAIN: I DO NOT HAVE THE SYMPTOM
HOW_WOULD_YOU_RATE_THE_OVERALL_FUNCTION_OF_YOUR_KNEE_DURING_YOUR_USUAL_DAILY_ACTIVITIES?: NEARLY NORMAL
KNEEL ON THE FRONT OF YOUR KNEE: NOT ANSWERED
WEAKNESS: I HAVE THE SYMPTOM BUT IT DOES NOT AFFECT MY ACTIVITY
RISE FROM A CHAIR: ACTIVITY IS NOT DIFFICULT
HOW_WOULD_YOU_RATE_THE_CURRENT_FUNCTION_OF_YOUR_KNEE_DURING_YOUR_USUAL_DAILY_ACTIVITIES_ON_A_SCALE_FROM_0_TO_100_WITH_100_BEING_YOUR_LEVEL_OF_KNEE_FUNCTION_PRIOR_TO_YOUR_INJURY_AND_0_BEING_THE_INABILITY_TO_PERFORM_ANY_OF_YOUR_USUAL_DAILY_ACTIVITIES?: 75
AS_A_RESULT_OF_YOUR_KNEE_INJURY,_HOW_WOULD_YOU_RATE_YOUR_CURRENT_LEVEL_OF_DAILY_ACTIVITY?: NEARLY NORMAL
SIT WITH YOUR KNEE BENT: ACTIVITY IS NOT DIFFICULT
STIFFNESS: THE SYMPTOM AFFECTS MY ACTIVITY SLIGHTLY
SQUAT: ACTIVITY IS MINIMALLY DIFFICULT
WEAKNESS: I HAVE THE SYMPTOM BUT IT DOES NOT AFFECT MY ACTIVITY
SWELLING: I HAVE THE SYMPTOM BUT IT DOES NOT AFFECT MY ACTIVITY
KNEE_ACTIVITY_OF_DAILY_LIVING_SUM: 62
STAND: ACTIVITY IS NOT DIFFICULT
GIVING WAY, BUCKLING OR SHIFTING OF KNEE: I DO NOT HAVE THE SYMPTOM
SWELLING: I HAVE THE SYMPTOM BUT IT DOES NOT AFFECT MY ACTIVITY
KNEE_ACTIVITY_OF_DAILY_LIVING_SCORE: 95.39
AS_A_RESULT_OF_YOUR_KNEE_INJURY,_HOW_WOULD_YOU_RATE_YOUR_CURRENT_LEVEL_OF_DAILY_ACTIVITY?: NEARLY NORMAL
HOW_WOULD_YOU_RATE_THE_CURRENT_FUNCTION_OF_YOUR_KNEE_DURING_YOUR_USUAL_DAILY_ACTIVITIES_ON_A_SCALE_FROM_0_TO_100_WITH_100_BEING_YOUR_LEVEL_OF_KNEE_FUNCTION_PRIOR_TO_YOUR_INJURY_AND_0_BEING_THE_INABILITY_TO_PERFORM_ANY_OF_YOUR_USUAL_DAILY_ACTIVITIES?: 80
RAW_SCORE: 66.77
KNEE_ACTIVITY_OF_DAILY_LIVING_SCORE: 86.16
HOW_WOULD_YOU_RATE_THE_OVERALL_FUNCTION_OF_YOUR_KNEE_DURING_YOUR_USUAL_DAILY_ACTIVITIES?: NEARLY NORMAL
LIMPING: I DO NOT HAVE THE SYMPTOM
GO UP STAIRS: ACTIVITY IS MINIMALLY DIFFICULT
RISE FROM A CHAIR: ACTIVITY IS NOT DIFFICULT
PAIN: I HAVE THE SYMPTOM BUT IT DOES NOT AFFECT MY ACTIVITY

## 2021-06-15 ENCOUNTER — MYC MEDICAL ADVICE (OUTPATIENT)
Dept: ORTHOPEDICS | Facility: CLINIC | Age: 66
End: 2021-06-15

## 2021-06-15 DIAGNOSIS — Z96.651 S/P TOTAL KNEE REPLACEMENT, RIGHT: Primary | ICD-10-CM

## 2021-06-15 RX ORDER — AMOXICILLIN 500 MG/1
2000 TABLET, FILM COATED ORAL ONCE
Qty: 4 TABLET | Refills: 3 | Status: SHIPPED | OUTPATIENT
Start: 2021-06-15 | End: 2021-06-15

## 2021-06-15 RX ORDER — AMOXICILLIN 500 MG/1
CAPSULE ORAL
Qty: 4 CAPSULE | Refills: 4 | Status: SHIPPED | OUTPATIENT
Start: 2021-06-15 | End: 2023-09-01

## 2021-06-17 ENCOUNTER — THERAPY VISIT (OUTPATIENT)
Dept: PHYSICAL THERAPY | Facility: CLINIC | Age: 66
End: 2021-06-17
Payer: COMMERCIAL

## 2021-06-17 ENCOUNTER — MYC MEDICAL ADVICE (OUTPATIENT)
Dept: ORTHOPEDICS | Facility: CLINIC | Age: 66
End: 2021-06-17

## 2021-06-17 DIAGNOSIS — Z96.651 S/P TOTAL KNEE REPLACEMENT, RIGHT: Primary | ICD-10-CM

## 2021-06-17 PROCEDURE — 97110 THERAPEUTIC EXERCISES: CPT | Mod: GP | Performed by: PHYSICAL THERAPIST

## 2021-06-17 PROCEDURE — 97112 NEUROMUSCULAR REEDUCATION: CPT | Mod: GP | Performed by: PHYSICAL THERAPIST

## 2021-06-17 PROCEDURE — 97530 THERAPEUTIC ACTIVITIES: CPT | Mod: GP | Performed by: PHYSICAL THERAPIST

## 2021-06-17 NOTE — TELEPHONE ENCOUNTER
Called pt and informed she should reach out to her pcp and gave a suggestion to [facility]. Pt agreed to this and will reach out to Dr. Young.  Zelda Amaral CMA CMA 6/17/2021 3:00 PM

## 2021-07-06 ENCOUNTER — DOCUMENTATION ONLY (OUTPATIENT)
Dept: ORTHOPEDICS | Facility: CLINIC | Age: 66
End: 2021-07-06

## 2021-07-06 NOTE — PROGRESS NOTES
Form completed for: Lola Partida  What was done with form: faxed [facility] Auth to [id number]/confirmed.  Harrison MIJARES

## 2021-07-21 ENCOUNTER — OFFICE VISIT (OUTPATIENT)
Dept: ORTHOPEDICS | Facility: CLINIC | Age: 66
End: 2021-07-21
Payer: COMMERCIAL

## 2021-07-21 VITALS
OXYGEN SATURATION: 98 % | WEIGHT: 191 LBS | HEART RATE: 89 BPM | BODY MASS INDEX: 28.95 KG/M2 | SYSTOLIC BLOOD PRESSURE: 154 MMHG | HEIGHT: 68 IN | DIASTOLIC BLOOD PRESSURE: 82 MMHG

## 2021-07-21 DIAGNOSIS — Z96.651 STATUS POST TOTAL RIGHT KNEE REPLACEMENT: Primary | ICD-10-CM

## 2021-07-21 PROCEDURE — 99212 OFFICE O/P EST SF 10 MIN: CPT | Performed by: ORTHOPAEDIC SURGERY

## 2021-07-21 ASSESSMENT — MIFFLIN-ST. JEOR: SCORE: 1456.93

## 2021-07-21 ASSESSMENT — PAIN SCALES - GENERAL: PAINLEVEL: NO PAIN (0)

## 2021-07-21 NOTE — PROGRESS NOTES
"SUBJECTIVE:  Lola Partida is here in follow up of right total knee arthroplasty on 4/19/21. It has been approximately 12 weeks since the procedure.     No complaints     Review of Systems:  Constitutional/General: Negative for fever, chills, change in weight  Integumentary/Skin: Negative for worrisome rashes, moles, or lesions  Neuro: Negative for weakness, dizziness, or paresthesias   Psychiatric: negative for changes in mood or affect    OBJECTIVE:  Physical Exam:  BP (!) 154/82 (BP Location: Left arm, Patient Position: Sitting, Cuff Size: Adult Large)   Pulse 89   Ht 1.715 m (5' 7.5\")   Wt 86.6 kg (191 lb)   SpO2 98%   BMI 29.47 kg/m    General Appearance: healthy, alert and no distress   Skin: no suspicious lesions or rashes  Neuro: Normal strength and tone, mentation intact and speech normal  Vascular: good pulses, and capillary refill   Lymph: no lymphadenopathy   Psych:  mentation appears normal and affect normal/bright  Resp: no increased work of breathing    Right Knee Exam:  Inspection: Wound looks healed. No evidence of infection.   ROM: 0-120 degrees  Good stability.  Mild effusion    ASSESSMENT:  Doing well status post right TKA    PLAN:  Continue exercises on own. Scar massage. [place] survey done today. Discussed use of antibiotic prophylaxis prior to dental procedures.    Return to clinic: 1 year postop, new x-rays at that time    TG Oconnor MD  Dept. Orthopedic Surgery  [facility]      "

## 2021-07-21 NOTE — LETTER
"    7/21/2021         RE: Lola Partida  [address] [facility] [address]        Dear Colleague,    Thank you for referring your patient, Lola Partida, to the [facility]. Please see a copy of my visit note below.    SUBJECTIVE:  Lola Partida is here in follow up of right total knee arthroplasty on 4/19/21. It has been approximately 12 weeks since the procedure.     No complaints     Review of Systems:  Constitutional/General: Negative for fever, chills, change in weight  Integumentary/Skin: Negative for worrisome rashes, moles, or lesions  Neuro: Negative for weakness, dizziness, or paresthesias   Psychiatric: negative for changes in mood or affect    OBJECTIVE:  Physical Exam:  BP (!) 154/82 (BP Location: Left arm, Patient Position: Sitting, Cuff Size: Adult Large)   Pulse 89   Ht 1.715 m (5' 7.5\")   Wt 86.6 kg (191 lb)   SpO2 98%   BMI 29.47 kg/m    General Appearance: healthy, alert and no distress   Skin: no suspicious lesions or rashes  Neuro: Normal strength and tone, mentation intact and speech normal  Vascular: good pulses, and capillary refill   Lymph: no lymphadenopathy   Psych:  mentation appears normal and affect normal/bright  Resp: no increased work of breathing    Right Knee Exam:  Inspection: Wound looks healed. No evidence of infection.   ROM: 0-120 degrees  Good stability.  Mild effusion    ASSESSMENT:  Doing well status post right TKA    PLAN:  Continue exercises on own. Scar massage. [place] survey done today. Discussed use of antibiotic prophylaxis prior to dental procedures.    Return to clinic: 1 year postop, new x-rays at that time    TG Oconnor MD  Dept. Orthopedic Surgery  [facility]          Again, thank you for allowing me to participate in the care of your patient.        Sincerely,        Buck Oconnor MD    "

## 2021-08-04 ENCOUNTER — OFFICE VISIT (OUTPATIENT)
Dept: ORTHOPEDICS | Facility: CLINIC | Age: 66
End: 2021-08-04
Payer: COMMERCIAL

## 2021-08-04 DIAGNOSIS — M19.011 GLENOHUMERAL ARTHRITIS, RIGHT: Primary | ICD-10-CM

## 2021-08-04 DIAGNOSIS — M19.011 PRIMARY OSTEOARTHRITIS OF RIGHT SHOULDER: ICD-10-CM

## 2021-08-04 PROCEDURE — 99207 PR DROP WITH A PROCEDURE: CPT | Performed by: PREVENTIVE MEDICINE

## 2021-08-04 PROCEDURE — 20611 DRAIN/INJ JOINT/BURSA W/US: CPT | Mod: RT | Performed by: PREVENTIVE MEDICINE

## 2021-08-04 RX ADMIN — METHYLPREDNISOLONE ACETATE 40 MG: 40 INJECTION, SUSPENSION INTRA-ARTICULAR; INTRALESIONAL; INTRAMUSCULAR; SOFT TISSUE at 16:57

## 2021-08-04 ASSESSMENT — PAIN SCALES - GENERAL: PAINLEVEL: NO PAIN (0)

## 2021-08-04 NOTE — PROGRESS NOTES
NEW PATIENT INTAKE QUESTIONNAIRE  [facility] 8/4/2021      Lola Partida's chief complaint for this visit includes:  Chief Complaint   Patient presents with     Shoulder     Right shoulder cortisone injection per Dr. Oconnor     PCP: Christine Young    Referring Provider:  Buck Oconnor MD  [address] [place] [address]    There were no vitals taken for this visit.  No Pain (0)       Reason for Visit:    What part of your body is injured / painful?  right shoulder    What caused the injury /pain? No inciting event     How long ago did your injury occur or pain begin? problem is longstanding    What are your most bothersome symptoms? Pain, Inability to compete/participate in sport or activity and Inability to perform ADLs    How would you characterize your symptom? aching and stabbing    What makes your symptoms better? Rest    What makes your symptoms worse? Overhead motion    Have you been previously seen for this problem? Yes, chiropractor     Medical History:    Medical History: Reviewed     Have you had surgery on this body part before? No    Medications: Up to date    Allergies: No known drug allergies    Large Joint Injection/Arthocentesis: R glenohumeral joint    Date/Time: 8/4/2021 4:57 PM  Performed by: Cristino Castro MD  Authorized by: Cristino Castro MD     Indications:  Pain and osteoarthritis  Needle Size:  22 G  Guidance: ultrasound    Approach:  Anterolateral  Location:  Shoulder      Site:  R glenohumeral joint  Medications:  40 mg methylPREDNISolone 40 MG/ML  Procedure discussed: discussed risks, benefits, and alternatives    Consent Given by:  Patient  Timeout: timeout called immediately prior to procedure    Prep: patient was prepped and draped in usual sterile fashion     NDC: 2089-1214-65

## 2021-08-04 NOTE — NURSING NOTE
[facility]   ORTHOPEDICS & SPORTS MEDICINE  [address] [place] [address]  Dept: [phone]  ______________________________________________________________________________    Patient: Lola Partida   : 1955   MRN: [id number]   2021    INVASIVE PROCEDURE SAFETY CHECKLIST    Date: 21  Procedure:Right shoulder GH injection   Patient Name: Lola Partida  MRN: [id number]  YOB: 1955    Action: Complete sections as appropriate. Any discrepancy results in a HARD COPY until resolved.     PRE PROCEDURE:  Patient ID verified with 2 identifiers (name and  or MRN): Yes  Procedure and site verified with patient/designee (when able): Yes  Accurate consent documentation in medical record: Yes  H&P (or appropriate assessment) documented in medical record: NA  H&P must be up to 20 days prior to procedure and updates within 24 hours of procedure as applicable: NA  Relevant diagnostic and radiology test results appropriately labeled and displayed as applicable: NA  Procedure site(s) marked with provider initials: NA    TIMEOUT:  Time-Out performed immediately prior to starting procedure, including verbal and active participation of all team members addressing the following:Yes  * Correct patient identify  * Confirmed that the correct side and site are marked  * An accurate procedure consent form  * Agreement on the procedure to be done  * Correct patient position  * Relevant images and results are properly labeled and appropriately displayed  * The need to administer antibiotics or fluids for irrigation purposes during the procedure as applicable   * Safety precautions based on patient history or medication use    DURING PROCEDURE: Verification of correct person, site, and procedures any time the responsibility for care of the patient is transferred to another member of the care team.       Prior to injection, verified patient identity using patient's name and date of  birth.  Due to injection administration, patient instructed to remain in clinic for 15 minutes  afterwards, and to report any adverse reaction to me immediately.    Joint injection was performed.      Drug Amount Wasted:  None.  Vial/Syringe: Single dose vial  Expiration Date:  12/2021      Avis Ovalle, ATC  August 4, 2021

## 2021-08-04 NOTE — LETTER
8/4/2021         RE: Lola Partida  [address] [facility] [address] [facility] [address]        Dear Colleague,    Thank you for referring your patient, Lola Partida, to the [facility] [place]. Please see a copy of my visit note below.    NEW PATIENT INTAKE QUESTIONNAIRE  [facility] 8/4/2021      Lola Partida's chief complaint for this visit includes:  Chief Complaint   Patient presents with     Shoulder     Right shoulder cortisone injection per Dr. Oconnor     PCP: Christine Young    Referring Provider:  Buck Oconnor MD  [address] [phone]    There were no vitals taken for this visit.  No Pain (0)       Reason for Visit:    What part of your body is injured / painful?  right shoulder    What caused the injury /pain? No inciting event     How long ago did your injury occur or pain begin? problem is longstanding    What are your most bothersome symptoms? Pain, Inability to compete/participate in sport or activity and Inability to perform ADLs    How would you characterize your symptom? aching and stabbing    What makes your symptoms better? Rest    What makes your symptoms worse? Overhead motion    Have you been previously seen for this problem? Yes, chiropractor     Medical History:    Medical History: Reviewed     Have you had surgery on this body part before? No    Medications: Up to date    Allergies: No known drug allergies    Large Joint Injection/Arthocentesis: R glenohumeral joint    Date/Time: 8/4/2021 4:57 PM  Performed by: Cristino Castro MD  Authorized by: Cristino Castro MD     Indications:  Pain and osteoarthritis  Needle Size:  22 G  Guidance: ultrasound    Approach:  Anterolateral  Location:  Shoulder      Site:  R glenohumeral joint  Medications:  40 mg methylPREDNISolone 40 MG/ML  Procedure discussed: discussed risks, benefits, and alternatives    Consent Given by:  Patient  Timeout:  timeout called immediately prior to procedure    Prep: patient was prepped and draped in usual sterile fashion     NDC: 0680-8699-46            Lola is referred for a right Glenohumeral arthritis injection    Diagnosis: right shoulder glenohumeral arthritis    Right Glenohumeral Injection - Ultrasound Guided  The patient was informed of the risks and the benefits of the procedure and a written consent was signed.  The patient s right shoulder was prepped with chlorhexidine in sterile fashion.   40 mg of methylprednisolone suspension was drawn up into a 5 mL syringe with 3 mL of 1% lidocaine w/o Epi.  Injection was performed using sterile technique.  Under ultrasound guidance a 3.5-inch 22-gauge needle was used to enter the glenohumeral joint.  Posterior approach was used with the patient in lateral recumbent position, arm in neutral position at the side.  Needle placement was visualized and documented with ultrasound.  Ultrasound visualization was necessary due to the small joint space entered.  Injection performed long axis to the probe.  Injection solution visualized within the joint space.  Images were permanently stored for the patient's record.  There were no complications. The patient tolerated the procedure well. There was negligible bleeding.   Therapy scheduled to follow for mobilization.  The patient was instructed to call or go to the emergency room with any unusual pain, swelling, redness, or if otherwise concerned.  F/u in 1-2 months        Again, thank you for allowing me to participate in the care of your patient.        Sincerely,        Cristino Castro MD

## 2021-08-22 RX ORDER — METHYLPREDNISOLONE ACETATE 40 MG/ML
40 INJECTION, SUSPENSION INTRA-ARTICULAR; INTRALESIONAL; INTRAMUSCULAR; SOFT TISSUE
Status: SHIPPED | OUTPATIENT
Start: 2021-08-04

## 2021-08-23 NOTE — PROGRESS NOTES
Lola is referred for a right Glenohumeral arthritis injection    Diagnosis: right shoulder glenohumeral arthritis    Right Glenohumeral Injection - Ultrasound Guided  The patient was informed of the risks and the benefits of the procedure and a written consent was signed.  The patient s right shoulder was prepped with chlorhexidine in sterile fashion.   40 mg of methylprednisolone suspension was drawn up into a 5 mL syringe with 3 mL of 1% lidocaine w/o Epi.  Injection was performed using sterile technique.  Under ultrasound guidance a 3.5-inch 22-gauge needle was used to enter the glenohumeral joint.  Posterior approach was used with the patient in lateral recumbent position, arm in neutral position at the side.  Needle placement was visualized and documented with ultrasound.  Ultrasound visualization was necessary due to the small joint space entered.  Injection performed long axis to the probe.  Injection solution visualized within the joint space.  Images were permanently stored for the patient's record.  There were no complications. The patient tolerated the procedure well. There was negligible bleeding.   Therapy scheduled to follow for mobilization.  The patient was instructed to call or go to the emergency room with any unusual pain, swelling, redness, or if otherwise concerned.  F/u in 1-2 months

## 2022-01-17 PROBLEM — Z96.651 S/P TOTAL KNEE REPLACEMENT, RIGHT: Status: RESOLVED | Noted: 2021-05-03 | Resolved: 2022-01-17

## 2022-03-29 DIAGNOSIS — M54.16 CHRONIC RADICULAR LOW BACK PAIN: ICD-10-CM

## 2022-03-29 DIAGNOSIS — R25.2 BILATERAL LEG CRAMPS: ICD-10-CM

## 2022-03-29 DIAGNOSIS — I10 HYPERTENSION GOAL BP (BLOOD PRESSURE) < 140/90: ICD-10-CM

## 2022-03-29 DIAGNOSIS — G89.29 CHRONIC RADICULAR LOW BACK PAIN: ICD-10-CM

## 2022-03-30 RX ORDER — METOPROLOL SUCCINATE 25 MG/1
TABLET, EXTENDED RELEASE ORAL
Qty: 30 TABLET | Refills: 0 | Status: SHIPPED | OUTPATIENT
Start: 2022-03-30 | End: 2022-04-27

## 2022-03-30 NOTE — TELEPHONE ENCOUNTER
"Routing refill request to provider for review/approval because:  Gabapentin (NEURONTIN) not on the [facility] refill protocol       Requested Prescriptions   Pending Prescriptions Disp Refills     metoprolol succinate ER (TOPROL-XL) 25 MG 24 hr tablet [Pharmacy Med Name: Metoprolol Succinate ER 25 MG Oral Tablet Extended Release 24 Hour] 90 tablet 0     Sig: Take 1 tablet by mouth once daily       Beta-Blockers Protocol Failed - 3/29/2022 11:44 AM        Failed - Blood pressure under 140/90 in past 12 months     BP Readings from Last 3 Encounters:   07/21/21 (!) 154/82   06/02/21 118/60   05/05/21 139/77                 Passed - Patient is age 6 or older        Passed - Recent (12 mo) or future (30 days) visit within the authorizing provider's specialty     Patient has had an office visit with the authorizing provider or a provider within the authorizing providers department within the previous 12 mos or has a future within next 30 days. See \"Patient Info\" tab in inbasket, or \"Choose Columns\" in Meds & Orders section of the refill encounter.              Passed - Medication is active on med list           gabapentin (NEURONTIN) 300 MG capsule [Pharmacy Med Name: Gabapentin 300 MG Oral Capsule] 180 capsule 0     Sig: TAKE 2 CAPSULES BY MOUTH ONCE DAILY AT BEDTIME       There is no refill protocol information for this order        Whitney Lozano RN    "

## 2022-03-31 RX ORDER — GABAPENTIN 300 MG/1
CAPSULE ORAL
Qty: 60 CAPSULE | Refills: 0 | Status: SHIPPED | OUTPATIENT
Start: 2022-03-31 | End: 2022-04-27

## 2022-04-27 ENCOUNTER — OFFICE VISIT (OUTPATIENT)
Dept: FAMILY MEDICINE | Facility: CLINIC | Age: 67
End: 2022-04-27
Payer: COMMERCIAL

## 2022-04-27 ENCOUNTER — ANCILLARY PROCEDURE (OUTPATIENT)
Dept: MAMMOGRAPHY | Facility: CLINIC | Age: 67
End: 2022-04-27
Attending: FAMILY MEDICINE
Payer: COMMERCIAL

## 2022-04-27 VITALS
BODY MASS INDEX: 30.47 KG/M2 | HEIGHT: 66 IN | WEIGHT: 189.6 LBS | DIASTOLIC BLOOD PRESSURE: 82 MMHG | OXYGEN SATURATION: 97 % | SYSTOLIC BLOOD PRESSURE: 136 MMHG | TEMPERATURE: 98.9 F | HEART RATE: 75 BPM

## 2022-04-27 DIAGNOSIS — M54.16 CHRONIC RADICULAR LOW BACK PAIN: ICD-10-CM

## 2022-04-27 DIAGNOSIS — R25.2 BILATERAL LEG CRAMPS: ICD-10-CM

## 2022-04-27 DIAGNOSIS — Z12.31 VISIT FOR SCREENING MAMMOGRAM: ICD-10-CM

## 2022-04-27 DIAGNOSIS — Z11.4 SCREENING FOR HIV (HUMAN IMMUNODEFICIENCY VIRUS): ICD-10-CM

## 2022-04-27 DIAGNOSIS — Z12.11 SCREEN FOR COLON CANCER: ICD-10-CM

## 2022-04-27 DIAGNOSIS — Z79.899 ENCOUNTER FOR LONG-TERM (CURRENT) USE OF MEDICATIONS: ICD-10-CM

## 2022-04-27 DIAGNOSIS — G89.29 CHRONIC RADICULAR LOW BACK PAIN: ICD-10-CM

## 2022-04-27 DIAGNOSIS — I10 HYPERTENSION GOAL BP (BLOOD PRESSURE) < 140/90: ICD-10-CM

## 2022-04-27 LAB
ANION GAP SERPL CALCULATED.3IONS-SCNC: 6 MMOL/L (ref 3–14)
BUN SERPL-MCNC: 17 MG/DL (ref 7–30)
CALCIUM SERPL-MCNC: 9.3 MG/DL (ref 8.5–10.1)
CHLORIDE BLD-SCNC: 111 MMOL/L (ref 94–109)
CHOLEST SERPL-MCNC: 214 MG/DL
CO2 SERPL-SCNC: 26 MMOL/L (ref 20–32)
CREAT SERPL-MCNC: 0.91 MG/DL (ref 0.52–1.04)
FASTING STATUS PATIENT QL REPORTED: YES
GFR SERPL CREATININE-BSD FRML MDRD: 70 ML/MIN/1.73M2
GLUCOSE BLD-MCNC: 101 MG/DL (ref 70–99)
HDLC SERPL-MCNC: 71 MG/DL
LDLC SERPL CALC-MCNC: 98 MG/DL
NONHDLC SERPL-MCNC: 143 MG/DL
POTASSIUM BLD-SCNC: 4.3 MMOL/L (ref 3.4–5.3)
SODIUM SERPL-SCNC: 143 MMOL/L (ref 133–144)
TRIGL SERPL-MCNC: 226 MG/DL

## 2022-04-27 PROCEDURE — 77067 SCR MAMMO BI INCL CAD: CPT | Mod: TC | Performed by: RADIOLOGY

## 2022-04-27 PROCEDURE — 99213 OFFICE O/P EST LOW 20 MIN: CPT | Mod: 25 | Performed by: FAMILY MEDICINE

## 2022-04-27 PROCEDURE — G0009 ADMIN PNEUMOCOCCAL VACCINE: HCPCS | Performed by: FAMILY MEDICINE

## 2022-04-27 PROCEDURE — 36415 COLL VENOUS BLD VENIPUNCTURE: CPT | Performed by: FAMILY MEDICINE

## 2022-04-27 PROCEDURE — 80061 LIPID PANEL: CPT | Performed by: FAMILY MEDICINE

## 2022-04-27 PROCEDURE — 80048 BASIC METABOLIC PNL TOTAL CA: CPT | Performed by: FAMILY MEDICINE

## 2022-04-27 PROCEDURE — 90732 PPSV23 VACC 2 YRS+ SUBQ/IM: CPT | Performed by: FAMILY MEDICINE

## 2022-04-27 PROCEDURE — 87389 HIV-1 AG W/HIV-1&-2 AB AG IA: CPT | Performed by: FAMILY MEDICINE

## 2022-04-27 RX ORDER — GABAPENTIN 300 MG/1
CAPSULE ORAL
Qty: 180 CAPSULE | Refills: 3 | Status: SHIPPED | OUTPATIENT
Start: 2022-04-27 | End: 2023-02-01

## 2022-04-27 RX ORDER — METOPROLOL SUCCINATE 25 MG/1
25 TABLET, EXTENDED RELEASE ORAL DAILY
Qty: 90 TABLET | Refills: 3 | Status: SHIPPED | OUTPATIENT
Start: 2022-04-27 | End: 2023-02-01

## 2022-04-27 ASSESSMENT — PAIN SCALES - GENERAL: PAINLEVEL: NO PAIN (0)

## 2022-04-27 NOTE — PROGRESS NOTES
"  Assessment & Plan     Hypertension goal BP (blood pressure) < 140/90  Stable   - metoprolol succinate ER (TOPROL-XL) 25 MG 24 hr tablet; Take 1 tablet (25 mg) by mouth daily  - Basic metabolic panel  (Ca, Cl, CO2, Creat, Gluc, K, Na, BUN); Future  - Lipid panel reflex to direct LDL Non-fasting; Future  - Basic metabolic panel  (Ca, Cl, CO2, Creat, Gluc, K, Na, BUN)  - Lipid panel reflex to direct LDL Non-fasting    Bilateral leg cramps  refilled  - gabapentin (NEURONTIN) 300 MG capsule; TAKE 2 CAPSULES BY MOUTH ONCE DAILY AT BEDTIME    Chronic radicular low back pain  Stable on meds  - gabapentin (NEURONTIN) 300 MG capsule; TAKE 2 CAPSULES BY MOUTH ONCE DAILY AT BEDTIME    Screening for HIV (human immunodeficiency virus)  Discussed   - HIV Antigen Antibody Combo; Future  - HIV Antigen Antibody Combo    Screen for colon cancer  Advised due to History of Polyps  - Adult Gastro Ref - Procedure Only; Future    Encounter for long-term (current) use of medications      Visit for screening mammogram  Advised   - MA Screening Digital Bilateral; Future  [id number]}     BMI:   Estimated body mass index is 30.18 kg/m  as calculated from the following:    Height as of this encounter: 1.688 m (5' 6.46\").    Weight as of this encounter: 86 kg (189 lb 9.6 oz).   Weight management plan: low mario alberto diet/Exercise        Return in about 3 months (around 7/27/2022) for Medicare wellness Exam.    Christine Young MD  [facility] MELISSA Davila is a 67 year old who presents for the following health issues    History of Present Illness       Hypertension: She presents for follow up of hypertension.  She does not check blood pressure  regularly outside of the clinic. Outside blood pressures have been over 140/90. She follows a low salt diet.     She eats 0-1 servings of fruits and vegetables daily.She consumes 0 sweetened beverage(s) daily.She exercises with enough effort to increase her heart rate 9 or less minutes per " "day.  She exercises with enough effort to increase her heart rate 3 or less days per week.   She is taking medications regularly.     She is doing well on her meds      Review of Systems   CONSTITUTIONAL: NEGATIVE for fever, chills, change in weight  ENT/MOUTH: NEGATIVE for ear, mouth and throat problems  RESP: NEGATIVE for significant cough or SOB  CV: NEGATIVE for chest pain, palpitations or peripheral edema  PSYCHIATRIC: NEGATIVE for changes in mood or affect  ROS otherwise negative      Objective    /82   Pulse 75   Temp 98.9  F (37.2  C) (Temporal)   Ht 1.688 m (5' 6.46\")   Wt 86 kg (189 lb 9.6 oz)   SpO2 97%   BMI 30.18 kg/m    Body mass index is 30.18 kg/m .  Physical Exam   GENERAL: healthy, alert and no distress  NECK: no adenopathy, no asymmetry, masses, or scars and thyroid normal to palpation  RESP: lungs clear to auscultation - no rales, rhonchi or wheezes  CV: regular rate and rhythm, normal S1 S2, no S3 or S4, no murmur, click or rub, no peripheral edema and peripheral pulses strong  ABDOMEN: soft, nontender, no hepatosplenomegaly, no masses and bowel sounds normal  MS: no gross musculoskeletal defects noted, no edema  PSYCH: mentation appears normal, affect normal/bright    Pending     "

## 2022-04-28 LAB — HIV 1+2 AB+HIV1 P24 AG SERPL QL IA: NONREACTIVE

## 2022-05-20 ENCOUNTER — TELEPHONE (OUTPATIENT)
Dept: FAMILY MEDICINE | Facility: CLINIC | Age: 67
End: 2022-05-20
Payer: COMMERCIAL

## 2022-05-20 NOTE — TELEPHONE ENCOUNTER
Patient Quality Outreach    Patient is due for the following:   Physical  - asap  Immunizations  -  Covid    NEXT STEPS:   Schedule a yearly physical    Type of outreach:    Sent [employer] message.      Questions for provider review:    None     Cornelia Arthur CMA

## 2022-06-08 ENCOUNTER — TELEPHONE (OUTPATIENT)
Dept: GASTROENTEROLOGY | Facility: CLINIC | Age: 67
End: 2022-06-08
Payer: COMMERCIAL

## 2022-06-08 ENCOUNTER — OFFICE VISIT (OUTPATIENT)
Dept: ORTHOPEDICS | Facility: CLINIC | Age: 67
End: 2022-06-08
Payer: COMMERCIAL

## 2022-06-08 VITALS — HEART RATE: 74 BPM | DIASTOLIC BLOOD PRESSURE: 81 MMHG | OXYGEN SATURATION: 97 % | SYSTOLIC BLOOD PRESSURE: 144 MMHG

## 2022-06-08 DIAGNOSIS — Z96.651 STATUS POST TOTAL RIGHT KNEE REPLACEMENT: ICD-10-CM

## 2022-06-08 DIAGNOSIS — M19.011 GLENOHUMERAL ARTHRITIS, RIGHT: Primary | ICD-10-CM

## 2022-06-08 PROCEDURE — 99213 OFFICE O/P EST LOW 20 MIN: CPT | Performed by: ORTHOPAEDIC SURGERY

## 2022-06-08 ASSESSMENT — PAIN SCALES - GENERAL: PAINLEVEL: MODERATE PAIN (5)

## 2022-06-08 NOTE — NURSING NOTE
Lola to follow up with Primary Care provider regarding elevated blood pressure.    Harrison MIJARES

## 2022-06-08 NOTE — LETTER
6/8/2022         RE: Lola Partida  [address] [address]        Dear Colleague,    Thank you for referring your patient, Lola Partida, to the [facility]. Please see a copy of my visit note below.    SUBJECTIVE:  Lola Partida is here for follow up of right shoulder glenohumeral joint osteoarthritis.      Present symptoms: pain in the shoulder, with most movements.   Treatment up to this point: image guided corticosteroid injection right glenohumeral joint which helped but she was using crutches shortly after that and caused pain the shoulder/  Would like to try the corticosteroid injection again.     Her right total knee arthroplasty is doing very well.  14 months out.     Review of Systems:  Constitutional/General: Negative for fever, chills, change in weight  Integumentary/Skin: Negative for worrisome rashes, moles, or lesions  Neuro: Negative for weakness, dizziness, or paresthesias   Psychiatric: negative for changes in mood or affect    OBJECTIVE:  Physical Exam:  BP (!) 144/81 (BP Location: Left arm, Patient Position: Sitting, Cuff Size: Adult Regular)   Pulse 74   SpO2 97%   General Appearance: healthy, alert and no distress   Skin: no suspicious lesions or rashes  Neuro: Normal strength and tone, mentation intact and speech normal  Vascular: good pulses, and capillary refill   Lymph: no lymphadenopathy   Psych:  mentation appears normal and affect normal/bright  Resp: no increased work of breathing    Right Shoulder Exam:  Shoulder Inspection: no swelling, bruising, discoloration, or obvious deformity or asymmetry  no atrophy  Tender: anterior and posterior    Range of Motion:    forward flexion: 100*, external rotation: 40*  Strength: forward flexion: 5/5, painful, external rotation: 5/5, painful,     Right knee good range of motion, no swelling, good stability         Xray 3/26/21 right shoulder:  X-RAY INTERPRETATION  From 3/26,   Moderate  "acromioclavicular degenerative changes. Moderate  glenohumeral degenerative changes with large inferior spurring off the  inferior margin of the humerus. ( not the typical \"goat's beard\" appearance, can't rule out loose body on this image.)     ASSESSMENT:  Encounter Diagnoses   Name Primary?     Glenohumeral arthritis, right Yes     Status post total right knee replacement         PLAN:  She would like to try another image guided corticosteroid injection of the right glenohumeral joint  This was ordered  Follow up 1-2 years for knee with xrays.     Return to clinic: as needed     TG Oconnor MD  Dept. Orthopedic Surgery  [facility]      Again, thank you for allowing me to participate in the care of your patient.        Sincerely,        Buck Oconnor MD    "

## 2022-06-08 NOTE — PROGRESS NOTES
"SUBJECTIVE:  Lola Partida is here for follow up of right shoulder glenohumeral joint osteoarthritis.      Present symptoms: pain in the shoulder, with most movements.   Treatment up to this point: image guided corticosteroid injection right glenohumeral joint which helped but she was using crutches shortly after that and caused pain the shoulder/  Would like to try the corticosteroid injection again.     Her right total knee arthroplasty is doing very well.  14 months out.     Review of Systems:  Constitutional/General: Negative for fever, chills, change in weight  Integumentary/Skin: Negative for worrisome rashes, moles, or lesions  Neuro: Negative for weakness, dizziness, or paresthesias   Psychiatric: negative for changes in mood or affect    OBJECTIVE:  Physical Exam:  BP (!) 144/81 (BP Location: Left arm, Patient Position: Sitting, Cuff Size: Adult Regular)   Pulse 74   SpO2 97%   General Appearance: healthy, alert and no distress   Skin: no suspicious lesions or rashes  Neuro: Normal strength and tone, mentation intact and speech normal  Vascular: good pulses, and capillary refill   Lymph: no lymphadenopathy   Psych:  mentation appears normal and affect normal/bright  Resp: no increased work of breathing    Right Shoulder Exam:  Shoulder Inspection: no swelling, bruising, discoloration, or obvious deformity or asymmetry  no atrophy  Tender: anterior and posterior    Range of Motion:    forward flexion: 100*, external rotation: 40*  Strength: forward flexion: 5/5, painful, external rotation: 5/5, painful,     Right knee good range of motion, no swelling, good stability         Xray 3/26/21 right shoulder:  X-RAY INTERPRETATION  From 3/26,   Moderate acromioclavicular degenerative changes. Moderate  glenohumeral degenerative changes with large inferior spurring off the  inferior margin of the humerus. ( not the typical \"goat's beard\" appearance, can't rule out loose body on this image.) "     ASSESSMENT:  Encounter Diagnoses   Name Primary?     Glenohumeral arthritis, right Yes     Status post total right knee replacement         PLAN:  She would like to try another image guided corticosteroid injection of the right glenohumeral joint  This was ordered  Follow up 1-2 years for knee with xrays.     Return to clinic: as needed     TG Oconnor MD  Dept. Orthopedic Surgery  [facility]

## 2022-06-08 NOTE — TELEPHONE ENCOUNTER
Screening Questions  BlueKIND OF PREP RedLOCATION [review exclusion criteria] GreenSEDATION TYPE      1. Are you able to give consent for your medical care? Do you have a legal guardian or medical Power of ?  Y (Sedation review/consideration needed)    2. Have you had a positive covid test in the last 90 days? N  a. If yes, what date?N    3. Are you active on mychart? Y    4. What insurance is in the chart? ARE MEDICARE      3.   Ordering/Referring Provider: TABBY     4. BMI 30.5 [BMI OVER 40-EXTENDED PREP]  If greater than 40 review exclusion criteria [PAC APPT IF @ [facility]]        5.  Respiratory Screening :  [If yes to any of the following HOSPITAL setting only]     Do you use daily home oxygen? N    Do you have mod to severe Obstructive Sleep Apnea? N  [OKAY @ [facility] SH PH RI]   Do you have Pulmonary Hypertension? N     Do you have UNCONTROLLED asthma? N        6.   Have you had a heart or lung transplant? N      7.   Are you currently on dialysis? N [ If yes, G-PREP & HOSPITAL setting only]     8.   Do you have chronic kidney disease? N [ If yes, G-PREP ]    9.   Have you had a stroke or Transient ischemic attack (TIA - aka  mini stroke ) within 6 months?  N (If yes, please review exclusion criteria)    10.   In the past 6 months, have you had any heart related issues including cardiomyopathy or heart attack? N           If yes, did it require cardiac stenting or other implantable device? N      11.   Do you have any implantable devices in your body (pacemaker, defib, LVAD)? N (If yes, please review exclusion criteria)    12.   Do you take nitroglycerin? N           If yes, how often? N  (if yes, HOSPITAL setting ONLY)    13.   Are you currently taking any blood thinners? N           [IF YES, INFORM PATIENT TO FOLLOW UP W/ ORDERING PROVIDER FOR BRIDGING INSTRUCTIONS]     14.   Do you have a diagnosis of diabetes? N   [ If yes, G-PREP ]    15.   [FEMALES] Are you currently pregnant? N    If yes,  how many weeks? N    16.   Are you taking any prescription pain medications on a routine schedule?  N  [ If yes, EXTENDED PREP.] [If yes, MAC]    17.   Do you have any chemical dependencies such as alcohol, street drugs, or methadone?  N [If yes, MAC]    18.   Do you have any history of post-traumatic stress syndrome, severe anxiety or history of psychosis?  N  [If yes, MAC]    19.   Do you transfer independently?  Y    20.  On a regular basis do you go 3-5 days between bowel movements? N   [ If yes, EXTENDED PREP.]    21.   Preferred LOCAL Pharmacy for Pre Prescription   [id number] - [facility] [place], [place] - [address]      Scheduling Details      Caller : MELLISA   (Please ask for phone number if not scheduled by patient)    Type of Procedure Scheduled: COLON  Which Colonoscopy Prep was Sent?: SPLIT M   [facility]URA CF PATIENTS & GROEN'S PATIENTS REQUIRE EXTENDED PREP  Surgeon: akash  Date of Procedure: 7/13  Location:       Sedation Type: CS  Conscious Sedation- Needs  for 6 hours after the procedure  MAC/General-Needs  for 24 hours after procedure    Pre-op Required at [facility] [place] [place] and OR for MAC sedation: N  (advise patient they will need a pre-op prior to procedure -)      Informed patient they will need an adult  Y  Cannot take any type of public or medical transportation alone    Pre-Procedure Cov[facility] or Externally: Y    Confirmed Nurse will call to complete assessment Y    Additional comments: N

## 2022-06-08 NOTE — TELEPHONE ENCOUNTER
Patient Quality Outreach    Patient is due for the following:   Colon Cancer Screening -  Colonoscopy  Physical  - asap  Immunizations  -  Covid    NEXT STEPS:   Schedule a yearly physical    Type of outreach:    Sent [employer] message.    Next Steps:  Reach out within 90 days via [employer].    Max number of attempts reached: Yes. Will try again in 90 days if patient still on fail list.    Questions for provider review:    None     Cornelia Arthur, CMA

## 2022-06-28 ENCOUNTER — ANCILLARY PROCEDURE (OUTPATIENT)
Dept: GENERAL RADIOLOGY | Facility: CLINIC | Age: 67
End: 2022-06-28
Attending: PREVENTIVE MEDICINE
Payer: COMMERCIAL

## 2022-06-28 ENCOUNTER — OFFICE VISIT (OUTPATIENT)
Dept: ORTHOPEDICS | Facility: CLINIC | Age: 67
End: 2022-06-28
Payer: COMMERCIAL

## 2022-06-28 DIAGNOSIS — M19.011 GLENOHUMERAL ARTHRITIS, RIGHT: ICD-10-CM

## 2022-06-28 DIAGNOSIS — M19.012 GLENOHUMERAL ARTHRITIS, LEFT: ICD-10-CM

## 2022-06-28 DIAGNOSIS — M54.12 CERVICAL RADICULAR PAIN: Primary | ICD-10-CM

## 2022-06-28 PROCEDURE — 99214 OFFICE O/P EST MOD 30 MIN: CPT | Mod: 25 | Performed by: PREVENTIVE MEDICINE

## 2022-06-28 PROCEDURE — 73030 X-RAY EXAM OF SHOULDER: CPT | Mod: LT | Performed by: RADIOLOGY

## 2022-06-28 PROCEDURE — 20611 DRAIN/INJ JOINT/BURSA W/US: CPT | Mod: RT | Performed by: PREVENTIVE MEDICINE

## 2022-06-28 RX ORDER — CELECOXIB 100 MG/1
100 CAPSULE ORAL 2 TIMES DAILY
Qty: 60 CAPSULE | Refills: 0 | Status: SHIPPED | OUTPATIENT
Start: 2022-06-28 | End: 2022-07-29

## 2022-06-28 RX ORDER — METHYLPREDNISOLONE ACETATE 40 MG/ML
40 INJECTION, SUSPENSION INTRA-ARTICULAR; INTRALESIONAL; INTRAMUSCULAR; SOFT TISSUE
Status: SHIPPED | OUTPATIENT
Start: 2022-06-28

## 2022-06-28 RX ADMIN — METHYLPREDNISOLONE ACETATE 40 MG: 40 INJECTION, SUSPENSION INTRA-ARTICULAR; INTRALESIONAL; INTRAMUSCULAR; SOFT TISSUE at 09:42

## 2022-06-28 NOTE — LETTER
6/28/2022         RE: Lola Partida  [facility] [address]        Dear Colleague,    Thank you for referring your patient, Lola Partida, to the [facility] [place]. Please see a copy of my visit note below.    HISTORY OF PRESENT ILLNESS  Ms. Partida is a pleasant 67 year old year old female who presents to clinic today with   Lola explains that she would like a right shoulder injection today, the one in the past helped, she feels like her shoulder is bone on bone.  Having more neck and left shoulder pain  Achy neck and arm/shoulder pain nightly  Right should improved for a few weeks after last GH injection  Location:right shoulder    Quality:  achy pain    Severity: 9/10 at worst        MEDICAL HISTORY  Patient Active Problem List   Diagnosis     Chronic rhinitis     Esophageal reflux     Menopause     Menopausal syndrome (hot flashes)     Bilateral leg cramps     Chronic radicular low back pain     Hypertension goal BP (blood pressure) < 140/90     Status post total right knee replacement     Status post total left knee replacement       Current Outpatient Medications   Medication Sig Dispense Refill     amoxicillin (AMOXIL) 500 MG capsule Take four capsules by mouth 1 hour prior to dental procedure. (Patient not taking: Reported on 4/27/2022) 4 capsule 4     calcium 600 MG tablet Take 1 tablet by mouth 2 times daily       gabapentin (NEURONTIN) 300 MG capsule TAKE 2 CAPSULES BY MOUTH ONCE DAILY AT BEDTIME 180 capsule 3     magnesium 250 MG tablet Take 1 tablet by mouth daily       metoprolol succinate ER (TOPROL-XL) 25 MG 24 hr tablet Take 1 tablet (25 mg) by mouth daily 90 tablet 3     OMEPRAZOLE PO Take by mouth daily       potassium gluconate 2.5 MEQ tablet Take 90 mEq by mouth         No Known Allergies    Family History   Problem Relation Age of Onset     Cancer Father         liver     Hyperlipidemia Father      Prostate Cancer Father       Mental Illness Sister         Depression & anxiety     Colon Polyps Brother      Glaucoma No family hx of      Macular Degeneration No family hx of      Social History     Socioeconomic History     Marital status:      Number of children: 2   Occupational History     Occupation: Office work   Tobacco Use     Smoking status: Never Smoker     Smokeless tobacco: Never Used   Substance and Sexual Activity     Alcohol use: Yes     Alcohol/week: 0.0 standard drinks     Comment: per week, 2-3 drinks 1-2 times per week     Drug use: No     Sexual activity: Yes     Partners: Male     Birth control/protection: Female Surgical   Other Topics Concern     Parent/sibling w/ CABG, MI or angioplasty before 65F 55M? No       Additional medical/Social/Surgical histories reviewed in [facility] and updated as appropriate.     REVIEW OF SYSTEMS (6/28/2022)  10 point ROS of systems including Constitutional, Eyes, Respiratory, Cardiovascular, Gastroenterology, Genitourinary, Integumentary, Musculoskeletal, Psychiatric, Allergic/Immunologic were all negative except for pertinent positives noted in my HPI.     PHYSICAL EXAM  VSS  General  - normal appearance, in no obvious distress  HEENT  - conjunctivae not injected, moist mucous membranes, normocephalic/atraumatic head, ears normal appearance, no lesions, mouth normal appearance, no scars, normal dentition and teeth present  CV  - normal peripheral perfusion  Pulm  - normal respiratory pattern, non-labored    Musculoskeletal - CERVICAL SPINE  - stance and posture: normal gait without limp, no obvious leg length discrepancy, normal heel and toe walk, normal balance, slightly forward shoulders, head balanced normally on trunk  - inspection: normal bone and joint alignment, no obvious kyphosis  - palpation: no paravertebral or bony tenderness, except at base of neck and trapezius muscles  - ROM: normal and painless flexion, extension, left and right sidebending and rotation with some  discomfort  - strength: upper extremities 5/5 in all planes  - special tests:  (+) spurlings    Neuro  - biceps, triceps, supinator DTRs 2+ bilaterally, no sensory or motor deficit, grossly normal coordination, normal muscle tone  Skin  - no ecchymosis, erythema, warmth, or induration, no obvious rash  Psych  - interactive, appropriate, normal mood and affect  Bilateral shoulder : has pain with mcleod and external rotation  And reaching overhead  ASSESSMENT & PLAN  68 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  Cristino Castro MD, CAQSM    Glenohumeral Injection - Ultrasound Guided  The patient was informed of the risks and the benefits of the procedure and a written consent was signed.  The patient s right shoulder was prepped with chlorhexidine in sterile fashion.   40 mg of methylprednisolone suspension was drawn up into a 5 mL syringe with 3 mL of 1% lidocaine w/o Epi.  Injection was performed using sterile technique.  Under ultrasound guidance a 3.5-inch 22-gauge needle was used to enter the glenohumeral joint.  Posterior approach was used with the patient in lateral recumbent position, arm in neutral position at the side.  Needle placement was visualized and documented with ultrasound.  Ultrasound visualization was necessary due to the small joint space entered.  Injection performed long axis to the probe.  Injection solution visualized within the joint space.  Images were permanently stored for the patient's record.  There were no complications. The patient  tolerated the procedure well. There was negligible bleeding.   Therapy scheduled to follow for mobilization.      Large Joint Injection/Arthocentesis: R glenohumeral joint    Date/Time: 6/28/2022 9:42 AM  Performed by: Cristino Castro MD  Authorized by: Cristino Castro MD     Indications:  Osteoarthritis, pain and diagnostic evaluation  Needle Size:  22 G  Guidance: ultrasound    Approach:  Posterolateral  Location:  Shoulder      Site:  R glenohumeral joint  Medications:  40 mg methylPREDNISolone 40 MG/ML  Outcome:  Tolerated well, no immediate complications  Procedure discussed: discussed risks, benefits, and alternatives    Consent Given by:  Patient  Timeout: timeout called immediately prior to procedure    Prep: patient was prepped and draped in usual sterile fashion                  Again, thank you for allowing me to participate in the care of your patient.        Sincerely,        Cristino Castro MD

## 2022-06-28 NOTE — PROGRESS NOTES
HISTORY OF PRESENT ILLNESS  Ms. Partida is a pleasant 67 year old year old female who presents to clinic today with   Lola explains that she would like a right shoulder injection today, the one in the past helped, she feels like her shoulder is bone on bone.  Having more neck and left shoulder pain  Achy neck and arm/shoulder pain nightly  Right should improved for a few weeks after last GH injection  Location:right shoulder    Quality:  achy pain    Severity: 9/10 at worst        MEDICAL HISTORY  Patient Active Problem List   Diagnosis     Chronic rhinitis     Esophageal reflux     Menopause     Menopausal syndrome (hot flashes)     Bilateral leg cramps     Chronic radicular low back pain     Hypertension goal BP (blood pressure) < 140/90     Status post total right knee replacement     Status post total left knee replacement       Current Outpatient Medications   Medication Sig Dispense Refill     amoxicillin (AMOXIL) 500 MG capsule Take four capsules by mouth 1 hour prior to dental procedure. (Patient not taking: Reported on 4/27/2022) 4 capsule 4     calcium 600 MG tablet Take 1 tablet by mouth 2 times daily       gabapentin (NEURONTIN) 300 MG capsule TAKE 2 CAPSULES BY MOUTH ONCE DAILY AT BEDTIME 180 capsule 3     magnesium 250 MG tablet Take 1 tablet by mouth daily       metoprolol succinate ER (TOPROL-XL) 25 MG 24 hr tablet Take 1 tablet (25 mg) by mouth daily 90 tablet 3     OMEPRAZOLE PO Take by mouth daily       potassium gluconate 2.5 MEQ tablet Take 90 mEq by mouth         No Known Allergies    Family History   Problem Relation Age of Onset     Cancer Father         liver     Hyperlipidemia Father      Prostate Cancer Father      Mental Illness Sister         Depression & anxiety     Colon Polyps Brother      Glaucoma No family hx of      Macular Degeneration No family hx of      Social History     Socioeconomic History     Marital status:      Number of children: 2   Occupational History      Occupation: Office work   Tobacco Use     Smoking status: Never Smoker     Smokeless tobacco: Never Used   Substance and Sexual Activity     Alcohol use: Yes     Alcohol/week: 0.0 standard drinks     Comment: per week, 2-3 drinks 1-2 times per week     Drug use: No     Sexual activity: Yes     Partners: Male     Birth control/protection: Female Surgical   Other Topics Concern     Parent/sibling w/ CABG, MI or angioplasty before 65F 55M? No       Additional medical/Social/Surgical histories reviewed in [facility] and updated as appropriate.     REVIEW OF SYSTEMS (6/28/2022)  10 point ROS of systems including Constitutional, Eyes, Respiratory, Cardiovascular, Gastroenterology, Genitourinary, Integumentary, Musculoskeletal, Psychiatric, Allergic/Immunologic were all negative except for pertinent positives noted in my HPI.     PHYSICAL EXAM  VSS  General  - normal appearance, in no obvious distress  HEENT  - conjunctivae not injected, moist mucous membranes, normocephalic/atraumatic head, ears normal appearance, no lesions, mouth normal appearance, no scars, normal dentition and teeth present  CV  - normal peripheral perfusion  Pulm  - normal respiratory pattern, non-labored    Musculoskeletal - CERVICAL SPINE  - stance and posture: normal gait without limp, no obvious leg length discrepancy, normal heel and toe walk, normal balance, slightly forward shoulders, head balanced normally on trunk  - inspection: normal bone and joint alignment, no obvious kyphosis  - palpation: no paravertebral or bony tenderness, except at base of neck and trapezius muscles  - ROM: normal and painless flexion, extension, left and right sidebending and rotation with some discomfort  - strength: upper extremities 5/5 in all planes  - special tests:  (+) spurlings    Neuro  - biceps, triceps, supinator DTRs 2+ bilaterally, no sensory or motor deficit, grossly normal coordination, normal muscle tone  Skin  - no ecchymosis, erythema, warmth, or  induration, no obvious rash  Psych  - interactive, appropriate, normal mood and affect  Bilateral shoulder : has pain with mcleod and external rotation  And reaching overhead  ASSESSMENT & PLAN  66 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  Cristino Castro MD, CAQSM    Glenohumeral Injection - Ultrasound Guided  The patient was informed of the risks and the benefits of the procedure and a written consent was signed.  The patient s right shoulder was prepped with chlorhexidine in sterile fashion.   40 mg of methylprednisolone suspension was drawn up into a 5 mL syringe with 3 mL of 1% lidocaine w/o Epi.  Injection was performed using sterile technique.  Under ultrasound guidance a 3.5-inch 22-gauge needle was used to enter the glenohumeral joint.  Posterior approach was used with the patient in lateral recumbent position, arm in neutral position at the side.  Needle placement was visualized and documented with ultrasound.  Ultrasound visualization was necessary due to the small joint space entered.  Injection performed long axis to the probe.  Injection solution visualized within the joint space.  Images were permanently stored for the patient's record.  There were no complications. The patient tolerated the procedure well. There was negligible bleeding.   Therapy scheduled to follow for mobilization.      Large Joint Injection/Arthocentesis: R glenohumeral joint    Date/Time: 6/28/2022 9:42 AM  Performed by: Cristino Castro MD  Authorized by: Cristino Castro  MD Felice     Indications:  Osteoarthritis, pain and diagnostic evaluation  Needle Size:  22 G  Guidance: ultrasound    Approach:  Posterolateral  Location:  Shoulder      Site:  R glenohumeral joint  Medications:  40 mg methylPREDNISolone 40 MG/ML  Outcome:  Tolerated well, no immediate complications  Procedure discussed: discussed risks, benefits, and alternatives    Consent Given by:  Patient  Timeout: timeout called immediately prior to procedure    Prep: patient was prepped and draped in usual sterile fashion

## 2022-07-13 ENCOUNTER — HOSPITAL ENCOUNTER (OUTPATIENT)
Facility: AMBULATORY SURGERY CENTER | Age: 67
Discharge: HOME | End: 2022-07-13
Attending: SURGERY | Admitting: SURGERY
Payer: COMMERCIAL

## 2022-07-13 VITALS
RESPIRATION RATE: 16 BRPM | TEMPERATURE: 97.5 F | SYSTOLIC BLOOD PRESSURE: 122 MMHG | HEART RATE: 74 BPM | DIASTOLIC BLOOD PRESSURE: 70 MMHG | OXYGEN SATURATION: 95 %

## 2022-07-13 LAB — COLONOSCOPY: NORMAL

## 2022-07-13 PROCEDURE — G8907 PT DOC NO EVENTS ON DISCHARG: HCPCS

## 2022-07-13 PROCEDURE — G8918 PT W/O PREOP ORDER IV AB PRO: HCPCS

## 2022-07-13 PROCEDURE — 45385 COLONOSCOPY W/LESION REMOVAL: CPT | Mod: PT

## 2022-07-13 PROCEDURE — 88305 TISSUE EXAM BY PATHOLOGIST: CPT | Mod: 26 | Performed by: PATHOLOGY

## 2022-07-13 PROCEDURE — G0500 MOD SEDAT ENDO SERVICE >5YRS: HCPCS | Performed by: SURGERY

## 2022-07-13 PROCEDURE — 45385 COLONOSCOPY W/LESION REMOVAL: CPT | Mod: PT | Performed by: SURGERY

## 2022-07-13 RX ORDER — ONDANSETRON 4 MG/1
4 TABLET, ORALLY DISINTEGRATING ORAL EVERY 6 HOURS PRN
Status: DISCONTINUED | OUTPATIENT
Start: 2022-07-13 | End: 2022-07-14 | Stop reason: HOSPADM

## 2022-07-13 RX ORDER — NALOXONE HYDROCHLORIDE 0.4 MG/ML
0.2 INJECTION, SOLUTION INTRAMUSCULAR; INTRAVENOUS; SUBCUTANEOUS
Status: DISCONTINUED | OUTPATIENT
Start: 2022-07-13 | End: 2022-07-14 | Stop reason: HOSPADM

## 2022-07-13 RX ORDER — FLUMAZENIL 0.1 MG/ML
0.2 INJECTION, SOLUTION INTRAVENOUS
Status: ACTIVE | OUTPATIENT
Start: 2022-07-13 | End: 2022-07-13

## 2022-07-13 RX ORDER — NALOXONE HYDROCHLORIDE 0.4 MG/ML
0.4 INJECTION, SOLUTION INTRAMUSCULAR; INTRAVENOUS; SUBCUTANEOUS
Status: DISCONTINUED | OUTPATIENT
Start: 2022-07-13 | End: 2022-07-14 | Stop reason: HOSPADM

## 2022-07-13 RX ORDER — ONDANSETRON 2 MG/ML
4 INJECTION INTRAMUSCULAR; INTRAVENOUS EVERY 6 HOURS PRN
Status: DISCONTINUED | OUTPATIENT
Start: 2022-07-13 | End: 2022-07-14 | Stop reason: HOSPADM

## 2022-07-13 RX ORDER — PROCHLORPERAZINE MALEATE 5 MG
5 TABLET ORAL EVERY 6 HOURS PRN
Status: DISCONTINUED | OUTPATIENT
Start: 2022-07-13 | End: 2022-07-14 | Stop reason: HOSPADM

## 2022-07-13 RX ORDER — LIDOCAINE 40 MG/G
CREAM TOPICAL
Status: DISCONTINUED | OUTPATIENT
Start: 2022-07-13 | End: 2022-07-14 | Stop reason: HOSPADM

## 2022-07-13 RX ORDER — FENTANYL CITRATE 50 UG/ML
INJECTION, SOLUTION INTRAMUSCULAR; INTRAVENOUS PRN
Status: DISCONTINUED | OUTPATIENT
Start: 2022-07-13 | End: 2022-07-13 | Stop reason: HOSPADM

## 2022-07-13 RX ORDER — ONDANSETRON 2 MG/ML
4 INJECTION INTRAMUSCULAR; INTRAVENOUS
Status: DISCONTINUED | OUTPATIENT
Start: 2022-07-13 | End: 2022-07-14 | Stop reason: HOSPADM

## 2022-07-13 NOTE — H&P
"[facility] Anesthesia Pre-op History and Physical    Lola Partida MRN# [id number]   Age: 67 year old YOB: 1955      Date of Surgery: 7/13/2022     Date of Exam 7/13/2022                Chief Complaint and/or Reason for Procedure:   Conscious sedation         Active problem list:     Patient Active Problem List    Diagnosis Date Noted     Status post total right knee replacement 04/30/2021     Priority: Medium     Status post total left knee replacement 04/30/2021     Priority: Medium     Hypertension goal BP (blood pressure) < 140/90 08/03/2017     Priority: Medium     [facility] research study patient : New diagnosis HTN arm of the study  First study visit 8/3/17     Patients will have regular blood pressure medication adjustments made by the [facility] study team. Under non-emergent/urgent situations where a change in blood pressure medication adjustment is being considered outside of a study visit, please contact the [facility] study team at [email or web address] and they will refer to the [facility] RN HTN MGMT standing order (under 'other orders' ) to guide medication selection. This standing order reflects the IRB approved blood pressure treatment protocol for this study.    Patient should NOT be provided their genetic profile results until the end of the study (this is a single blinded study.   Patients will remain blinded to results during the study but will be given this information at the end of the study)    Click on Research \"Active\" in header for more details about the study protocols    [facility] study team (Marilyn Carrillo MD)            Bilateral leg cramps 05/25/2016     Priority: Medium     Chronic radicular low back pain 05/25/2016     Priority: Medium     Chronic rhinitis 08/28/2015     Priority: Medium     Esophageal reflux 08/28/2015     Priority: Medium     Menopause 08/28/2015     Priority: Medium     Menopausal syndrome (hot flashes) 08/28/2015     Priority: Medium            Medications (include " herbals and vitamins):      Current Outpatient Medications   Medication Sig     metoprolol succinate ER (TOPROL-XL) 25 MG 24 hr tablet Take 1 tablet (25 mg) by mouth daily     OMEPRAZOLE PO Take by mouth daily     amoxicillin (AMOXIL) 500 MG capsule Take four capsules by mouth 1 hour prior to dental procedure. (Patient not taking: Reported on 4/27/2022)     calcium 600 MG tablet Take 1 tablet by mouth 2 times daily     celecoxib (CELEBREX) 100 MG capsule Take 1 capsule (100 mg) by mouth 2 times daily     gabapentin (NEURONTIN) 300 MG capsule TAKE 2 CAPSULES BY MOUTH ONCE DAILY AT BEDTIME     magnesium 250 MG tablet Take 1 tablet by mouth daily     potassium gluconate 2.5 MEQ tablet Take 90 mEq by mouth     Current Facility-Administered Medications   Medication     betamethasone acet & sod phos (CELESTONE) injection 6 mg     cross-Linked Hyaluronate (GEL-ONE) injection PRSY 30 mg     lidocaine (LMX4) kit     lidocaine 1 % 0.1-1 mL     lidocaine 1 % injection 4 mL     methylPREDNISolone (DEPO-MEDROL) injection 40 mg     methylPREDNISolone (DEPO-MEDROL) injection 40 mg     methylPREDNISolone acetate (DEPO-MEDROL) injection 80 mg     ondansetron (ZOFRAN) injection 4 mg     ropivacaine (NAROPIN) injection 1 mL     sodium chloride (PF) 0.9% PF flush 3 mL     sodium chloride (PF) 0.9% PF flush 3 mL             Allergies:    No Known Allergies            Physical Exam:   All vitals have been reviewed  Patient Vitals for the past 8 hrs:   BP Temp Temp src Resp SpO2   07/13/22 0901 130/82 98  F (36.7  C) Temporal 16 98 %     No intake/output data recorded.  Airway assessment:   Patient is able to open mouth wide  Patient is able to stick out tongue}      Lungs:   No increased work of breathing, good air exchange, clear to auscultation bilaterally, no crackles or wheezing     Cardiovascular:   regular rate and rhythm and normal S1 and S2             Anesthetic risk and/or ASA classification:     ASA classification  2    Valdo Lopez, DO

## 2022-07-15 LAB
PATH REPORT.COMMENTS IMP SPEC: NORMAL
PATH REPORT.COMMENTS IMP SPEC: NORMAL
PATH REPORT.FINAL DX SPEC: NORMAL
PATH REPORT.GROSS SPEC: NORMAL
PATH REPORT.MICROSCOPIC SPEC OTHER STN: NORMAL
PATH REPORT.RELEVANT HX SPEC: NORMAL
PHOTO IMAGE: NORMAL

## 2022-07-18 ENCOUNTER — OFFICE VISIT (OUTPATIENT)
Dept: ORTHOPEDICS | Facility: CLINIC | Age: 67
End: 2022-07-18
Payer: COMMERCIAL

## 2022-07-18 DIAGNOSIS — M19.012 GLENOHUMERAL ARTHRITIS, LEFT: Primary | ICD-10-CM

## 2022-07-18 PROCEDURE — 20611 DRAIN/INJ JOINT/BURSA W/US: CPT | Mod: LT | Performed by: PREVENTIVE MEDICINE

## 2022-07-18 PROCEDURE — 99214 OFFICE O/P EST MOD 30 MIN: CPT | Mod: 25 | Performed by: PREVENTIVE MEDICINE

## 2022-07-18 RX ORDER — METHYLPREDNISOLONE ACETATE 40 MG/ML
40 INJECTION, SUSPENSION INTRA-ARTICULAR; INTRALESIONAL; INTRAMUSCULAR; SOFT TISSUE
Status: SHIPPED | OUTPATIENT
Start: 2022-07-18

## 2022-07-18 RX ADMIN — METHYLPREDNISOLONE ACETATE 40 MG: 40 INJECTION, SUSPENSION INTRA-ARTICULAR; INTRALESIONAL; INTRAMUSCULAR; SOFT TISSUE at 11:57

## 2022-07-18 NOTE — PROGRESS NOTES
HISTORY OF PRESENT ILLNESS  Ms. Partida is a pleasant 67 year old year old female who presents to clinic today with   Lola explains that her shoulder has been doing better since her right shoulder injection, states that only specific movements with her right arm cause her pain  Her left shoulder hurts more and is lmiting movements  Would like to review her left shoulder MRI and consider injection treatment  Location: right shoulder and left shoulder  Quality:  achy pain    Severity: 3/10 at worst    Duration: worse over past several months    Timing: occurs intermittently  Context: occurs while exercising and lifting left arm  Modifying factors:  resting and non-use makes it better, movement and use makes it worse  Associated signs & symptoms: some neck pain but improved    MEDICAL HISTORY  Patient Active Problem List   Diagnosis     Chronic rhinitis     Esophageal reflux     Menopause     Menopausal syndrome (hot flashes)     Bilateral leg cramps     Chronic radicular low back pain     Hypertension goal BP (blood pressure) < 140/90     Status post total right knee replacement     Status post total left knee replacement       Current Outpatient Medications   Medication Sig Dispense Refill     amoxicillin (AMOXIL) 500 MG capsule Take four capsules by mouth 1 hour prior to dental procedure. (Patient not taking: Reported on 4/27/2022) 4 capsule 4     calcium 600 MG tablet Take 1 tablet by mouth 2 times daily       celecoxib (CELEBREX) 100 MG capsule Take 1 capsule (100 mg) by mouth 2 times daily 60 capsule 0     gabapentin (NEURONTIN) 300 MG capsule TAKE 2 CAPSULES BY MOUTH ONCE DAILY AT BEDTIME 180 capsule 3     magnesium 250 MG tablet Take 1 tablet by mouth daily       metoprolol succinate ER (TOPROL-XL) 25 MG 24 hr tablet Take 1 tablet (25 mg) by mouth daily 90 tablet 3     OMEPRAZOLE PO Take by mouth daily       potassium gluconate 2.5 MEQ tablet Take 90 mEq by mouth         No Known Allergies    Family History    Problem Relation Age of Onset     Cancer Father         liver     Hyperlipidemia Father      Prostate Cancer Father      Mental Illness Sister         Depression & anxiety     Colon Polyps Brother      Glaucoma No family hx of      Macular Degeneration No family hx of      Social History     Socioeconomic History     Marital status:      Number of children: 2   Occupational History     Occupation: Office work   Tobacco Use     Smoking status: Never Smoker     Smokeless tobacco: Never Used   Substance and Sexual Activity     Alcohol use: Yes     Alcohol/week: 0.0 standard drinks     Comment: per week, 2-3 drinks 1-2 times per week     Drug use: No     Sexual activity: Yes     Partners: Male     Birth control/protection: Female Surgical   Other Topics Concern     Parent/sibling w/ CABG, MI or angioplasty before 65F 55M? No       Additional medical/Social/Surgical histories reviewed in [facility] and updated as appropriate.     REVIEW OF SYSTEMS (7/18/2022)  10 point ROS of systems including Constitutional, Eyes, Respiratory, Cardiovascular, Gastroenterology, Genitourinary, Integumentary, Musculoskeletal, Psychiatric, Allergic/Immunologic were all negative except for pertinent positives noted in my HPI.     PHYSICAL EXAM  VSS  General  - normal appearance, in no obvious distress  HEENT  - conjunctivae not injected, moist mucous membranes, normocephalic/atraumatic head, ears normal appearance, no lesions, mouth normal appearance, no scars, normal dentition and teeth present  CV  - normal radial pulse  Pulm  - normal respiratory pattern, non-labored  Musculoskeletal - left shoulder and right shoulder  - inspection: normal bone and joint alignment, no obvious deformity, no scapular winging, no AC step-off  - palpation: no bony or soft tissue tenderness, normal clavicle, non-tender AC  - ROM:  limited flexion, IR, ER, abduction, painful at end range  - strength: 5/5  strength, 5/5 in all shoulder planes  - special  tests:  (-) Speed's  (-) Neer  (-) Hawkin's  (-) David's  (+) [place]'s  (+) load & shift, supine  (-) apprehension  (-) subscap lift-off  Neuro  - no sensory or motor deficit, grossly normal coordination, normal muscle tone  Skin  - no ecchymosis, erythema, warmth, or induration, no obvious rash  Psych  - interactive, appropriate, normal mood and affect        ASSESSMENT & PLAN  68 yo female with left glenohumeral arthritis, worse, and right glenohumeral arthritis, worse    I independently reviewed the following imaging studies:  xrays left shoulder shows GH arthritis  Right shoulder xray: shows GH arthritis  After a 20 minute discussion and examination, we decided to perform a same day injection for diagnostic and therapeutic purposes for right GH joint  Given HEP with theraband  Consider more PT  F/u 1 month  Appropriate PPE was utilized for prevention of spread of Covid-19.  Cristino Castro MD, CAQSM    Right Glenohumeral Injection - Ultrasound Guided  The patient was informed of the risks and the benefits of the procedure and a written consent was signed.  The patient s right shoulder was prepped with chlorhexidine in sterile fashion.   40 mg of methylprednisolone suspension was drawn up into a 5 mL syringe with 3 mL of 1% lidocaine w/o Epi.  Injection was performed using sterile technique.  Under ultrasound guidance a 3.5-inch 22-gauge needle was used to enter the glenohumeral joint.  Posterior approach was used with the patient in lateral recumbent position, arm in neutral position at the side.  Needle placement was visualized and documented with ultrasound.  Ultrasound visualization was necessary due to the small joint space entered.  Injection performed long axis to the probe.  Injection solution visualized within the joint space.  Images were permanently stored for the patient's record.  There were no complications. The patient tolerated the procedure well. There was negligible bleeding.   Therapy scheduled  to follow for mobilization.      Large Joint Injection/Arthocentesis: R glenohumeral joint    Date/Time: 7/18/2022 11:57 AM  Performed by: Cristino Castro MD  Authorized by: Cristino Castro MD     Indications:  Osteoarthritis, pain and diagnostic evaluation  Needle Size:  22 G  Guidance: ultrasound    Approach:  Posterolateral  Location:  Shoulder      Site:  R glenohumeral joint  Medications:  40 mg methylPREDNISolone 40 MG/ML  Outcome:  Tolerated well, no immediate complications  Procedure discussed: discussed risks, benefits, and alternatives    Consent Given by:  Patient  Timeout: timeout called immediately prior to procedure    Prep: patient was prepped and draped in usual sterile fashion

## 2022-07-18 NOTE — LETTER
7/18/2022         RE: Lola Partida  [facility] [facility] [id number]        Dear Colleague,    Thank you for referring your patient, Lola Partida, to the [facility] [place]. Please see a copy of my visit note below.    HISTORY OF PRESENT ILLNESS  Ms. Partida is a pleasant 67 year old year old female who presents to clinic today with   Lola explains that her shoulder has been doing better since her right shoulder injection, states that only specific movements with her right arm cause her pain  Her left shoulder hurts more and is lmiting movements  Would like to review her left shoulder MRI and consider injection treatment  Location: right shoulder  Quality:  achy pain    Severity: 3/10 at worst    Duration: worse over past several months    Timing: occurs intermittently  Context: occurs while exercising and lifting left arm  Modifying factors:  resting and non-use makes it better, movement and use makes it worse  Associated signs & symptoms: some neck pain but improved    MEDICAL HISTORY  Patient Active Problem List   Diagnosis     Chronic rhinitis     Esophageal reflux     Menopause     Menopausal syndrome (hot flashes)     Bilateral leg cramps     Chronic radicular low back pain     Hypertension goal BP (blood pressure) < 140/90     Status post total right knee replacement     Status post total left knee replacement       Current Outpatient Medications   Medication Sig Dispense Refill     amoxicillin (AMOXIL) 500 MG capsule Take four capsules by mouth 1 hour prior to dental procedure. (Patient not taking: Reported on 4/27/2022) 4 capsule 4     calcium 600 MG tablet Take 1 tablet by mouth 2 times daily       celecoxib (CELEBREX) 100 MG capsule Take 1 capsule (100 mg) by mouth 2 times daily 60 capsule 0     gabapentin (NEURONTIN) 300 MG capsule TAKE 2 CAPSULES BY MOUTH ONCE DAILY AT BEDTIME 180 capsule 3     magnesium 250 MG tablet Take 1 tablet by mouth daily        metoprolol succinate ER (TOPROL-XL) 25 MG 24 hr tablet Take 1 tablet (25 mg) by mouth daily 90 tablet 3     OMEPRAZOLE PO Take by mouth daily       potassium gluconate 2.5 MEQ tablet Take 90 mEq by mouth         No Known Allergies    Family History   Problem Relation Age of Onset     Cancer Father         liver     Hyperlipidemia Father      Prostate Cancer Father      Mental Illness Sister         Depression & anxiety     Colon Polyps Brother      Glaucoma No family hx of      Macular Degeneration No family hx of      Social History     Socioeconomic History     Marital status:      Number of children: 2   Occupational History     Occupation: Office work   Tobacco Use     Smoking status: Never Smoker     Smokeless tobacco: Never Used   Substance and Sexual Activity     Alcohol use: Yes     Alcohol/week: 0.0 standard drinks     Comment: per week, 2-3 drinks 1-2 times per week     Drug use: No     Sexual activity: Yes     Partners: Male     Birth control/protection: Female Surgical   Other Topics Concern     Parent/sibling w/ CABG, MI or angioplasty before 65F 55M? No       Additional medical/Social/Surgical histories reviewed in [facility] and updated as appropriate.     REVIEW OF SYSTEMS (7/18/2022)  10 point ROS of systems including Constitutional, Eyes, Respiratory, Cardiovascular, Gastroenterology, Genitourinary, Integumentary, Musculoskeletal, Psychiatric, Allergic/Immunologic were all negative except for pertinent positives noted in my HPI.     PHYSICAL EXAM  VSS  General  - normal appearance, in no obvious distress  HEENT  - conjunctivae not injected, moist mucous membranes, normocephalic/atraumatic head, ears normal appearance, no lesions, mouth normal appearance, no scars, normal dentition and teeth present  CV  - normal radial pulse  Pulm  - normal respiratory pattern, non-labored  Musculoskeletal - left shoulder  - inspection: normal bone and joint alignment, no obvious deformity, no scapular  winging, no AC step-off  - palpation: no bony or soft tissue tenderness, normal clavicle, non-tender AC  - ROM:  limited flexion, IR, ER, abduction, painful at end range  - strength: 5/5  strength, 5/5 in all shoulder planes  - special tests:  (-) Speed's  (-) Neer  (-) Hawkin's  (-) David's  (+) [place]'s  (+) load & shift, supine  (-) apprehension  (-) subscap lift-off  Neuro  - no sensory or motor deficit, grossly normal coordination, normal muscle tone  Skin  - no ecchymosis, erythema, warmth, or induration, no obvious rash  Psych  - interactive, appropriate, normal mood and affect        ASSESSMENT & PLAN  66 yo female with left glenohumeral arthritis, worse, and right glenohumeral arthritis, improved    I independently reviewed the following imaging studies:  xrays left shoulder shows GH arthritis  After a 20 minute discussion and examination, we decided to perform a same day injection for diagnostic and therapeutic purposes for left GH joint  Given HEP with theraband  Consider more PT  F/u 1 month  Appropriate PPE was utilized for prevention of spread of Covid-19.  Cristino Castro MD, CAQSM    Glenohumeral Injection - Ultrasound Guided  The patient was informed of the risks and the benefits of the procedure and a written consent was signed.  The patient s left shoulder was prepped with chlorhexidine in sterile fashion.   40 mg of methylprednisolone suspension was drawn up into a 5 mL syringe with 3 mL of 1% lidocaine w/o Epi.  Injection was performed using sterile technique.  Under ultrasound guidance a 3.5-inch 22-gauge needle was used to enter the glenohumeral joint.  Posterior approach was used with the patient in lateral recumbent position, arm in neutral position at the side.  Needle placement was visualized and documented with ultrasound.  Ultrasound visualization was necessary due to the small joint space entered.  Injection performed long axis to the probe.  Injection solution visualized within the  joint space.  Images were permanently stored for the patient's record.  There were no complications. The patient tolerated the procedure well. There was negligible bleeding.   Therapy scheduled to follow for mobilization.      Large Joint Injection/Arthocentesis: L glenohumeral joint    Date/Time: 7/18/2022 11:57 AM  Performed by: Cristino Castro MD  Authorized by: Cristino Castro MD     Indications:  Osteoarthritis, pain and diagnostic evaluation  Needle Size:  22 G  Guidance: ultrasound    Approach:  Posterolateral  Location:  Shoulder      Site:  L glenohumeral joint  Medications:  40 mg methylPREDNISolone 40 MG/ML  Outcome:  Tolerated well, no immediate complications  Procedure discussed: discussed risks, benefits, and alternatives    Consent Given by:  Patient  Timeout: timeout called immediately prior to procedure    Prep: patient was prepped and draped in usual sterile fashion                  Again, thank you for allowing me to participate in the care of your patient.        Sincerely,        Cristino Castro MD

## 2022-07-27 ENCOUNTER — OFFICE VISIT (OUTPATIENT)
Dept: FAMILY MEDICINE | Facility: CLINIC | Age: 67
End: 2022-07-27
Payer: COMMERCIAL

## 2022-07-27 VITALS
WEIGHT: 192 LBS | HEIGHT: 67 IN | DIASTOLIC BLOOD PRESSURE: 84 MMHG | BODY MASS INDEX: 30.13 KG/M2 | OXYGEN SATURATION: 98 % | TEMPERATURE: 98.1 F | SYSTOLIC BLOOD PRESSURE: 138 MMHG | HEART RATE: 71 BPM

## 2022-07-27 DIAGNOSIS — Z00.00 ENCOUNTER FOR MEDICARE ANNUAL WELLNESS EXAM: ICD-10-CM

## 2022-07-27 DIAGNOSIS — Z79.899 ENCOUNTER FOR LONG-TERM (CURRENT) USE OF MEDICATIONS: ICD-10-CM

## 2022-07-27 DIAGNOSIS — M19.011 GLENOHUMERAL ARTHRITIS, RIGHT: ICD-10-CM

## 2022-07-27 DIAGNOSIS — Z78.0 ASYMPTOMATIC POSTMENOPAUSAL STATUS: ICD-10-CM

## 2022-07-27 DIAGNOSIS — I10 HYPERTENSION GOAL BP (BLOOD PRESSURE) < 140/90: ICD-10-CM

## 2022-07-27 PROCEDURE — G0402 INITIAL PREVENTIVE EXAM: HCPCS | Performed by: FAMILY MEDICINE

## 2022-07-27 RX ORDER — CELECOXIB 100 MG/1
100 CAPSULE ORAL 2 TIMES DAILY
Qty: 60 CAPSULE | Refills: 0 | Status: SHIPPED | OUTPATIENT
Start: 2022-07-27 | End: 2022-08-26

## 2022-07-27 ASSESSMENT — ENCOUNTER SYMPTOMS
WEAKNESS: 0
NAUSEA: 0
BREAST MASS: 0
JOINT SWELLING: 0
HEMATOCHEZIA: 0
DIARRHEA: 0
SHORTNESS OF BREATH: 0
PALPITATIONS: 0
SORE THROAT: 0
FEVER: 0
PARESTHESIAS: 0
NERVOUS/ANXIOUS: 0
HEMATURIA: 0
ARTHRALGIAS: 0
CONSTIPATION: 0
COUGH: 0
MYALGIAS: 1
FREQUENCY: 0
DIZZINESS: 0
HEADACHES: 0
CHILLS: 0
EYE PAIN: 0
HEARTBURN: 0
DYSURIA: 0
ABDOMINAL PAIN: 0
HEARTBURN: 1

## 2022-07-27 ASSESSMENT — PAIN SCALES - GENERAL: PAINLEVEL: NO PAIN (0)

## 2022-07-27 ASSESSMENT — ACTIVITIES OF DAILY LIVING (ADL): CURRENT_FUNCTION: NO ASSISTANCE NEEDED

## 2022-07-27 NOTE — PATIENT INSTRUCTIONS
At your visit today, we discussed your risk for falls and preventive options.    Fall Prevention  Falls often occur due to slipping, tripping or losing your balance. Millions of people fall every year and injure themselves. Here are ways to reduce your risk of falling again.     Think about your fall, was there anything that caused your fall that can be fixed, removed, or replaced?    Make your home safe by keeping walkways clear of objects you may trip over, such as electric cords.    Use non-slip pads under rugs. Don't use area rugs or small throw rugs.    Use non-slip mats in bathtubs and showers.    Install handrails and lights on staircases. The handrails should be on both sides of the stairs.    Don't walk in poorly lit areas.    Don't stand on chairs or wobbly ladders.    Use caution when reaching overhead or looking upward. This position can cause a loss of balance.    Be sure your shoes fit properly, have non-slip bottoms and are in good condition.     Wear shoes both inside and out. Don't go barefoot or wear slippers.    Be cautious when going up and down stairs, curbs, and when walking on uneven sidewalks.    If your balance is poor, consider using a cane or walker.    If your fall was related to alcohol use, stop or limit alcohol intake.     If your fall was related to use of sleeping medicines, talk to your healthcare provider about this. You may need to reduce your dosage at bedtime if you awaken during the night to go to the bathroom.      To reduce the need for nighttime bathroom trips:  ? Don't drink fluids for several hours before going to bed  ? Empty your bladder before going to bed  ? Men can keep a urinal at the bedside    Stay as active as you can. Balance, flexibility, strength, and endurance all come from exercise. They all play a role in preventing falls. Ask your healthcare provider which types of activity are right for you.    Get your vision checked on a regular basis.    If you have  pets, know where they are before you stand up or walk so you don't trip over them.    Use night lights.    Go over all your medicines with a pharmacist or other healthcare provider to see if any of them could make you more likely to fall.  [employer] last reviewed this educational content on 4/1/2018 2000-2021 [employer]. All rights reserved. This information is not intended as a substitute for professional medical care. Always follow your healthcare professional's instructions.        Patient Education   Personalized Prevention Plan  You are due for the preventive services outlined below.  Your care team is available to assist you in scheduling these services.  If you have already completed any of these items, please share that information with your care team to update in your medical record.  Health Maintenance Due   Topic Date Due     URINE DRUG SCREEN  Never done       Exercise for a Healthier Heart  You may wonder how you can improve the health of your heart. If you re thinking about exercise, you re on the right track. You don t need to become an athlete. But you do need a certain amount of brisk exercise to help strengthen your heart. If you have been diagnosed with a heart condition, your healthcare provider may advise exercise to help stabilize your condition. To help make exercise a habit, choose safe, fun activities.      Exercise with a friend. When activity is fun, you're more likely to stick with it.   Before you start  Check with your healthcare provider before starting an exercise program. This is especially important if you have not been active for a while. It's also important if you have a long-term (chronic) health problem such as heart disease, diabetes, or obesity. Or if you are at high risk for having these problems.   Why exercise?  Exercising regularly offers many healthy rewards. It can help you do all of the following:     Improve your blood cholesterol level to help prevent  further heart trouble    Lower your blood pressure to help prevent a stroke or heart attack    Control diabetes, or reduce your risk of getting this disease    Improve your heart and lung function    Reach and stay at a healthy weight    Make your muscles stronger so you can stay active    Prevent falls and fractures by slowing the loss of bone mass (osteoporosis)    Manage stress better    Reduce your blood pressure    Improve your sense of self and your body image  Exercise tips      Ease into your routine. Set small goals. Then build on them. If you are not sure what your activity level should be, talk with your healthcare provider first before starting an exercise routine.    Exercise on most days. Aim for a total of 150 minutes (2 hours and 30 minutes) or more of moderate-intensity aerobic activity each week. Or 75 minutes (1 hour and 15 minutes) or more of vigorous-intensity aerobic activity each week. Or try for a combination of both. Moderate activity means that you breathe heavier and your heart rate increases but you can still talk. Think about doing 40 minutes of moderate exercise, 3 to 4 times a week. For best results, activity should last for about 40 minutes to lower blood pressure and cholesterol. It's OK to work up to the 40-minute period over time. Examples of moderate-intensity activity are walking 1 mile in 15 minutes. Or doing 30 to 45 minutes of yard work.    Step up your daily activity level.  Along with your exercise program, try being more active the whole day. Walk instead of drive. Or park further away so that you take more steps each day. Do more household tasks or yard work. You may not be able to meet the advised mount of physical activity. But doing some moderate- or vigorous-intensity aerobic activity can help reduce your risk for heart disease. Your healthcare provider can help you figure out what is best for you.    Choose 1 or more activities you enjoy.  Walking is one of the  easiest things you can do. You can also try swimming, riding a bike, dancing, or taking an exercise class.    When to call your healthcare provider  Call your healthcare provider if you have any of these:     Chest pain or feel dizzy or lightheaded    Burning, tightness, pressure, or heaviness in your chest, neck, shoulders, back, or arms    Abnormal shortness of breath    More joint or muscle pain    A very fast or irregular heartbeat (palpitations)  [employer] last reviewed this educational content on 7/1/2019 2000-2021 [employer]. All rights reserved. This information is not intended as a substitute for professional medical care. Always follow your healthcare professional's instructions.         Patient Education   Alcohol Use     Many people can enjoy a glass of wine or beer without any negative consequences to their health. According to the Centers for Disease Control and Prevention (CDC), having one or fewer drinks per day for women and two or fewer per day for men is considered moderate drinking.     When people drink more than moderately, it can become concerning. Excessive drinking is defined as consuming 15 drinks or more per week for men and 8 drinks or more per week for women. There are various health problems associated with excessive drinking, which include:    Damage to vital organs like the heart, brain, liver and pancreas    Harm to the digestive tract    Weaken the immune system    Higher risk for heart disease and cancer    There are many resources available to people who are addicted to alcohol. A counselor or other health care provider can give you support. So can a , , or rabbi who is trained in substance abuse counseling. Friends and family may also help once you are connected with professionals.           Understanding USDA MyPlate  The USDA has guidelines to help you make healthy food choices. These are called MyPlate. MyPlate shows the food groups that make up  healthy meals using the image of a place setting. Before you eat, think about the healthiest choices for what to put on your plate or in your cup or bowl. To learn more about building a healthy plate, visit www.choosemyplate.gov.    The food groups    Fruits. Any fruit or 100% fruit juice counts as part of the Fruit Group. Fruits may be fresh, canned, frozen, or dried, and may be whole, cut-up, or pureed. Make 1/2 of your plate fruits and vegetables.    Vegetables. Any vegetable or 100% vegetable juice counts as a member of the Vegetable Group. Vegetables may be fresh, frozen, canned, or dried. They can be served raw or cooked and may be whole, cut-up, or mashed. Make 1/2 of your plate fruits and vegetables.    Grains. All foods made from grains are part of the Grains Group. These include wheat, rice, oats, cornmeal, and barley. Grains are often used to make foods such as bread, pasta, oatmeal, cereal, tortillas, and grits. Grains should be no more than 1/4 of your plate. At least half of your grains should be whole grains.    Protein. This group includes meat, poultry, seafood, beans and peas, eggs, processed soy products (such as tofu), nuts (including nut butters), and seeds. Make protein choices no more than 1/4 of your plate. Meat and poultry choices should be lean or low fat.    Dairy. The Dairy Group includes all fluid milk products and foods made from milk that contain calcium, such as yogurt and cheese. (Foods that have little calcium, such as cream, butter, and cream cheese, are not part of this group.) Most dairy choices should be low-fat or fat-free.    Oils. Oils aren't a food group, but they do contain essential nutrients. However it's important to watch your intake of oils. These are fats that are liquid at room temperature. They include canola, corn, olive, soybean, vegetable, and sunflower oil. Foods that are mainly oil include mayonnaise, certain salad dressings, and soft margarines. You likely  already get your daily oil allowance from the foods you eat.  Things to limit  Eating healthy also means limiting these things in your diet:       Salt (sodium). Many processed foods have a lot of sodium. To keep sodium intake down, eat fresh vegetables, meats, poultry, and seafood when possible. Purchase low-sodium, reduced-sodium, or no-salt-added food products at the store. And don't add salt to your meals at home. Instead, season them with herbs and spices such as dill, oregano, cumin, and paprika. Or try adding flavor with lemon or lime zest and juice.    Saturated fat. Saturated fats are most often found in animal products such as beef, pork, and chicken. They are often solid at room temperature, such as butter. To reduce your saturated fat intake, choose leaner cuts of meat and poultry. And try healthier cooking methods such as grilling, broiling, roasting, or baking. For a simple lower-fat swap, use plain nonfat yogurt instead of mayonnaise when making potato salad or macaroni salad.    Added sugars. These are sugars added to foods. They are in foods such as ice cream, candy, soda, fruit drinks, sports drinks, energy drinks, cookies, pastries, jams, and syrups. Cut down on added sugars by sharing sweet treats with a family member or friend. You can also choose fruit for dessert, and drink water or other unsweetened beverages.     [employer] last reviewed this educational content on 6/1/2020 2000-2021 [employer]. All rights reserved. This information is not intended as a substitute for professional medical care. Always follow your healthcare professional's instructions.          Preventing Falls at Home  A person can fall for many reasons. Older adults may fall because reaction time slows down as we age. Your muscles and joints may get stiff, weak, or less flexible because of illness, medicines, or a physical condition.   Other health problems that make falls more likely include:      Arthritis    Dizziness or lightheadedness when you stand up (orthostatic hypotension)    History of a stroke    Dizziness    Anemia    Certain medicines taken for mental illness or to control blood pressure.    Problems with balance or gait    Bladder or urinary problems    History of falling    Changes in vision (vision impairment)    Changes in thinking skills and memory (cognitive impairment)  Injuries from a fall can include serious injuries such as broken bones, dislocated joints, internal bleeding and cuts. Injuries like these can limit your independence.   Prevention tips  To help prevent falls and fall-related injuries, follow the tips below.    Floors  To make floors safer:     Put nonskid pads under area rugs.    Remove small rugs.    Replace worn floor coverings.    Tack carpets firmly to each step on carpeted stairs. Put nonskid strips on the edges of uncarpeted stairs.    Keep floors and stairs free of clutter and cords.    Arrange furniture so there are clear pathways.    Clean up any spills right away.  Bathrooms    To make bathrooms safer:     Install grab bars in the tub or shower.    Apply nonskid strips or put a nonskid rubber mat in the tub or shower.    Sit on a bath chair to bathe.    Use bathmats with nonskid backing.  Lighting  To improve visibility in your home:      Keep a flashlight in each room. Or put a lamp next to the bed within easy reach.    Put nightlights in the bedrooms, hallways, kitchen, and bathrooms.    Make sure all stairways have good lighting.    Take your time when going up and down stairs.    Put handrails on both sides of stairs and in walkways for more support. To prevent injury to your wrist or arm, don t use handrails to pull yourself up.    Install grab bars to pull yourself up.    Move or rearrange items that you use often. This will make them easier to find or reach.    Look at your home to find any safety hazards. Especially look at doorways, walkways, and the  driveway. Remove or repair any safety problems that you find.  Other changes to make    Look around to find any safety hazards. Look closely at doorways, walkways, and the driveway. Remove or repair any safety problems that you find.    Wear shoes that fit well.    Take your time when going up and down stairs.    Put handrails on both sides of stairs and in walkways for more support. To prevent injury to your wrist or arm, don t use handrails to pull yourself up.    Install grab bars wherever needed to pull yourself up.    Arrange items that you use often. This will make them easier to find or reach.    [employer] last reviewed this educational content on 3/1/2020    3658-9111 [employer]. All rights reserved. This information is not intended as a substitute for professional medical care. Always follow your healthcare professional's instructions.

## 2022-07-27 NOTE — PROGRESS NOTES
"SUBJECTIVE:   Lola Partida is a 67 year old female who presents for Preventive Visit.      Patient has been advised of split billing requirements and indicates understanding: Yes  Are you in the first 12 months of your Medicare coverage?  Yes,  Visual Acuity:  Right Eye: 20/40   Left Eye: 20/40  Both Eyes: 20/25    Healthy Habits:     In general, how would you rate your overall health?  Excellent    Frequency of exercise:  1 day/week    Duration of exercise:  Less than 15 minutes    Do you usually eat at least 4 servings of fruit and vegetables a day, include whole grains    & fiber and avoid regularly eating high fat or \"junk\" foods?  No    Taking medications regularly:  Yes    Medication side effects:  Not applicable    Ability to successfully perform activities of daily living:  No assistance needed    Home Safety:  No safety concerns identified    Hearing Impairment:  No hearing concerns    In the past 6 months, have you been bothered by leaking of urine?  No    In general, how would you rate your overall mental or emotional health?  Excellent      PHQ-2 Total Score: 0    Additional concerns today:  No    Do you feel safe in your environment? Yes    Have you ever done Advance Care Planning? (For example, a Health Directive, POLST, or a discussion with a medical provider or your loved ones about your wishes): No, advance care planning information given to patient to review.  Advanced care planning was discussed at today's visit.       Fall risk  Fallen 2 or more times in the past year?: Yes  Any fall with injury in the past year?: Yes  Timed Up and Go Test (>13.5 is fall risk; contact physician) : 10    Cognitive Screening   1) Repeat 3 items (Leader, Season, Table)    2) Clock draw: NORMAL  3) 3 item recall: Recalls 3 objects  Results: 3 items recalled: COGNITIVE IMPAIRMENT LESS LIKELY    Mini-CogTM Copyright S Sandra. Licensed by the author for use in [facility]; reprinted with permission " ([email or web address]). All rights reserved.      Do you have sleep apnea, excessive snoring or daytime drowsiness?: no    Reviewed and updated as needed this visit by clinical staff   Tobacco  Allergies  Meds  Problems  Med Hx  Surg Hx  Fam Hx            Reviewed and updated as needed this visit by Provider   Tobacco  Allergies  Meds  Problems  Med Hx  Surg Hx  Fam Hx           Social History     Tobacco Use     Smoking status: Never Smoker     Smokeless tobacco: Never Used   Substance Use Topics     Alcohol use: Yes     Comment: per week, 2-3 drinks 1-2 times per week         Alcohol Use 7/27/2022   Prescreen: >3 drinks/day or >7 drinks/week? Yes   Prescreen: >3 drinks/day or >7 drinks/week? -   AUDIT SCORE  5         Hypertension Follow-up      Do you check your blood pressure regularly outside of the clinic? No     Are you following a low salt diet? Yes    Are your blood pressures ever more than 140 on the top number (systolic) OR more   than 90 on the bottom number (diastolic), for example 140/90? No      Current providers sharing in care for this patient include:   Patient Care Team:  Christine Young MD as PCP - General (Family Practice)  Buck Oconnor MD as Assigned Musculoskeletal Provider  Radha Martinez MD as Assigned Surgical Provider  Christine Young MD as Assigned PCP    The following health maintenance items are reviewed in Epic and correct as of today:  Health Maintenance Due   Topic Date Due     URINE DRUG SCREEN  Never done     Lab work is in process  Labs reviewed in EPIC  BP Readings from Last 3 Encounters:   07/27/22 138/84   07/13/22 122/70   06/08/22 (!) 144/81    Wt Readings from Last 3 Encounters:   07/27/22 87.1 kg (192 lb)   04/27/22 86 kg (189 lb 9.6 oz)   07/21/21 86.6 kg (191 lb)                  Patient Active Problem List   Diagnosis     Chronic rhinitis     Esophageal reflux     Menopause     Menopausal syndrome (hot flashes)     Bilateral leg cramps     Chronic radicular  low back pain     Hypertension goal BP (blood pressure) < 140/90     Status post total right knee replacement     Status post total left knee replacement     Past Surgical History:   Procedure Laterality Date     ARTHROSCOPY KNEE Right      COLONOSCOPY  ?     COLONOSCOPY N/A 7/13/2022    Procedure: COLONOSCOPY, FLEXIBLE, WITH LESION REMOVAL USING SNARE;  Surgeon: Valdo Lopez DO;  Location: [facility] OR     COLONOSCOPY WITH CO2 INSUFFLATION N/A 7/13/2022    Procedure: COLONOSCOPY, WITH CO2 INSUFFLATION;  Surgeon: Valdo Lopez DO;  Location: [facility] OR     HC TOOTH EXTRACTION W/FORCEP       LASIK      10 years ago     TOTAL KNEE ARTHROPLASTY Right      4/19/21     TUBAL LIGATION       vulvar biopsy  2005    [facility] III       Social History     Tobacco Use     Smoking status: Never Smoker     Smokeless tobacco: Never Used   Substance Use Topics     Alcohol use: Yes     Comment: per week, 2-3 drinks 1-2 times per week     Family History   Problem Relation Age of Onset     Cancer Father         liver     Hyperlipidemia Father      Prostate Cancer Father      Mental Illness Sister         Depression & anxiety     Colon Polyps Brother      Glaucoma No family hx of      Macular Degeneration No family hx of          Current Outpatient Medications   Medication Sig Dispense Refill     calcium 600 MG tablet Take 1 tablet by mouth 2 times daily       celecoxib (CELEBREX) 100 MG capsule Take 1 capsule (100 mg) by mouth 2 times daily 60 capsule 0     gabapentin (NEURONTIN) 300 MG capsule TAKE 2 CAPSULES BY MOUTH ONCE DAILY AT BEDTIME 180 capsule 3     magnesium 250 MG tablet Take 1 tablet by mouth daily       metoprolol succinate ER (TOPROL-XL) 25 MG 24 hr tablet Take 1 tablet (25 mg) by mouth daily 90 tablet 3     OMEPRAZOLE PO Take by mouth daily       potassium gluconate 2.5 MEQ tablet Take 90 mEq by mouth       amoxicillin (AMOXIL) 500 MG capsule Take four capsules by mouth 1 hour prior to dental procedure. (Patient not taking: No  "sig reported) 4 capsule 4     No Known Allergies  Pt is UTD on mammogram    Breast CA Risk Assessment (FHS-7) 3/26/2021   Do you have a family history of breast, colon, or ovarian cancer? No / Unknown       Review of Systems   Constitutional: Negative for chills and fever.   HENT: Negative for congestion, ear pain, hearing loss and sore throat.    Eyes: Negative for pain and visual disturbance.   Respiratory: Negative for cough and shortness of breath.    Cardiovascular: Negative for chest pain, palpitations and peripheral edema.   Gastrointestinal: Negative for abdominal pain, constipation, diarrhea, heartburn, hematochezia and nausea.   Breasts:  Negative for tenderness, breast mass and discharge.   Genitourinary: Negative for dysuria, frequency, genital sores, hematuria, pelvic pain, urgency, vaginal bleeding and vaginal discharge.   Musculoskeletal: Positive for myalgias. Negative for arthralgias and joint swelling.   Skin: Negative for rash.   Neurological: Negative for dizziness, weakness, headaches and paresthesias.   Psychiatric/Behavioral: Negative for mood changes. The patient is not nervous/anxious.    Rest of the ROS is Negative except see above and Problem list [stable]      OBJECTIVE:   /84   Pulse 71   Temp 98.1  F (36.7  C) (Tympanic)   Ht 1.698 m (5' 6.85\")   Wt 87.1 kg (192 lb)   SpO2 98%   BMI 30.21 kg/m   Estimated body mass index is 30.21 kg/m  as calculated from the following:    Height as of this encounter: 1.698 m (5' 6.85\").    Weight as of this encounter: 87.1 kg (192 lb).  Physical Exam  GENERAL APPEARANCE: healthy, alert and no distress  EYES: Eyes grossly normal to inspection, PERRL and conjunctivae and sclerae normal  HENT: ear canals and TM's normal, nose and mouth without ulcers or lesions, oropharynx clear and oral mucous membranes moist  NECK: no adenopathy, no asymmetry, masses, or scars and thyroid normal to palpation  RESP: lungs clear to auscultation - no rales, " rhonchi or wheezes  BREAST: normal without masses, tenderness or nipple discharge and no palpable axillary masses or adenopathy  CV: regular rate and rhythm, normal S1 S2, no S3 or S4, no murmur, click or rub, no peripheral edema and peripheral pulses strong  ABDOMEN: soft, nontender, no hepatosplenomegaly, no masses and bowel sounds normal  MS: no musculoskeletal defects are noted and gait is age appropriate without ataxia  SKIN: no suspicious lesions or rashes  NEURO: Normal strength and tone, sensory exam grossly normal, mentation intact and speech normal  PSYCH: mentation appears normal and affect normal/bright    Diagnostic Test Results:  Labs reviewed in Epic  Pending     ASSESSMENT / PLAN:   (Z00.00) Encounter for Medicare annual wellness exam  Comment:   Plan:     (Z79.899) Encounter for long-term (current) use of medications  Comment: stable   Plan:     (I10) Hypertension goal BP (blood pressure) < 140/90  Comment: controlled    (M19.011) Glenohumeral arthritis, right  Comment: refilled  Plan: celecoxib (CELEBREX) 100 MG capsule            (Z78.0) Asymptomatic postmenopausal status  Comment: advised   Plan: DX Hip/Pelvis/Spine          COUNSELING:  Reviewed preventive health counseling, as reflected in patient instructions       Regular exercise       Healthy diet/nutrition       Vision screening       Hearing screening       Dental care       Bladder control       Osteoporosis prevention/bone health       Colon cancer screening       The 10-year ASCVD risk score (Jossy REDDING Jr., et al., 2013) is: 8%    Values used to calculate the score:      Age: 67 years      Sex: Female      Is Non- : No      Diabetic: No      Tobacco smoker: No      Systolic Blood Pressure: 138 mmHg      Is BP treated: Yes      HDL Cholesterol: 71 mg/dL      Total Cholesterol: 214 mg/dL       Advanced Planning     Estimated body mass index is 30.21 kg/m  as calculated from the following:    Height as of this  "encounter: 1.698 m (5' 6.85\").    Weight as of this encounter: 87.1 kg (192 lb).        She reports that she has never smoked. She has never used smokeless tobacco.      Appropriate preventive services were discussed with this patient, including applicable screening as appropriate for cardiovascular disease, diabetes, osteopenia/osteoporosis, and glaucoma.  As appropriate for age/gender, discussed screening for colorectal cancer, prostate cancer, breast cancer, and cervical cancer. Checklist reviewing preventive services available has been given to the patient.    Reviewed patients plan of care and provided an AVS. The Intermediate Care Plan ( asthma action plan, low back pain action plan, and migraine action plan) for Lola meets the Care Plan requirement. This Care Plan has been established and reviewed with the Patient.    Counseling Resources:  ATP IV Guidelines  Pooled Cohorts Equation Calculator  Breast Cancer Risk Calculator  Breast Cancer: Medication to Reduce Risk  FRAX Risk Assessment  ICSI Preventive Guidelines  Dietary Guidelines for Americans, 2010  [employer]'s MyPlate  ASA Prophylaxis  Lung CA Screening    Christine Young MD  [facility]    Identified Health Risks:    She is at risk for lack of exercise and has been provided with information to increase physical activity for the benefit of her well-being.  The patient reports that she drinks more than one alcoholic drink per day but denies binge or excessive drinking. She was counseled and given information about possible harmful effects of excessive alcohol intake.  The patient was counseled and encouraged to consider modifying their diet and eating habits. She was provided with information on recommended healthy diet options.  She is at risk for falling and has been provided with information to reduce the risk of falling at home.  "

## 2022-08-26 ENCOUNTER — ANCILLARY PROCEDURE (OUTPATIENT)
Dept: BONE DENSITY | Facility: CLINIC | Age: 67
End: 2022-08-26
Attending: FAMILY MEDICINE
Payer: COMMERCIAL

## 2022-08-26 DIAGNOSIS — Z78.0 ASYMPTOMATIC POSTMENOPAUSAL STATUS: ICD-10-CM

## 2022-08-26 PROCEDURE — 77080 DXA BONE DENSITY AXIAL: CPT | Performed by: INTERNAL MEDICINE

## 2022-09-27 DIAGNOSIS — M19.011 GLENOHUMERAL ARTHRITIS, RIGHT: ICD-10-CM

## 2022-09-28 RX ORDER — CELECOXIB 100 MG/1
CAPSULE ORAL
Qty: 60 CAPSULE | Refills: 0 | Status: SHIPPED | OUTPATIENT
Start: 2022-09-28 | End: 2022-10-27

## 2022-10-27 DIAGNOSIS — M19.011 GLENOHUMERAL ARTHRITIS, RIGHT: ICD-10-CM

## 2022-10-27 RX ORDER — CELECOXIB 100 MG/1
CAPSULE ORAL
Qty: 60 CAPSULE | Refills: 0 | Status: SHIPPED | OUTPATIENT
Start: 2022-10-27 | End: 2022-11-26

## 2022-12-14 ENCOUNTER — IMMUNIZATION (OUTPATIENT)
Dept: FAMILY MEDICINE | Facility: CLINIC | Age: 67
End: 2022-12-14
Payer: COMMERCIAL

## 2022-12-14 DIAGNOSIS — Z23 NEED FOR PROPHYLACTIC VACCINATION AND INOCULATION AGAINST INFLUENZA: Primary | ICD-10-CM

## 2022-12-14 DIAGNOSIS — Z23 HIGH PRIORITY FOR 2019-NCOV VACCINE: ICD-10-CM

## 2022-12-14 PROCEDURE — 0134A COVID-19 VACCINE BIVALENT BOOSTER 18+ (MODERNA): CPT

## 2022-12-14 PROCEDURE — 91313 COVID-19 VACCINE BIVALENT BOOSTER 18+ (MODERNA): CPT

## 2022-12-14 PROCEDURE — 90662 IIV NO PRSV INCREASED AG IM: CPT

## 2022-12-14 PROCEDURE — 99207 PR NO CHARGE NURSE ONLY: CPT

## 2022-12-14 PROCEDURE — G0008 ADMIN INFLUENZA VIRUS VAC: HCPCS | Mod: 59

## 2022-12-24 DIAGNOSIS — M19.011 GLENOHUMERAL ARTHRITIS, RIGHT: ICD-10-CM

## 2023-02-01 ENCOUNTER — OFFICE VISIT (OUTPATIENT)
Dept: FAMILY MEDICINE | Facility: CLINIC | Age: 68
End: 2023-02-01
Payer: COMMERCIAL

## 2023-02-01 VITALS
WEIGHT: 195.4 LBS | BODY MASS INDEX: 30.67 KG/M2 | RESPIRATION RATE: 20 BRPM | TEMPERATURE: 97.7 F | HEIGHT: 67 IN | DIASTOLIC BLOOD PRESSURE: 72 MMHG | HEART RATE: 70 BPM | SYSTOLIC BLOOD PRESSURE: 130 MMHG | OXYGEN SATURATION: 99 %

## 2023-02-01 DIAGNOSIS — Z79.899 ENCOUNTER FOR LONG-TERM (CURRENT) USE OF MEDICATIONS: ICD-10-CM

## 2023-02-01 DIAGNOSIS — M54.16 CHRONIC RADICULAR LOW BACK PAIN: ICD-10-CM

## 2023-02-01 DIAGNOSIS — R25.2 BILATERAL LEG CRAMPS: ICD-10-CM

## 2023-02-01 DIAGNOSIS — G89.29 CHRONIC RADICULAR LOW BACK PAIN: ICD-10-CM

## 2023-02-01 DIAGNOSIS — K21.9 GASTROESOPHAGEAL REFLUX DISEASE, UNSPECIFIED WHETHER ESOPHAGITIS PRESENT: ICD-10-CM

## 2023-02-01 DIAGNOSIS — Z78.9 ALCOHOL USE: ICD-10-CM

## 2023-02-01 DIAGNOSIS — I10 HYPERTENSION GOAL BP (BLOOD PRESSURE) < 140/90: ICD-10-CM

## 2023-02-01 PROCEDURE — 99214 OFFICE O/P EST MOD 30 MIN: CPT | Performed by: FAMILY MEDICINE

## 2023-02-01 RX ORDER — CELECOXIB 100 MG/1
100 CAPSULE ORAL 2 TIMES DAILY
Qty: 180 CAPSULE | Refills: 0 | Status: SHIPPED | OUTPATIENT
Start: 2023-02-01 | End: 2023-05-01

## 2023-02-01 RX ORDER — GABAPENTIN 300 MG/1
CAPSULE ORAL
Qty: 180 CAPSULE | Refills: 3 | Status: SHIPPED | OUTPATIENT
Start: 2023-02-01 | End: 2023-10-29

## 2023-02-01 RX ORDER — METOPROLOL SUCCINATE 25 MG/1
25 TABLET, EXTENDED RELEASE ORAL DAILY
Qty: 90 TABLET | Refills: 3 | Status: SHIPPED | OUTPATIENT
Start: 2023-02-01 | End: 2024-01-15

## 2023-02-01 NOTE — PROGRESS NOTES
"  Assessment & Plan     Bilateral leg cramps  refilled  - gabapentin (NEURONTIN) 300 MG capsule; TAKE 2 CAPSULES BY MOUTH ONCE DAILY AT BEDTIME    Chronic radicular low back pain  Stable   - gabapentin (NEURONTIN) 300 MG capsule; TAKE 2 CAPSULES BY MOUTH ONCE DAILY AT BEDTIME    Hypertension goal BP (blood pressure) < 140/90  Stable   - metoprolol succinate ER (TOPROL XL) 25 MG 24 hr tablet; Take 1 tablet (25 mg) by mouth daily    Gastroesophageal reflux disease, unspecified whether esophagitis present  Discussed endoscopy  Stop gastric Irritants  Reduce alcohol to no more than 1 drink daily  - omeprazole (PRILOSEC) 20 MG DR capsule; Take 1 capsule (20 mg) by mouth daily  - Adult GI  Referral - Procedure Only; Future  Limit celebrex  Take Tylenol  Encounter for long-term (current) use of medications    BMI:   Estimated body mass index is 30.74 kg/m  as calculated from the following:    Height as of this encounter: 1.698 m (5' 6.85\").    Weight as of this encounter: 88.6 kg (195 lb 6.4 oz).       Regular exercise    Return in about 6 months (around 8/1/2023) for Medicare wellness Exam.    Christine Young MD  [facility] MELISSA Davila is a 68 year old accompanied by her self, presenting for the following health issues:  Recheck Medication      History of Present Illness       Reason for visit:  Medication update    She eats 0-1 servings of fruits and vegetables daily.She consumes 0 sweetened beverage(s) daily.She exercises with enough effort to increase her heart rate 9 or less minutes per day.  She exercises with enough effort to increase her heart rate 3 or less days per week.   She is taking medications regularly.       Hypertension Follow-up      Do you check your blood pressure regularly outside of the clinic? No     Are you following a low salt diet? Yes    Are your blood pressures ever more than 140 on the top number (systolic) OR more   than 90 on the bottom number " "(diastolic), for example 140/90? No    Pain History:  When did you first notice your pain? - Chronic Pain  And RLS  On gabapentin which helps  Have you seen this provider for your pain in the past?   Yes   Where in your body do you have pain? Shoulders  Are you seeing anyone else for your pain? Yes - Yes - Dr. Cristino Castro- Sport medicine     Pt also  chronic GERD  Wants to get PPI here  Has Not had EGD  Pt takes Celebrex daily  Drinks alcohol 3 days a week about 3 drinks at a time  No Other gastric Irritants    Review of Systems   CONSTITUTIONAL: NEGATIVE for fever, chills, change in weight  ENT/MOUTH: NEGATIVE for ear, mouth and throat problems  RESP: NEGATIVE for significant cough or SOB  CV: NEGATIVE for chest pain, palpitations or peripheral edema  GI: as above o/w Negative  PSYCHIATRIC: NEGATIVE for changes in mood or affect      Objective    /72   Pulse 70   Temp 97.7  F (36.5  C) (Oral)   Resp 20   Ht 1.698 m (5' 6.85\")   Wt 88.6 kg (195 lb 6.4 oz)   SpO2 99%   BMI 30.74 kg/m    Body mass index is 30.74 kg/m .  Physical Exam   GENERAL: healthy, alert and no distress  EYES: Eyes grossly normal to inspection  NECK: no adenopathy, no asymmetry, masses, or scars and thyroid normal to palpation  RESP: lungs clear to auscultation - no rales, rhonchi or wheezes  CV: regular rate and rhythm, normal S1 S2, no S3 or S4, no murmur, click or rub, no peripheral edema and peripheral pulses strong  ABDOMEN: soft, nontender, no hepatosplenomegaly, no masses and bowel sounds normal  MS: no gross musculoskeletal defects noted, no edema  PSYCH: mentation appears normal    Reviewed             "

## 2023-02-01 NOTE — PROGRESS NOTES
"  {PROVIDER CHARTING PREFERENCE:405372}    Komal Davila is a 68 year old accompanied by her self, presenting for the following health issues:  Recheck Medication      History of Present Illness       Reason for visit:  Medication update    She eats 0-1 servings of fruits and vegetables daily.She consumes 0 sweetened beverage(s) daily.She exercises with enough effort to increase her heart rate 9 or less minutes per day.  She exercises with enough effort to increase her heart rate 3 or less days per week.   She is taking medications regularly.       Hypertension Follow-up      Do you check your blood pressure regularly outside of the clinic? No     Are you following a low salt diet? Yes    Are your blood pressures ever more than 140 on the top number (systolic) OR more   than 90 on the bottom number (diastolic), for example 140/90? No    Pain History:  When did you first notice your pain? - Chronic Pain   Have you seen this provider for your pain in the past?   Yes   Where in your body do you have pain? Shoulders  Are you seeing anyone else for your pain? Yes - Yes - Dr. Cristino Castro- Sport medicine       {Rooming staff  Please complete PHQ assessment :399691}  {Rooming staff  Please complete GAD7 assessment :734945}  {Rooming staff  Please complete the PEG Assessment  Assess Pain, Function, and Quality of Life. Complete every pain visit :035609}    {After completing assessments, pull in flowsheet scores (Optional) :249107}    Chronic Pain Follow Up:    Location of pain: ***  Analgesia/pain control:    - Recent changes:  ***    - Overall control: {Pain control level :347777::\"Tolerable with discomfort\"}    - Current treatments: ***   Adherence:     - Do you ever take more pain medicine than prescribed? {Yes ***/No :446540}    - When did you take your last dose of pain medicine?  ***   Adverse effects: {Yes ***/No :583265}   PDMP Review     None        Last CSA Agreement:   CSA -- Patient Level:    CSA: None " "found at the patient level.       Last UDS:   {Provider  Link to Pain Management SmartSet  Includes non-opioid pharmacological medications and referrals  :206432}  {If newly prescribing or considering opioid therapy, the following assessments must be completed :773987}  {Pull in assessment results (Optional) :998797}  {additonal problems for provider to add (Optional):975215}    Review of Systems   {ROS COMP (Optional):756046}      Objective    BP (!) 180/89   Pulse 70   Temp 97.7  F (36.5  C) (Oral)   Resp 20   Ht 1.698 m (5' 6.85\")   Wt 88.6 kg (195 lb 6.4 oz)   SpO2 99%   BMI 30.74 kg/m    Body mass index is 30.74 kg/m .  Physical Exam   {Exam List (Optional):728901}    {Diagnostic Test Results (Optional):965724}    {AMBULATORY ATTESTATION (Optional):671490}            "

## 2023-02-04 ENCOUNTER — OFFICE VISIT (OUTPATIENT)
Dept: ORTHOPEDICS | Facility: CLINIC | Age: 68
End: 2023-02-04
Payer: COMMERCIAL

## 2023-02-04 DIAGNOSIS — M19.012 GLENOHUMERAL ARTHRITIS, LEFT: Primary | ICD-10-CM

## 2023-02-04 DIAGNOSIS — M54.12 CERVICAL RADICULAR PAIN: ICD-10-CM

## 2023-02-04 DIAGNOSIS — M19.011 GLENOHUMERAL ARTHRITIS, RIGHT: ICD-10-CM

## 2023-02-04 PROCEDURE — 2894A VOIDCORRECT: CPT | Performed by: PREVENTIVE MEDICINE

## 2023-02-04 PROCEDURE — 20611 DRAIN/INJ JOINT/BURSA W/US: CPT | Mod: 50 | Performed by: PREVENTIVE MEDICINE

## 2023-02-04 PROCEDURE — 99207 PR DROP WITH A PROCEDURE: CPT | Performed by: PREVENTIVE MEDICINE

## 2023-02-04 RX ORDER — METHYLPREDNISOLONE ACETATE 40 MG/ML
40 INJECTION, SUSPENSION INTRA-ARTICULAR; INTRALESIONAL; INTRAMUSCULAR; SOFT TISSUE
Status: SHIPPED | OUTPATIENT
Start: 2023-02-04

## 2023-02-04 RX ADMIN — METHYLPREDNISOLONE ACETATE 40 MG: 40 INJECTION, SUSPENSION INTRA-ARTICULAR; INTRALESIONAL; INTRAMUSCULAR; SOFT TISSUE at 12:20

## 2023-02-04 ASSESSMENT — PAIN SCALES - GENERAL: PAINLEVEL: WORST PAIN (10)

## 2023-02-04 NOTE — NURSING NOTE
[facility]   ORTHOPEDICS & SPORTS MEDICINE  [address] [place] [address]  Dept: [phone]  ______________________________________________________________________________    Patient: Lola Partida   : 1955   MRN: [id number]   2023    INVASIVE PROCEDURE SAFETY CHECKLIST    Date: 2023   Procedure: Bilateral shoulder injections  Patient Name: Lola Partida  MRN: [id number]  YOB: 1955    Action: Complete sections as appropriate. Any discrepancy results in a HARD COPY until resolved.     PRE PROCEDURE:  Patient ID verified with 2 identifiers (name and  or MRN): Yes  Procedure and site verified with patient/designee (when able): Yes  Accurate consent documentation in medical record: Yes  H&P (or appropriate assessment) documented in medical record: Yes  H&P must be up to 20 days prior to procedure and updates within 24 hours of procedure as applicable: NA  Relevant diagnostic and radiology test results appropriately labeled and displayed as applicable: NA  Procedure site(s) marked with provider initials: NA    TIMEOUT:  Time-Out performed immediately prior to starting procedure, including verbal and active participation of all team members addressing the following:Yes  * Correct patient identify  * Confirmed that the correct side and site are marked  * An accurate procedure consent form  * Agreement on the procedure to be done  * Correct patient position  * Relevant images and results are properly labeled and appropriately displayed  * The need to administer antibiotics or fluids for irrigation purposes during the procedure as applicable   * Safety precautions based on patient history or medication use    DURING PROCEDURE: Verification of correct person, site, and procedures any time the responsibility for care of the patient is transferred to another member of the care team.       Prior to injection, verified patient identity using patient's name and date of  birth.  Due to injection administration, patient instructed to remain in clinic for 15 minutes  afterwards, and to report any adverse reaction to me immediately.    Joint injection was performed.      Drug Amount Wasted:  None.  Vial/Syringe: Single dose vial  Expiration Date:  12/31/2023      Marcelo Yadav, EMT  February 4, 2023

## 2023-02-04 NOTE — PROGRESS NOTES
HISTORY OF PRESENT ILLNESS  Ms. Partida is a pleasant 68 year old year old female who presents to clinic today with   Lola explains that both of her shoulders have started to get more painful with reaching and lifting and overhead  Has had injection in the past for right shoulder GH joint that really improved her symptoms for over 6 months  Both shoulders bothering her now  Wants to discuss injections for both shoulders  Location: bilateral shoulders    Quality:  achy pain    Severity: 7/10 at worst      MEDICAL HISTORY  Patient Active Problem List   Diagnosis     Chronic rhinitis     Esophageal reflux     Menopause     Menopausal syndrome (hot flashes)     Bilateral leg cramps     Chronic radicular low back pain     Hypertension goal BP (blood pressure) < 140/90     Status post total right knee replacement     Status post total left knee replacement       Current Outpatient Medications   Medication Sig Dispense Refill     calcium 600 MG tablet Take 1 tablet by mouth 2 times daily       celecoxib (CELEBREX) 100 MG capsule Take 1 capsule (100 mg) by mouth 2 times daily 180 capsule 0     gabapentin (NEURONTIN) 300 MG capsule TAKE 2 CAPSULES BY MOUTH ONCE DAILY AT BEDTIME 180 capsule 3     magnesium 250 MG tablet Take 1 tablet by mouth daily       metoprolol succinate ER (TOPROL XL) 25 MG 24 hr tablet Take 1 tablet (25 mg) by mouth daily 90 tablet 3     omeprazole (PRILOSEC) 20 MG DR capsule Take 1 capsule (20 mg) by mouth daily 90 capsule 0     OMEPRAZOLE PO Take by mouth daily       potassium gluconate 2.5 MEQ tablet Take 90 mEq by mouth       amoxicillin (AMOXIL) 500 MG capsule Take four capsules by mouth 1 hour prior to dental procedure. (Patient not taking: Reported on 4/27/2022) 4 capsule 4     celecoxib (CELEBREX) 100 MG capsule Take 1 capsule (100 mg) by mouth 2 times daily 60 capsule 0       No Known Allergies    Family History   Problem Relation Age of Onset     Cancer Father         liver     Hyperlipidemia  Father      Prostate Cancer Father      Mental Illness Sister         Depression & anxiety     Colon Polyps Brother      Glaucoma No family hx of      Macular Degeneration No family hx of      Social History     Socioeconomic History     Marital status:      Spouse name: None     Number of children: 2     Years of education: None     Highest education level: None   Occupational History     Occupation: Office work   Tobacco Use     Smoking status: Never     Smokeless tobacco: Never   Substance and Sexual Activity     Alcohol use: Yes     Comment: per week, 2-3 drinks 1-2 times per week     Drug use: No     Sexual activity: Yes     Partners: Male     Birth control/protection: Female Surgical   Other Topics Concern     Parent/sibling w/ CABG, MI or angioplasty before 65F 55M? No       Additional medical/Social/Surgical histories reviewed in [facility] and updated as appropriate.     REVIEW OF SYSTEMS (2/4/2023)  10 point ROS of systems including Constitutional, Eyes, Respiratory, Cardiovascular, Gastroenterology, Genitourinary, Integumentary, Musculoskeletal, Psychiatric, Allergic/Immunologic were all negative except for pertinent positives noted in my HPI.     PHYSICAL EXAM  VSS    General  - normal appearance, in no obvious distress  HEENT  - conjunctivae not injected, moist mucous membranes, normocephalic/atraumatic head, ears normal appearance, no lesions, mouth normal appearance, no scars, normal dentition and teeth present  CV  - normal radial pulse  Pulm  - normal respiratory pattern, non-labored  Musculoskeletal - bilateral shoulder  - inspection: normal bone and joint alignment, no obvious deformity, no scapular winging, no AC step-off  - palpation: no bony or soft tissue tenderness, normal clavicle, non-tender AC  - ROM:  limited flexion, IR, ER, abduction, painful at end range  - strength: 5/5  strength, 5/5 in all shoulder planes  - special tests:  (-) Speed's  (-) Neer  (-) Hawkin's  (-) David's  (+)  [place]'s  (+) load & shift, supine  (-) apprehension  (-) subscap lift-off  Neuro  - no sensory or motor deficit, grossly normal coordination, normal muscle tone  Skin  - no ecchymosis, erythema, warmth, or induration, no obvious rash  Psych  - interactive, appropriate, normal mood and affect        ASSESSMENT & PLAN  67 yo female with bilateral shoulder chronic pain, GH arthritis, worse    I independently reviewed the following imaging studies:  Bilateral shoulder xrays: shows GH arthritis  After a 20 minute discussion and examination, we decided to perform a same day injection for diagnostic and therapeutic purposes for bilateral shoulder GH joints    Patient has been doing home exercise physical therapy program for this problem      Appropriate PPE was utilized for prevention of spread of Covid-19.  Cristino Castro MD, CAQSM  Bilateral Glenohumeral Injection - Ultrasound Guided  The patient was informed of the risks and the benefits of the procedure and a written consent was signed.  The patient s bilateral  shoulder was prepped with chlorhexidine in sterile fashion.   40 mg of methylprednisolone suspension was drawn up into a 5 mL syringe with 3 mL of 1% lidocaine w/o Epi.  Injection was performed using sterile technique.  Under ultrasound guidance a 3.5-inch 22-gauge needle was used to enter the right and left glenohumeral joint.  Posterior approach was used with the patient in lateral recumbent position, arm in neutral position at the side.  Needle placement was visualized and documented with ultrasound.  Ultrasound visualization was necessary due to the small joint space entered.  Injection performed long axis to the probe.  Injection solution visualized within the joint space.  Images were permanently stored for the patient's record.  There were no complications. The patient tolerated the procedure well. There was negligible bleeding.   Therapy scheduled to follow for mobilization.  Large Joint  Injection/Arthocentesis: bilateral glenohumeral    Date/Time: 2/4/2023 12:20 PM  Performed by: Cristino Castro MD  Authorized by: Cristino Castro MD     Indications:  Pain, diagnostic evaluation and osteoarthritis  Needle Size:  22 G  Guidance: ultrasound    Approach:  Posterolateral  Location:  Shoulder  Laterality:  Bilateral      Site:  Bilateral glenohumeral  Medications (Right):  40 mg methylPREDNISolone 40 MG/ML  Medications (Left):  40 mg methylPREDNISolone 40 MG/ML  Outcome:  Tolerated well, no immediate complications  Procedure discussed: discussed risks, benefits, and alternatives    Consent Given by:  Patient  Timeout: timeout called immediately prior to procedure    Prep: patient was prepped and draped in usual sterile fashion

## 2023-02-04 NOTE — LETTER
2/4/2023         RE: Lola Partida  [facility] [address]        Dear Colleague,    Thank you for referring your patient, Lola Partida, to the [facility] [place]. Please see a copy of my visit note below.    HISTORY OF PRESENT ILLNESS  Ms. Partida is a pleasant 68 year old year old female who presents to clinic today with   Lola explains that both of her shoulders have started to get more painful with reaching and lifting and overhead  Has had injection in the past for right shoulder GH joint that really improved her symptoms for over 6 months  Both shoulders bothering her now  Wants to discuss injections for both shoulders  Location: bilateral shoulders    Quality:  achy pain    Severity: 7/10 at worst      MEDICAL HISTORY  Patient Active Problem List   Diagnosis     Chronic rhinitis     Esophageal reflux     Menopause     Menopausal syndrome (hot flashes)     Bilateral leg cramps     Chronic radicular low back pain     Hypertension goal BP (blood pressure) < 140/90     Status post total right knee replacement     Status post total left knee replacement       Current Outpatient Medications   Medication Sig Dispense Refill     calcium 600 MG tablet Take 1 tablet by mouth 2 times daily       celecoxib (CELEBREX) 100 MG capsule Take 1 capsule (100 mg) by mouth 2 times daily 180 capsule 0     gabapentin (NEURONTIN) 300 MG capsule TAKE 2 CAPSULES BY MOUTH ONCE DAILY AT BEDTIME 180 capsule 3     magnesium 250 MG tablet Take 1 tablet by mouth daily       metoprolol succinate ER (TOPROL XL) 25 MG 24 hr tablet Take 1 tablet (25 mg) by mouth daily 90 tablet 3     omeprazole (PRILOSEC) 20 MG DR capsule Take 1 capsule (20 mg) by mouth daily 90 capsule 0     OMEPRAZOLE PO Take by mouth daily       potassium gluconate 2.5 MEQ tablet Take 90 mEq by mouth       amoxicillin (AMOXIL) 500 MG capsule Take four capsules by mouth 1 hour prior to dental procedure.  (Patient not taking: Reported on 4/27/2022) 4 capsule 4     celecoxib (CELEBREX) 100 MG capsule Take 1 capsule (100 mg) by mouth 2 times daily 60 capsule 0       No Known Allergies    Family History   Problem Relation Age of Onset     Cancer Father         liver     Hyperlipidemia Father      Prostate Cancer Father      Mental Illness Sister         Depression & anxiety     Colon Polyps Brother      Glaucoma No family hx of      Macular Degeneration No family hx of      Social History     Socioeconomic History     Marital status:      Spouse name: None     Number of children: 2     Years of education: None     Highest education level: None   Occupational History     Occupation: Office work   Tobacco Use     Smoking status: Never     Smokeless tobacco: Never   Substance and Sexual Activity     Alcohol use: Yes     Comment: per week, 2-3 drinks 1-2 times per week     Drug use: No     Sexual activity: Yes     Partners: Male     Birth control/protection: Female Surgical   Other Topics Concern     Parent/sibling w/ CABG, MI or angioplasty before 65F 55M? No       Additional medical/Social/Surgical histories reviewed in [facility] and updated as appropriate.     REVIEW OF SYSTEMS (2/4/2023)  10 point ROS of systems including Constitutional, Eyes, Respiratory, Cardiovascular, Gastroenterology, Genitourinary, Integumentary, Musculoskeletal, Psychiatric, Allergic/Immunologic were all negative except for pertinent positives noted in my HPI.     PHYSICAL EXAM  VSS    General  - normal appearance, in no obvious distress  HEENT  - conjunctivae not injected, moist mucous membranes, normocephalic/atraumatic head, ears normal appearance, no lesions, mouth normal appearance, no scars, normal dentition and teeth present  CV  - normal radial pulse  Pulm  - normal respiratory pattern, non-labored  Musculoskeletal - bilateral shoulder  - inspection: normal bone and joint alignment, no obvious deformity, no scapular winging, no AC  step-off  - palpation: no bony or soft tissue tenderness, normal clavicle, non-tender AC  - ROM:  limited flexion, IR, ER, abduction, painful at end range  - strength: 5/5  strength, 5/5 in all shoulder planes  - special tests:  (-) Speed's  (-) Neer  (-) Hawkin's  (-) David's  (+) [place]'s  (+) load & shift, supine  (-) apprehension  (-) subscap lift-off  Neuro  - no sensory or motor deficit, grossly normal coordination, normal muscle tone  Skin  - no ecchymosis, erythema, warmth, or induration, no obvious rash  Psych  - interactive, appropriate, normal mood and affect        ASSESSMENT & PLAN  67 yo female with bilateral shoulder chronic pain, GH arthritis, worse    I independently reviewed the following imaging studies:  Bilateral shoulder xrays: shows GH arthritis  After a 20 minute discussion and examination, we decided to perform a same day injection for diagnostic and therapeutic purposes for bilateral shoulder GH joints    Patient has been doing home exercise physical therapy program for this problem      Appropriate PPE was utilized for prevention of spread of Covid-19.  Cristino Castro MD, CAQSM  Bilateral Glenohumeral Injection - Ultrasound Guided  The patient was informed of the risks and the benefits of the procedure and a written consent was signed.  The patient s bilateral  shoulder was prepped with chlorhexidine in sterile fashion.   40 mg of methylprednisolone suspension was drawn up into a 5 mL syringe with 3 mL of 1% lidocaine w/o Epi.  Injection was performed using sterile technique.  Under ultrasound guidance a 3.5-inch 22-gauge needle was used to enter the right and left glenohumeral joint.  Posterior approach was used with the patient in lateral recumbent position, arm in neutral position at the side.  Needle placement was visualized and documented with ultrasound.  Ultrasound visualization was necessary due to the small joint space entered.  Injection performed long axis to the probe.   Injection solution visualized within the joint space.  Images were permanently stored for the patient's record.  There were no complications. The patient tolerated the procedure well. There was negligible bleeding.   Therapy scheduled to follow for mobilization.  Large Joint Injection/Arthocentesis: bilateral glenohumeral    Date/Time: 2/4/2023 12:20 PM  Performed by: Cristino Castro MD  Authorized by: Cristino Castro MD     Indications:  Pain, diagnostic evaluation and osteoarthritis  Needle Size:  22 G  Guidance: ultrasound    Approach:  Posterolateral  Location:  Shoulder  Laterality:  Bilateral      Site:  Bilateral glenohumeral  Medications (Right):  40 mg methylPREDNISolone 40 MG/ML  Medications (Left):  40 mg methylPREDNISolone 40 MG/ML  Outcome:  Tolerated well, no immediate complications  Procedure discussed: discussed risks, benefits, and alternatives    Consent Given by:  Patient  Timeout: timeout called immediately prior to procedure    Prep: patient was prepped and draped in usual sterile fashion                  Again, thank you for allowing me to participate in the care of your patient.        Sincerely,        Cristino Castro MD

## 2023-02-04 NOTE — NURSING NOTE
Chief Complaint   Patient presents with     Right Shoulder - Pain, Follow Up     Left Shoulder - Pain, Follow Up       There were no vitals filed for this visit.    There is no height or weight on file to calculate BMI.      LALA Robertson NREMT

## 2023-02-22 ENCOUNTER — TELEPHONE (OUTPATIENT)
Dept: GASTROENTEROLOGY | Facility: CLINIC | Age: 68
End: 2023-02-22
Payer: COMMERCIAL

## 2023-02-22 NOTE — TELEPHONE ENCOUNTER
Screening Questions  BLUE  KIND OF PREP RED  LOCATION [review exclusion criteria] GREEN  SEDATION TYPE        Y Are you active on mychart?       MICHELLE MCNAIR Ordering/Referring Provider?        MEDICARE UHC What type of coverage do you have?      N Have you had a positive covid test in the last 14 days?     31.5 1. BMI  [BMI 40+ - review exclusion criteria]    Y  2. Are you able to give consent for your medical care? [IF NO,RN REVIEW]          N  3. Are you taking any prescription pain medications on a routine schedule   (ex narcotics: oxycodone, roxicodone, oxycontin,  and percocet)? [RN Review]          3a. EXTENDED PREP What kind of prescription?     N 4. Do you have any chemical dependencies such as alcohol, street drugs, or methadone?        **If yes 3- 5 , please schedule with MAC sedation.**          IF YES TO ANY 6 - 10 - HOSPITAL SETTING ONLY.     N 6.   Do you need assistance transferring?     N 7.   Have you had a heart or lung transplant?    N 8.   Are you currently on dialysis?   N 9.   Do you use daily home oxygen?   N 10. Do you take nitroglycerin?   10a.  If yes, how often?     11. [FEMALES]   Are you currently pregnant?    11a.  If yes, how many weeks? [ Greater than 12 weeks, OR NEEDED]    N 12. Do you have Pulmonary Hypertension? *NEED PAC APPT AT [facility] w/ MAC*     N 13. [review exclusion criteria]  Do you have any implantable devices in your body (pacemaker, defib, LVAD)?    N 14. In the past 6 months, have you had any heart related issues including cardiomyopathy or heart attack?     14a.  If yes, did it require cardiac stenting if so when?     N 15. Have you had a stroke or Transient ischemic attack (TIA - aka  mini stroke ) within 6 months?      N 16. Do you have mod to severe Obstructive Sleep Apnea?  [Hospital only]    N 17. Do you have SEVERE AND UNCONTROLLED asthma? *NEED PAC APPT AT [facility] w/MAC*     N 18. Are you currently taking any blood thinners?     18a. If yes, inform patient to  "\"follow up w/ ordering provider for bridging instructions.\"    N 19. Do you take the medication Phentermine?    19a. If yes, \"Hold for 7 days before procedure.  Please consult your prescribing provider if you have questions about holding this medication.\"     N  20. Do you have chronic kidney disease?      N  21. Do you have a diagnosis of diabetes?       22. On a regular basis do you go 3-5 days between bowel movements?      23. Preferred LOCAL Pharmacy for Pre Prescription    [ LIST ONLY ONE PHARMACY]        [facility] [place] - [address] [facility]    PLEASE CALL FOR INSTRUCTIONS ON A WED OR Friday PER PT          - CLOSING REMINDERS -    Informed patient they will need an adult    Cannot take any type of public or medical transportation alone    Conscious Sedation- Needs  for 6 hours after the procedure       MAC/General-Needs  for 24 hours after procedure    Pre-Procedure Covid test to be completed [[facility] PCR Testing Required]    Confirmed Nurse will call to complete assessment       - SCHEDULING DETAILS -  NO Hospital Setting Required? If yes, what is the exclusion?:    JANICE  Surgeon    4/21  Date of Procedure  Upper Endoscopy [EGD]  Type of Procedure Scheduled  [facility]   Which Colonoscopy Prep was Sent?     CS Sedation Type     NO PAC / Pre-op Required                 "

## 2023-03-24 RX ORDER — CELECOXIB 100 MG/1
100 CAPSULE ORAL 2 TIMES DAILY
Qty: 60 CAPSULE | Refills: 0 | OUTPATIENT
Start: 2023-03-24

## 2023-04-07 ENCOUNTER — TELEPHONE (OUTPATIENT)
Dept: GASTROENTEROLOGY | Facility: CLINIC | Age: 68
End: 2023-04-07

## 2023-04-07 NOTE — TELEPHONE ENCOUNTER
"Attempted to contact patient regarding upcoming Upper endoscopy (EGD) procedure on 4/21/23 for pre assessment questions. No answer.     Left message to return call to [phone] #4    Discuss Covid policy and designated  policy.    Pre op exam? N/A    Arrival time: 1055. Procedure time: 1140    Facility location: [facility]; [address], [place] [address]    Sedation type: Conscious sedation  - Patient did not tolerate conscious sedation well with a colonoscopy procedure in the past -7/13/22: \"excessive discomfort during the procedure\". Discuss with patient if they are OK with conscious sedation for the upcoming EGD or if would prefer MAC?    Anticoagulants: No    Electronic implanted devices? No    Diabetic? No    Indication for procedure: GERD    Prep instructions previously sent via [employer] by scheduling team.       Annalise Jimenez RN  Endoscopy Procedure Pre Assessment RN          "

## 2023-04-12 NOTE — TELEPHONE ENCOUNTER
"Patient returned call.     Pre assessment questions completed for upcoming Upper endoscopy (EGD) procedure scheduled on 4/21/23    COVID policy reviewed.     Pre-op exam? N/A    Reviewed procedural arrival time 1055, procedure time 1055 and facility location [facility]; [address], [place] [address]    Designated  policy reviewed. Instructed to have someone stay 6 hours post procedure.     Reviewed procedure prep instructions.     Patient is aware that procedure is scheduled under CS. Per patient they do not remember previous colonoscopy with CS or that it was difficult. Reported \"I thought it went fine\".  Patient would like to proceed with CS for upcoming EGD.    Patient verbalized understanding and had no questions or concerns at this time.    Nasrin Gibsno RN  Endoscopy Procedure Pre Assessment RN      "

## 2023-04-21 ENCOUNTER — HOSPITAL ENCOUNTER (OUTPATIENT)
Facility: AMBULATORY SURGERY CENTER | Age: 68
Discharge: HOME | End: 2023-04-21
Attending: INTERNAL MEDICINE | Admitting: INTERNAL MEDICINE
Payer: COMMERCIAL

## 2023-04-21 VITALS
RESPIRATION RATE: 16 BRPM | TEMPERATURE: 97 F | DIASTOLIC BLOOD PRESSURE: 83 MMHG | OXYGEN SATURATION: 95 % | SYSTOLIC BLOOD PRESSURE: 149 MMHG | HEART RATE: 65 BPM

## 2023-04-21 LAB — UPPER GI ENDOSCOPY: NORMAL

## 2023-04-21 PROCEDURE — G8918 PT W/O PREOP ORDER IV AB PRO: HCPCS

## 2023-04-21 PROCEDURE — G8907 PT DOC NO EVENTS ON DISCHARG: HCPCS

## 2023-04-21 PROCEDURE — 43235 EGD DIAGNOSTIC BRUSH WASH: CPT

## 2023-04-21 RX ORDER — PROCHLORPERAZINE MALEATE 5 MG
5 TABLET ORAL EVERY 6 HOURS PRN
Status: DISCONTINUED | OUTPATIENT
Start: 2023-04-21 | End: 2023-04-22 | Stop reason: HOSPADM

## 2023-04-21 RX ORDER — NALOXONE HYDROCHLORIDE 0.4 MG/ML
0.2 INJECTION, SOLUTION INTRAMUSCULAR; INTRAVENOUS; SUBCUTANEOUS
Status: DISCONTINUED | OUTPATIENT
Start: 2023-04-21 | End: 2023-04-22 | Stop reason: HOSPADM

## 2023-04-21 RX ORDER — ONDANSETRON 2 MG/ML
4 INJECTION INTRAMUSCULAR; INTRAVENOUS EVERY 6 HOURS PRN
Status: DISCONTINUED | OUTPATIENT
Start: 2023-04-21 | End: 2023-04-22 | Stop reason: HOSPADM

## 2023-04-21 RX ORDER — ONDANSETRON 4 MG/1
4 TABLET, ORALLY DISINTEGRATING ORAL EVERY 6 HOURS PRN
Status: DISCONTINUED | OUTPATIENT
Start: 2023-04-21 | End: 2023-04-22 | Stop reason: HOSPADM

## 2023-04-21 RX ORDER — ONDANSETRON 2 MG/ML
4 INJECTION INTRAMUSCULAR; INTRAVENOUS
Status: DISCONTINUED | OUTPATIENT
Start: 2023-04-21 | End: 2023-04-22 | Stop reason: HOSPADM

## 2023-04-21 RX ORDER — FLUMAZENIL 0.1 MG/ML
0.2 INJECTION, SOLUTION INTRAVENOUS
Status: ACTIVE | OUTPATIENT
Start: 2023-04-21 | End: 2023-04-21

## 2023-04-21 RX ORDER — FENTANYL CITRATE 50 UG/ML
INJECTION, SOLUTION INTRAMUSCULAR; INTRAVENOUS PRN
Status: DISCONTINUED | OUTPATIENT
Start: 2023-04-21 | End: 2023-04-21 | Stop reason: HOSPADM

## 2023-04-21 RX ORDER — NALOXONE HYDROCHLORIDE 0.4 MG/ML
0.4 INJECTION, SOLUTION INTRAMUSCULAR; INTRAVENOUS; SUBCUTANEOUS
Status: DISCONTINUED | OUTPATIENT
Start: 2023-04-21 | End: 2023-04-22 | Stop reason: HOSPADM

## 2023-04-21 RX ORDER — LIDOCAINE 40 MG/G
CREAM TOPICAL
Status: DISCONTINUED | OUTPATIENT
Start: 2023-04-21 | End: 2023-04-22 | Stop reason: HOSPADM

## 2023-04-21 NOTE — H&P
"[facility] Anesthesia Pre-op History and Physical    Lola Partida MRN# [id number]   Age: 68 year old YOB: 1955            Date of Exam 4/21/2023           Primary care provider: Christine Young         Chief Complaint and/or Reason for Procedure:     Reflux recurrent despite therapy         Active problem list:     Patient Active Problem List    Diagnosis Date Noted     Status post total right knee replacement 04/30/2021     Priority: Medium     Status post total left knee replacement 04/30/2021     Priority: Medium     Hypertension goal BP (blood pressure) < 140/90 08/03/2017     Priority: Medium     [facility] research study patient : New diagnosis HTN arm of the study  First study visit 8/3/17     Patients will have regular blood pressure medication adjustments made by [facility] team. Under non-emergent/urgent situations where a change in blood pressure medication adjustment is being considered outside of a study visit, please contact the [facility] study team at [email or web address] and they will refer to the [facility] RN HTN MGMT standing order (under 'other orders' ) to guide medication selection. This standing order reflects the IRB approved blood pressure treatment protocol for this study.    Patient should NOT be provided their genetic profile results until the end of the study (this is a single blinded study.   Patients will remain blinded to results during the study but will be given this information at the end of the study)    Click on Research \"Active\" in header for more details about the study protocols    [facility] study team (Marilyn Carrillo MD)            Bilateral leg cramps 05/25/2016     Priority: Medium     Chronic radicular low back pain 05/25/2016     Priority: Medium     Chronic rhinitis 08/28/2015     Priority: Medium     Esophageal reflux 08/28/2015     Priority: Medium     Menopause 08/28/2015     Priority: Medium     Menopausal syndrome (hot flashes) 08/28/2015     Priority: Medium "            Medications (include herbals and vitamins):   Any Plavix use in the last 7 days? No     Current Outpatient Medications   Medication Sig     calcium 600 MG tablet Take 1 tablet by mouth 2 times daily     celecoxib (CELEBREX) 100 MG capsule Take 1 capsule (100 mg) by mouth 2 times daily     gabapentin (NEURONTIN) 300 MG capsule TAKE 2 CAPSULES BY MOUTH ONCE DAILY AT BEDTIME     magnesium 250 MG tablet Take 1 tablet by mouth daily     metoprolol succinate ER (TOPROL XL) 25 MG 24 hr tablet Take 1 tablet (25 mg) by mouth daily     omeprazole (PRILOSEC) 20 MG DR capsule Take 1 capsule (20 mg) by mouth daily     potassium gluconate 2.5 MEQ tablet Take 90 mEq by mouth     tiZANidine (ZANAFLEX) 4 MG tablet Take 1-2 tablets (4-8 mg) by mouth nightly as needed     amoxicillin (AMOXIL) 500 MG capsule Take four capsules by mouth 1 hour prior to dental procedure. (Patient not taking: Reported on 4/27/2022)     celecoxib (CELEBREX) 100 MG capsule Take 1 capsule (100 mg) by mouth 2 times daily     OMEPRAZOLE PO Take by mouth daily     Current Facility-Administered Medications   Medication     betamethasone acet & sod phos (CELESTONE) injection 6 mg     cross-Linked Hyaluronate (GEL-ONE) injection PRSY 30 mg     lidocaine (LMX4) kit     lidocaine 1 % 0.1-1 mL     lidocaine 1 % injection 4 mL     methylPREDNISolone (DEPO-MEDROL) injection 40 mg     methylPREDNISolone (DEPO-MEDROL) injection 40 mg     methylPREDNISolone (DEPO-MEDROL) injection 40 mg     methylPREDNISolone (DEPO-MEDROL) injection 40 mg     methylPREDNISolone (DEPO-MEDROL) injection 40 mg     methylPREDNISolone acetate (DEPO-MEDROL) injection 80 mg     ondansetron (ZOFRAN) injection 4 mg     ropivacaine (NAROPIN) injection 1 mL     sodium chloride (PF) 0.9% PF flush 3 mL     sodium chloride (PF) 0.9% PF flush 3 mL             Allergies:    No Known Allergies  Allergy to Latex? No  Allergy to tape?   No  Intolerances:             Physical Exam:   All vitals  have been reviewed  Patient Vitals for the past 8 hrs:   BP Temp Temp src Pulse Resp SpO2   04/21/23 1048 (!) 182/88 97  F (36.1  C) Temporal 72 16 98 %     No intake/output data recorded.  Lungs:   No increased work of breathing, good air exchange, clear to auscultation bilaterally, no crackles or wheezing     Cardiovascular:   normal S1 and S2             Lab / Radiology Results:            Anesthetic risk and/or ASA classification:     2  Marli Briceno DO

## 2023-04-26 ENCOUNTER — ANCILLARY ORDERS (OUTPATIENT)
Dept: FAMILY MEDICINE | Facility: CLINIC | Age: 68
End: 2023-04-26

## 2023-04-26 DIAGNOSIS — Z12.31 VISIT FOR SCREENING MAMMOGRAM: ICD-10-CM

## 2023-05-12 ENCOUNTER — ANCILLARY PROCEDURE (OUTPATIENT)
Dept: MAMMOGRAPHY | Facility: CLINIC | Age: 68
End: 2023-05-12
Attending: FAMILY MEDICINE
Payer: COMMERCIAL

## 2023-05-12 DIAGNOSIS — Z12.31 VISIT FOR SCREENING MAMMOGRAM: ICD-10-CM

## 2023-05-12 PROCEDURE — 77067 SCR MAMMO BI INCL CAD: CPT | Mod: TC | Performed by: STUDENT IN AN ORGANIZED HEALTH CARE EDUCATION/TRAINING PROGRAM

## 2023-06-09 DIAGNOSIS — I10 HYPERTENSION GOAL BP (BLOOD PRESSURE) < 140/90: Primary | ICD-10-CM

## 2023-06-09 DIAGNOSIS — G89.29 CHRONIC RADICULAR LOW BACK PAIN: ICD-10-CM

## 2023-06-09 DIAGNOSIS — M19.011 GLENOHUMERAL ARTHRITIS, RIGHT: ICD-10-CM

## 2023-06-09 DIAGNOSIS — M54.16 CHRONIC RADICULAR LOW BACK PAIN: ICD-10-CM

## 2023-06-09 RX ORDER — CELECOXIB 100 MG/1
100 CAPSULE ORAL 2 TIMES DAILY
Qty: 60 CAPSULE | Refills: 0 | OUTPATIENT
Start: 2023-06-09

## 2023-06-12 NOTE — TELEPHONE ENCOUNTER
Patient informed declined to schedule at this time she will call back to schedule.needs lab work, wellness do on or after 7/27/23.  Berta Montalvo MA

## 2023-07-06 RX ORDER — CELECOXIB 100 MG/1
100 CAPSULE ORAL 2 TIMES DAILY
Qty: 60 CAPSULE | Refills: 0 | OUTPATIENT
Start: 2023-07-06

## 2023-07-06 NOTE — TELEPHONE ENCOUNTER
Patient calling, states that she has scheduled wellness appointment 8/2/23 as instructed.    States that she is currently out of Celebrex and asking for charlene refill until appointment if appropriate?    Medication/pharmacy cued if appropriate.    Thanks,  GIA Martino RN  [facility]

## 2023-07-10 NOTE — TELEPHONE ENCOUNTER
Patient is out of medication and would like this asap.    She is willing to schedule a lab appointment as RN discussed providers note below. RN reviewed labs, there is no order placed.     RN routing to PCP to place lab order so patient can schedule and get the refill.    Please call patient when lab order is placed to assist with scheduling.       Kristyn Yao RN on 7/10/2023 at 11:47 AM

## 2023-07-11 RX ORDER — CELECOXIB 100 MG/1
100 CAPSULE ORAL 2 TIMES DAILY
Qty: 60 CAPSULE | Refills: 11 | Status: SHIPPED | OUTPATIENT
Start: 2023-07-11 | End: 2023-08-02

## 2023-07-11 NOTE — TELEPHONE ENCOUNTER
Patient calling, says she wants to make her lab appointment but there are no lab orders placed.   Did not get charlene fill of celebrex.    She is scheduled for annual visit on 8/2/23, but says she scheduled that weeks ago, so needs to do a lab visit now per Dr. Young's note in order to refill her celebrex.    I scheduled her for lab visit tomorrow to give provider time to put in future orders.    Routed high priority to Dr. Young and sent message to team to ask them to alert Dr. Young to do this before tomorrow.    Heaven Cortez RN  [facility]

## 2023-07-14 ENCOUNTER — LAB (OUTPATIENT)
Dept: LAB | Facility: CLINIC | Age: 68
End: 2023-07-14
Payer: COMMERCIAL

## 2023-07-14 DIAGNOSIS — M19.011 GLENOHUMERAL ARTHRITIS, RIGHT: ICD-10-CM

## 2023-07-14 DIAGNOSIS — M54.16 CHRONIC RADICULAR LOW BACK PAIN: ICD-10-CM

## 2023-07-14 DIAGNOSIS — G89.29 CHRONIC RADICULAR LOW BACK PAIN: ICD-10-CM

## 2023-07-14 DIAGNOSIS — I10 HYPERTENSION GOAL BP (BLOOD PRESSURE) < 140/90: ICD-10-CM

## 2023-07-14 LAB
ALT SERPL W P-5'-P-CCNC: 26 U/L (ref 0–50)
ANION GAP SERPL CALCULATED.3IONS-SCNC: 13 MMOL/L (ref 7–15)
AST SERPL W P-5'-P-CCNC: 30 U/L (ref 0–45)
BUN SERPL-MCNC: 18.8 MG/DL (ref 8–23)
CALCIUM SERPL-MCNC: 9.5 MG/DL (ref 8.8–10.2)
CHLORIDE SERPL-SCNC: 104 MMOL/L (ref 98–107)
CHOLEST SERPL-MCNC: 211 MG/DL
CREAT SERPL-MCNC: 0.89 MG/DL (ref 0.51–0.95)
DEPRECATED HCO3 PLAS-SCNC: 25 MMOL/L (ref 22–29)
ERYTHROCYTE [DISTWIDTH] IN BLOOD BY AUTOMATED COUNT: 12.2 % (ref 10–15)
GFR SERPL CREATININE-BSD FRML MDRD: 71 ML/MIN/1.73M2
GLUCOSE SERPL-MCNC: 92 MG/DL (ref 70–99)
HCT VFR BLD AUTO: 38.6 % (ref 35–47)
HDLC SERPL-MCNC: 69 MG/DL
HGB BLD-MCNC: 13 G/DL (ref 11.7–15.7)
LDLC SERPL CALC-MCNC: 111 MG/DL
MCH RBC QN AUTO: 32.3 PG (ref 26.5–33)
MCHC RBC AUTO-ENTMCNC: 33.7 G/DL (ref 31.5–36.5)
MCV RBC AUTO: 96 FL (ref 78–100)
NONHDLC SERPL-MCNC: 142 MG/DL
PLATELET # BLD AUTO: 214 10E3/UL (ref 150–450)
POTASSIUM SERPL-SCNC: 4.3 MMOL/L (ref 3.4–5.3)
RBC # BLD AUTO: 4.03 10E6/UL (ref 3.8–5.2)
SODIUM SERPL-SCNC: 142 MMOL/L (ref 136–145)
TRIGL SERPL-MCNC: 153 MG/DL
WBC # BLD AUTO: 7.5 10E3/UL (ref 4–11)

## 2023-07-14 PROCEDURE — 80048 BASIC METABOLIC PNL TOTAL CA: CPT

## 2023-07-14 PROCEDURE — 80061 LIPID PANEL: CPT

## 2023-07-14 PROCEDURE — 85027 COMPLETE CBC AUTOMATED: CPT

## 2023-07-14 PROCEDURE — 84460 ALANINE AMINO (ALT) (SGPT): CPT

## 2023-07-14 PROCEDURE — 84450 TRANSFERASE (AST) (SGOT): CPT

## 2023-07-14 PROCEDURE — 36415 COLL VENOUS BLD VENIPUNCTURE: CPT

## 2023-07-24 DIAGNOSIS — K21.9 GASTROESOPHAGEAL REFLUX DISEASE, UNSPECIFIED WHETHER ESOPHAGITIS PRESENT: ICD-10-CM

## 2023-08-02 ENCOUNTER — OFFICE VISIT (OUTPATIENT)
Dept: FAMILY MEDICINE | Facility: CLINIC | Age: 68
End: 2023-08-02
Payer: COMMERCIAL

## 2023-08-02 VITALS
TEMPERATURE: 98 F | HEART RATE: 70 BPM | HEIGHT: 67 IN | DIASTOLIC BLOOD PRESSURE: 75 MMHG | OXYGEN SATURATION: 98 % | BODY MASS INDEX: 29.98 KG/M2 | WEIGHT: 191 LBS | SYSTOLIC BLOOD PRESSURE: 134 MMHG

## 2023-08-02 DIAGNOSIS — Z79.1 LONG TERM (CURRENT) USE OF NON-STEROIDAL ANTI-INFLAMMATORIES (NSAID): ICD-10-CM

## 2023-08-02 DIAGNOSIS — E78.5 HYPERLIPIDEMIA, UNSPECIFIED HYPERLIPIDEMIA TYPE: ICD-10-CM

## 2023-08-02 DIAGNOSIS — Z00.00 ENCOUNTER FOR MEDICARE ANNUAL WELLNESS EXAM: Primary | ICD-10-CM

## 2023-08-02 DIAGNOSIS — L98.9 SKIN LESION: ICD-10-CM

## 2023-08-02 DIAGNOSIS — M19.011 GLENOHUMERAL ARTHRITIS, RIGHT: ICD-10-CM

## 2023-08-02 LAB
HGB BLD-MCNC: 13 G/DL (ref 11.7–15.7)
TSH SERPL DL<=0.005 MIU/L-ACNC: 0.5 UIU/ML (ref 0.3–4.2)

## 2023-08-02 PROCEDURE — 85018 HEMOGLOBIN: CPT | Performed by: FAMILY MEDICINE

## 2023-08-02 PROCEDURE — 36415 COLL VENOUS BLD VENIPUNCTURE: CPT | Performed by: FAMILY MEDICINE

## 2023-08-02 PROCEDURE — 84443 ASSAY THYROID STIM HORMONE: CPT | Performed by: FAMILY MEDICINE

## 2023-08-02 PROCEDURE — G0438 PPPS, INITIAL VISIT: HCPCS | Performed by: FAMILY MEDICINE

## 2023-08-02 RX ORDER — PRAVASTATIN SODIUM 10 MG
10 TABLET ORAL DAILY
Qty: 30 TABLET | Refills: 11 | Status: SHIPPED | OUTPATIENT
Start: 2023-08-02 | End: 2023-12-30

## 2023-08-02 RX ORDER — CELECOXIB 100 MG/1
100 CAPSULE ORAL 2 TIMES DAILY
Qty: 90 CAPSULE | Refills: 3 | Status: SHIPPED | OUTPATIENT
Start: 2023-08-02 | End: 2023-12-16

## 2023-08-02 ASSESSMENT — ENCOUNTER SYMPTOMS
SORE THROAT: 0
HEADACHES: 0
CHILLS: 0
DIARRHEA: 0
ARTHRALGIAS: 0
HEARTBURN: 0
DYSURIA: 0
EYE PAIN: 0
PALPITATIONS: 0
FEVER: 0
DIZZINESS: 0
CONSTIPATION: 0
FREQUENCY: 0
ABDOMINAL PAIN: 0
SHORTNESS OF BREATH: 0
HEMATOCHEZIA: 0
MYALGIAS: 1
HEMATURIA: 0
NERVOUS/ANXIOUS: 0
WEAKNESS: 0
NAUSEA: 0
BREAST MASS: 0
COUGH: 0
PARESTHESIAS: 0
JOINT SWELLING: 0
HEARTBURN: 1

## 2023-08-02 ASSESSMENT — PAIN SCALES - GENERAL: PAINLEVEL: NO PAIN (0)

## 2023-08-02 ASSESSMENT — ACTIVITIES OF DAILY LIVING (ADL): CURRENT_FUNCTION: NO ASSISTANCE NEEDED

## 2023-08-02 NOTE — PROGRESS NOTES
"SUBJECTIVE:   Lola is a 68 year old who presents for Preventive Visit.      8/2/2023    11:07 AM   Additional Questions   Roomed by Berta       Are you in the first 12 months of your Medicare coverage?  No    Healthy Habits:     In general, how would you rate your overall health?  Excellent    Frequency of exercise:  None    Do you usually eat at least 4 servings of fruit and vegetables a day, include whole grains    & fiber and avoid regularly eating high fat or \"junk\" foods?  No    Taking medications regularly:  Yes    Medication side effects:  Muscle aches    Ability to successfully perform activities of daily living:  No assistance needed    Home Safety:  No safety concerns identified    Hearing Impairment:  No hearing concerns    In the past 6 months, have you been bothered by leaking of urine? Yes    In general, how would you rate your overall mental or emotional health?  Excellent    Additional concerns today:  No        Have you ever done Advance Care Planning? (For example, a Health Directive, POLST, or a discussion with a medical provider or your loved ones about your wishes): No, advance care planning information given to patient to review.  Advanced care planning was discussed at today's visit.       Fall risk  Fallen 2 or more times in the past year?: No  Any fall with injury in the past year?: No    Cognitive Screening   1) Repeat 3 items (Leader, Season, Table)    2) Clock draw: NORMAL  3) 3 item recall: Recalls 3 objects  Results: 3 items recalled: COGNITIVE IMPAIRMENT LESS LIKELY    Mini-CogTM Copyright S Sandra. Licensed by the author for use in [facility]; reprinted with permission ([email or web address]@.[email or web address]). All rights reserved.          Reviewed and updated as needed this visit by clinical staff    Allergies  Meds              Reviewed and updated as needed this visit by Provider                 Social History     Tobacco Use     Smoking status: Never     Smokeless tobacco: Never   Substance " Use Topics     Alcohol use: Yes     Comment: per week, 2-3 drinks 1-2 times per week             8/2/2023    10:58 AM   Alcohol Use   Prescreen: >3 drinks/day or >7 drinks/week? No     Do you have a current opioid prescription? No  Do you use any other controlled substances or medications that are not prescribed by a provider? None          Hyperlipidemia Follow-Up    Are you regularly taking any medication or supplement to lower your cholesterol?   No  Are you having muscle aches or other side effects that you think could be caused by your cholesterol lowering medication?  No    Current providers sharing in care for this patient include:   Patient Care Team:  Christine Young MD as PCP - General (Family Practice)  Christine Young MD as Assigned PCP  Cristino Castro MD as Assigned Musculoskeletal Provider    The following health maintenance items are reviewed in Epic and correct as of today:  Health Maintenance   Topic Date Due     URINE DRUG SCREEN  Never done     ANNUAL REVIEW OF HM ORDERS  04/27/2023     MEDICARE ANNUAL WELLNESS VISIT  07/27/2023     INFLUENZA VACCINE (1) 09/01/2023     BMP  07/14/2024     LIPID  07/14/2024     FALL RISK ASSESSMENT  08/02/2024     MAMMO SCREENING  05/12/2025     COLORECTAL CANCER SCREENING  07/13/2025     PAP FOLLOW-UP  03/26/2026     HPV FOLLOW-UP  03/26/2026     ADVANCE CARE PLANNING  07/27/2027     DTAP/TDAP/TD IMMUNIZATION (3 - Td or Tdap) 07/14/2033     DEXA  08/26/2037     HEPATITIS C SCREENING  Completed     PHQ-2 (once per calendar year)  Completed     Pneumococcal Vaccine: 65+ Years  Completed     ZOSTER IMMUNIZATION  Completed     COVID-19 Vaccine  Completed     IPV IMMUNIZATION  Aged Out     MENINGITIS IMMUNIZATION  Aged Out     PAP  Discontinued     Doing well on other meds          Pertinent mammograms are reviewed under the imaging tab.    Review of Systems   Constitutional:  Negative for chills and fever.   HENT:  Negative for congestion, ear pain, hearing loss  "and sore throat.    Eyes:  Negative for pain and visual disturbance.   Respiratory:  Negative for cough and shortness of breath.    Cardiovascular:  Negative for chest pain, palpitations and peripheral edema.   Gastrointestinal:  Negative for abdominal pain, constipation, diarrhea, heartburn, hematochezia and nausea.   Breasts:  Negative for tenderness, breast mass and discharge.   Genitourinary:  Negative for dysuria, frequency, genital sores, hematuria, pelvic pain, urgency, vaginal bleeding and vaginal discharge.   Musculoskeletal:  Positive for myalgias. Negative for arthralgias and joint swelling.   Skin:  Negative for rash.   Neurological:  Negative for dizziness, weakness, headaches and paresthesias.   Psychiatric/Behavioral:  Negative for mood changes. The patient is not nervous/anxious.    Rest of the ROS is Negative except see above and Problem list [stable]      OBJECTIVE:   /75   Pulse 70   Temp 98  F (36.7  C) (Temporal)   Ht 1.698 m (5' 6.85\")   Wt 86.6 kg (191 lb)   SpO2 98%   BMI 30.05 kg/m   Estimated body mass index is 30.05 kg/m  as calculated from the following:    Height as of this encounter: 1.698 m (5' 6.85\").    Weight as of this encounter: 86.6 kg (191 lb).  Physical Exam  GENERAL APPEARANCE: healthy, alert and no distress  EYES: Eyes grossly normal to inspection, PERRL and conjunctivae and sclerae normal  HENT: ear canals and TM's normal, nose and mouth without ulcers or lesions, oropharynx clear and oral mucous membranes moist  NECK: no adenopathy, no asymmetry, masses, or scars and thyroid normal to palpation  RESP: lungs clear to auscultation - no rales, rhonchi or wheezes  BREAST: normal without masses, tenderness or nipple discharge and no palpable axillary masses or adenopathy  CV: regular rate and rhythm, normal S1 S2, no S3 or S4, no murmur, click or rub, no peripheral edema and peripheral pulses strong  ABDOMEN: soft, nontender, no hepatosplenomegaly, no masses and " "bowel sounds normal  MS: no musculoskeletal defects are noted and gait is age appropriate without ataxia  SKIN: no suspicious lesions or rashes  NEURO: Normal strength and tone, sensory exam grossly normal, mentation intact and speech normal  PSYCH: mentation appears normal and affect normal/bright    Diagnostic Test Results:  Labs reviewed in Epic  Pending     ASSESSMENT / PLAN:   (Z00.00) Encounter for Medicare annual wellness exam  (primary encounter diagnosis)  Comment:   Plan: Hemoglobin        Refilled     (M19.011) Glenohumeral arthritis, right  Comment:   Plan: celecoxib (CELEBREX) 100 MG capsule, Hemoglobin        Labs pending     (E78.5) Hyperlipidemia, unspecified hyperlipidemia type  Comment: advised statins  Plan: TSH with free T4 reflex, pravastatin         (PRAVACHOL) 10 MG tablet    Handouts Given  Follow low cholesterol diet     (Z79.1) Long term (current) use of non-steroidal anti-inflammatories (nsaid)  Comment: pending   Plan: Hemoglobin         (L98.9) Skin lesions  Comment: referral for skin check  Plan: Adult Dermatology Referral        COUNSELING:  Reviewed preventive health counseling, as reflected in patient instructions       Regular exercise       Healthy diet/nutrition       Vision screening       Hearing screening       Dental care       Bladder control       Fall risk prevention       Osteoporosis prevention/bone health       Colon cancer screening       The 10-year ASCVD risk score (Leah BARRIOS, et al., 2019) is: 8.5%    Values used to calculate the score:      Age: 68 years      Sex: Female      Is Non- : No      Diabetic: No      Tobacco smoker: No      Systolic Blood Pressure: 134 mmHg      Is BP treated: Yes      HDL Cholesterol: 69 mg/dL      Total Cholesterol: 211 mg/dL       Advanced Planning       BMI:   Estimated body mass index is 30.05 kg/m  as calculated from the following:    Height as of this encounter: 1.698 m (5' 6.85\").    Weight as of this " encounter: 86.6 kg (191 lb).   Weight management plan: Discussed healthy diet and exercise guidelines      She reports that she has never smoked. She has never used smokeless tobacco.      Appropriate preventive services were discussed with this patient, including applicable screening as appropriate for cardiovascular disease, diabetes, osteopenia/osteoporosis, and glaucoma.  As appropriate for age/gender, discussed screening for colorectal cancer, prostate cancer, breast cancer, and cervical cancer. Checklist reviewing preventive services available has been given to the patient.    Reviewed patients plan of care and provided an AVS. The Intermediate Care Plan ( asthma action plan, low back pain action plan, and migraine action plan) for Lola meets the Care Plan requirement. This Care Plan has been established and reviewed with the Patient.          Christine Young MD  [facility]    Identified Health Risks:

## 2023-08-02 NOTE — PATIENT INSTRUCTIONS
"Patient Education   Personalized Prevention Plan  You are due for the preventive services outlined below.  Your care team is available to assist you in scheduling these services.  If you have already completed any of these items, please share that information with your care team to update in your medical record.  Health Maintenance Due   Topic Date Due     URINE DRUG SCREEN  Never done     ANNUAL REVIEW OF HM ORDERS  04/27/2023     Annual Wellness Visit  07/27/2023     Learning About Being Physically Active  What is physical activity?     Being physically active means doing any kind of activity that gets your body moving.  The types of physical activity that can help you get fit and stay healthy include:  Aerobic or \"cardio\" activities. These make your heart beat faster and make you breathe harder, such as brisk walking, riding a bike, or running. They strengthen your heart and lungs and build up your endurance.  Strength training activities. These make your muscles work against, or \"resist,\" something. Examples include lifting weights or doing push-ups. These activities help tone and strengthen your muscles and bones.  Stretches. These let you move your joints and muscles through their full range of motion. Stretching helps you be more flexible.  Reaching a balance between these three types of physical activity is important because each one contributes to your overall fitness.  What are the benefits of being active?  Being active is one of the best things you can do for your health. It helps you to:  Feel stronger and have more energy to do all the things you like to do.  Focus better at school or work.  Feel, think, and sleep better.  Reach and stay at a healthy weight.  Lose fat and build lean muscle.  Lower your risk for serious health problems, including diabetes, heart attack, high blood pressure, and some cancers.  Keep your heart, lungs, bones, muscles, and joints strong and healthy.  How can you make being " "active part of your life?  Start slowly. Make it your long-term goal to get at least 30 minutes of exercise on most days of the week. Walking is a good choice. You also may want to do other activities, such as running, swimming, cycling, or playing tennis or team sports.  Pick activities that you like--ones that make your heart beat faster, your muscles stronger, and your muscles and joints more flexible. If you find more than one thing you like doing, do them all. You don't have to do the same thing every day.  Get your heart pumping every day. Any activity that makes your heart beat faster and keeps it at that rate for a while counts.  Here are some great ways to get your heart beating faster:  Go for a brisk walk, run, or bike ride.  Go for a hike or swim.  Go in-line skating.  Play a game of touch football, basketball, or soccer.  Ride a bike.  Play tennis or racquetball.  Climb stairs.  Even some household chores can be aerobic--just do them at a faster pace. Vacuuming, raking or mowing the lawn, sweeping the garage, and washing and waxing the car all can help get your heart rate up.  Strengthen your muscles during the week. You don't have to lift heavy weights or grow big, bulky muscles to get stronger. Doing a few simple activities that make your muscles work against, or \"resist,\" something can help you get stronger.  For example, you can:  Do push-ups or sit-ups, which use your own body weight as resistance.  Lift weights or dumbbells or use stretch bands at home or in a gym or community center.  Stretch your muscles often. Stretching will help you as you become more active. It can help you stay flexible, loosen tight muscles, and avoid injury. It can also help improve your balance and posture and can be a great way to relax.  Be sure to stretch the muscles you'll be using when you work out. It's best to warm your muscles slightly before you stretch them. Walk or do some other light aerobic activity for a " "few minutes, and then start stretching.  When you stretch your muscles:  Do it slowly. Stretching is not about going fast or making sudden movements.  Don't push or bounce during a stretch.  Hold each stretch for at least 15 to 30 seconds, if you can. You should feel a stretch in the muscle, but not pain.  Breathe out as you do the stretch. Then breathe in as you hold the stretch. Don't hold your breath.  If you're worried about how more activity might affect your health, have a checkup before you start. Follow any special advice your doctor gives you for getting a smart start.  Where can you learn more?  Go to https://www.[employer].net/patiented  Enter W332 in the search box to learn more about \"Learning About Being Physically Active.\"  Current as of: October 10, 2022               Content Version: 13.7    3866-7667 [employer].   Care instructions adapted under license by your healthcare professional. If you have questions about a medical condition or this instruction, always ask your healthcare professional. [employer] disclaims any warranty or liability for your use of this information.      Learning About Dietary Guidelines  What are the Dietary Guidelines for Americans?     Dietary Guidelines for Americans provide tips for eating well and staying healthy. This helps reduce the risk for long-term (chronic) diseases.  These guidelines recommend that you:  Eat and drink the right amount for you. The U.[place] government's food guide is called MyPlate. It can help you make your own well-balanced eating plan.  Try to balance your eating with your activity. This helps you stay at a healthy weight.  Drink alcohol in moderation, if at all.  Limit foods high in salt, saturated fat, trans fat, and added sugar.  These guidelines are from the U.S. Department of Agriculture and the U.S. Department of Health and Human Services. They are updated every 5 years.  What is MyPlate?  MyPlate is the U.[place] " "government's food guide. It can help you make your own well-balanced eating plan. A balanced eating plan means that you eat enough, but not too much, and that your food gives you the nutrients you need to stay healthy.  MyPlate focuses on eating plenty of whole grains, fruits, and vegetables, and on limiting fat and sugar. It is available online at www.ChooseMyPlate.gov.  How can you get started?  If you're trying to eat healthier, you can slowly change your eating habits over time. You don't have to make big changes all at once. Start by adding one or two healthy foods to your meals each day.  Grains  Choose whole-grain breads and cereals and whole-wheat pasta and whole-grain crackers.  Vegetables  Eat a variety of vegetables every day. They have lots of nutrients and are part of a heart-healthy diet.  Fruits  Eat a variety of fruits every day. Fruits contain lots of nutrients. Choose fresh fruit instead of fruit juice.  Protein foods  Choose fish and lean poultry more often. Eat red meat and fried meats less often. Dried beans, tofu, and nuts are also good sources of protein.  Dairy  Choose low-fat or fat-free products from this food group. If you have problems digesting milk, try eating cheese or yogurt instead.  Fats and oils  Limit fats and oils if you're trying to cut calories. Choose healthy fats when you cook. These include canola oil and olive oil.  Where can you learn more?  Go to https://www.[employer].net/patiented  Enter D676 in the search box to learn more about \"Learning About Dietary Guidelines.\"  Current as of: March 1, 2023               Content Version: 13.7    8646-4707 [employer].   Care instructions adapted under license by your healthcare professional. If you have questions about a medical condition or this instruction, always ask your healthcare professional. [employer] disclaims any warranty or liability for your use of this information.      Bladder Training: " Care Instructions  Your Care Instructions     Bladder training is used to treat urge incontinence and stress incontinence. Urge incontinence means that the need to urinate comes on so fast that you can't get to a toilet in time. Stress incontinence means that you leak urine because of pressure on your bladder. For example, it may happen when you laugh, cough, or lift something heavy.  Bladder training can increase how long you can wait before you have to urinate. It can also help your bladder hold more urine. And it can give you better control over the urge to urinate.  It is important to remember that bladder training takes a few weeks to a few months to make a difference. You may not see results right away, but don't give up.  Follow-up care is a key part of your treatment and safety. Be sure to make and go to all appointments, and call your doctor if you are having problems. It's also a good idea to know your test results and keep a list of the medicines you take.  How can you care for yourself at home?  Work with your doctor to come up with a bladder training program that is right for you. You may use one or more of the following methods.  Delayed urination  In the beginning, try to keep from urinating for 5 minutes after you first feel the need to go.  While you wait, take deep, slow breaths to relax. Kegel exercises can also help you delay the need to go to the bathroom.  After some practice, when you can easily wait 5 minutes to urinate, try to wait 10 minutes before you urinate.  Slowly increase the waiting period until you are able to control when you have to urinate.  Scheduled urination  Empty your bladder when you first wake up in the morning.  Schedule times throughout the day when you will urinate.  Start by going to the bathroom every hour, even if you don't need to go.  Slowly increase the time between trips to the bathroom.  When you have found a schedule that works well for you, keep doing it.  If  "you wake up during the night and have to urinate, do it. Apply your schedule to waking hours only.  Kegel exercises  These tighten and strengthen pelvic muscles, which can help you control the flow of urine. (If doing these exercises causes pain, stop doing them and talk with your doctor.) To do Kegel exercises:  Squeeze your muscles as if you were trying not to pass gas. Or squeeze your muscles as if you were stopping the flow of urine. Your belly, legs, and buttocks shouldn't move.  Hold the squeeze for 3 seconds, then relax for 5 to 10 seconds.  Start with 3 seconds, then add 1 second each week until you are able to squeeze for 10 seconds.  Repeat the exercise 10 times a session. Do 3 to 8 sessions a day.  When should you call for help?  Watch closely for changes in your health, and be sure to contact your doctor if:    Your incontinence is getting worse.     You do not get better as expected.   Where can you learn more?  Go to https://www.[employer].net/patiented  Enter V684 in the search box to learn more about \"Bladder Training: Care Instructions.\"  Current as of: March 1, 2023               Content Version: 13.7    2867-4812 [employer].   Care instructions adapted under license by your healthcare professional. If you have questions about a medical condition or this instruction, always ask your healthcare professional. [employer] disclaims any warranty or liability for your use of this information.         "

## 2023-08-02 NOTE — PROGRESS NOTES
She is at risk for lack of exercise and has been provided with information to increase physical activity for the benefit of her well-being.  The patient was counseled and encouraged to consider modifying their diet and eating habits. She was provided with information on recommended healthy diet options.  Information on urinary incontinence and treatment options given to patient.

## 2023-09-01 ENCOUNTER — MYC MEDICAL ADVICE (OUTPATIENT)
Dept: FAMILY MEDICINE | Facility: CLINIC | Age: 68
End: 2023-09-01
Payer: COMMERCIAL

## 2023-09-01 DIAGNOSIS — Z96.651 S/P TOTAL KNEE REPLACEMENT, RIGHT: ICD-10-CM

## 2023-09-01 RX ORDER — AMOXICILLIN 500 MG/1
CAPSULE ORAL
Qty: 4 CAPSULE | Refills: 4 | Status: SHIPPED | OUTPATIENT
Start: 2023-09-01 | End: 2024-03-20

## 2023-09-06 ENCOUNTER — DOCUMENTATION ONLY (OUTPATIENT)
Dept: FAMILY MEDICINE | Facility: CLINIC | Age: 68
End: 2023-09-06
Payer: COMMERCIAL

## 2023-09-06 NOTE — PROGRESS NOTES
Please call  Not due for Lipid check as It was Just checked and this has been consistent last few Years

## 2023-09-06 NOTE — PROGRESS NOTES
Patient coming in on 9.13.23 for labs for high cholesterol.  Patient does not have anything due in [facility].  Please place future orders, or call and advise patient to cancel Lab appointment.

## 2023-12-16 ENCOUNTER — MYC REFILL (OUTPATIENT)
Dept: FAMILY MEDICINE | Facility: CLINIC | Age: 68
End: 2023-12-16
Payer: COMMERCIAL

## 2023-12-16 DIAGNOSIS — M19.011 GLENOHUMERAL ARTHRITIS, RIGHT: ICD-10-CM

## 2023-12-18 RX ORDER — CELECOXIB 100 MG/1
100 CAPSULE ORAL 2 TIMES DAILY
Qty: 90 CAPSULE | Refills: 3 | Status: SHIPPED | OUTPATIENT
Start: 2023-12-18 | End: 2024-03-20

## 2023-12-30 ENCOUNTER — MYC REFILL (OUTPATIENT)
Dept: FAMILY MEDICINE | Facility: CLINIC | Age: 68
End: 2023-12-30
Payer: COMMERCIAL

## 2023-12-30 DIAGNOSIS — E78.5 HYPERLIPIDEMIA, UNSPECIFIED HYPERLIPIDEMIA TYPE: ICD-10-CM

## 2024-01-02 RX ORDER — PRAVASTATIN SODIUM 10 MG
10 TABLET ORAL DAILY
Qty: 30 TABLET | Refills: 5 | Status: SHIPPED | OUTPATIENT
Start: 2024-01-02 | End: 2024-01-29

## 2024-01-15 ENCOUNTER — MYC REFILL (OUTPATIENT)
Dept: FAMILY MEDICINE | Facility: CLINIC | Age: 69
End: 2024-01-15
Payer: COMMERCIAL

## 2024-01-15 DIAGNOSIS — I10 HYPERTENSION GOAL BP (BLOOD PRESSURE) < 140/90: ICD-10-CM

## 2024-01-15 RX ORDER — METOPROLOL SUCCINATE 25 MG/1
25 TABLET, EXTENDED RELEASE ORAL DAILY
Qty: 90 TABLET | Refills: 1 | Status: SHIPPED | OUTPATIENT
Start: 2024-01-15 | End: 2024-04-14

## 2024-01-29 ENCOUNTER — MYC REFILL (OUTPATIENT)
Dept: FAMILY MEDICINE | Facility: CLINIC | Age: 69
End: 2024-01-29
Payer: COMMERCIAL

## 2024-01-29 DIAGNOSIS — K21.9 GASTROESOPHAGEAL REFLUX DISEASE, UNSPECIFIED WHETHER ESOPHAGITIS PRESENT: ICD-10-CM

## 2024-01-29 DIAGNOSIS — E78.5 HYPERLIPIDEMIA, UNSPECIFIED HYPERLIPIDEMIA TYPE: ICD-10-CM

## 2024-01-29 DIAGNOSIS — R25.2 BILATERAL LEG CRAMPS: ICD-10-CM

## 2024-01-29 DIAGNOSIS — M54.16 CHRONIC RADICULAR LOW BACK PAIN: ICD-10-CM

## 2024-01-29 DIAGNOSIS — G89.29 CHRONIC RADICULAR LOW BACK PAIN: ICD-10-CM

## 2024-01-29 RX ORDER — PRAVASTATIN SODIUM 10 MG
10 TABLET ORAL DAILY
Qty: 90 TABLET | Refills: 1 | Status: SHIPPED | OUTPATIENT
Start: 2024-01-29 | End: 2024-04-30

## 2024-01-29 RX ORDER — GABAPENTIN 300 MG/1
CAPSULE ORAL
Qty: 180 CAPSULE | Refills: 3 | Status: SHIPPED | OUTPATIENT
Start: 2024-01-29 | End: 2024-04-30

## 2024-01-29 RX ORDER — PRAVASTATIN SODIUM 10 MG
10 TABLET ORAL DAILY
Qty: 30 TABLET | Refills: 1 | Status: SHIPPED | OUTPATIENT
Start: 2024-01-29 | End: 2024-01-29

## 2024-02-04 ENCOUNTER — MYC REFILL (OUTPATIENT)
Dept: FAMILY MEDICINE | Facility: CLINIC | Age: 69
End: 2024-02-04
Payer: COMMERCIAL

## 2024-02-04 DIAGNOSIS — G89.29 CHRONIC RADICULAR LOW BACK PAIN: ICD-10-CM

## 2024-02-04 DIAGNOSIS — M54.16 CHRONIC RADICULAR LOW BACK PAIN: ICD-10-CM

## 2024-02-04 DIAGNOSIS — R25.2 BILATERAL LEG CRAMPS: ICD-10-CM

## 2024-02-05 RX ORDER — GABAPENTIN 300 MG/1
CAPSULE ORAL
Qty: 180 CAPSULE | Refills: 3 | OUTPATIENT
Start: 2024-02-05

## 2024-02-28 ENCOUNTER — OFFICE VISIT (OUTPATIENT)
Dept: DERMATOLOGY | Facility: CLINIC | Age: 69
End: 2024-02-28
Attending: FAMILY MEDICINE
Payer: COMMERCIAL

## 2024-02-28 DIAGNOSIS — L98.9 SKIN LESION: ICD-10-CM

## 2024-02-28 DIAGNOSIS — L82.1 SEBORRHEIC KERATOSIS: Primary | ICD-10-CM

## 2024-02-28 DIAGNOSIS — D18.01 CHERRY ANGIOMA: ICD-10-CM

## 2024-02-28 DIAGNOSIS — L81.4 LENTIGO: ICD-10-CM

## 2024-02-28 DIAGNOSIS — D22.9 MULTIPLE BENIGN NEVI: ICD-10-CM

## 2024-02-28 PROCEDURE — 99213 OFFICE O/P EST LOW 20 MIN: CPT | Performed by: PHYSICIAN ASSISTANT

## 2024-02-28 NOTE — LETTER
2/28/2024         RE: Lola Partida  [address] [address]        Dear Colleague,    Thank you for referring your patient, Lola Partida, to the [facility]. Please see a copy of my visit note below.    Lola Partida is an extremely pleasant 69 year old year old female patient here today for skin check. She denies any painful or bleeding skin lesions. No changing nevi.  Patient has no other skin complaints today.  Remainder of the HPI, Meds, PMH, Allergies, FH, and SH was reviewed in chart.    Pertinent Hx:   No personal history of skin cancer   Past Medical History:   Diagnosis Date     Anemia, unspecified type 5/27/2016    Regularly gives blood. 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       Past Surgical History:   Procedure Laterality Date     ARTHROSCOPY KNEE Right      COLONOSCOPY  ?     COLONOSCOPY N/A 7/13/2022    Procedure: COLONOSCOPY, FLEXIBLE, WITH LESION REMOVAL USING SNARE;  Surgeon: Valdo Lopez DO;  Location: [facility] OR     COLONOSCOPY WITH CO2 INSUFFLATION N/A 7/13/2022    Procedure: COLONOSCOPY, WITH CO2 INSUFFLATION;  Surgeon: Valdo Lopez DO;  Location: [facility] OR     COMBINED ESOPHAGOSCOPY, GASTROSCOPY, DUODENOSCOPY (EGD) WITH CO2 INSUFFLATION N/A 4/21/2023    Procedure: ESOPHAGOGASTRODUODENOSCOPY, WITH CO2 INSUFFLATION;  Surgeon: Marli Briceno DO;  Location: [facility] OR     HC TOOTH EXTRACTION W/FORCEP       LASIK      10 years ago     TOTAL KNEE ARTHROPLASTY Right      4/19/21     TUBAL LIGATION       vulvar biopsy  2005    LORI III        Family History   Problem Relation Age of Onset     Cancer Father         liver     Hyperlipidemia Father      Prostate Cancer Father      Mental Illness Sister         Depression & anxiety     Colon Polyps Brother      Glaucoma No family  hx of      Macular Degeneration No family hx of        Social History     Socioeconomic History     Marital status:      Spouse name: Not on file     Number of children: 2     Years of education: Not on file     Highest education level: Not on file   Occupational History     Occupation: Office work   Tobacco Use     Smoking status: Never     Smokeless tobacco: Never   Substance and Sexual Activity     Alcohol use: Yes     Comment: per week, 2-3 drinks 1-2 times per week     Drug use: No     Sexual activity: Yes     Partners: Male     Birth control/protection: Female Surgical   Other Topics Concern     Parent/sibling w/ CABG, MI or angioplasty before 65F 55M? No   Social History Narrative     Not on file     Social Determinants of Health     Financial Resource Strain: Not on file   Food Insecurity: Not on file   Transportation Needs: Not on file   Physical Activity: Not on file   Stress: Not on file   Social Connections: Not on file   Interpersonal Safety: Not on file   Housing Stability: Not on file       Outpatient Encounter Medications as of 2/28/2024   Medication Sig Dispense Refill     calcium 600 MG tablet Take 1 tablet by mouth 2 times daily       celecoxib (CELEBREX) 100 MG capsule Take 1 capsule (100 mg) by mouth 2 times daily Needs follow up labs before further refills 60 capsule 0     gabapentin (NEURONTIN) 300 MG capsule TAKE 2 CAPSULES BY MOUTH ONCE DAILY AT BEDTIME 180 capsule 3     magnesium 250 MG tablet Take 1 tablet by mouth daily       metoprolol succinate ER (TOPROL XL) 25 MG 24 hr tablet Take 1 tablet (25 mg) by mouth daily 90 tablet 1     omeprazole (PRILOSEC) 20 MG DR capsule Take 1 capsule (20 mg) by mouth daily 90 capsule 1     potassium gluconate 2.5 MEQ tablet Take 90 mEq by mouth       pravastatin (PRAVACHOL) 10 MG tablet Take 1 tablet (10 mg) by mouth daily 90 tablet 1     amoxicillin (AMOXIL) 500 MG capsule Take four capsules by mouth 1 hour prior to dental procedure. 4 capsule 4      celecoxib (CELEBREX) 100 MG capsule Take 1 capsule (100 mg) by mouth 2 times daily 90 capsule 3     OMEPRAZOLE PO Take by mouth daily (Patient not taking: Reported on 8/2/2023)       Facility-Administered Encounter Medications as of 2/28/2024   Medication Dose Route Frequency Provider Last Rate Last Admin     betamethasone acet & sod phos (CELESTONE) injection 6 mg  6 mg   Mahendra Sheikh MD   6 mg at 04/08/21 2152     cross-Linked Hyaluronate (GEL-ONE) injection PRSY 30 mg  30 mg   Buck Oconnor MD   30 mg at 07/03/18 1058     lidocaine 1 % injection 4 mL  4 mL   Buck Oconnor MD   4 mL at 07/03/18 1058     methylPREDNISolone (DEPO-MEDROL) injection 40 mg  40 mg   Cristino Castro MD   40 mg at 02/04/23 1220     methylPREDNISolone (DEPO-MEDROL) injection 40 mg  40 mg   Cristino Castro MD   40 mg at 02/04/23 1220     methylPREDNISolone (DEPO-MEDROL) injection 40 mg  40 mg   Cristino Castro MD   40 mg at 07/18/22 1157     methylPREDNISolone (DEPO-MEDROL) injection 40 mg  40 mg   Cristino Castro MD   40 mg at 06/28/22 0942     methylPREDNISolone (DEPO-MEDROL) injection 40 mg  40 mg   Cristino Castro MD   40 mg at 08/04/21 1657     methylPREDNISolone acetate (DEPO-MEDROL) injection 80 mg  80 mg   Buck Oconnor MD   80 mg at 07/03/18 1058     ropivacaine (NAROPIN) injection 1 mL  1 mL   Mahendra Sheikh MD   1 mL at 04/08/21 2152             O:   NAD, WDWN, Alert & Oriented, Mood & Affect wnl, Vitals stable   Here today alone   There were no vitals taken for this visit.   General appearance normal   Vitals stable   Alert, oriented and in no acute distress    Stuck on papules and brown macules on trunk and ext   Red papules on trunk  Brown papules and macules with regular pigment network and borders on on torso and extremities      The remainder of skin exam is normal       Eyes: Conjunctivae/lids:Normal     ENT: Lips:  normal    MSK:Normal    Cardiovascular: peripheral edema none    Pulm: Breathing Normal    Neuro/Psych: Orientation:Alert and Orientedx3 ; Mood/Affect:normal   A/P:  1. Seborrheic keratosis, lentigo, angioma, benign nevi   It was a pleasure speaking to Lola Partida today.  BENIGN LESIONS DISCUSSED WITH PATIENT:  I discussed the specifics of tumor, prognosis, and genetics of benign lesions.  I explained that treatment of these lesions would be purely cosmetic and not medically neccessary.  I discussed with patient different removal options including excision, cautery and /or laser.      Nature and genetics of benign skin lesions dicussed with patient.  Signs and Symptoms of skin cancer discussed with patient.  ABCDEs of melanoma reviewed with patient.  Patient encouraged to perform monthly skin exams.  UV precautions reviewed with patient.  Risks of non-melanoma skin cancer discussed with patient   Return to clinic      Again, thank you for allowing me to participate in the care of your patient.        Sincerely,        Marli Rhoades PA-C

## 2024-02-29 NOTE — PROGRESS NOTES
Lola Partida is an extremely pleasant 69 year old year old female patient here today for skin check. She denies any painful or bleeding skin lesions. No changing nevi.  Patient has no other skin complaints today.  Remainder of the HPI, Meds, PMH, Allergies, FH, and SH was reviewed in chart.    Pertinent Hx:   No personal history of skin cancer   Past Medical History:   Diagnosis Date    Anemia, unspecified type 5/27/2016    Regularly gives blood. Now on iron regularly     Colon polyp     GERD (gastroesophageal reflux disease)     Hypertension 8-1-17    Rhinitis     Worker's compensation claim administrative problem     DOI 3/23/21  Emp: Sleep number.       Past Surgical History:   Procedure Laterality Date    ARTHROSCOPY KNEE Right     COLONOSCOPY  ?    COLONOSCOPY N/A 7/13/2022    Procedure: COLONOSCOPY, FLEXIBLE, WITH LESION REMOVAL USING SNARE;  Surgeon: Valdo Lopez DO;  Location: [facility]    COLONOSCOPY WITH CO2 INSUFFLATION N/A 7/13/2022    Procedure: COLONOSCOPY, WITH CO2 INSUFFLATION;  Surgeon: Valdo Lopez DO;  Location: [facility]    COMBINED ESOPHAGOSCOPY, GASTROSCOPY, DUODENOSCOPY (EGD) WITH CO2 INSUFFLATION N/A 4/21/2023    Procedure: ESOPHAGOGASTRODUODENOSCOPY, WITH CO2 INSUFFLATION;  Surgeon: Marli Briceno DO;  Location: [facility] OR    HC TOOTH EXTRACTION W/FORCEP      LASIK      10 years ago    TOTAL KNEE ARTHROPLASTY Right      4/19/21    TUBAL LIGATION      vulvar biopsy  2005    LORI III        Family History   Problem Relation Age of Onset    Cancer Father         liver    Hyperlipidemia Father     Prostate Cancer Father     Mental Illness Sister         Depression & anxiety    Colon Polyps Brother     Glaucoma No family hx of     Macular Degeneration No family hx of        Social History     Socioeconomic History    Marital status:      Spouse name: Not on file    Number of children: 2    Years of education: Not on file    Highest education level: Not on file   Occupational History     Occupation: Office work   Tobacco Use    Smoking status: Never    Smokeless tobacco: Never   Substance and Sexual Activity    Alcohol use: Yes     Comment: per week, 2-3 drinks 1-2 times per week    Drug use: No    Sexual activity: Yes     Partners: Male     Birth control/protection: Female Surgical   Other Topics Concern    Parent/sibling w/ CABG, MI or angioplasty before 65F 55M? No   Social History Narrative    Not on file     Social Determinants of Health     Financial Resource Strain: Not on file   Food Insecurity: Not on file   Transportation Needs: Not on file   Physical Activity: Not on file   Stress: Not on file   Social Connections: Not on file   Interpersonal Safety: Not on file   Housing Stability: Not on file       Outpatient Encounter Medications as of 2/28/2024   Medication Sig Dispense Refill    calcium 600 MG tablet Take 1 tablet by mouth 2 times daily      celecoxib (CELEBREX) 100 MG capsule Take 1 capsule (100 mg) by mouth 2 times daily Needs follow up labs before further refills 60 capsule 0    gabapentin (NEURONTIN) 300 MG capsule TAKE 2 CAPSULES BY MOUTH ONCE DAILY AT BEDTIME 180 capsule 3    magnesium 250 MG tablet Take 1 tablet by mouth daily      metoprolol succinate ER (TOPROL XL) 25 MG 24 hr tablet Take 1 tablet (25 mg) by mouth daily 90 tablet 1    omeprazole (PRILOSEC) 20 MG DR capsule Take 1 capsule (20 mg) by mouth daily 90 capsule 1    potassium gluconate 2.5 MEQ tablet Take 90 mEq by mouth      pravastatin (PRAVACHOL) 10 MG tablet Take 1 tablet (10 mg) by mouth daily 90 tablet 1    amoxicillin (AMOXIL) 500 MG capsule Take four capsules by mouth 1 hour prior to dental procedure. 4 capsule 4    celecoxib (CELEBREX) 100 MG capsule Take 1 capsule (100 mg) by mouth 2 times daily 90 capsule 3    OMEPRAZOLE PO Take by mouth daily (Patient not taking: Reported on 8/2/2023)       Facility-Administered Encounter Medications as of 2/28/2024   Medication Dose Route Frequency Provider Last Rate  Last Admin    betamethasone acet & sod phos (CELESTONE) injection 6 mg  6 mg   Mahendra Sheikh MD   6 mg at 04/08/21 2152    cross-Linked Hyaluronate (GEL-ONE) injection PRSY 30 mg  30 mg   Buck Oconnor MD   30 mg at 07/03/18 1058    lidocaine 1 % injection 4 mL  4 mL   Buck Oconnor MD   4 mL at 07/03/18 1058    methylPREDNISolone (DEPO-MEDROL) injection 40 mg  40 mg   Cristino Castro MD   40 mg at 02/04/23 1220    methylPREDNISolone (DEPO-MEDROL) injection 40 mg  40 mg   Cristino Castro MD   40 mg at 02/04/23 1220    methylPREDNISolone (DEPO-MEDROL) injection 40 mg  40 mg   Cristino Castro MD   40 mg at 07/18/22 1157    methylPREDNISolone (DEPO-MEDROL) injection 40 mg  40 mg   Cristino Castro MD   40 mg at 06/28/22 0942    methylPREDNISolone (DEPO-MEDROL) injection 40 mg  40 mg   Cristino Castro MD   40 mg at 08/04/21 1657    methylPREDNISolone acetate (DEPO-MEDROL) injection 80 mg  80 mg   Buck Oconnor MD   80 mg at 07/03/18 1058    ropivacaine (NAROPIN) injection 1 mL  1 mL   Mahendra Sheikh MD   1 mL at 04/08/21 2152             O:   NAD, WDWN, Alert & Oriented, Mood & Affect wnl, Vitals stable   Here today alone   There were no vitals taken for this visit.   General appearance normal   Vitals stable   Alert, oriented and in no acute distress    Stuck on papules and brown macules on trunk and ext   Red papules on trunk  Brown papules and macules with regular pigment network and borders on on torso and extremities      The remainder of skin exam is normal       Eyes: Conjunctivae/lids:Normal     ENT: Lips: normal    MSK:Normal    Cardiovascular: peripheral edema none    Pulm: Breathing Normal    Neuro/Psych: Orientation:Alert and Orientedx3 ; Mood/Affect:normal   A/P:  1. Seborrheic keratosis, lentigo, angioma, benign nevi   It was a pleasure speaking to Lola Partida today.  BENIGN LESIONS DISCUSSED WITH PATIENT:  I discussed the specifics of  tumor, prognosis, and genetics of benign lesions.  I explained that treatment of these lesions would be purely cosmetic and not medically neccessary.  I discussed with patient different removal options including excision, cautery and /or laser.      Nature and genetics of benign skin lesions dicussed with patient.  Signs and Symptoms of skin cancer discussed with patient.  ABCDEs of melanoma reviewed with patient.  Patient encouraged to perform monthly skin exams.  UV precautions reviewed with patient.  Risks of non-melanoma skin cancer discussed with patient   Return to clinic

## 2024-03-20 ENCOUNTER — MYC REFILL (OUTPATIENT)
Dept: FAMILY MEDICINE | Facility: CLINIC | Age: 69
End: 2024-03-20
Payer: COMMERCIAL

## 2024-03-20 DIAGNOSIS — M19.011 GLENOHUMERAL ARTHRITIS, RIGHT: ICD-10-CM

## 2024-03-20 DIAGNOSIS — Z96.651 S/P TOTAL KNEE REPLACEMENT, RIGHT: ICD-10-CM

## 2024-03-20 RX ORDER — CELECOXIB 100 MG/1
100 CAPSULE ORAL 2 TIMES DAILY
Qty: 90 CAPSULE | Refills: 3 | Status: SHIPPED | OUTPATIENT
Start: 2024-03-20 | End: 2024-04-30

## 2024-03-20 RX ORDER — AMOXICILLIN 500 MG/1
CAPSULE ORAL
Qty: 4 CAPSULE | Refills: 4 | Status: SHIPPED | OUTPATIENT
Start: 2024-03-20 | End: 2024-05-28

## 2024-04-14 ENCOUNTER — MYC REFILL (OUTPATIENT)
Dept: FAMILY MEDICINE | Facility: CLINIC | Age: 69
End: 2024-04-14
Payer: COMMERCIAL

## 2024-04-14 DIAGNOSIS — I10 HYPERTENSION GOAL BP (BLOOD PRESSURE) < 140/90: ICD-10-CM

## 2024-04-16 RX ORDER — METOPROLOL SUCCINATE 25 MG/1
25 TABLET, EXTENDED RELEASE ORAL DAILY
Qty: 90 TABLET | Refills: 1 | Status: SHIPPED | OUTPATIENT
Start: 2024-04-16 | End: 2024-07-14

## 2024-05-10 ENCOUNTER — ANCILLARY PROCEDURE (OUTPATIENT)
Dept: MAMMOGRAPHY | Facility: CLINIC | Age: 69
End: 2024-05-10
Attending: FAMILY MEDICINE
Payer: COMMERCIAL

## 2024-05-10 DIAGNOSIS — Z12.31 VISIT FOR SCREENING MAMMOGRAM: ICD-10-CM

## 2024-05-10 PROCEDURE — 77067 SCR MAMMO BI INCL CAD: CPT | Mod: TC | Performed by: RADIOLOGY

## 2024-06-17 ENCOUNTER — MYC REFILL (OUTPATIENT)
Dept: FAMILY MEDICINE | Facility: CLINIC | Age: 69
End: 2024-06-17
Payer: COMMERCIAL

## 2024-06-17 DIAGNOSIS — M19.011 GLENOHUMERAL ARTHRITIS, RIGHT: ICD-10-CM

## 2024-06-17 RX ORDER — CELECOXIB 100 MG/1
100 CAPSULE ORAL 2 TIMES DAILY
Qty: 90 CAPSULE | Refills: 0 | Status: SHIPPED | OUTPATIENT
Start: 2024-06-17 | End: 2024-07-31

## 2024-07-14 ENCOUNTER — MYC REFILL (OUTPATIENT)
Dept: FAMILY MEDICINE | Facility: CLINIC | Age: 69
End: 2024-07-14
Payer: COMMERCIAL

## 2024-07-14 DIAGNOSIS — I10 HYPERTENSION GOAL BP (BLOOD PRESSURE) < 140/90: ICD-10-CM

## 2024-07-15 RX ORDER — METOPROLOL SUCCINATE 25 MG/1
25 TABLET, EXTENDED RELEASE ORAL DAILY
Qty: 90 TABLET | Refills: 0 | Status: SHIPPED | OUTPATIENT
Start: 2024-07-15 | End: 2024-09-10

## 2024-07-30 ENCOUNTER — MYC REFILL (OUTPATIENT)
Dept: FAMILY MEDICINE | Facility: CLINIC | Age: 69
End: 2024-07-30
Payer: COMMERCIAL

## 2024-07-30 DIAGNOSIS — R25.2 BILATERAL LEG CRAMPS: ICD-10-CM

## 2024-07-30 DIAGNOSIS — G89.29 CHRONIC RADICULAR LOW BACK PAIN: ICD-10-CM

## 2024-07-30 DIAGNOSIS — E78.5 HYPERLIPIDEMIA, UNSPECIFIED HYPERLIPIDEMIA TYPE: ICD-10-CM

## 2024-07-30 DIAGNOSIS — M54.16 CHRONIC RADICULAR LOW BACK PAIN: ICD-10-CM

## 2024-07-30 DIAGNOSIS — K21.9 GASTROESOPHAGEAL REFLUX DISEASE, UNSPECIFIED WHETHER ESOPHAGITIS PRESENT: ICD-10-CM

## 2024-07-30 DIAGNOSIS — M19.011 GLENOHUMERAL ARTHRITIS, RIGHT: ICD-10-CM

## 2024-07-30 RX ORDER — GABAPENTIN 300 MG/1
CAPSULE ORAL
Qty: 180 CAPSULE | Refills: 0 | Status: SHIPPED | OUTPATIENT
Start: 2024-07-30 | End: 2024-08-08

## 2024-07-30 RX ORDER — PRAVASTATIN SODIUM 10 MG
10 TABLET ORAL DAILY
Qty: 90 TABLET | Refills: 1 | OUTPATIENT
Start: 2024-07-30

## 2024-07-30 RX ORDER — GABAPENTIN 300 MG/1
CAPSULE ORAL
Qty: 180 CAPSULE | Refills: 1 | OUTPATIENT
Start: 2024-07-30

## 2024-07-30 RX ORDER — CELECOXIB 100 MG/1
100 CAPSULE ORAL 2 TIMES DAILY
Qty: 90 CAPSULE | Refills: 0 | OUTPATIENT
Start: 2024-07-30

## 2024-07-30 NOTE — TELEPHONE ENCOUNTER
Patient is scheduled to see Dr. Yonug for an annual wellness exam on Thursday,August 8th.    Edel Bell,

## 2024-07-31 DIAGNOSIS — M19.011 GLENOHUMERAL ARTHRITIS, RIGHT: ICD-10-CM

## 2024-07-31 RX ORDER — CELECOXIB 100 MG/1
CAPSULE ORAL
Qty: 90 CAPSULE | Refills: 0 | Status: SHIPPED | OUTPATIENT
Start: 2024-07-31 | End: 2024-08-08

## 2024-08-04 SDOH — HEALTH STABILITY: PHYSICAL HEALTH: ON AVERAGE, HOW MANY DAYS PER WEEK DO YOU ENGAGE IN MODERATE TO STRENUOUS EXERCISE (LIKE A BRISK WALK)?: 0 DAYS

## 2024-08-04 SDOH — HEALTH STABILITY: PHYSICAL HEALTH: ON AVERAGE, HOW MANY MINUTES DO YOU ENGAGE IN EXERCISE AT THIS LEVEL?: 0 MIN

## 2024-08-04 ASSESSMENT — SOCIAL DETERMINANTS OF HEALTH (SDOH): HOW OFTEN DO YOU GET TOGETHER WITH FRIENDS OR RELATIVES?: TWICE A WEEK

## 2024-08-08 ENCOUNTER — OFFICE VISIT (OUTPATIENT)
Dept: FAMILY MEDICINE | Facility: CLINIC | Age: 69
End: 2024-08-08
Attending: FAMILY MEDICINE
Payer: COMMERCIAL

## 2024-08-08 VITALS
HEIGHT: 67 IN | RESPIRATION RATE: 16 BRPM | OXYGEN SATURATION: 99 % | WEIGHT: 180 LBS | TEMPERATURE: 97.5 F | DIASTOLIC BLOOD PRESSURE: 80 MMHG | SYSTOLIC BLOOD PRESSURE: 163 MMHG | HEART RATE: 61 BPM | BODY MASS INDEX: 28.25 KG/M2

## 2024-08-08 DIAGNOSIS — Z29.11 NEED FOR VACCINATION AGAINST RESPIRATORY SYNCYTIAL VIRUS: ICD-10-CM

## 2024-08-08 DIAGNOSIS — G89.29 CHRONIC RADICULAR LOW BACK PAIN: ICD-10-CM

## 2024-08-08 DIAGNOSIS — E78.5 HYPERLIPIDEMIA, UNSPECIFIED HYPERLIPIDEMIA TYPE: ICD-10-CM

## 2024-08-08 DIAGNOSIS — R25.2 BILATERAL LEG CRAMPS: ICD-10-CM

## 2024-08-08 DIAGNOSIS — M54.16 CHRONIC RADICULAR LOW BACK PAIN: ICD-10-CM

## 2024-08-08 DIAGNOSIS — F10.10 ALCOHOL USE DISORDER, MILD, ABUSE: ICD-10-CM

## 2024-08-08 DIAGNOSIS — K21.9 GASTROESOPHAGEAL REFLUX DISEASE, UNSPECIFIED WHETHER ESOPHAGITIS PRESENT: ICD-10-CM

## 2024-08-08 DIAGNOSIS — M19.011 GLENOHUMERAL ARTHRITIS, RIGHT: ICD-10-CM

## 2024-08-08 DIAGNOSIS — Z00.00 ENCOUNTER FOR MEDICARE ANNUAL WELLNESS EXAM: ICD-10-CM

## 2024-08-08 DIAGNOSIS — I10 HYPERTENSION GOAL BP (BLOOD PRESSURE) < 140/90: ICD-10-CM

## 2024-08-08 LAB
ALBUMIN SERPL BCG-MCNC: 4.3 G/DL (ref 3.5–5.2)
ALP SERPL-CCNC: 112 U/L (ref 40–150)
ALT SERPL W P-5'-P-CCNC: 19 U/L (ref 0–50)
ANION GAP SERPL CALCULATED.3IONS-SCNC: 11 MMOL/L (ref 7–15)
AST SERPL W P-5'-P-CCNC: 20 U/L (ref 0–45)
BILIRUB SERPL-MCNC: 0.6 MG/DL
BUN SERPL-MCNC: 19 MG/DL (ref 8–23)
CALCIUM SERPL-MCNC: 9.2 MG/DL (ref 8.8–10.4)
CHLORIDE SERPL-SCNC: 104 MMOL/L (ref 98–107)
CHOLEST SERPL-MCNC: 192 MG/DL
CREAT SERPL-MCNC: 1.06 MG/DL (ref 0.51–0.95)
EGFRCR SERPLBLD CKD-EPI 2021: 57 ML/MIN/1.73M2
ERYTHROCYTE [DISTWIDTH] IN BLOOD BY AUTOMATED COUNT: 12.8 % (ref 10–15)
FASTING STATUS PATIENT QL REPORTED: NO
FASTING STATUS PATIENT QL REPORTED: NO
GLUCOSE SERPL-MCNC: 103 MG/DL (ref 70–99)
HCO3 SERPL-SCNC: 27 MMOL/L (ref 22–29)
HCT VFR BLD AUTO: 38.8 % (ref 35–47)
HDLC SERPL-MCNC: 69 MG/DL
HGB BLD-MCNC: 12.7 G/DL (ref 11.7–15.7)
LDLC SERPL CALC-MCNC: 87 MG/DL
MCH RBC QN AUTO: 31.8 PG (ref 26.5–33)
MCHC RBC AUTO-ENTMCNC: 32.7 G/DL (ref 31.5–36.5)
MCV RBC AUTO: 97 FL (ref 78–100)
NONHDLC SERPL-MCNC: 123 MG/DL
PLATELET # BLD AUTO: 168 10E3/UL (ref 150–450)
POTASSIUM SERPL-SCNC: 4.2 MMOL/L (ref 3.4–5.3)
PROT SERPL-MCNC: 6.7 G/DL (ref 6.4–8.3)
RBC # BLD AUTO: 4 10E6/UL (ref 3.8–5.2)
SODIUM SERPL-SCNC: 142 MMOL/L (ref 135–145)
TRIGL SERPL-MCNC: 182 MG/DL
WBC # BLD AUTO: 6 10E3/UL (ref 4–11)

## 2024-08-08 PROCEDURE — 80061 LIPID PANEL: CPT | Performed by: FAMILY MEDICINE

## 2024-08-08 PROCEDURE — 85027 COMPLETE CBC AUTOMATED: CPT | Performed by: FAMILY MEDICINE

## 2024-08-08 PROCEDURE — 80053 COMPREHEN METABOLIC PANEL: CPT | Performed by: FAMILY MEDICINE

## 2024-08-08 PROCEDURE — G0439 PPPS, SUBSEQ VISIT: HCPCS | Performed by: FAMILY MEDICINE

## 2024-08-08 PROCEDURE — 99214 OFFICE O/P EST MOD 30 MIN: CPT | Mod: 25 | Performed by: FAMILY MEDICINE

## 2024-08-08 PROCEDURE — 36415 COLL VENOUS BLD VENIPUNCTURE: CPT | Performed by: FAMILY MEDICINE

## 2024-08-08 RX ORDER — GABAPENTIN 300 MG/1
CAPSULE ORAL
Qty: 180 CAPSULE | Refills: 3 | Status: SHIPPED | OUTPATIENT
Start: 2024-08-08

## 2024-08-08 RX ORDER — CELECOXIB 100 MG/1
100 CAPSULE ORAL 2 TIMES DAILY
Qty: 180 CAPSULE | Refills: 1 | Status: SHIPPED | OUTPATIENT
Start: 2024-08-08

## 2024-08-08 RX ORDER — METOPROLOL SUCCINATE 50 MG/1
50 TABLET, EXTENDED RELEASE ORAL DAILY
Qty: 30 TABLET | Refills: 0 | Status: SHIPPED | OUTPATIENT
Start: 2024-08-08 | End: 2024-09-10

## 2024-08-08 RX ORDER — PRAVASTATIN SODIUM 10 MG
10 TABLET ORAL DAILY
Qty: 90 TABLET | Refills: 3 | Status: SHIPPED | OUTPATIENT
Start: 2024-08-08

## 2024-08-08 RX ORDER — METOPROLOL SUCCINATE 25 MG/1
25 TABLET, EXTENDED RELEASE ORAL DAILY
Qty: 90 TABLET | Refills: 0 | Status: CANCELLED | OUTPATIENT
Start: 2024-08-08

## 2024-08-08 ASSESSMENT — PAIN SCALES - GENERAL: PAINLEVEL: NO PAIN (0)

## 2024-08-08 NOTE — LETTER
August 15, 2024      Lola Partida  [address] [facility] [address] [facility] [address]      Dear Lola:    We are writing to inform you of your test results.    Kidney test is borderline high probably due to elevated blood pressure.  Good blood pressure control will help.     Your cholesterol results are normal.     Your electrolytes, kidney test and liver test are normal.  Resulted Orders   Lipid panel reflex to direct LDL Fasting   Result Value Ref Range    Cholesterol 192 <200 mg/dL    Triglycerides 182 (H) <150 mg/dL    Direct Measure HDL 69 >=50 mg/dL    LDL Cholesterol Calculated 87 <=100 mg/dL    Non HDL Cholesterol 123 <130 mg/dL    Patient Fasting > 8hrs? No     Narrative    Cholesterol  Desirable:  <200 mg/dL    Triglycerides  Normal:  Less than 150 mg/dL  Borderline High:  150-199 mg/dL  High:  200-499 mg/dL  Very High:  Greater than or equal to 500 mg/dL    Direct Measure HDL  Female:  Greater than or equal to 50 mg/dL   Male:  Greater than or equal to 40 mg/dL    LDL Cholesterol  Desirable:  <100mg/dL  Above Desirable:  100-129 mg/dL   Borderline High:  130-159 mg/dL   High:  160-189 mg/dL   Very High:  >= 190 mg/dL    Non HDL Cholesterol  Desirable:  130 mg/dL  Above Desirable:  130-159 mg/dL  Borderline High:  160-189 mg/dL  High:  190-219 mg/dL  Very High:  Greater than or equal to 220 mg/dL   Comprehensive metabolic panel (BMP + Alb, Alk Phos, ALT, AST, Total. Bili, TP)   Result Value Ref Range    Sodium 142 135 - 145 mmol/L    Potassium 4.2 3.4 - 5.3 mmol/L    Carbon Dioxide (CO2) 27 22 - 29 mmol/L    Anion Gap 11 7 - 15 mmol/L    Urea Nitrogen 19.0 8.0 - 23.0 mg/dL    Creatinine 1.06 (H) 0.51 - 0.95 mg/dL    GFR Estimate 57 (L) >60 mL/min/1.73m2      Comment:      eGFR calculated using 2021 CKD-EPI equation.    Calcium 9.2 8.8 - 10.4 mg/dL      Comment:      Reference intervals for this test were updated on 7/16/2024 to reflect our healthy population more accurately. There may be differences in  the flagging of prior results with similar values performed with this method. Those prior results can be interpreted in the context of the updated reference intervals.    Chloride 104 98 - 107 mmol/L    Glucose 103 (H) 70 - 99 mg/dL    Alkaline Phosphatase 112 40 - 150 U/L    AST 20 0 - 45 U/L    ALT 19 0 - 50 U/L    Protein Total 6.7 6.4 - 8.3 g/dL    Albumin 4.3 3.5 - 5.2 g/dL    Bilirubin Total 0.6 <=1.2 mg/dL    Patient Fasting > 8hrs? No    CBC with platelets   Result Value Ref Range    WBC Count 6.0 4.0 - 11.0 10e3/uL    RBC Count 4.00 3.80 - 5.20 10e6/uL    Hemoglobin 12.7 11.7 - 15.7 g/dL    Hematocrit 38.8 35.0 - 47.0 %    MCV 97 78 - 100 fL    MCH 31.8 26.5 - 33.0 pg    MCHC 32.7 31.5 - 36.5 g/dL    RDW 12.8 10.0 - 15.0 %    Platelet Count 168 150 - 450 10e3/uL     If you have any questions or concerns, please call the clinic at the number listed above.     Sincerely,      Christine Young MD/bernadette

## 2024-08-08 NOTE — PROGRESS NOTES
Preventive Care Visit  [facility] MELISSA Young MD, Family Medicine  Aug 8, 2024      Assessment & Plan     Encounter for Medicare annual wellness exam    - CBC with platelets; Future  - CBC with platelets    Hypertension goal BP (blood pressure) < 140/90  High  In Toprol dose  Lifestyle changes discussed   Reduce alcohol to no more than 1 drink daily  DASH   Follow up 1 month  - Lipid panel reflex to direct LDL Fasting; Future  - Comprehensive metabolic panel (BMP + Alb, Alk Phos, ALT, AST, Total. Bili, TP); Future  - metoprolol succinate ER (TOPROL XL) 50 MG 24 hr tablet; Take 1 tablet (50 mg) by mouth daily  - Lipid panel reflex to direct LDL Fasting  - Comprehensive metabolic panel (BMP + Alb, Alk Phos, ALT, AST, Total. Bili, TP)    Hyperlipidemia, unspecified hyperlipidemia type  Pending   - Lipid panel reflex to direct LDL Fasting; Future  - Comprehensive metabolic panel (BMP + Alb, Alk Phos, ALT, AST, Total. Bili, TP); Future  - pravastatin (PRAVACHOL) 10 MG tablet; Take 1 tablet (10 mg) by mouth daily  - Lipid panel reflex to direct LDL Fasting  - Comprehensive metabolic panel (BMP + Alb, Alk Phos, ALT, AST, Total. Bili, TP)    Alcohol use disorder, mild, abuse  Alcohol use Guidelines discussed     Bilateral leg cramps  Refilled   - gabapentin (NEURONTIN) 300 MG capsule; TAKE 2 CAPSULES BY MOUTH ONCE DAILY AT BEDTIME    Chronic radicular low back pain  refilled  - gabapentin (NEURONTIN) 300 MG capsule; TAKE 2 CAPSULES BY MOUTH ONCE DAILY AT BEDTIME    Glenohumeral arthritis, right    - celecoxib (CELEBREX) 100 MG capsule; Take 1 capsule (100 mg) by mouth 2 times daily    Gastroesophageal reflux disease, unspecified whether esophagitis present  Dec gastric Irritants Like alcohol, caffeine etc     Need for vaccination against respiratory syncytial virus  Advised   - RSV vaccine, bivalent, ABRYSVO, injection; Inject 0.5 mLs into the muscle once for 1 dose Pharmacist administered  Advised  "covid vaccine           BMI  Estimated body mass index is 28.38 kg/m  as calculated from the following:    Height as of this encounter: 1.696 m (5' 6.77\").    Weight as of this encounter: 81.6 kg (180 lb).       Counseling  Appropriate preventive services were addressed with this patient via screening, questionnaire, or discussion as appropriate for fall prevention, nutrition, physical activity, Tobacco-use cessation, weight loss and cognition.  Checklist reviewing preventive services available has been given to the patient.  Reviewed patient's diet, addressing concerns and/or questions.   The patient reports drinking more than 3 alcoholic drinks per day and/or more than 7 drhnks per week. The patient was counseled and given information about possible harmful effects of excessive alcohol intake.    Regular exercise  Past medical history, family history, medications and social history reviewed today and updated in EPIC.    Komal Davila is a 69 year old, presenting for the following:  Physical        8/8/2024     9:03 AM   Additional Questions   Roomed by [facility] Care Directive  Patient does not have a Health Care Directive or Living Will: Discussed advance care planning with patient; information given to patient to review.    HPI  Pt here for a Physical and check on chronic medical conditions    Hyperlipidemia Follow-Up    Are you regularly taking any medication or supplement to lower your cholesterol?   Yes- statin  Are you having muscle aches or other side effects that you think could be caused by your cholesterol lowering medication?  No    Hypertension Follow-up    Do you check your blood pressure regularly outside of the clinic? No   Are you following a low salt diet? Yes  Are your blood pressures ever more than 140 on the top number (systolic) OR more   than 90 on the bottom number (diastolic), for example 140/90? Yes    Pt is doing well on GERD        8/4/2024   General Health   How would " you rate your overall physical health? Good   Feel stress (tense, anxious, or unable to sleep) To some extent      (!) STRESS CONCERN      8/4/2024   Nutrition   Diet: Regular (no restrictions)            8/4/2024   Exercise   Days per week of moderate/strenous exercise 0 days   Average minutes spent exercising at this level 0 min      (!) EXERCISE CONCERN      8/4/2024   Social Factors   Frequency of gathering with friends or relatives Twice a week   Worry food won't last until get money to buy more No   Food not last or not have enough money for food? Yes   Do you have housing? (Housing is defined as stable permanent housing and does not include staying ouside in a car, in a tent, in an abandoned building, in an overnight shelter, or couch-surfing.) Yes   Are you worried about losing your housing? No   Lack of transportation? No   Unable to get utilities (heat,electricity)? No      (!) FOOD SECURITY CONCERN PRESENT      8/4/2024   Fall Risk   Fallen 2 or more times in the past year? No    No   Trouble with walking or balance? No    No       Multiple values from one day are sorted in reverse-chronological order          8/4/2024   Activities of Daily Living- Home Safety   Needs help with the following daily activites None of the above   Safety concerns in the home None of the above            8/4/2024   Dental   Dentist two times every year? Yes            8/4/2024   Hearing Screening   Hearing concerns? None of the above            8/4/2024   Driving Risk Screening   Patient/family members have concerns about driving No            8/4/2024   General Alertness/Fatigue Screening   Have you been more tired than usual lately? No            8/4/2024   Urinary Incontinence Screening   Bothered by leaking urine in past 6 months No            8/4/2024   TB Screening   Were you born outside of the [place]? No            Today's PHQ-2 Score:       8/8/2024     8:55 AM   PHQ-2 ( 1999 Pf[employer])   Q1: Little interest or pleasure in  doing things 0   Q2: Feeling down, depressed or hopeless 0   PHQ-2 Score 0   Q1: Little interest or pleasure in doing things Not at all   Q2: Feeling down, depressed or hopeless Not at all   PHQ-2 Score 0           8/4/2024   Substance Use   Alcohol more than 3/day or more than 7/wk Yes   How often do you have a drink containing alcohol 2 to 3 times a week   How many alcohol drinks on typical day 3 or 4   How often do you have 5+ drinks at one occasion Never   Audit 2/3 Score 1   How often not able to stop drinking once started Never   How often failed to do what normally expected Never   How often needed first drink in am after a heavy drinking session Never   How often feeling of guilt or remorse after drinking Never   How often unable to remember what happened the night before Never   Have you or someone else been injured because of your drinking No   Has anyone been concerned or suggested you cut down on drinking No   TOTAL SCORE - AUDIT 4   Do you have a current opioid prescription? No   How severe/bad is pain from 1 to 10? 0/10 (No Pain)   Do you use any other substances recreationally? (!) DECLINE        Social History     Tobacco Use    Smoking status: Never    Smokeless tobacco: Never   Substance Use Topics    Alcohol use: Yes     Comment: per week, 2-3 drinks 1-2 times per week    Drug use: No           5/10/2024   LAST FHS-7 RESULTS   1st degree relative breast or ovarian cancer No   Any relative bilateral breast cancer No   Any male have breast cancer No   Any ONE woman have BOTH breast AND ovarian cancer No   Any woman with breast cancer before 50yrs No   2 or more relatives with breast AND/OR ovarian cancer No   2 or more relatives with breast AND/OR bowel cancer No           Pt gets  her  mamograms and wishes to continue      History of abnormal Pap smear: No - age 65 or older with adequate negative prior screening test results (3 consecutive negative cytology results, 2 consecutive negative cotesting  results, or 2 consecutive negative HrHPV test results within 10 years, with the most recent test occurring within the recommended screening interval for the test used)        Latest Ref Rng & Units 3/26/2021    10:00 AM 3/26/2021     9:45 AM 8/3/2017     2:26 PM   PAP / HPV   PAP (Historical)   NIL     HPV 16 DNA NEG^Negative Negative   Negative    HPV 18 DNA NEG^Negative Negative   Negative    Other HR HPV NEG^Negative Negative   Negative      ASCVD Risk   The 10-year ASCVD risk score (Leah BARRIOS, et al., 2019) is: 14.5%    Values used to calculate the score:      Age: 69 years      Sex: Female      Is Non- : No      Diabetic: No      Tobacco smoker: No      Systolic Blood Pressure: 167 mmHg      Is BP treated: Yes      HDL Cholesterol: 69 mg/dL      Total Cholesterol: 211 mg/dL    Fracture Risk Assessment Tool  Link to Frax Calculator  Use the information below to complete the Frax calculator  : 1955  Sex: female  Weight (kg): 81.6 kg (actual weight)  Height (cm): 169.6 cm  Previous Fragility Fracture:  No  History of parent with fractured hip:  No  Current Smoking:  No  Patient has been on glucocorticoids for more than 3 months (5mg/day or more): No  Rheumatoid Arthritis on Problem List:  No  Secondary Osteoporosis on Problem List:  No  Consumes 3 or more units of alcohol per day: Yes  Femoral Neck BMD (g/cm2)            Reviewed and updated as needed this visit by Provider                    Past Medical History:   Diagnosis Date    Anemia, unspecified type 2016    Regularly gives blood. Now on iron regularly     Colon polyp     GERD (gastroesophageal reflux disease)     Hypertension 8-1-17    Rhinitis     Worker's compensation claim administrative problem     DOI 3/23/21  Emp: Sleep number.     Past Surgical History:   Procedure Laterality Date    ARTHROSCOPY KNEE Right     COLONOSCOPY  ?    COLONOSCOPY N/A 2022    Procedure: COLONOSCOPY, FLEXIBLE, WITH LESION  REMOVAL USING SNARE;  Surgeon: Valdo Lopez DO;  Location: [facility] OR    COLONOSCOPY WITH CO2 INSUFFLATION N/A 2022    Procedure: COLONOSCOPY, WITH CO2 INSUFFLATION;  Surgeon: Valdo Lopez DO;  Location: [facility] OR    COMBINED ESOPHAGOSCOPY, GASTROSCOPY, DUODENOSCOPY (EGD) WITH CO2 INSUFFLATION N/A 2023    Procedure: ESOPHAGOGASTRODUODENOSCOPY, WITH CO2 INSUFFLATION;  Surgeon: Marli Briceno DO;  Location: [facility] OR    HC TOOTH EXTRACTION W/FORCEP      LASIK      10 years ago    TOTAL KNEE ARTHROPLASTY Right      21    TUBAL LIGATION      vulvar biopsy      LORI III     OB History    Para Term  AB Living   2 0 0 0 0 2   SAB IAB Ectopic Multiple Live Births   0 0 0 0 0      # Outcome Date GA Lbr Neptali/2nd Weight Sex Type Anes PTL Lv   2             1               Lab work is in process  Labs reviewed in EPIC  BP Readings from Last 3 Encounters:   24 (!) 163/80   23 134/75   23 (!) 149/83    Wt Readings from Last 3 Encounters:   24 81.6 kg (180 lb)   23 86.6 kg (191 lb)   23 88.6 kg (195 lb 6.4 oz)                  Patient Active Problem List   Diagnosis    Chronic rhinitis    Esophageal reflux    Menopause    Menopausal syndrome (hot flashes)    Bilateral leg cramps    Chronic radicular low back pain    Hypertension goal BP (blood pressure) < 140/90    Status post total right knee replacement    Status post total left knee replacement     Past Surgical History:   Procedure Laterality Date    ARTHROSCOPY KNEE Right     COLONOSCOPY  ?    COLONOSCOPY N/A 2022    Procedure: COLONOSCOPY, FLEXIBLE, WITH LESION REMOVAL USING SNARE;  Surgeon: Valdo Lopez DO;  Location: [facility]    COLONOSCOPY WITH CO2 INSUFFLATION N/A 2022    Procedure: COLONOSCOPY, WITH CO2 INSUFFLATION;  Surgeon: Valdo Lopez DO;  Location: [facility]    COMBINED ESOPHAGOSCOPY, GASTROSCOPY, DUODENOSCOPY (EGD) WITH CO2 INSUFFLATION N/A 2023    Procedure:  ESOPHAGOGASTRODUODENOSCOPY, WITH CO2 INSUFFLATION;  Surgeon: Marli Briceno DO;  Location: [facility] OR    HC TOOTH EXTRACTION W/FORCEP      LASIK      10 years ago    TOTAL KNEE ARTHROPLASTY Right      4/19/21    TUBAL LIGATION      vulvar biopsy  2005    LORI III       Social History     Tobacco Use    Smoking status: Never    Smokeless tobacco: Never   Substance Use Topics    Alcohol use: Yes     Comment: per week, 2-3 drinks 1-2 times per week     Family History   Problem Relation Age of Onset    Cancer Father         liver    Hyperlipidemia Father     Prostate Cancer Father     Mental Illness Sister         Depression & anxiety    Colon Polyps Brother     Glaucoma No family hx of     Macular Degeneration No family hx of          Current Outpatient Medications   Medication Sig Dispense Refill    amoxicillin (AMOXIL) 500 MG capsule Take four capsules by mouth 1 hour prior to dental procedure. 4 capsule 4    calcium 600 MG tablet Take 1 tablet by mouth 2 times daily      celecoxib (CELEBREX) 100 MG capsule TAKE ONE CAPSULE BY MOUTH TWICE A DAY. NEEDS FOLLOW UP APPOINTMENT FOR FURTHER REFILLS. 90 capsule 0    gabapentin (NEURONTIN) 300 MG capsule TAKE 2 CAPSULES BY MOUTH ONCE DAILY AT BEDTIME 180 capsule 0    magnesium 250 MG tablet Take 1 tablet by mouth daily      metoprolol succinate ER (TOPROL XL) 25 MG 24 hr tablet Take 1 tablet (25 mg) by mouth daily 90 tablet 0    omeprazole (PRILOSEC) 20 MG DR capsule Take 1 capsule (20 mg) by mouth daily 90 capsule 1    OMEPRAZOLE PO Take by mouth daily      potassium gluconate 2.5 MEQ tablet Take 90 mEq by mouth      pravastatin (PRAVACHOL) 10 MG tablet Take 1 tablet (10 mg) by mouth daily 90 tablet 1    celecoxib (CELEBREX) 100 MG capsule Take 1 capsule (100 mg) by mouth 2 times daily Needs follow up labs before further refills 60 capsule 0     No Known Allergies  Recent Labs   Lab Test 08/02/23  1157 07/14/23  1320 04/27/22  1305 04/12/21  1249 03/26/21  1004 09/14/20  0818  08/03/17  1406 05/25/16  1343   A1C  --   --   --   --  5.4  --   --   --    LDL  --  111* 98  --  92 135*   < >  --    HDL  --  69 71  --  75 71   < >  --    TRIG  --  153* 226*  --  113 147   < >  --    ALT  --  26  --   --   --   --   --  25   CR  --  0.89 0.91  --  0.76 0.84   < > 0.63   GFRESTIMATED  --  71 70  --  83 74   < > >90  Non  GFR Calc     GFRESTBLACK  --   --   --   --  >90 86   < > >90   GFR Calc     POTASSIUM  --  4.3 4.3   < > 4.0 4.4   < > 4.1   TSH 0.50  --   --   --   --   --   --  0.40    < > = values in this interval not displayed.      Current providers sharing in care for this patient include:  Patient Care Team:  Christine Young MD as PCP - General (Family Practice)  Christine Young MD as Assigned PCP  Cristino Castro MD as Assigned Musculoskeletal Provider  Marli Singh PA-C as Physician Assistant (Dermatology)  Marli Singh PA-C as Assigned Surgical Provider    The following health maintenance items are reviewed in Epic and correct as of today:  Health Maintenance   Topic Date Due    URINE DRUG SCREEN  Never done    RSV VACCINE (Pregnancy & 60+) (1 - 1-dose 60+ series) Never done    COVID-19 Vaccine (6 - 2023-24 season) 09/08/2023    BMP  07/14/2024    LIPID  07/14/2024    ANNUAL REVIEW OF HM ORDERS  08/02/2024    MEDICARE ANNUAL WELLNESS VISIT  08/02/2024    INFLUENZA VACCINE (1) 09/01/2024    COLORECTAL CANCER SCREENING  07/13/2025    FALL RISK ASSESSMENT  08/08/2025    PAP FOLLOW-UP  03/26/2026    HPV FOLLOW-UP  03/26/2026    MAMMO SCREENING  05/10/2026    GLUCOSE  07/14/2026    ADVANCE CARE PLANNING  08/02/2028    DTAP/TDAP/TD IMMUNIZATION (3 - Td or Tdap) 07/14/2033    DEXA  08/26/2037    HEPATITIS C SCREENING  Completed    PHQ-2 (once per calendar year)  Completed    Pneumococcal Vaccine: 65+ Years  Completed    ZOSTER IMMUNIZATION  Completed    IPV IMMUNIZATION  Aged Out    HPV IMMUNIZATION  Aged Out    MENINGITIS  "IMMUNIZATION  Aged Out    RSV MONOCLONAL ANTIBODY  Aged Out    PAP  Discontinued         Review of Systems  CONSTITUTIONAL: NEGATIVE for fever, chills, change in weight  INTEGUMENTARY/SKIN: NEGATIVE for worrisome rashes, moles or lesions  EYES: NEGATIVE for vision changes or irritation  ENT/MOUTH: NEGATIVE for ear, mouth and throat problems  RESP: NEGATIVE for significant cough or SOB  BREAST: NEGATIVE for masses, tenderness or discharge  CV: NEGATIVE for chest pain, palpitations or peripheral edema  GI: NEGATIVE for nausea, abdominal pain, heartburn, or change in bowel habits  : NEGATIVE for frequency, dysuria, or hematuria  MUSCULOSKELETAL: NEGATIVE for significant arthralgias or myalgia  NEURO: NEGATIVE for weakness, dizziness or paresthesias  ENDOCRINE: NEGATIVE for temperature intolerance, skin/hair changes  HEME: NEGATIVE for bleeding problems  PSYCHIATRIC: NEGATIVE for changes in mood or affect     Objective    Exam  BP (!) 167/80   Pulse 61   Temp 97.5  F (36.4  C) (Temporal)   Resp 16   Ht 1.696 m (5' 6.77\")   Wt 81.6 kg (180 lb)   SpO2 99%   BMI 28.38 kg/m     Estimated body mass index is 28.38 kg/m  as calculated from the following:    Height as of this encounter: 1.696 m (5' 6.77\").    Weight as of this encounter: 81.6 kg (180 lb).    Physical Exam  GENERAL: alert and no distress  EYES: Eyes grossly normal to inspection, PERRL and conjunctivae and sclerae normal  HENT: ear canals and TM's normal, nose and mouth without ulcers or lesions  NECK: no adenopathy, no asymmetry, masses, or scars  RESP: lungs clear to auscultation - no rales, rhonchi or wheezes  BREAST: pt declined  CV: regular rate and rhythm, normal S1 S2, no S3 or S4, no murmur, click or rub, no peripheral edema  ABDOMEN: soft, nontender, no hepatosplenomegaly, no masses and bowel sounds normal  MS: no gross musculoskeletal defects noted, no edema  SKIN: no suspicious lesions or rashes  NEURO: Normal strength and tone, mentation " intact and speech normal  PSYCH: mentation appears normal, affect normal/bright        8/8/2024   Mini Cog   Clock Draw Score 2 Normal   3 Item Recall 2 objects recalled   Mini Cog Total Score 4                 Signed Electronically by: Christine Young MD

## 2024-09-10 ENCOUNTER — OFFICE VISIT (OUTPATIENT)
Dept: FAMILY MEDICINE | Facility: CLINIC | Age: 69
End: 2024-09-10
Payer: COMMERCIAL

## 2024-09-10 VITALS
BODY MASS INDEX: 28.56 KG/M2 | HEART RATE: 67 BPM | HEIGHT: 67 IN | WEIGHT: 182 LBS | TEMPERATURE: 98.3 F | RESPIRATION RATE: 16 BRPM | SYSTOLIC BLOOD PRESSURE: 158 MMHG | DIASTOLIC BLOOD PRESSURE: 78 MMHG | OXYGEN SATURATION: 98 %

## 2024-09-10 DIAGNOSIS — F10.10 ALCOHOL USE DISORDER, MILD, ABUSE: ICD-10-CM

## 2024-09-10 DIAGNOSIS — N18.30 BENIGN HYPERTENSION WITH CKD (CHRONIC KIDNEY DISEASE) STAGE III (H): ICD-10-CM

## 2024-09-10 DIAGNOSIS — I12.9 BENIGN HYPERTENSION WITH CKD (CHRONIC KIDNEY DISEASE) STAGE III (H): ICD-10-CM

## 2024-09-10 DIAGNOSIS — N18.31 STAGE 3A CHRONIC KIDNEY DISEASE (H): ICD-10-CM

## 2024-09-10 PROCEDURE — G2211 COMPLEX E/M VISIT ADD ON: HCPCS | Performed by: FAMILY MEDICINE

## 2024-09-10 PROCEDURE — 99214 OFFICE O/P EST MOD 30 MIN: CPT | Performed by: FAMILY MEDICINE

## 2024-09-10 RX ORDER — LOSARTAN POTASSIUM 50 MG/1
50 TABLET ORAL DAILY
Qty: 30 TABLET | Refills: 0 | Status: SHIPPED | OUTPATIENT
Start: 2024-09-10 | End: 2024-10-03

## 2024-09-10 RX ORDER — METOPROLOL SUCCINATE 50 MG/1
50 TABLET, EXTENDED RELEASE ORAL DAILY
Qty: 90 TABLET | Refills: 0 | Status: SHIPPED | OUTPATIENT
Start: 2024-09-10

## 2024-09-10 ASSESSMENT — PAIN SCALES - GENERAL: PAINLEVEL: NO PAIN (0)

## 2024-09-10 NOTE — PROGRESS NOTES
Assessment & Plan     Benign hypertension with CKD (chronic kidney disease) stage III (H)  Add cozaar  SEE EPIC care orders  The potential side effects of this medication have been discussed with the patient.  Call if any significant problems with these are experienced.  Follow up BP check in 1 month  Life style changes discussed   - metoprolol succinate ER (TOPROL XL) 50 MG 24 hr tablet; Take 1 tablet (50 mg) by mouth daily.  - losartan (COZAAR) 50 MG tablet; Take 1 tablet (50 mg) by mouth daily.    Stage 3a chronic kidney disease (H)  On No NSAID  BP control will help  Add Losrtan    Alcohol use disorder, mild, abuse  Discussed limiting alcohol to no more than 1 drink a day will  help with BP      The longitudinal plan of care for the diagnosis(es)/condition(s) as documented were addressed during this visit. Due to the added complexity in care, I will continue to support Lola in the subsequent management and with ongoing continuity of care.    Komal Davila is a 69 year old, presenting for the following health issues:  Hypertension        9/10/2024    11:28 AM   Additional Questions   Roomed by Berta     History of Present Illness       Hypertension: She presents for follow up of hypertension.  She does check blood pressure  regularly outside of the clinic. Outside blood pressures have been over 140/90. She follows a low salt diet.     She eats 2-3 servings of fruits and vegetables daily.She consumes 0 sweetened beverage(s) daily.She exercises with enough effort to increase her heart rate 9 or less minutes per day.  She exercises with enough effort to increase her heart rate 3 or less days per week.   She is taking medications regularly.   She has been taking her Blood Pressure at home and BP has been 150s/70 to 80  She continue to drink alcohol 3 days a week about 3 Drinks              Review of Systems  Constitutional, HEENT, cardiovascular, pulmonary, gi and gu systems are negative, except as otherwise  "noted.      Objective    BP (!) 158/78   Pulse 67   Temp 98.3  F (36.8  C) (Temporal)   Resp 16   Ht 1.696 m (5' 6.77\")   Wt 82.6 kg (182 lb)   SpO2 98%   BMI 28.70 kg/m    Body mass index is 28.7 kg/m .  Physical Exam   GENERAL: alert and no distress  NECK: no adenopathy, no asymmetry, masses, or scars  RESP: lungs clear to auscultation - no rales, rhonchi or wheezes  CV: regular rate and rhythm, normal S1 S2, no S3 or S4, no murmur, click or rub, no peripheral edema  ABDOMEN: soft, nontender, no hepatosplenomegaly, no masses and bowel sounds normal  MS: no gross musculoskeletal defects noted, no edema    Office Visit on 08/08/2024   Component Date Value Ref Range Status    Cholesterol 08/08/2024 192  <200 mg/dL Final    Triglycerides 08/08/2024 182 (H)  <150 mg/dL Final    Direct Measure HDL 08/08/2024 69  >=50 mg/dL Final    LDL Cholesterol Calculated 08/08/2024 87  <=100 mg/dL Final    Non HDL Cholesterol 08/08/2024 123  <130 mg/dL Final    Patient Fasting > 8hrs? 08/08/2024 No   Final    Sodium 08/08/2024 142  135 - 145 mmol/L Final    Potassium 08/08/2024 4.2  3.4 - 5.3 mmol/L Final    Carbon Dioxide (CO2) 08/08/2024 27  22 - 29 mmol/L Final    Anion Gap 08/08/2024 11  7 - 15 mmol/L Final    Urea Nitrogen 08/08/2024 19.0  8.0 - 23.0 mg/dL Final    Creatinine 08/08/2024 1.06 (H)  0.51 - 0.95 mg/dL Final    GFR Estimate 08/08/2024 57 (L)  >60 mL/min/1.73m2 Final    eGFR calculated using 2021 CKD-EPI equation.    Calcium 08/08/2024 9.2  8.8 - 10.4 mg/dL Final    Reference intervals for this test were updated on 7/16/2024 to reflect our healthy population more accurately. There may be differences in the flagging of prior results with similar values performed with this method. Those prior results can be interpreted in the context of the updated reference intervals.    Chloride 08/08/2024 104  98 - 107 mmol/L Final    Glucose 08/08/2024 103 (H)  70 - 99 mg/dL Final    Alkaline Phosphatase 08/08/2024 112  40 - " 150 U/L Final    AST 08/08/2024 20  0 - 45 U/L Final    ALT 08/08/2024 19  0 - 50 U/L Final    Protein Total 08/08/2024 6.7  6.4 - 8.3 g/dL Final    Albumin 08/08/2024 4.3  3.5 - 5.2 g/dL Final    Bilirubin Total 08/08/2024 0.6  <=1.2 mg/dL Final    Patient Fasting > 8hrs? 08/08/2024 No   Final    WBC Count 08/08/2024 6.0  4.0 - 11.0 10e3/uL Final    RBC Count 08/08/2024 4.00  3.80 - 5.20 10e6/uL Final    Hemoglobin 08/08/2024 12.7  11.7 - 15.7 g/dL Final    Hematocrit 08/08/2024 38.8  35.0 - 47.0 % Final    MCV 08/08/2024 97  78 - 100 fL Final    MCH 08/08/2024 31.8  26.5 - 33.0 pg Final    MCHC 08/08/2024 32.7  31.5 - 36.5 g/dL Final    RDW 08/08/2024 12.8  10.0 - 15.0 % Final    Platelet Count 08/08/2024 168  150 - 450 10e3/uL Final           Signed Electronically by: Christine Young MD

## 2024-09-16 ENCOUNTER — MYC REFILL (OUTPATIENT)
Dept: FAMILY MEDICINE | Facility: CLINIC | Age: 69
End: 2024-09-16
Payer: COMMERCIAL

## 2024-09-16 DIAGNOSIS — M19.011 GLENOHUMERAL ARTHRITIS, RIGHT: ICD-10-CM

## 2024-09-16 RX ORDER — CELECOXIB 100 MG/1
100 CAPSULE ORAL 2 TIMES DAILY
Qty: 180 CAPSULE | Refills: 1 | OUTPATIENT
Start: 2024-09-16

## 2024-10-03 DIAGNOSIS — N18.30 BENIGN HYPERTENSION WITH CKD (CHRONIC KIDNEY DISEASE) STAGE III (H): ICD-10-CM

## 2024-10-03 DIAGNOSIS — I12.9 BENIGN HYPERTENSION WITH CKD (CHRONIC KIDNEY DISEASE) STAGE III (H): ICD-10-CM

## 2024-10-03 RX ORDER — LOSARTAN POTASSIUM 50 MG/1
50 TABLET ORAL DAILY
Qty: 30 TABLET | Refills: 0 | Status: SHIPPED | OUTPATIENT
Start: 2024-10-03 | End: 2024-11-11

## 2024-11-04 ENCOUNTER — MYC REFILL (OUTPATIENT)
Dept: FAMILY MEDICINE | Facility: CLINIC | Age: 69
End: 2024-11-04
Payer: COMMERCIAL

## 2024-11-04 DIAGNOSIS — I12.9 BENIGN HYPERTENSION WITH CKD (CHRONIC KIDNEY DISEASE) STAGE III (H): ICD-10-CM

## 2024-11-04 DIAGNOSIS — N18.30 BENIGN HYPERTENSION WITH CKD (CHRONIC KIDNEY DISEASE) STAGE III (H): ICD-10-CM

## 2024-11-04 RX ORDER — LOSARTAN POTASSIUM 50 MG/1
50 TABLET ORAL DAILY
Qty: 30 TABLET | Refills: 0 | OUTPATIENT
Start: 2024-11-04

## 2024-11-05 RX ORDER — LOSARTAN POTASSIUM 50 MG/1
50 TABLET ORAL DAILY
Qty: 30 TABLET | Refills: 0 | OUTPATIENT
Start: 2024-11-05

## 2024-11-06 NOTE — TELEPHONE ENCOUNTER
Patient is scheduled for an appointment with Dr. Young on Monday, November 11th, 2024 at 11:20am.  Edel Bell,

## 2024-11-11 ENCOUNTER — OFFICE VISIT (OUTPATIENT)
Dept: FAMILY MEDICINE | Facility: CLINIC | Age: 69
End: 2024-11-11
Payer: COMMERCIAL

## 2024-11-11 VITALS
HEART RATE: 71 BPM | SYSTOLIC BLOOD PRESSURE: 162 MMHG | DIASTOLIC BLOOD PRESSURE: 78 MMHG | TEMPERATURE: 97.2 F | WEIGHT: 180 LBS | OXYGEN SATURATION: 98 % | BODY MASS INDEX: 28.25 KG/M2 | HEIGHT: 67 IN | RESPIRATION RATE: 16 BRPM

## 2024-11-11 DIAGNOSIS — K21.9 GASTROESOPHAGEAL REFLUX DISEASE, UNSPECIFIED WHETHER ESOPHAGITIS PRESENT: ICD-10-CM

## 2024-11-11 DIAGNOSIS — R25.2 BILATERAL LEG CRAMPS: ICD-10-CM

## 2024-11-11 DIAGNOSIS — G89.29 CHRONIC RADICULAR LOW BACK PAIN: ICD-10-CM

## 2024-11-11 DIAGNOSIS — N18.30 BENIGN HYPERTENSION WITH CKD (CHRONIC KIDNEY DISEASE) STAGE III (H): ICD-10-CM

## 2024-11-11 DIAGNOSIS — N18.31 STAGE 3A CHRONIC KIDNEY DISEASE (H): ICD-10-CM

## 2024-11-11 DIAGNOSIS — I12.9 BENIGN HYPERTENSION WITH CKD (CHRONIC KIDNEY DISEASE) STAGE III (H): ICD-10-CM

## 2024-11-11 DIAGNOSIS — M54.16 CHRONIC RADICULAR LOW BACK PAIN: ICD-10-CM

## 2024-11-11 PROCEDURE — G2211 COMPLEX E/M VISIT ADD ON: HCPCS | Performed by: FAMILY MEDICINE

## 2024-11-11 PROCEDURE — 99214 OFFICE O/P EST MOD 30 MIN: CPT | Performed by: FAMILY MEDICINE

## 2024-11-11 RX ORDER — LOSARTAN POTASSIUM 50 MG/1
50 TABLET ORAL DAILY
Qty: 90 TABLET | Refills: 0 | Status: SHIPPED | OUTPATIENT
Start: 2024-11-11

## 2024-11-11 RX ORDER — GABAPENTIN 300 MG/1
CAPSULE ORAL
Qty: 270 CAPSULE | Refills: 3 | Status: SHIPPED | OUTPATIENT
Start: 2024-11-11

## 2024-11-11 RX ORDER — METOPROLOL SUCCINATE 50 MG/1
50 TABLET, EXTENDED RELEASE ORAL DAILY
Qty: 90 TABLET | Refills: 0 | Status: SHIPPED | OUTPATIENT
Start: 2024-11-11

## 2024-11-11 RX ORDER — AMLODIPINE BESYLATE 2.5 MG/1
2.5 TABLET ORAL DAILY
Qty: 30 TABLET | Refills: 1 | Status: SHIPPED | OUTPATIENT
Start: 2024-11-11

## 2024-11-11 RX ORDER — CELECOXIB 100 MG/1
100 CAPSULE ORAL 2 TIMES DAILY
Qty: 180 CAPSULE | Refills: 1 | Status: CANCELLED | OUTPATIENT
Start: 2024-11-11

## 2024-11-11 ASSESSMENT — PAIN SCALES - GENERAL: PAINLEVEL_OUTOF10: NO PAIN (0)

## 2024-11-11 NOTE — PROGRESS NOTES
Assessment & Plan     Benign hypertension with CKD (chronic kidney disease) stage III (H)  High  Pt says she had More alcohol last evening  Advised add Norvasc 2.5 mg daiy  - losartan (COZAAR) 50 MG tablet; Take 1 tablet (50 mg) by mouth daily.  - metoprolol succinate ER (TOPROL XL) 50 MG 24 hr tablet; Take 1 tablet (50 mg) by mouth daily.  - Albumin Random Urine Quantitative with Creat Ratio; Future  - amLODIPine (NORVASC) 2.5 MG tablet; Take 1 tablet (2.5 mg) by mouth daily.  Follow up 3-4 weeks BP check  Stop alcohol  DASH diet   Stage 3a chronic kidney disease (H)  Due to elvated BP  Needs better control as discussed Today  Lifestyle changes will help     Gastroesophageal reflux disease, unspecified whether esophagitis present  Stable   - omeprazole (PRILOSEC) 20 MG DR capsule; Take 1 capsule (20 mg) by mouth daily.    Bilateral leg cramps    - gabapentin (NEURONTIN) 300 MG capsule; TAKE 2 CAPSULES BY MOUTH ONCE DAILY AT BEDTIME and 300 mg in AM    Chronic radicular low back pain    - gabapentin (NEURONTIN) 300 MG capsule; TAKE 2 CAPSULES BY MOUTH ONCE DAILY AT BEDTIME and 300 mg in AM    Advised stop celebrex due to CKD  Increase Gabapentin  Follow up 3-4 weeks BP check       The longitudinal plan of care for the diagnosis(es)/condition(s) as documented were addressed during this visit. Due to the added complexity in care, I will continue to support Lola in the subsequent management and with ongoing continuity of care.      Subjective   Lola is a 69 year old, presenting for the following health issues:  Hypertension        11/11/2024    11:21 AM   Additional Questions   Roomed by Berta     History of Present Illness       CKD: She is not using over the counter pain medicine.     Hypertension: She presents for follow up of hypertension.  She does not check blood pressure  regularly outside of the clinic.  She follows a low salt diet.     She eats 0-1 servings of fruits and vegetables daily.She consumes 0  "sweetened beverage(s) daily.She exercises with enough effort to increase her heart rate 10 to 19 minutes per day.  She exercises with enough effort to increase her heart rate 6 days per week.   She is taking medications regularly.   Pt states she has Decreased alcohol Intake  GERD is stable   The patient denies abdominal or flank pain, anorexia, nausea or vomiting, dysphagia, change in bowel habits or black or bloody stools.                Review of Systems  CONSTITUTIONAL: NEGATIVE for fever, chills, change in weight  ENT/MOUTH: NEGATIVE for ear, mouth and throat problems  RESP: NEGATIVE for significant cough or SOB  CV: NEGATIVE for chest pain, palpitations or peripheral edema  GI: NEGATIVE for nausea, abdominal pain, heartburn, or change in bowel habits  PSYCHIATRIC: NEGATIVE for changes in mood or affect      Objective    BP (!) 183/60   Pulse 71   Temp 97.2  F (36.2  C) (Temporal)   Resp 16   Ht 1.696 m (5' 6.77\")   Wt 81.6 kg (180 lb)   SpO2 98%   BMI 28.38 kg/m    Body mass index is 28.38 kg/m .  Physical Exam   GENERAL: alert and no distress  NECK: no adenopathy, no asymmetry, masses, or scars  RESP: lungs clear to auscultation - no rales, rhonchi or wheezes  CV: regular rate and rhythm, normal S1 S2, no S3 or S4, no murmur, click or rub, no peripheral edema  ABDOMEN: soft, nontender, no hepatosplenomegaly, no masses and bowel sounds normal  MS: no gross musculoskeletal defects noted, no edema  PSYCH: mentation appears normal, affect normal/bright    Office Visit on 08/08/2024   Component Date Value Ref Range Status    Cholesterol 08/08/2024 192  <200 mg/dL Final    Triglycerides 08/08/2024 182 (H)  <150 mg/dL Final    Direct Measure HDL 08/08/2024 69  >=50 mg/dL Final    LDL Cholesterol Calculated 08/08/2024 87  <=100 mg/dL Final    Non HDL Cholesterol 08/08/2024 123  <130 mg/dL Final    Patient Fasting > 8hrs? 08/08/2024 No   Final    Sodium 08/08/2024 142  135 - 145 mmol/L Final    Potassium " 08/08/2024 4.2  3.4 - 5.3 mmol/L Final    Carbon Dioxide (CO2) 08/08/2024 27  22 - 29 mmol/L Final    Anion Gap 08/08/2024 11  7 - 15 mmol/L Final    Urea Nitrogen 08/08/2024 19.0  8.0 - 23.0 mg/dL Final    Creatinine 08/08/2024 1.06 (H)  0.51 - 0.95 mg/dL Final    GFR Estimate 08/08/2024 57 (L)  >60 mL/min/1.73m2 Final    eGFR calculated using 2021 CKD-EPI equation.    Calcium 08/08/2024 9.2  8.8 - 10.4 mg/dL Final    Reference intervals for this test were updated on 7/16/2024 to reflect our healthy population more accurately. There may be differences in the flagging of prior results with similar values performed with this method. Those prior results can be interpreted in the context of the updated reference intervals.    Chloride 08/08/2024 104  98 - 107 mmol/L Final    Glucose 08/08/2024 103 (H)  70 - 99 mg/dL Final    Alkaline Phosphatase 08/08/2024 112  40 - 150 U/L Final    AST 08/08/2024 20  0 - 45 U/L Final    ALT 08/08/2024 19  0 - 50 U/L Final    Protein Total 08/08/2024 6.7  6.4 - 8.3 g/dL Final    Albumin 08/08/2024 4.3  3.5 - 5.2 g/dL Final    Bilirubin Total 08/08/2024 0.6  <=1.2 mg/dL Final    Patient Fasting > 8hrs? 08/08/2024 No   Final    WBC Count 08/08/2024 6.0  4.0 - 11.0 10e3/uL Final    RBC Count 08/08/2024 4.00  3.80 - 5.20 10e6/uL Final    Hemoglobin 08/08/2024 12.7  11.7 - 15.7 g/dL Final    Hematocrit 08/08/2024 38.8  35.0 - 47.0 % Final    MCV 08/08/2024 97  78 - 100 fL Final    MCH 08/08/2024 31.8  26.5 - 33.0 pg Final    MCHC 08/08/2024 32.7  31.5 - 36.5 g/dL Final    RDW 08/08/2024 12.8  10.0 - 15.0 % Final    Platelet Count 08/08/2024 168  150 - 450 10e3/uL Final           Signed Electronically by: Christine Young MD

## 2024-11-11 NOTE — PATIENT INSTRUCTIONS
What is Chronic Kidney Disease  Chronic kidney disease (CKD) is the permanent loss of kidney function.  When the kidneys are damaged, they do not remove wastes and extra water from the blood as well as they should.  The most common causes of CKD are high blood pressure and diabetes.   It can also run in families, so you may be at higher risk if you have a blood relative with kidney failure.  CKD is a silent condition (having few or no symptoms) and develops so slowly that most people do not realize they are sick until the disease is advanced.  Left undetected and untreated CKD can eventually lead to further problems including hypertension, anemia, weak bones, poor nutrition, nerve damage, and in severe cases kidney failure (requiring dialysis or transplant).  CKD also increases a patient s risk for developing diseases of the heart and blood vessels.    We can diagnose CKD through blood and urine tests.  By detecting CKD in its early stages we can prevent or delay the complications of CKD, including kidney failure and heart disease.  Stages of CKD:  There are five stages of chronic kidney disease.  Your stage of kidney damage is based on the presence of kidney damage (often in the form of urinary proteins) and your glomerular filtration rate (GFR), which is a measure of your level of kidney function.  Things you can do to slow down or avoid kidney failure:  If you have diabetes, control your blood sugar.  Studies show that keeping tight control of blood sugar can delay or prevent kidney failure  If you have high blood pressure, it is important to lower your blood pressure.  Medications called ACE (angiotensin-converting enzyme) inhibitors or ARBs (angiotensin receptor blockers) are used to keep your kidney disease from getting worse.  Be active by including exercise in your daily routine.  Eat a well balanced diet.   We may have you watch the amount of protein you eat.  Too much protein can make the kidneys work too  hard.  Quit smoking.  Smoking damages the kidneys.  It also raises blood pressure.  Discuss all of your medications, including over-the-counter medications, with your doctor.  You should  avoid certain medications, including popular medications such as Advil, Motrin, Aleve (and other  NSAIDs ) which can further damage your kidneys.  For more information on Chronic Kidney Disease, please see the National Kidney Foundation www.kidney.org.  Lifestyle Modifications to Manage Hypertension    Weight reduction to a body mass index of 18.5 to 24.9 will give rise to a systolic blood pressure reduction of  5 to 20 mm Hg.  Your current BMI is bowel movement.    Incorporating the DASH Diet (a diet rich in fruits, vegetables, and low-fat dairy products with a reduced content of saturated and total fat) is as effective as a single drug agent.  This will provide a reduction of  8 to 14 mm Hg    Dietary sodium restriction to no more than 100 mmol per day (2.4 g sodium or 6 g sodium chloride) will provide a reduction of  2 to 8 mm Hg.    Engage in regular aerobic activity such as brisk walking (at least 30 minutes per day, most days of the week). This will provide a reduction of  4 to 9 mm Hg.    Limit alcohol consumption to no more than two drinks (1 oz or 30 mL of alcohol; e.g., 24-oz beer, 10 oz of wine, or 3 oz of 80-proof whiskey) per day in most men and to no more than one drink per day in women and lighter-weight persons. This will provide a reduction of  2 to 4 mm Hg.    If continues to be elevated, we should consider using a low dose of a medication to improve this and lower your risk for heart attack and stroke.

## 2024-11-28 NOTE — TELEPHONE ENCOUNTER
Interval History: No events overnight. Patient sitting comfortably at edge of bed today. No new concerns. VSS.    Review of Systems  Objective:     Vital Signs (Most Recent):  Temp: 98.3 °F (36.8 °C) (11/28/24 0733)  Pulse: 92 (11/28/24 0733)  Resp: 17 (11/28/24 0733)  BP: 104/74 (11/28/24 0733)  SpO2: 98 % (11/28/24 0733) Vital Signs (24h Range):  Temp:  [98.3 °F (36.8 °C)-98.5 °F (36.9 °C)] 98.3 °F (36.8 °C)  Pulse:  [84-92] 92  Resp:  [17-18] 17  SpO2:  [97 %-98 %] 98 %  BP: (104-105)/(68-74) 104/74     Weight: 49.9 kg (110 lb 0.2 oz)  Body mass index is 20.12 kg/m².  No intake or output data in the 24 hours ending 11/28/24 1151      Physical Exam  Vitals and nursing note reviewed.   Constitutional:       General: She is not in acute distress.     Appearance: Normal appearance. She is not ill-appearing.   HENT:      Head: Normocephalic and atraumatic.      Nose: Nose normal.      Mouth/Throat:      Mouth: Mucous membranes are moist.   Eyes:      Conjunctiva/sclera: Conjunctivae normal.   Cardiovascular:      Rate and Rhythm: Normal rate.   Pulmonary:      Effort: Pulmonary effort is normal. No respiratory distress.   Abdominal:      General: Abdomen is flat. There is no distension.   Musculoskeletal:      Right lower leg: No edema.      Left lower leg: No edema.   Skin:     General: Skin is warm and dry.   Neurological:      Mental Status: She is alert. Mental status is at baseline.             Significant Labs: All pertinent labs within the past 24 hours have been reviewed.    Significant Imaging: I have reviewed all pertinent imaging results/findings within the past 24 hours.   Patient notified of providers message as written.  Patient verbalized understanding, no further questions or concerns.  Patient scheduled with another provider on 3/14/17  Whitney Ng RN

## 2024-12-01 ENCOUNTER — MYC REFILL (OUTPATIENT)
Dept: FAMILY MEDICINE | Facility: CLINIC | Age: 69
End: 2024-12-01
Payer: COMMERCIAL

## 2024-12-01 DIAGNOSIS — Z96.651 S/P TOTAL KNEE REPLACEMENT, RIGHT: ICD-10-CM

## 2024-12-02 RX ORDER — AMOXICILLIN 500 MG/1
CAPSULE ORAL
Qty: 4 CAPSULE | Refills: 4 | Status: SHIPPED | OUTPATIENT
Start: 2024-12-02

## 2024-12-10 RX ORDER — DOXYCYCLINE HYCLATE 100 MG
100 TABLET ORAL 2 TIMES DAILY
Qty: 20 TABLET | Refills: 0 | OUTPATIENT
Start: 2024-12-10

## 2024-12-10 NOTE — TELEPHONE ENCOUNTER
Pt called requesting a refill of antibiotic they received at end of November.    This was prescribed by urgent care.     Please review and deem if refill would be appropriate.    Thanks,  Ana Patrick, PharmD [facility] [place]  December 10, 2024      [phone]

## 2024-12-16 NOTE — TELEPHONE ENCOUNTER
Called patient informed her of message below patient states she does not need refills on this mediation anymore     Thank you  LS

## 2025-01-06 DIAGNOSIS — I12.9 BENIGN HYPERTENSION WITH CKD (CHRONIC KIDNEY DISEASE) STAGE III (H): ICD-10-CM

## 2025-01-06 DIAGNOSIS — N18.30 BENIGN HYPERTENSION WITH CKD (CHRONIC KIDNEY DISEASE) STAGE III (H): ICD-10-CM

## 2025-01-06 RX ORDER — AMLODIPINE BESYLATE 2.5 MG/1
2.5 TABLET ORAL DAILY
Qty: 30 TABLET | Refills: 0 | Status: SHIPPED | OUTPATIENT
Start: 2025-01-06 | End: 2025-01-07

## 2025-01-07 ENCOUNTER — OFFICE VISIT (OUTPATIENT)
Dept: FAMILY MEDICINE | Facility: CLINIC | Age: 70
End: 2025-01-07
Payer: COMMERCIAL

## 2025-01-07 VITALS
OXYGEN SATURATION: 98 % | BODY MASS INDEX: 27.19 KG/M2 | WEIGHT: 173.25 LBS | HEART RATE: 66 BPM | SYSTOLIC BLOOD PRESSURE: 135 MMHG | RESPIRATION RATE: 16 BRPM | HEIGHT: 67 IN | DIASTOLIC BLOOD PRESSURE: 78 MMHG | TEMPERATURE: 97.2 F

## 2025-01-07 DIAGNOSIS — M54.16 CHRONIC RADICULAR LOW BACK PAIN: ICD-10-CM

## 2025-01-07 DIAGNOSIS — R25.2 BILATERAL LEG CRAMPS: ICD-10-CM

## 2025-01-07 DIAGNOSIS — N18.30 BENIGN HYPERTENSION WITH CKD (CHRONIC KIDNEY DISEASE) STAGE III (H): ICD-10-CM

## 2025-01-07 DIAGNOSIS — G89.29 CHRONIC RADICULAR LOW BACK PAIN: ICD-10-CM

## 2025-01-07 DIAGNOSIS — N18.31 STAGE 3A CHRONIC KIDNEY DISEASE (H): Primary | ICD-10-CM

## 2025-01-07 DIAGNOSIS — F10.10 ALCOHOL USE DISORDER, MILD, ABUSE: ICD-10-CM

## 2025-01-07 DIAGNOSIS — I12.9 BENIGN HYPERTENSION WITH CKD (CHRONIC KIDNEY DISEASE) STAGE III (H): ICD-10-CM

## 2025-01-07 PROCEDURE — 82570 ASSAY OF URINE CREATININE: CPT | Performed by: FAMILY MEDICINE

## 2025-01-07 PROCEDURE — G2211 COMPLEX E/M VISIT ADD ON: HCPCS | Performed by: FAMILY MEDICINE

## 2025-01-07 PROCEDURE — 82043 UR ALBUMIN QUANTITATIVE: CPT | Performed by: FAMILY MEDICINE

## 2025-01-07 PROCEDURE — 99213 OFFICE O/P EST LOW 20 MIN: CPT | Performed by: FAMILY MEDICINE

## 2025-01-07 RX ORDER — LOSARTAN POTASSIUM 50 MG/1
50 TABLET ORAL DAILY
Qty: 90 TABLET | Refills: 2 | Status: SHIPPED | OUTPATIENT
Start: 2025-01-07

## 2025-01-07 RX ORDER — AMLODIPINE BESYLATE 2.5 MG/1
2.5 TABLET ORAL DAILY
Qty: 90 TABLET | Refills: 3 | Status: SHIPPED | OUTPATIENT
Start: 2025-01-07

## 2025-01-07 RX ORDER — AMLODIPINE BESYLATE 2.5 MG/1
2.5 TABLET ORAL DAILY
Qty: 30 TABLET | Refills: 0 | Status: SHIPPED | OUTPATIENT
Start: 2025-01-07 | End: 2025-01-07

## 2025-01-07 RX ORDER — METOPROLOL SUCCINATE 50 MG/1
50 TABLET, EXTENDED RELEASE ORAL DAILY
Qty: 90 TABLET | Refills: 2 | Status: SHIPPED | OUTPATIENT
Start: 2025-01-07

## 2025-01-07 RX ORDER — GABAPENTIN 300 MG/1
300 CAPSULE ORAL 3 TIMES DAILY
Qty: 270 CAPSULE | Refills: 3 | Status: SHIPPED | OUTPATIENT
Start: 2025-01-07

## 2025-01-07 ASSESSMENT — PAIN SCALES - GENERAL: PAINLEVEL_OUTOF10: NO PAIN (0)

## 2025-01-07 NOTE — PROGRESS NOTES
Assessment & Plan     Benign hypertension with CKD (chronic kidney disease) stage III (H)  Stable   - amLODIPine (NORVASC) 2.5 MG tablet; Take 1 tablet (2.5 mg) by mouth daily.  - losartan (COZAAR) 50 MG tablet; Take 1 tablet (50 mg) by mouth daily.  - metoprolol succinate ER (TOPROL XL) 50 MG 24 hr tablet; Take 1 tablet (50 mg) by mouth daily.    Alcohol use disorder, mild, abuse  Pt has decreased     Stage 3a chronic kidney disease (H)  Stable   - Albumin Random Urine Quantitative with Creat Ratio; Future    Bilateral leg cramps  Refilled   - gabapentin (NEURONTIN) 300 MG capsule; Take 1 capsule (300 mg) by mouth 3 times daily.    Chronic radicular low back pain  Refilled  Doing well on this  - gabapentin (NEURONTIN) 300 MG capsule; Take 1 capsule (300 mg) by mouth 3 times daily.    The longitudinal plan of care for the diagnosis(es)/condition(s) as documented were addressed during this visit. Due to the added complexity in care, I will continue to support Lola in the subsequent management and with ongoing continuity of care.      Follow up August for Physical   Subjective   Lola is a 69 year old, presenting for the following health issues:  Hypertension        1/7/2025    11:36 AM   Additional Questions   Roomed by Abiola Escoto     History of Present Illness       Hypertension: She presents for follow up of hypertension.  She does check blood pressure  regularly outside of the clinic. Outside blood pressures have been over 140/90. She follows a low salt diet.     She eats 2-3 servings of fruits and vegetables daily.She consumes 0 sweetened beverage(s) daily.She exercises with enough effort to increase her heart rate 9 or less minutes per day.  She exercises with enough effort to increase her heart rate 3 or less days per week.   She is taking medications regularly.   She has Decreased alcohol to 1 drink a week         Chronic Kidney Disease Follow-up  Not on any NSAID      Review of Systems  CONSTITUTIONAL:  "NEGATIVE for fever, chills, change in weight  ENT/MOUTH: pt has some nasal congestion   RESP: NEGATIVE for significant cough or SOB  CV: NEGATIVE for chest pain, palpitations or peripheral edema  GI: NEGATIVE for nausea, abdominal pain, heartburn, or change in bowel habits  MUSCULOSKELETAL: NEGATIVE for significant arthralgias or myalgia  ROS otherwise negative      Objective    /78 (BP Location: Right arm, Patient Position: Chair, Cuff Size: Adult Regular)   Pulse 66   Temp 97.2  F (36.2  C) (Temporal)   Resp 16   Ht 1.694 m (5' 6.7\")   Wt 78.6 kg (173 lb 4 oz)   SpO2 98%   BMI 27.38 kg/m    Body mass index is 27.38 kg/m .  Physical Exam   GENERAL: alert and no distress  EYES: Eyes grossly normal to inspection  NECK: no adenopathy, no asymmetry, masses, or scars  RESP: lungs clear to auscultation - no rales, rhonchi or wheezes  CV: regular rate and rhythm, normal S1 S2, no S3 or S4, no murmur, click or rub, no peripheral edema  ABDOMEN: soft, nontender, no hepatosplenomegaly, no masses and bowel sounds normal  MS: no gross musculoskeletal defects noted, no edema    Office Visit on 08/08/2024   Component Date Value Ref Range Status    Cholesterol 08/08/2024 192  <200 mg/dL Final    Triglycerides 08/08/2024 182 (H)  <150 mg/dL Final    Direct Measure HDL 08/08/2024 69  >=50 mg/dL Final    LDL Cholesterol Calculated 08/08/2024 87  <=100 mg/dL Final    Non HDL Cholesterol 08/08/2024 123  <130 mg/dL Final    Patient Fasting > 8hrs? 08/08/2024 No   Final    Sodium 08/08/2024 142  135 - 145 mmol/L Final    Potassium 08/08/2024 4.2  3.4 - 5.3 mmol/L Final    Carbon Dioxide (CO2) 08/08/2024 27  22 - 29 mmol/L Final    Anion Gap 08/08/2024 11  7 - 15 mmol/L Final    Urea Nitrogen 08/08/2024 19.0  8.0 - 23.0 mg/dL Final    Creatinine 08/08/2024 1.06 (H)  0.51 - 0.95 mg/dL Final    GFR Estimate 08/08/2024 57 (L)  >60 mL/min/1.73m2 Final    eGFR calculated using 2021 CKD-EPI equation.    Calcium 08/08/2024 9.2  8.8 " - 10.4 mg/dL Final    Reference intervals for this test were updated on 7/16/2024 to reflect our healthy population more accurately. There may be differences in the flagging of prior results with similar values performed with this method. Those prior results can be interpreted in the context of the updated reference intervals.    Chloride 08/08/2024 104  98 - 107 mmol/L Final    Glucose 08/08/2024 103 (H)  70 - 99 mg/dL Final    Alkaline Phosphatase 08/08/2024 112  40 - 150 U/L Final    AST 08/08/2024 20  0 - 45 U/L Final    ALT 08/08/2024 19  0 - 50 U/L Final    Protein Total 08/08/2024 6.7  6.4 - 8.3 g/dL Final    Albumin 08/08/2024 4.3  3.5 - 5.2 g/dL Final    Bilirubin Total 08/08/2024 0.6  <=1.2 mg/dL Final    Patient Fasting > 8hrs? 08/08/2024 No   Final    WBC Count 08/08/2024 6.0  4.0 - 11.0 10e3/uL Final    RBC Count 08/08/2024 4.00  3.80 - 5.20 10e6/uL Final    Hemoglobin 08/08/2024 12.7  11.7 - 15.7 g/dL Final    Hematocrit 08/08/2024 38.8  35.0 - 47.0 % Final    MCV 08/08/2024 97  78 - 100 fL Final    MCH 08/08/2024 31.8  26.5 - 33.0 pg Final    MCHC 08/08/2024 32.7  31.5 - 36.5 g/dL Final    RDW 08/08/2024 12.8  10.0 - 15.0 % Final    Platelet Count 08/08/2024 168  150 - 450 10e3/uL Final           Signed Electronically by: Christine Young MD

## 2025-01-08 LAB
CREAT UR-MCNC: 241 MG/DL
MICROALBUMIN UR-MCNC: 15.3 MG/L
MICROALBUMIN/CREAT UR: 6.35 MG/G CR (ref 0–25)

## 2025-02-17 NOTE — PROGRESS NOTES
SUBJECTIVE:  Lola Partida is a 70 year old female who is seen as self referral for left shoulder pain that started years ago. Worse lately  Worse than right one is now.     Present symptoms: pain constant, pain severe with any movements   Feels grinding, catching  Pain up into neck.  No pain moving the neck.     Treatment up to this point:  2 corticosteroid injections in both shoulders. They help for very limited amounts of time.      Prior history of related problems: treated here in the past for RIGHT glenohumeral joint osteoarthritis, and ultrasound guided steroid injections  Significant Orthopedic past medical history: Status post right total knee arthroplasty 4 years ago.    Past Medical History:   Diagnosis Date    Anemia, unspecified type 5/27/2016    Regularly gives blood. Now on iron regularly     Colon polyp     GERD (gastroesophageal reflux disease)     Hypertension 8-1-17    Rhinitis     Worker's compensation claim administrative problem     DOI 3/23/21  Emp: Sleep number.       Past Surgical History:   Procedure Laterality Date    ARTHROSCOPY KNEE Right     COLONOSCOPY  ?    COLONOSCOPY N/A 7/13/2022    Procedure: COLONOSCOPY, FLEXIBLE, WITH LESION REMOVAL USING SNARE;  Surgeon: Valdo Lopez DO;  Location: [facility] OR    COLONOSCOPY WITH CO2 INSUFFLATION N/A 7/13/2022    Procedure: COLONOSCOPY, WITH CO2 INSUFFLATION;  Surgeon: Valdo Lopez DO;  Location: [facility] OR    COMBINED ESOPHAGOSCOPY, GASTROSCOPY, DUODENOSCOPY (EGD) WITH CO2 INSUFFLATION N/A 4/21/2023    Procedure: ESOPHAGOGASTRODUODENOSCOPY, WITH CO2 INSUFFLATION;  Surgeon: Marli Briceno DO;  Location: [facility] OR    HC TOOTH EXTRACTION W/FORCEP      LASIK      10 years ago    TOTAL KNEE ARTHROPLASTY Right      4/19/21    TUBAL LIGATION      vulvar biopsy  2005    LORI III       REVIEW OF SYSTEMS:  CONSTITUTIONAL:  NEGATIVE for fever, chills, change in weight  INTEGUMENTARY/SKIN:  NEGATIVE for worrisome rashes, moles or lesions  EYES:  NEGATIVE for  "vision changes or irritation  ENT/MOUTH:  NEGATIVE for ear, mouth and throat problems  RESP:  NEGATIVE for significant cough or SOB  BREAST:  NEGATIVE for masses, tenderness or discharge  CV:  NEGATIVE for chest pain, palpitations or peripheral edema  GI:  NEGATIVE for nausea, abdominal pain, heartburn, or change in bowel habits  :  Negative   MUSCULOSKELETAL:  See HPI above  NEURO:  NEGATIVE for weakness, dizziness or paresthesias  ENDOCRINE:  NEGATIVE for temperature intolerance, skin/hair changes  HEME/ALLERGY/IMMUNE:  NEGATIVE for bleeding problems  PSYCHIATRIC:  NEGATIVE for changes in mood or affect    OBJECTIVE:  BP (!) 147/79 (BP Location: Left arm, Patient Position: Sitting, Cuff Size: Adult Large)   Pulse 70   Ht 1.696 m (5' 6.77\")   Wt 81.6 kg (180 lb)   SpO2 98%   BMI 28.39 kg/m       GENERAL: healthy, alert and no distress  GAIT: normal   SKIN: no suspicious lesions or rashes  NEURO: Normal strength and tone, mentation intact and speech normal  VASCULAR:  normal pulses and cappillary refill   PSYCH:  mentation appears normal and affect normal/bright    MUSCULOSKELETAL:    Left SHOULDER:  Limited range of motion, grinding, pain.  Rotator cuff strength seems intact.      X-RAY INTERPRETATION  Xrays from today, 2/18/2025 left shoulder: severe glenohumeral joint narrowing, large inferior osteophyte formation. Similar to last xrays on right side. Advanced since xrays of 2022.    MRI:    none    ASSESSMENT    ICD-10-CM    1. Osteoarthritis of bilateral glenohumeral joints  M19.011     M19.012       2. Left shoulder pain  M25.512 XR Shoulder Left G/E 3 Views      She has bone-bone changes on her xrays, with early bone loss starting    PLAN:   She has failed conservative treatment.   I think physical therapy would just aggravate her pain.  She will need total shoulder arthroplasty  I have referred her to one of my partners who perform this procedure.     TG Oconnor MD  Dept. Orthopedic " Surgery  [facility]

## 2025-02-18 ENCOUNTER — ANCILLARY PROCEDURE (OUTPATIENT)
Dept: GENERAL RADIOLOGY | Facility: CLINIC | Age: 70
End: 2025-02-18
Attending: ORTHOPAEDIC SURGERY
Payer: COMMERCIAL

## 2025-02-18 ENCOUNTER — OFFICE VISIT (OUTPATIENT)
Dept: ORTHOPEDICS | Facility: CLINIC | Age: 70
End: 2025-02-18
Payer: COMMERCIAL

## 2025-02-18 VITALS
HEART RATE: 70 BPM | HEIGHT: 67 IN | SYSTOLIC BLOOD PRESSURE: 147 MMHG | WEIGHT: 180 LBS | BODY MASS INDEX: 28.25 KG/M2 | DIASTOLIC BLOOD PRESSURE: 79 MMHG | OXYGEN SATURATION: 98 %

## 2025-02-18 DIAGNOSIS — M19.012 OSTEOARTHRITIS OF BILATERAL GLENOHUMERAL JOINTS: Primary | ICD-10-CM

## 2025-02-18 DIAGNOSIS — M25.512 LEFT SHOULDER PAIN: ICD-10-CM

## 2025-02-18 DIAGNOSIS — M19.011 OSTEOARTHRITIS OF BILATERAL GLENOHUMERAL JOINTS: Primary | ICD-10-CM

## 2025-02-18 DIAGNOSIS — G89.29 CHRONIC LEFT SHOULDER PAIN: ICD-10-CM

## 2025-02-18 DIAGNOSIS — M25.512 CHRONIC LEFT SHOULDER PAIN: ICD-10-CM

## 2025-02-18 PROCEDURE — 73030 X-RAY EXAM OF SHOULDER: CPT | Mod: TC | Performed by: RADIOLOGY

## 2025-02-18 PROCEDURE — 99213 OFFICE O/P EST LOW 20 MIN: CPT | Performed by: ORTHOPAEDIC SURGERY

## 2025-02-18 ASSESSMENT — PAIN SCALES - GENERAL: PAINLEVEL_OUTOF10: SEVERE PAIN (10)

## 2025-02-18 NOTE — LETTER
2/18/2025      Lola Partida  [address] [facility] [address]      Dear Colleague,    Thank you for referring your patient, Lola Partida, to the [facility]. Please see a copy of my visit note below.    SUBJECTIVE:  Lola Partida is a 70 year old female who is seen as self referral for left shoulder pain that started years ago. Worse lately  Worse than right one is now.     Present symptoms: pain constant, pain severe with any movements   Feels grinding, catching  Pain up into neck.  No pain moving the neck.     Treatment up to this point:  2 corticosteroid injections in both shoulders. They help for very limited amounts of time.      Prior history of related problems: treated here in the past for RIGHT glenohumeral joint osteoarthritis, and ultrasound guided steroid injections  Significant Orthopedic past medical history: Status post right total knee arthroplasty 4 years ago.    Past Medical History:   Diagnosis Date     Anemia, unspecified type 5/27/2016    Regularly gives blood. 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       Past Surgical History:   Procedure Laterality Date     ARTHROSCOPY KNEE Right      COLONOSCOPY  ?     COLONOSCOPY N/A 7/13/2022    Procedure: COLONOSCOPY, FLEXIBLE, WITH LESION REMOVAL USING SNARE;  Surgeon: Valdo Lopez DO;  Location: [facility] OR     COLONOSCOPY WITH CO2 INSUFFLATION N/A 7/13/2022    Procedure: COLONOSCOPY, WITH CO2 INSUFFLATION;  Surgeon: Valdo Lopez DO;  Location: [facility] OR     COMBINED ESOPHAGOSCOPY, GASTROSCOPY, DUODENOSCOPY (EGD) WITH CO2 INSUFFLATION N/A 4/21/2023    Procedure: ESOPHAGOGASTRODUODENOSCOPY, WITH CO2 INSUFFLATION;  Surgeon: Marli Briceno DO;  Location: [facility] OR     HC TOOTH EXTRACTION W/FORCEP       LASIK      10 years ago     TOTAL KNEE  "ARTHROPLASTY Right      4/19/21     TUBAL LIGATION       vulvar biopsy  2005    LORI III       REVIEW OF SYSTEMS:  CONSTITUTIONAL:  NEGATIVE for fever, chills, change in weight  INTEGUMENTARY/SKIN:  NEGATIVE for worrisome rashes, moles or lesions  EYES:  NEGATIVE for vision changes or irritation  ENT/MOUTH:  NEGATIVE for ear, mouth and throat problems  RESP:  NEGATIVE for significant cough or SOB  BREAST:  NEGATIVE for masses, tenderness or discharge  CV:  NEGATIVE for chest pain, palpitations or peripheral edema  GI:  NEGATIVE for nausea, abdominal pain, heartburn, or change in bowel habits  :  Negative   MUSCULOSKELETAL:  See HPI above  NEURO:  NEGATIVE for weakness, dizziness or paresthesias  ENDOCRINE:  NEGATIVE for temperature intolerance, skin/hair changes  HEME/ALLERGY/IMMUNE:  NEGATIVE for bleeding problems  PSYCHIATRIC:  NEGATIVE for changes in mood or affect    OBJECTIVE:  BP (!) 147/79 (BP Location: Left arm, Patient Position: Sitting, Cuff Size: Adult Large)   Pulse 70   Ht 1.696 m (5' 6.77\")   Wt 81.6 kg (180 lb)   SpO2 98%   BMI 28.39 kg/m       GENERAL: healthy, alert and no distress  GAIT: normal   SKIN: no suspicious lesions or rashes  NEURO: Normal strength and tone, mentation intact and speech normal  VASCULAR:  normal pulses and cappillary refill   PSYCH:  mentation appears normal and affect normal/bright    MUSCULOSKELETAL:    Left SHOULDER:  Limited range of motion, grinding, pain.  Rotator cuff strength seems intact.      X-RAY INTERPRETATION  Xrays from today, 2/18/2025 left shoulder: severe glenohumeral joint narrowing, large inferior osteophyte formation. Similar to last xrays on right side. Advanced since xrays of 2022.    MRI:    none    ASSESSMENT    ICD-10-CM    1. Osteoarthritis of bilateral glenohumeral joints  M19.011     M19.012       2. Left shoulder pain  M25.512 XR Shoulder Left G/E 3 Views      She has bone-bone changes on her xrays, with early bone loss starting    PLAN: "   She has failed conservative treatment.   I think physical therapy would just aggravate her pain.  She will need total shoulder arthroplasty  I have referred her to one of my partners who perform this procedure.     TG Oconnor MD  Dept. Orthopedic Surgery  [facility]      Again, thank you for allowing me to participate in the care of your patient.        Sincerely,        Buck Oconnor MD    Electronically signed

## 2025-02-25 NOTE — PROGRESS NOTES
CHIEF COMPLAINT:   Chief Complaint   Patient presents with    Left Shoulder - Pain     Left shoulder OA, Hx of Injections with Dr. Castro that are not helpful. Interested in discussing surgical options. Ref by Dr. Oconnor            HISTORY:  Lola Partida is a 70 year old female, left  -hand dominant, who is seen in consultation at the request of Dr. Daria Oconnor  for left shoulder pain that started  many years ago. Did a lot of repetitive work 30 years ago that started it all. Worse the past couple of years. She locates pain throughout the shoulder, aggravated with movements, but also pain at rest, night.    Xrays with advanced gleno-humeral osteoarthritis. Seen by Dr. Daria Oconnor, sent to us for discussion of shoulder replacement.    Most recent injections, bilateral, 2/4/2023 with Dr Cristino castro.      Onset: years ago  Symptoms have been worsening since that time.  Aggrevated by: any activities, movements, night, rest  Relieved by: rest  Present symptoms: pain with ADL's (dressing),  pain with overhead activities,  pain reaching behind back,  pain reaching out or away from body (flexion/ abduction),  positional night pain,  pain lifting,  Pain location: lateral shoulder, deltoid and upper arm, and located throughout the shoulder joint/diffuse  Pain severity: 10/10  Pain quality: dull, aching, and sharp  Frequency of symptoms: are constant    Patient has tried:     NSAIDS:  doesn't help       Acetaminophen:  doesn't help     Opioids: No     Physical Therapy:  1.5-2 years ago       Home exercise program: Yes      Activity modification: Yes       Injections: Yes numerous, most recently 2/4/2023.     Ice: No      Heat: Yes      Topicals:  voltaren without relief.       Other: gabapentin      Usual level of work activity: retired    Other PMH:  has a past medical history of Anemia, unspecified type (5/27/2016), Colon polyp, GERD (gastroesophageal reflux disease), Hypertension (8-1-17), Rhinitis, and Worker's  compensation claim administrative problem.    She has no past medical history of Arthritis, Cancer (H), Cerebral infarction (H), Congestive heart failure (H), COPD (chronic obstructive pulmonary disease) (H), Depressive disorder, Diabetes (H), Heart disease, History of blood transfusion, Thyroid disease, or Uncomplicated asthma.  Patient Active Problem List   Diagnosis    Chronic rhinitis    Esophageal reflux    Menopause    Menopausal syndrome (hot flashes)    Bilateral leg cramps    Chronic radicular low back pain    Hypertension goal BP (blood pressure) < 140/90    Status post total right knee replacement    Status post total left knee replacement    Alcohol use disorder, mild, abuse    Stage 3a chronic kidney disease (H)       Surgical Hx:  has a past surgical history that includes tubal ligation; TOOTH EXTRACTION W/FORCEP; Arthroscopy knee (Right); vulvar biopsy (2005); Lasik; colonoscopy (?); Total Knee Arthroplasty (Right); Colonoscopy with CO2 insufflation (N/A, 7/13/2022); Colonoscopy (N/A, 7/13/2022); and Combined Esophagoscopy, Gastroscopy, Duodenoscopy (Egd) With Co2 Insufflation (N/A, 4/21/2023).    Medications:   Current Outpatient Medications:     amLODIPine (NORVASC) 2.5 MG tablet, Take 1 tablet (2.5 mg) by mouth daily., Disp: 90 tablet, Rfl: 3    amoxicillin (AMOXIL) 500 MG capsule, Take four capsules by mouth 1 hour prior to dental procedure., Disp: 4 capsule, Rfl: 4    calcium 600 MG tablet, Take 1 tablet by mouth 2 times daily, Disp: , Rfl:     gabapentin (NEURONTIN) 300 MG capsule, Take 1 capsule (300 mg) by mouth 3 times daily., Disp: 270 capsule, Rfl: 3    losartan (COZAAR) 50 MG tablet, Take 1 tablet (50 mg) by mouth daily., Disp: 90 tablet, Rfl: 2    magnesium 250 MG tablet, Take 1 tablet by mouth daily, Disp: , Rfl:     metoprolol succinate ER (TOPROL XL) 50 MG 24 hr tablet, Take 1 tablet (50 mg) by mouth daily., Disp: 90 tablet, Rfl: 2    omeprazole (PRILOSEC) 20 MG DR capsule, Take 1  "capsule (20 mg) by mouth daily., Disp: 90 capsule, Rfl: 1    potassium gluconate 2.5 MEQ tablet, Take 90 mEq by mouth, Disp: , Rfl:     pravastatin (PRAVACHOL) 10 MG tablet, Take 1 tablet (10 mg) by mouth daily, Disp: 90 tablet, Rfl: 3    Allergies: No Known Allergies    Social Hx: retired ([employer]).   reports that she has never smoked. She has never been exposed to tobacco smoke. She has never used smokeless tobacco. She reports current alcohol use. She reports that she does not use drugs.    Family Hx: family history includes Cancer in her father; Colon Polyps in her brother; Hyperlipidemia in her father; Mental Illness in her sister; Prostate Cancer in her father..    REVIEW OF SYSTEMS: 10 point ROS neg other than the symptoms noted above in the HPI and PMH. Notables include  CONSTITUTIONAL:NEGATIVE for fever, chills, change in weight  INTEGUMENTARY/SKIN: NEGATIVE for worrisome rashes, moles or lesions  MUSCULOSKELETAL:See HPI above  NEURO: NEGATIVE for weakness, dizziness or paresthesias    PHYSICAL EXAM:  /79 (BP Location: Left arm)   Ht 1.702 m (5' 7\")   Wt 77.1 kg (170 lb)   BMI 26.63 kg/m     GENERAL APPEARANCE: healthy, alert, no distress  SKIN: no suspicious lesions or rashes  NEURO: Normal strength and tone, mentation intact and speech normal  PSYCH:  mentation appears normal and affect normal, not anxious  RESPIRATORY: No increased work of breathing.  VASCULAR: Radial pulses 2+ and brisk cappillary refill   LYMPH: no palpable axillary lymphadenopathy or cervical neck lymphadenopathy.      MUSCULOSKELETAL:    NECK:   Posterior cervical spine nontender to palpation over midline bony prominences  There is MILD tender to palpation along neck paraspinals and trapezius muscles      RIGHT UPPER EXTREMITY:  Sensation intact to light touch in median, radial, ulnar and axillary nerve distributions  Palpable 2+ radial pulse, brisk capillary refill to all fingers, wwp  Intact epl fpl fdp edc wrist " flexion/extension biceps triceps deltoid    RIGHT SHOULDER:  Shoulder Inspection: no swelling, bruising, discoloration, or obvious deformity or asymmetry  Tender: greater tuberosity and upper trapezius muscle  Non-tender: AC joint  Range of Motion:   Active:forward flexion 120 degrees, external rotation  40 degrees, internal rotation side of hip.   Strength: forward flexion 5-/5, painful, limited by pain, External rotation 5-/5, painful, limited by pain     Special tests: Belly Press: Negative  Empty Can: Negative    LEFT UPPER EXTREMITY:  Sensation intact to light touch in median, radial, ulnar and axillary nerve distributions  Palpable 2+ radial pulse, brisk capillary refill to all fingers, wwp  Intact epl fpl fdp edc wrist flexion/extension biceps triceps deltoid    LEFT SHOULDER:  Shoulder Inspection: no swelling, bruising, discoloration, or obvious deformity or asymmetry  Tender: acromion, anterior capsule, proximal bicep tendon, greater tuberosity, and upper trapezius muscle  Non-tender: SC joint  Range of Motion:   Active:forward flexion 90 degrees, external rotation  10 degrees, internal rotation  side of hip   Passive: forward flexion 100 degrees, external rotation  35 degrees, with crepitus.  Strength: forward flexion 4+/5, painful, limited by pain, External rotation 4+/5, painful, limited by pain      Special tests: Belly Press: equivocal.  Empty Can: pain limited.      X-RAY INTERPRETATION: multiple views left  shoulder (no axillary) previously obtained 2/25/2025 were reviewed personally in clinic today with the patient. On my review, Advanced degenerative arthrosis of the glenohumeral joint with bone-on-bone articulation and prominent marginal osteophytes. Mild degenerative arthrosis of the acromioclavicular joint. No acute fracture.          ASSESSMENT: Lola Partida is a 70 year old female, right  -hand dominant with chronic left shoulder pain, advanced gleno-humeral osteoarthritis.      PLAN:   *  reviewed xrays with patient. Exam and xrays consistent with chronic, long-standing arthritis. There has been loss of shoulder joint space from wearing of the cartilage, with the growth of bone spurs.  * this is most likely reason for shoulder pain and decreased range of motion  * treatment options depend on how symptomatic as well has how aggressive patient wants to be. If not overly symptomatic, we can try a cortisone injection as well as Physical Therapy, pain control with tylenol and NSAIDS.  * surgical options would be a  total shoulder arthroplasty, ( versus reverse total shoulder arthroplasty depending on rotator cuff integrity)  * risks and perceived benefits of nonsurgical and surgical options of each discussed.  * the primary goal of surgery is pain relief, with return or improvement of some function secondary as a bonus.   * there is a lifting weight restriction after total shoulder arthroplasty, 25lbs maximum.  * risks of surgery include, but not limited to, bleeding, infection, pain, scar, damage to adjacent structures (such as nerves, vessels), temporary versus permanent nerve injury, failure to relieve or improve symptoms or function, recurrence of symptoms, stiffness, implant dislocation, implant failure, implant infection, need for further surgery, blood clots, risks of anesthesia and death.    * at this time, she'd like to proceed with surgery.  Will obtain special CT scan left shoulder, using E96 Signature Glenoid protocol, for preop templating and custom guidses.  Will plan left shoulder anatomic left total shoulder arthroplasty (with reverse total shoulder arthroplasty as a backup, depending on rotator cuff integrity intra-op), >30 days after the CT scan, to allow timing for custom guides to be made.  Once date of CT scan set, will work to schedule surgery. No preop surgical orders placed.  Will do the surgery at [place], [place], overnight stay and home the next  day.  She will need preop H+P from primary care provider prior to surgery.  She will return to clinic 2 weeks postoperative for wound check, xrays left shoulder.  All questions addressed and answered today.    * All questions were addressed and answered prior to discharge from clinic today. The patient acknowledges an understanding of and agreement with the plan set forth during today's visit. Patient was advised to call our office or MyChart us if any further questions arise upon leaving our office today.      Luther Renae M.D., M.S.  Dept. of Orthopaedic Surgery  [facility]

## 2025-03-03 ENCOUNTER — OFFICE VISIT (OUTPATIENT)
Dept: ORTHOPEDICS | Facility: CLINIC | Age: 70
End: 2025-03-03
Payer: COMMERCIAL

## 2025-03-03 VITALS
BODY MASS INDEX: 26.68 KG/M2 | HEIGHT: 67 IN | WEIGHT: 170 LBS | SYSTOLIC BLOOD PRESSURE: 128 MMHG | DIASTOLIC BLOOD PRESSURE: 79 MMHG

## 2025-03-03 DIAGNOSIS — M25.512 CHRONIC LEFT SHOULDER PAIN: ICD-10-CM

## 2025-03-03 DIAGNOSIS — M19.012 PRIMARY OSTEOARTHRITIS, LEFT SHOULDER: Primary | ICD-10-CM

## 2025-03-03 DIAGNOSIS — G89.29 CHRONIC LEFT SHOULDER PAIN: ICD-10-CM

## 2025-03-03 PROCEDURE — 3074F SYST BP LT 130 MM HG: CPT | Performed by: ORTHOPAEDIC SURGERY

## 2025-03-03 PROCEDURE — 1125F AMNT PAIN NOTED PAIN PRSNT: CPT | Performed by: ORTHOPAEDIC SURGERY

## 2025-03-03 PROCEDURE — 99214 OFFICE O/P EST MOD 30 MIN: CPT | Performed by: ORTHOPAEDIC SURGERY

## 2025-03-03 PROCEDURE — 3078F DIAST BP <80 MM HG: CPT | Performed by: ORTHOPAEDIC SURGERY

## 2025-03-03 ASSESSMENT — PAIN SCALES - GENERAL: PAINLEVEL_OUTOF10: SEVERE PAIN (10)

## 2025-03-03 NOTE — LETTER
3/3/2025      Lola Partida  [address] [facility] [address]      Dear Colleague,    Thank you for referring your patient, Lola Partida, to the [facility]. Please see a copy of my visit note below.    CHIEF COMPLAINT:   Chief Complaint   Patient presents with     Left Shoulder - Pain     Left shoulder OA, Hx of Injections with Dr. Castro that are not helpful. Interested in discussing surgical options. Ref by Dr. Oconnor            HISTORY:  Lola Partida is a 70 year old female, left  -hand dominant, who is seen in consultation at the request of Dr. Daria Oconnor  for left shoulder pain that started  many years ago. Did a lot of repetitive work 30 years ago that started it all. Worse the past couple of years. She locates pain throughout the shoulder, aggravated with movements, but also pain at rest, night.    Xrays with advanced gleno-humeral osteoarthritis. Seen by Dr. Daria Oconnor, sent to us for discussion of shoulder replacement.    Most recent injections, bilateral, 2/4/2023 with Dr Cristino castro.      Onset: years ago  Symptoms have been worsening since that time.  Aggrevated by: any activities, movements, night, rest  Relieved by: rest  Present symptoms: pain with ADL's (dressing),  pain with overhead activities,  pain reaching behind back,  pain reaching out or away from body (flexion/ abduction),  positional night pain,  pain lifting,  Pain location: lateral shoulder, deltoid and upper arm, and located throughout the shoulder joint/diffuse  Pain severity: 10/10  Pain quality: dull, aching, and sharp  Frequency of symptoms: are constant    Patient has tried:     NSAIDS:  doesn't help       Acetaminophen:  doesn't help     Opioids: No     Physical Therapy:  1.5-2 years ago       Home exercise program: Yes      Activity modification: Yes       Injections: Yes numerous, most recently 2/4/2023.     Ice: No      Heat: Yes      Topicals:  voltaren without relief.        Other: gabapentin      Usual level of work activity: retired    Other PMH:  has a past medical history of Anemia, unspecified type (5/27/2016), Colon polyp, GERD (gastroesophageal reflux disease), Hypertension (8-1-17), Rhinitis, and Worker's compensation claim administrative problem.    She has no past medical history of Arthritis, Cancer (H), Cerebral infarction (H), Congestive heart failure (H), COPD (chronic obstructive pulmonary disease) (H), Depressive disorder, Diabetes (H), Heart disease, History of blood transfusion, Thyroid disease, or Uncomplicated asthma.  Patient Active Problem List   Diagnosis     Chronic rhinitis     Esophageal reflux     Menopause     Menopausal syndrome (hot flashes)     Bilateral leg cramps     Chronic radicular low back pain     Hypertension goal BP (blood pressure) < 140/90     Status post total right knee replacement     Status post total left knee replacement     Alcohol use disorder, mild, abuse     Stage 3a chronic kidney disease (H)       Surgical Hx:  has a past surgical history that includes tubal ligation; TOOTH EXTRACTION W/FORCEP; Arthroscopy knee (Right); vulvar biopsy (2005); Lasik; colonoscopy (?); Total Knee Arthroplasty (Right); Colonoscopy with CO2 insufflation (N/A, 7/13/2022); Colonoscopy (N/A, 7/13/2022); and Combined Esophagoscopy, Gastroscopy, Duodenoscopy (Egd) With Co2 Insufflation (N/A, 4/21/2023).    Medications:   Current Outpatient Medications:      amLODIPine (NORVASC) 2.5 MG tablet, Take 1 tablet (2.5 mg) by mouth daily., Disp: 90 tablet, Rfl: 3     amoxicillin (AMOXIL) 500 MG capsule, Take four capsules by mouth 1 hour prior to dental procedure., Disp: 4 capsule, Rfl: 4     calcium 600 MG tablet, Take 1 tablet by mouth 2 times daily, Disp: , Rfl:      gabapentin (NEURONTIN) 300 MG capsule, Take 1 capsule (300 mg) by mouth 3 times daily., Disp: 270 capsule, Rfl: 3     losartan (COZAAR) 50 MG tablet, Take 1 tablet (50 mg) by mouth daily., Disp:  "90 tablet, Rfl: 2     magnesium 250 MG tablet, Take 1 tablet by mouth daily, Disp: , Rfl:      metoprolol succinate ER (TOPROL XL) 50 MG 24 hr tablet, Take 1 tablet (50 mg) by mouth daily., Disp: 90 tablet, Rfl: 2     omeprazole (PRILOSEC) 20 MG DR capsule, Take 1 capsule (20 mg) by mouth daily., Disp: 90 capsule, Rfl: 1     potassium gluconate 2.5 MEQ tablet, Take 90 mEq by mouth, Disp: , Rfl:      pravastatin (PRAVACHOL) 10 MG tablet, Take 1 tablet (10 mg) by mouth daily, Disp: 90 tablet, Rfl: 3    Allergies: No Known Allergies    Social Hx: retired ([employer]).   reports that she has never smoked. She has never been exposed to tobacco smoke. She has never used smokeless tobacco. She reports current alcohol use. She reports that she does not use drugs.    Family Hx: family history includes Cancer in her father; Colon Polyps in her brother; Hyperlipidemia in her father; Mental Illness in her sister; Prostate Cancer in her father..    REVIEW OF SYSTEMS: 10 point ROS neg other than the symptoms noted above in the HPI and PMH. Notables include  CONSTITUTIONAL:NEGATIVE for fever, chills, change in weight  INTEGUMENTARY/SKIN: NEGATIVE for worrisome rashes, moles or lesions  MUSCULOSKELETAL:See HPI above  NEURO: NEGATIVE for weakness, dizziness or paresthesias    PHYSICAL EXAM:  /79 (BP Location: Left arm)   Ht 1.702 m (5' 7\")   Wt 77.1 kg (170 lb)   BMI 26.63 kg/m     GENERAL APPEARANCE: healthy, alert, no distress  SKIN: no suspicious lesions or rashes  NEURO: Normal strength and tone, mentation intact and speech normal  PSYCH:  mentation appears normal and affect normal, not anxious  RESPIRATORY: No increased work of breathing.  VASCULAR: Radial pulses 2+ and brisk cappillary refill   LYMPH: no palpable axillary lymphadenopathy or cervical neck lymphadenopathy.      MUSCULOSKELETAL:    NECK:   Posterior cervical spine nontender to palpation over midline bony prominences  There is MILD tender to " palpation along neck paraspinals and trapezius muscles      RIGHT UPPER EXTREMITY:  Sensation intact to light touch in median, radial, ulnar and axillary nerve distributions  Palpable 2+ radial pulse, brisk capillary refill to all fingers, wwp  Intact epl fpl fdp edc wrist flexion/extension biceps triceps deltoid    RIGHT SHOULDER:  Shoulder Inspection: no swelling, bruising, discoloration, or obvious deformity or asymmetry  Tender: greater tuberosity and upper trapezius muscle  Non-tender: AC joint  Range of Motion:   Active:forward flexion 120 degrees, external rotation  40 degrees, internal rotation side of hip.   Strength: forward flexion 5-/5, painful, limited by pain, External rotation 5-/5, painful, limited by pain     Special tests: Belly Press: Negative  Empty Can: Negative    LEFT UPPER EXTREMITY:  Sensation intact to light touch in median, radial, ulnar and axillary nerve distributions  Palpable 2+ radial pulse, brisk capillary refill to all fingers, wwp  Intact epl fpl fdp edc wrist flexion/extension biceps triceps deltoid    LEFT SHOULDER:  Shoulder Inspection: no swelling, bruising, discoloration, or obvious deformity or asymmetry  Tender: acromion, anterior capsule, proximal bicep tendon, greater tuberosity, and upper trapezius muscle  Non-tender: SC joint  Range of Motion:   Active:forward flexion 90 degrees, external rotation  10 degrees, internal rotation  side of hip   Passive: forward flexion 100 degrees, external rotation  35 degrees, with crepitus.  Strength: forward flexion 4+/5, painful, limited by pain, External rotation 4+/5, painful, limited by pain      Special tests: Belly Press: equivocal.  Empty Can: pain limited.      X-RAY INTERPRETATION: multiple views left  shoulder (no axillary) previously obtained 2/25/2025 were reviewed personally in clinic today with the patient. On my review, Advanced degenerative arthrosis of the glenohumeral joint with bone-on-bone articulation and prominent  marginal osteophytes. Mild degenerative arthrosis of the acromioclavicular joint. No acute fracture.          ASSESSMENT: Lola Partida is a 70 year old female, right  -hand dominant with chronic left shoulder pain, advanced gleno-humeral osteoarthritis.      PLAN:   * reviewed xrays with patient. Exam and xrays consistent with chronic, long-standing arthritis. There has been loss of shoulder joint space from wearing of the cartilage, with the growth of bone spurs.  * this is most likely reason for shoulder pain and decreased range of motion  * treatment options depend on how symptomatic as well has how aggressive patient wants to be. If not overly symptomatic, we can try a cortisone injection as well as Physical Therapy, pain control with tylenol and NSAIDS.  * surgical options would be a  total shoulder arthroplasty, ( versus reverse total shoulder arthroplasty depending on rotator cuff integrity)  * risks and perceived benefits of nonsurgical and surgical options of each discussed.  * the primary goal of surgery is pain relief, with return or improvement of some function secondary as a bonus.   * there is a lifting weight restriction after total shoulder arthroplasty, 25lbs maximum.  * risks of surgery include, but not limited to, bleeding, infection, pain, scar, damage to adjacent structures (such as nerves, vessels), temporary versus permanent nerve injury, failure to relieve or improve symptoms or function, recurrence of symptoms, stiffness, implant dislocation, implant failure, implant infection, need for further surgery, blood clots, risks of anesthesia and death.    * at this time, she'd like to proceed with surgery.  Will obtain special CT scan left shoulder, using [employer] Signature [place] protocol, for preop templating and custom guidses.  Will plan left shoulder anatomic left total shoulder arthroplasty (with reverse total shoulder arthroplasty as a backup, depending on rotator cuff integrity intra-op),  >30 days after the CT scan, to allow timing for custom guides to be made.  Once date of CT scan set, will work to schedule surgery. No preop surgical orders placed.  Will do the surgery at [place], [place], overnight stay and home the next day.  She will need preop H+P from primary care provider prior to surgery.  She will return to clinic 2 weeks postoperative for wound check, xrays left shoulder.  All questions addressed and answered today.    * All questions were addressed and answered prior to discharge from clinic today. The patient acknowledges an understanding of and agreement with the plan set forth during today's visit. Patient was advised to call our office or MyChart us if any further questions arise upon leaving our office today.      Luther Renae M.D., M.S.  Dept. of Orthopaedic Surgery  [facility]       Again, thank you for allowing me to participate in the care of your patient.        Sincerely,        Luther Renae MD    Electronically signed

## 2025-03-08 ENCOUNTER — HEALTH MAINTENANCE LETTER (OUTPATIENT)
Age: 70
End: 2025-03-08

## 2025-03-10 ENCOUNTER — IMMUNIZATION (OUTPATIENT)
Dept: FAMILY MEDICINE | Facility: CLINIC | Age: 70
End: 2025-03-10
Payer: COMMERCIAL

## 2025-03-10 PROCEDURE — 90480 ADMN SARSCOV2 VAC 1/ONLY CMP: CPT

## 2025-03-10 PROCEDURE — 91320 SARSCV2 VAC 30MCG TRS-SUC IM: CPT

## 2025-03-11 ENCOUNTER — ANCILLARY PROCEDURE (OUTPATIENT)
Dept: CT IMAGING | Facility: CLINIC | Age: 70
End: 2025-03-11
Attending: ORTHOPAEDIC SURGERY
Payer: COMMERCIAL

## 2025-03-11 DIAGNOSIS — M25.512 CHRONIC LEFT SHOULDER PAIN: ICD-10-CM

## 2025-03-11 DIAGNOSIS — G89.29 CHRONIC LEFT SHOULDER PAIN: ICD-10-CM

## 2025-03-11 DIAGNOSIS — M19.012 PRIMARY OSTEOARTHRITIS, LEFT SHOULDER: ICD-10-CM

## 2025-03-11 PROCEDURE — 73200 CT UPPER EXTREMITY W/O DYE: CPT | Mod: LT | Performed by: RADIOLOGY

## 2025-03-19 ENCOUNTER — TELEPHONE (OUTPATIENT)
Dept: SURGERY | Facility: CLINIC | Age: 70
End: 2025-03-19
Payer: COMMERCIAL

## 2025-03-21 ENCOUNTER — MYC MEDICAL ADVICE (OUTPATIENT)
Dept: SURGERY | Facility: CLINIC | Age: 70
End: 2025-03-21

## 2025-03-21 DIAGNOSIS — R25.2 BILATERAL LEG CRAMPS: ICD-10-CM

## 2025-03-21 DIAGNOSIS — M54.16 CHRONIC RADICULAR LOW BACK PAIN: ICD-10-CM

## 2025-03-21 DIAGNOSIS — E78.5 HYPERLIPIDEMIA, UNSPECIFIED HYPERLIPIDEMIA TYPE: ICD-10-CM

## 2025-03-21 DIAGNOSIS — I12.9 BENIGN HYPERTENSION WITH CKD (CHRONIC KIDNEY DISEASE) STAGE III (H): ICD-10-CM

## 2025-03-21 DIAGNOSIS — G89.29 CHRONIC RADICULAR LOW BACK PAIN: ICD-10-CM

## 2025-03-21 DIAGNOSIS — K21.9 GASTROESOPHAGEAL REFLUX DISEASE, UNSPECIFIED WHETHER ESOPHAGITIS PRESENT: ICD-10-CM

## 2025-03-21 DIAGNOSIS — N18.30 BENIGN HYPERTENSION WITH CKD (CHRONIC KIDNEY DISEASE) STAGE III (H): ICD-10-CM

## 2025-03-21 NOTE — TELEPHONE ENCOUNTER
Type of surgery: ARTHROPLASTY, left SHOULDER, TOTAL versus REVERSE total shoulder, CT guided   Location of surgery: [place] OR  Date and time of surgery: 05/20/2025  Surgeon: ERA   Pre-Op Appt Date: 05/06/2025  Post-Op Appt Date: 06/06/2025   Packet sent out: Yes  Pre-cert/Authorization completed:  No  Date:

## 2025-05-06 ENCOUNTER — OFFICE VISIT (OUTPATIENT)
Dept: FAMILY MEDICINE | Facility: CLINIC | Age: 70
End: 2025-05-06
Payer: COMMERCIAL

## 2025-05-06 VITALS
OXYGEN SATURATION: 98 % | BODY MASS INDEX: 26.63 KG/M2 | SYSTOLIC BLOOD PRESSURE: 120 MMHG | DIASTOLIC BLOOD PRESSURE: 72 MMHG | TEMPERATURE: 98.1 F | RESPIRATION RATE: 16 BRPM | HEART RATE: 77 BPM | WEIGHT: 170 LBS

## 2025-05-06 DIAGNOSIS — N18.31 STAGE 3A CHRONIC KIDNEY DISEASE (H): ICD-10-CM

## 2025-05-06 DIAGNOSIS — Z01.818 PREOP GENERAL PHYSICAL EXAM: Primary | ICD-10-CM

## 2025-05-06 DIAGNOSIS — I10 HYPERTENSION GOAL BP (BLOOD PRESSURE) < 140/90: ICD-10-CM

## 2025-05-06 LAB
HGB BLD-MCNC: 13.2 G/DL (ref 11.7–15.7)
POTASSIUM SERPL-SCNC: 4 MMOL/L (ref 3.4–5.3)

## 2025-05-06 PROCEDURE — 36415 COLL VENOUS BLD VENIPUNCTURE: CPT | Performed by: FAMILY MEDICINE

## 2025-05-06 PROCEDURE — 85018 HEMOGLOBIN: CPT | Performed by: FAMILY MEDICINE

## 2025-05-06 PROCEDURE — 93000 ELECTROCARDIOGRAM COMPLETE: CPT | Performed by: FAMILY MEDICINE

## 2025-05-06 PROCEDURE — 3078F DIAST BP <80 MM HG: CPT | Performed by: FAMILY MEDICINE

## 2025-05-06 PROCEDURE — 3074F SYST BP LT 130 MM HG: CPT | Performed by: FAMILY MEDICINE

## 2025-05-06 PROCEDURE — 99214 OFFICE O/P EST MOD 30 MIN: CPT | Performed by: FAMILY MEDICINE

## 2025-05-06 PROCEDURE — 84132 ASSAY OF SERUM POTASSIUM: CPT | Performed by: FAMILY MEDICINE

## 2025-05-06 PROCEDURE — 1126F AMNT PAIN NOTED NONE PRSNT: CPT | Performed by: FAMILY MEDICINE

## 2025-05-06 RX ORDER — VITAMIN B COMPLEX
TABLET ORAL DAILY
COMMUNITY

## 2025-05-06 RX ORDER — MULTIVIT-MIN/IRON/FOLIC ACID/K 18-600-40
CAPSULE ORAL
COMMUNITY

## 2025-05-06 RX ORDER — UBIDECARENONE 75 MG
100 CAPSULE ORAL DAILY
COMMUNITY

## 2025-05-06 ASSESSMENT — PAIN SCALES - GENERAL: PAINLEVEL_OUTOF10: NO PAIN (0)

## 2025-05-06 NOTE — PATIENT INSTRUCTIONS
Take all scheduled medications on the day of surgery EXCEPT for modifications listed below:   - Herbal medications and vitamins: DO NOT TAKE 14 days prior to surgery.   - ACE/ARB/ARNI (lisinopril, enalapril, losartan, valsartan, olmesartan, sacubritril/valsartan) : DO NOT TAKE on day of surgery (minimum 11 hours for general anesthesia).   - Beta Blockers (atenolol, metoprolol, propranolol) : Continue taking on the day of surgery.   - Calcium Channel Blockers (amlodipine, diltiazem, felodipine): May be continued on the day of surgery.   - Statins (atorvastatin, simvastatin, pravastatin) : Continue taking on the day of surgery.    - pregabalin, gabapentin: Continue without modification.    Sincerely,  Christine Young MD    Patient Education   Preparing for Your Surgery  For Adults  Getting started  In most cases, a nurse will call to review your health history and instructions. They will give you an arrival time based on your scheduled surgery time. Please be ready to share:  Your doctor's clinic name and phone number  Your medical, surgical, and anesthesia history  A list of allergies and sensitivities  A list of medicines, including herbal treatments and over-the-counter drugs  Whether the patient has a legal guardian (ask how to send us the papers in advance)  Note: You may not receive a call if you were seen at our PAC (Preoperative Assessment Center).  Please tell us if you're pregnant--or if there's any chance you might be pregnant. Some surgeries may injure a fetus (unborn baby), so they require a pregnancy test. Surgeries that are safe for a fetus don't always need a test, and you can choose whether to have one.   Preparing for surgery  Within 10 to 30 days of surgery: Have a pre-op exam (sometimes called an H&P, or History and Physical). This can be done at a clinic or pre-operative center.  If you're having a , you may not need this exam. Talk to your care team.  At your pre-op exam, talk to your care  team about all medicines you take. (This includes CBD oil and any drugs, such as THC, marijuana, and other forms of cannabis.) If you need to stop any medicine before surgery, ask when to start taking it again.  This is for your safety. Many medicines and drugs can make you bleed too much during surgery. Some change how well surgery (anesthesia) drugs work.  Call your insurance company to let them know you're having surgery. (If you don't have insurance, call [phone].)  Call your clinic if there's any change in your health. This includes a scrape or scratch near the surgery site, or any signs of a cold (sore throat, runny nose, cough, rash, fever).  Eating and drinking guidelines  For your safety: Unless your surgeon tells you otherwise, follow the guidelines below.  Eat and drink as normal until 8 hours before you arrive for surgery. After that, no food or milk. You can spit out gum when you arrive.  Drink clear liquids until 2 hours before you arrive. These are liquids you can see through, like water, Gatorade, and Propel Water. They also include plain black coffee and tea (no cream or milk).  No alcohol for 24 hours before you arrive. The night before surgery, stop any drinks that contain THC.  If your care team tells you to take medicine on the morning of surgery, it's okay to take it with a sip of water. No other medicines or drugs are allowed (including CBD oil)--follow your care team's instructions.  If you have questions the day of surgery, call your hospital or surgery center.   Preventing infection  Shower or bathe the night before and the morning of surgery. Follow the instructions your clinic gave you. (If no instructions, use regular soap.)  Don't shave or clip hair near your surgery site. We'll remove the hair if needed.  Don't smoke or vape the morning of surgery. No chewing tobacco for 6 hours before you arrive. A nicotine patch is okay. You may spit out nicotine gum when you arrive.  For some  surgeries, the surgeon will tell you to fully quit smoking and nicotine.  We will make every effort to keep you safe from infection. We will:  Clean our hands often with soap and water (or an alcohol-based hand rub).  Clean the skin at your surgery site with a special soap that kills germs.  Give you a special gown to keep you warm. (Cold raises the risk of infection.)  Wear hair covers, masks, gowns, and gloves during surgery.  Give antibiotic medicine, if prescribed. Not all surgeries need this medicine.  What to bring on the day of surgery  Photo ID and insurance card  Copy of your health care directive, if you have one  Glasses and hearing aids (bring cases)  You can't wear contacts during surgery  Inhaler and eye drops, if you use them (tell us about these when you arrive)  CPAP machine or breathing device, if you use them  A few personal items, if spending the night  If you have . . .  A pacemaker, ICD (cardiac defibrillator), or other implant: Bring the ID card.  An implanted stimulator: Bring the remote control.  A legal guardian: Bring a copy of the certified (court-stamped) guardianship papers.  Please remove any jewelry, including body piercings. Leave jewelry and other valuables at home.  If you're going home the day of surgery  You must have a responsible adult drive you home. They should stay with you overnight as well.  If you don't have someone to stay with you, and you aren't safe to go home alone, we may keep you overnight. Insurance often won't pay for this.  After surgery  If it's hard to control your pain or you need more pain medicine, please call your surgeon's office.  Questions?   If you have any questions for your care team, list them here:    ____________________________________________________________________________________________________________________________________________________________________________________________________________________________________________________________  For informational purposes only. Not to replace the advice of your health care provider. Copyright   2003, 2019 [facility]. All rights reserved. Clinically reviewed by Efrain Toscano MD. [employer] [id number] - REV 08/24.

## 2025-05-06 NOTE — PROGRESS NOTES
Preoperative Evaluation  [facility] [place]  [address]  [place] [address]  Phone: [phone]  Primary Provider: Christine Young MD  Pre-op Performing Provider: Christine Young MD  May 6, 2025             5/5/2025   Surgical Information   What procedure is being done? left shoulder replacement   Facility or Hospital where procedure/surgery will be performed: [facility]   Who is doing the procedure / surgery? Dr. Luther Landers MD   Date of surgery / procedure: May 20, 2025   Time of surgery / procedure: not sure   Where do you plan to recover after surgery? at home with family     Fax number for surgical facility: Note does not need to be faxed, will be available electronically in Epic.    Assessment & Plan     The proposed surgical procedure is considered INTERMEDIATE risk.    Preop general physical exam    - Hemoglobin; Future  - Potassium; Future    Hypertension goal BP (blood pressure) < 140/90  Stable   - Potassium; Future    Stage 3a chronic kidney disease (H)  Stable         Risks and Recommendations  The patient has the following additional risks and recommendations for perioperative complications:  Social and Substance:    - pt has a History of alcohol use disorder  She does say that she has decreased alcohol use to 3 drinks a week     Antiplatelet or Anticoagulation Medication Instructions   - We reviewed the medication list and the patient is not on an antiplatelet or anticoagulation medications.    Additional Medication Instructions  Take all scheduled medications on the day of surgery EXCEPT for modifications listed below:   - Herbal medications and vitamins: DO NOT TAKE 14 days prior to surgery.   - ACE/ARB/ARNI (lisinopril, enalapril, losartan, valsartan, olmesartan, sacubritril/valsartan) : DO NOT TAKE on day of surgery (minimum 11 hours for general anesthesia).   - Beta Blockers (atenolol, metoprolol, propranolol) : Continue taking on the day of  surgery.   - Calcium Channel Blockers (amlodipine, diltiazem, felodipine): May be continued on the day of surgery.   - Statins (atorvastatin, simvastatin, pravastatin) : Continue taking on the day of surgery.    - pregabalin, gabapentin: Continue without modification.    Recommendation  Approval given to proceed with proposed procedure, without further diagnostic evaluation.        Komal Davila is a 70 year old, presenting for the following:  Pre-Op Exam          5/6/2025    10:51 AM   Additional Questions   Roomed by Berta MONTESINOS: pt is schedueld for above surgery due to Ongoing pain and has failed conservative treatment          5/5/2025   Pre-Op Questionnaire   Have you ever had a heart attack or stroke? No   Have you ever had surgery on your heart or blood vessels, such as a stent placement, a coronary artery bypass, or surgery on an artery in your head, neck, heart, or legs? No   Do you have chest pain with activity? No   Do you have a history of heart failure? No   Do you currently have a cold, bronchitis or symptoms of other infection? No   Do you have a cough, shortness of breath, or wheezing? No   Do you or anyone in your family have previous history of blood clots? No   Do you or does anyone in your family have a serious bleeding problem such as prolonged bleeding following surgeries or cuts? No   Have you ever had problems with anemia or been told to take iron pills? No   Have you had any abnormal blood loss such as black, tarry or bloody stools, or abnormal vaginal bleeding? No   Have you ever had a blood transfusion? No   Are you willing to have a blood transfusion if it is medically needed before, during, or after your surgery? Yes   Have you or any of your relatives ever had problems with anesthesia? No   Do you have sleep apnea, excessive snoring or daytime drowsiness? (!) YES-I snores   Do you have a CPAP machine? (!) NO    Do you have any artifical heart valves or other implanted medical  devices like a pacemaker, defibrillator, or continuous glucose monitor? No   Do you have artificial joints? (!) UNKNOWN   Are you allergic to latex? No     Advance Care Planning    Discussed advance care planning with patient; informed AVS has link to [employer].    Preoperative Review of    reviewed - controlled substances reflected in medication list.      Status of Chronic Conditions:  HYPERLIPIDEMIA - Patient has a long history of significant Hyperlipidemia requiring medication for treatment with recent good control. Patient reports no problems or side effects with the medication.     HYPERTENSION - Patient has longstanding history of HTN , currently denies any symptoms referable to elevated blood pressure. Specifically denies chest pain, palpitations, dyspnea, orthopnea, PND or peripheral edema. Blood pressure readings have been in normal range. Current medication regimen is as listed below. Patient denies any side effects of medication.     Patient Active Problem List    Diagnosis Date Noted    Stage 3a chronic kidney disease (H) 09/10/2024     Priority: Medium    Alcohol use disorder, mild, abuse 08/08/2024     Priority: Medium    Status post total right knee replacement 04/30/2021     Priority: Medium    Status post total left knee replacement 04/30/2021     Priority: Medium    Hypertension goal BP (blood pressure) < 140/90 08/03/2017     Priority: Medium     [facility] research study patient : New diagnosis HTN arm of the study  First study visit 8/3/17     Patients will have regular blood pressure medication adjustments made by the [facility] PGEN study team. Under non-emergent/urgent situations where a change in blood pressure medication adjustment is being considered outside of a study visit, please contact the PGEN study team at [email or web address] and they will refer [facility] order (under 'other orders' ) to guide medication selection. This standing order reflects the IRB  "approved blood pressure treatment protocol for this study.    Patient should NOT be provided their genetic profile results until the end of the study (this is a single blinded study.   Patients will remain blinded to results during the study but will be given this information at the end of the study)    Click on Research \"Active\" in header for more details about the study protocols    PGEN study team (Marilyn Carrillo MD)           Bilateral leg cramps 05/25/2016     Priority: Medium    Chronic radicular low back pain 05/25/2016     Priority: Medium    Chronic rhinitis 08/28/2015     Priority: Medium    Esophageal reflux 08/28/2015     Priority: Medium    Menopause 08/28/2015     Priority: Medium    Menopausal syndrome (hot flashes) 08/28/2015     Priority: Medium      Past Medical History:   Diagnosis Date    Anemia, unspecified type 5/27/2016    Regularly gives blood. Now on iron regularly     Colon polyp     GERD (gastroesophageal reflux disease)     Hypertension 8-1-17    Rhinitis     Worker's compensation claim administrative problem     DOI 3/23/21  Emp: Sleep number.     Past Surgical History:   Procedure Laterality Date    ARTHROSCOPY KNEE Right     COLONOSCOPY  ?    COLONOSCOPY N/A 7/13/2022    Procedure: COLONOSCOPY, FLEXIBLE, WITH LESION REMOVAL USING SNARE;  Surgeon: Valdo Lopez DO;  Location: [facility] OR    COLONOSCOPY WITH CO2 INSUFFLATION N/A 7/13/2022    Procedure: COLONOSCOPY, WITH CO2 INSUFFLATION;  Surgeon: Valdo Lopez DO;  Location: [facility] OR    COMBINED ESOPHAGOSCOPY, GASTROSCOPY, DUODENOSCOPY (EGD) WITH CO2 INSUFFLATION N/A 4/21/2023    Procedure: ESOPHAGOGASTRODUODENOSCOPY, WITH CO2 INSUFFLATION;  Surgeon: Marli Briceno DO;  Location: [facility] OR    HC TOOTH EXTRACTION W/FORCEP      LASIK      10 years ago    TOTAL KNEE ARTHROPLASTY Right      4/19/21    TUBAL LIGATION      vulvar biopsy  2005    LORI III     Current Outpatient Medications   Medication Sig Dispense Refill    Ascorbic Acid (VITAMIN C) " 500 MG CAPS Take by mouth.      cyanocobalamin (VITAMIN B-12) 100 MCG tablet Take 100 mcg by mouth daily.      Vitamin D3 (CHOLECALCIFEROL) 25 mcg (1000 units) tablet Take by mouth daily.      amLODIPine (NORVASC) 2.5 MG tablet Take 1 tablet (2.5 mg) by mouth daily. 90 tablet 3    amoxicillin (AMOXIL) 500 MG capsule Take four capsules by mouth 1 hour prior to dental procedure. 4 capsule 4    calcium 600 MG tablet Take 1 tablet by mouth 2 times daily      gabapentin (NEURONTIN) 300 MG capsule Take 1 capsule (300 mg) by mouth 3 times daily. 270 capsule 3    losartan (COZAAR) 50 MG tablet Take 1 tablet (50 mg) by mouth daily. 90 tablet 2    magnesium 250 MG tablet Take 1 tablet by mouth daily      metoprolol succinate ER (TOPROL XL) 50 MG 24 hr tablet Take 1 tablet (50 mg) by mouth daily. 90 tablet 2    omeprazole (PRILOSEC) 20 MG DR capsule Take 1 capsule (20 mg) by mouth daily. 90 capsule 1    potassium gluconate 2.5 MEQ tablet Take 90 mEq by mouth      pravastatin (PRAVACHOL) 10 MG tablet Take 1 tablet (10 mg) by mouth daily 90 tablet 3       No Known Allergies     Social History     Tobacco Use    Smoking status: Never     Passive exposure: Never    Smokeless tobacco: Never   Substance Use Topics    Alcohol use: Yes     Comment: per week, 2-3 drinks 1-2 times per week     Family History   Problem Relation Age of Onset    Cancer Father         liver    Hyperlipidemia Father     Prostate Cancer Father     Mental Illness Sister         Depression & anxiety    Colon Polyps Brother     Glaucoma No family hx of     Macular Degeneration No family hx of      History   Drug Use No             Review of Systems  CONSTITUTIONAL: NEGATIVE for fever, chills, change in weight  INTEGUMENTARY/SKIN: NEGATIVE for worrisome rashes, moles or lesions  EYES: NEGATIVE for vision changes or irritation  ENT/MOUTH: NEGATIVE for ear, mouth and throat problems  RESP: NEGATIVE for significant cough or SOB  BREAST: NEGATIVE for masses,  "tenderness or discharge  CV: NEGATIVE for chest pain, palpitations or peripheral edema  GI: NEGATIVE for nausea, abdominal pain, heartburn, or change in bowel habits  : NEGATIVE for frequency, dysuria, or hematuria  MUSCULOSKELETAL:as above  NEURO: NEGATIVE for weakness, dizziness or paresthesias  ENDOCRINE: NEGATIVE for temperature intolerance, skin/hair changes  HEME: NEGATIVE for bleeding problems  PSYCHIATRIC: NEGATIVE for changes in mood or affect    Objective    BP (!) 167/75   Pulse 77   Temp 98.1  F (36.7  C) (Temporal)   Resp 16   Wt 77.1 kg (170 lb)   SpO2 98%   BMI 26.63 kg/m     Estimated body mass index is 26.63 kg/m  as calculated from the following:    Height as of 3/3/25: 1.702 m (5' 7\").    Weight as of this encounter: 77.1 kg (170 lb).  Physical Exam  GENERAL: alert and no distress  EYES: Eyes grossly normal to inspection, PERRL and conjunctivae and sclerae normal  HENT: ear canals and TM's normal, nose and mouth without ulcers or lesions  NECK: no adenopathy, no asymmetry, masses, or scars  RESP: lungs clear to auscultation - no rales, rhonchi or wheezes  CV: regular rate and rhythm, normal S1 S2, no S3 or S4, no murmur, click or rub, no peripheral edema  ABDOMEN: soft, nontender, no hepatosplenomegaly, no masses and bowel sounds normal  MS: no gross musculoskeletal defects noted, no edema  SKIN: no suspicious lesions or rashes  NEURO: Normal strength and tone, mentation intact and speech normal  PSYCH: mentation appears normal, affect normal/bright    Recent Labs   Lab Test 08/08/24  0942   HGB 12.7         POTASSIUM 4.2   CR 1.06*        Diagnostics  Labs pending at this time.  Results will be reviewed when available.   EKG: appears normal, NSR, normal axis, normal intervals, no acute ST/T changes c/w ischemia, no LVH by voltage criteria, unchanged from previous tracings    Revised Cardiac Risk Index (RCRI)  The patient has the following serious cardiovascular risks for " perioperative complications:   - No serious cardiac risks = 0 points     RCRI Interpretation: 0 points: Class I (very low risk - 0.4% complication rate)         Signed Electronically by: Christine Young MD  A copy of this evaluation report is provided to the requesting physician.

## 2025-05-06 NOTE — H&P (VIEW-ONLY)
Preoperative Evaluation  [facility] [place]  [address]  [place] [address]  Phone: [phone]  Primary Provider: Christine Young MD  Pre-op Performing Provider: Christine Young MD  May 6, 2025             5/5/2025   Surgical Information   What procedure is being done? left shoulder replacement   Facility or Hospital where procedure/surgery will be performed: [facility]   Who is doing the procedure / surgery? Dr. Luther Landers MD   Date of surgery / procedure: May 20, 2025   Time of surgery / procedure: not sure   Where do you plan to recover after surgery? at home with family     Fax number for surgical facility: Note does not need to be faxed, will be available electronically in Epic.    Assessment & Plan     The proposed surgical procedure is considered INTERMEDIATE risk.    Preop general physical exam    - Hemoglobin; Future  - Potassium; Future    Hypertension goal BP (blood pressure) < 140/90  Stable   - Potassium; Future    Stage 3a chronic kidney disease (H)  Stable         Risks and Recommendations  The patient has the following additional risks and recommendations for perioperative complications:  Social and Substance:    - pt has a History of alcohol use disorder  She does say that she has decreased alcohol use to 3 drinks a week     Antiplatelet or Anticoagulation Medication Instructions   - We reviewed the medication list and the patient is not on an antiplatelet or anticoagulation medications.    Additional Medication Instructions  Take all scheduled medications on the day of surgery EXCEPT for modifications listed below:   - Herbal medications and vitamins: DO NOT TAKE 14 days prior to surgery.   - ACE/ARB/ARNI (lisinopril, enalapril, losartan, valsartan, olmesartan, sacubritril/valsartan) : DO NOT TAKE on day of surgery (minimum 11 hours for general anesthesia).   - Beta Blockers (atenolol, metoprolol, propranolol) : Continue taking on the day of  surgery.   - Calcium Channel Blockers (amlodipine, diltiazem, felodipine): May be continued on the day of surgery.   - Statins (atorvastatin, simvastatin, pravastatin) : Continue taking on the day of surgery.    - pregabalin, gabapentin: Continue without modification.    Recommendation  Approval given to proceed with proposed procedure, without further diagnostic evaluation.        Komal Davila is a 70 year old, presenting for the following:  Pre-Op Exam          5/6/2025    10:51 AM   Additional Questions   Roomed by Berta MONTESINOS: pt is schedueld for above surgery due to Ongoing pain and has failed conservative treatment          5/5/2025   Pre-Op Questionnaire   Have you ever had a heart attack or stroke? No   Have you ever had surgery on your heart or blood vessels, such as a stent placement, a coronary artery bypass, or surgery on an artery in your head, neck, heart, or legs? No   Do you have chest pain with activity? No   Do you have a history of heart failure? No   Do you currently have a cold, bronchitis or symptoms of other infection? No   Do you have a cough, shortness of breath, or wheezing? No   Do you or anyone in your family have previous history of blood clots? No   Do you or does anyone in your family have a serious bleeding problem such as prolonged bleeding following surgeries or cuts? No   Have you ever had problems with anemia or been told to take iron pills? No   Have you had any abnormal blood loss such as black, tarry or bloody stools, or abnormal vaginal bleeding? No   Have you ever had a blood transfusion? No   Are you willing to have a blood transfusion if it is medically needed before, during, or after your surgery? Yes   Have you or any of your relatives ever had problems with anesthesia? No   Do you have sleep apnea, excessive snoring or daytime drowsiness? (!) YES-I snores   Do you have a CPAP machine? (!) NO    Do you have any artifical heart valves or other implanted medical  devices like a pacemaker, defibrillator, or continuous glucose monitor? No   Do you have artificial joints? (!) UNKNOWN   Are you allergic to latex? No     Advance Care Planning    Discussed advance care planning with patient; informed AVS has link to [employer].    Preoperative Review of    reviewed - controlled substances reflected in medication list.      Status of Chronic Conditions:  HYPERLIPIDEMIA - Patient has a long history of significant Hyperlipidemia requiring medication for treatment with recent good control. Patient reports no problems or side effects with the medication.     HYPERTENSION - Patient has longstanding history of HTN , currently denies any symptoms referable to elevated blood pressure. Specifically denies chest pain, palpitations, dyspnea, orthopnea, PND or peripheral edema. Blood pressure readings have been in normal range. Current medication regimen is as listed below. Patient denies any side effects of medication.     Patient Active Problem List    Diagnosis Date Noted    Stage 3a chronic kidney disease (H) 09/10/2024     Priority: Medium    Alcohol use disorder, mild, abuse 08/08/2024     Priority: Medium    Status post total right knee replacement 04/30/2021     Priority: Medium    Status post total left knee replacement 04/30/2021     Priority: Medium    Hypertension goal BP (blood pressure) < 140/90 08/03/2017     Priority: Medium     [facility] research study patient : New diagnosis HTN arm of the study  First study visit 8/3/17     Patients will have regular blood pressure medication adjustments made by the [facility] [place] team. Under non-emergent/urgent situations where a change in blood pressure medication adjustment is being considered outside of a study visit, please contact the PGEN study team at [email or web address] and they will [facility] order (under 'other orders' ) to guide medication selection. This standing order reflects the IRB  "approved blood pressure treatment protocol for this study.    Patient should NOT be provided their genetic profile results until the end of the study (this is a single blinded study.   Patients will remain blinded to results during the study but will be given this information at the end of the study)    Click on Research \"Active\" in header for more details about the study protocols    PGEN study team (Marilyn Carrillo MD)           Bilateral leg cramps 05/25/2016     Priority: Medium    Chronic radicular low back pain 05/25/2016     Priority: Medium    Chronic rhinitis 08/28/2015     Priority: Medium    Esophageal reflux 08/28/2015     Priority: Medium    Menopause 08/28/2015     Priority: Medium    Menopausal syndrome (hot flashes) 08/28/2015     Priority: Medium      Past Medical History:   Diagnosis Date    Anemia, unspecified type 5/27/2016    Regularly gives blood. Now on iron regularly     Colon polyp     GERD (gastroesophageal reflux disease)     Hypertension 8-1-17    Rhinitis     Worker's compensation claim administrative problem     DOI 3/23/21  Emp: Sleep number.     Past Surgical History:   Procedure Laterality Date    ARTHROSCOPY KNEE Right     COLONOSCOPY  ?    COLONOSCOPY N/A 7/13/2022    Procedure: COLONOSCOPY, FLEXIBLE, WITH LESION REMOVAL USING SNARE;  Surgeon: Valdo Lopez DO;  Location: [facility] OR    COLONOSCOPY WITH CO2 INSUFFLATION N/A 7/13/2022    Procedure: COLONOSCOPY, WITH CO2 INSUFFLATION;  Surgeon: Valdo Lopez DO;  Location: [facility] OR    COMBINED ESOPHAGOSCOPY, GASTROSCOPY, DUODENOSCOPY (EGD) WITH CO2 INSUFFLATION N/A 4/21/2023    Procedure: ESOPHAGOGASTRODUODENOSCOPY, WITH CO2 INSUFFLATION;  Surgeon: Marli Briceno DO;  Location: [facility] OR    HC TOOTH EXTRACTION W/FORCEP      LASIK      10 years ago    TOTAL KNEE ARTHROPLASTY Right      4/19/21    TUBAL LIGATION      vulvar biopsy  2005    LORI III     Current Outpatient Medications   Medication Sig Dispense Refill    Ascorbic Acid (VITAMIN C) " 500 MG CAPS Take by mouth.      cyanocobalamin (VITAMIN B-12) 100 MCG tablet Take 100 mcg by mouth daily.      Vitamin D3 (CHOLECALCIFEROL) 25 mcg (1000 units) tablet Take by mouth daily.      amLODIPine (NORVASC) 2.5 MG tablet Take 1 tablet (2.5 mg) by mouth daily. 90 tablet 3    amoxicillin (AMOXIL) 500 MG capsule Take four capsules by mouth 1 hour prior to dental procedure. 4 capsule 4    calcium 600 MG tablet Take 1 tablet by mouth 2 times daily      gabapentin (NEURONTIN) 300 MG capsule Take 1 capsule (300 mg) by mouth 3 times daily. 270 capsule 3    losartan (COZAAR) 50 MG tablet Take 1 tablet (50 mg) by mouth daily. 90 tablet 2    magnesium 250 MG tablet Take 1 tablet by mouth daily      metoprolol succinate ER (TOPROL XL) 50 MG 24 hr tablet Take 1 tablet (50 mg) by mouth daily. 90 tablet 2    omeprazole (PRILOSEC) 20 MG DR capsule Take 1 capsule (20 mg) by mouth daily. 90 capsule 1    potassium gluconate 2.5 MEQ tablet Take 90 mEq by mouth      pravastatin (PRAVACHOL) 10 MG tablet Take 1 tablet (10 mg) by mouth daily 90 tablet 3       No Known Allergies     Social History     Tobacco Use    Smoking status: Never     Passive exposure: Never    Smokeless tobacco: Never   Substance Use Topics    Alcohol use: Yes     Comment: per week, 2-3 drinks 1-2 times per week     Family History   Problem Relation Age of Onset    Cancer Father         liver    Hyperlipidemia Father     Prostate Cancer Father     Mental Illness Sister         Depression & anxiety    Colon Polyps Brother     Glaucoma No family hx of     Macular Degeneration No family hx of      History   Drug Use No             Review of Systems  CONSTITUTIONAL: NEGATIVE for fever, chills, change in weight  INTEGUMENTARY/SKIN: NEGATIVE for worrisome rashes, moles or lesions  EYES: NEGATIVE for vision changes or irritation  ENT/MOUTH: NEGATIVE for ear, mouth and throat problems  RESP: NEGATIVE for significant cough or SOB  BREAST: NEGATIVE for masses,  "tenderness or discharge  CV: NEGATIVE for chest pain, palpitations or peripheral edema  GI: NEGATIVE for nausea, abdominal pain, heartburn, or change in bowel habits  : NEGATIVE for frequency, dysuria, or hematuria  MUSCULOSKELETAL:as above  NEURO: NEGATIVE for weakness, dizziness or paresthesias  ENDOCRINE: NEGATIVE for temperature intolerance, skin/hair changes  HEME: NEGATIVE for bleeding problems  PSYCHIATRIC: NEGATIVE for changes in mood or affect    Objective    BP (!) 167/75   Pulse 77   Temp 98.1  F (36.7  C) (Temporal)   Resp 16   Wt 77.1 kg (170 lb)   SpO2 98%   BMI 26.63 kg/m     Estimated body mass index is 26.63 kg/m  as calculated from the following:    Height as of 3/3/25: 1.702 m (5' 7\").    Weight as of this encounter: 77.1 kg (170 lb).  Physical Exam  GENERAL: alert and no distress  EYES: Eyes grossly normal to inspection, PERRL and conjunctivae and sclerae normal  HENT: ear canals and TM's normal, nose and mouth without ulcers or lesions  NECK: no adenopathy, no asymmetry, masses, or scars  RESP: lungs clear to auscultation - no rales, rhonchi or wheezes  CV: regular rate and rhythm, normal S1 S2, no S3 or S4, no murmur, click or rub, no peripheral edema  ABDOMEN: soft, nontender, no hepatosplenomegaly, no masses and bowel sounds normal  MS: no gross musculoskeletal defects noted, no edema  SKIN: no suspicious lesions or rashes  NEURO: Normal strength and tone, mentation intact and speech normal  PSYCH: mentation appears normal, affect normal/bright    Recent Labs   Lab Test 08/08/24  0942   HGB 12.7         POTASSIUM 4.2   CR 1.06*        Diagnostics  Labs pending at this time.  Results will be reviewed when available.   EKG: appears normal, NSR, normal axis, normal intervals, no acute ST/T changes c/w ischemia, no LVH by voltage criteria, unchanged from previous tracings    Revised Cardiac Risk Index (RCRI)  The patient has the following serious cardiovascular risks for " perioperative complications:   - No serious cardiac risks = 0 points     RCRI Interpretation: 0 points: Class I (very low risk - 0.4% complication rate)         Signed Electronically by: Christine Young MD  A copy of this evaluation report is provided to the requesting physician.

## 2025-05-08 RX ORDER — LOSARTAN POTASSIUM 50 MG/1
50 TABLET ORAL DAILY
Qty: 90 TABLET | Refills: 2 | Status: CANCELLED | OUTPATIENT
Start: 2025-05-08

## 2025-05-08 RX ORDER — AMLODIPINE BESYLATE 2.5 MG/1
2.5 TABLET ORAL DAILY
Qty: 90 TABLET | Refills: 3 | Status: CANCELLED | OUTPATIENT
Start: 2025-05-08

## 2025-05-08 RX ORDER — LOSARTAN POTASSIUM 50 MG/1
50 TABLET ORAL DAILY
Qty: 90 TABLET | Refills: 0 | Status: SHIPPED | OUTPATIENT
Start: 2025-05-08

## 2025-05-08 RX ORDER — METOPROLOL SUCCINATE 50 MG/1
50 TABLET, EXTENDED RELEASE ORAL DAILY
Qty: 90 TABLET | Refills: 2 | Status: CANCELLED | OUTPATIENT
Start: 2025-05-08

## 2025-05-08 RX ORDER — OMEPRAZOLE 20 MG/1
20 CAPSULE, DELAYED RELEASE ORAL DAILY
Qty: 90 CAPSULE | Refills: 1 | Status: CANCELLED | OUTPATIENT
Start: 2025-05-08

## 2025-05-08 RX ORDER — METOPROLOL SUCCINATE 50 MG/1
50 TABLET, EXTENDED RELEASE ORAL DAILY
Qty: 90 TABLET | Refills: 0 | Status: SHIPPED | OUTPATIENT
Start: 2025-05-08

## 2025-05-08 RX ORDER — GABAPENTIN 300 MG/1
300 CAPSULE ORAL 3 TIMES DAILY
Qty: 270 CAPSULE | Refills: 3 | Status: CANCELLED | OUTPATIENT
Start: 2025-05-08

## 2025-05-08 RX ORDER — PRAVASTATIN SODIUM 10 MG
10 TABLET ORAL DAILY
Qty: 90 TABLET | Refills: 0 | Status: SHIPPED | OUTPATIENT
Start: 2025-05-08

## 2025-05-08 RX ORDER — GABAPENTIN 300 MG/1
300 CAPSULE ORAL 3 TIMES DAILY
Qty: 270 CAPSULE | Refills: 1 | Status: SHIPPED | OUTPATIENT
Start: 2025-05-08

## 2025-05-08 RX ORDER — AMLODIPINE BESYLATE 2.5 MG/1
2.5 TABLET ORAL DAILY
Qty: 90 TABLET | Refills: 1 | Status: SHIPPED | OUTPATIENT
Start: 2025-05-08

## 2025-05-08 RX ORDER — PRAVASTATIN SODIUM 10 MG
10 TABLET ORAL DAILY
Qty: 90 TABLET | Refills: 3 | Status: CANCELLED | OUTPATIENT
Start: 2025-05-08

## 2025-05-08 RX ORDER — OMEPRAZOLE 20 MG/1
20 CAPSULE, DELAYED RELEASE ORAL DAILY
Qty: 90 CAPSULE | Refills: 0 | Status: SHIPPED | OUTPATIENT
Start: 2025-05-08

## 2025-05-13 NOTE — PROVIDER NOTIFICATION
05/13/25 1457   Discharge Planning   Patient/Family Anticipates Transition to home with family   Concerns to be Addressed all concerns addressed in this encounter;no discharge needs identified   Living Arrangements   People in Home spouse   Type of Residence Private Residence   Is your private residence a single family home or apartment? Single family home   Number of Stairs, Within Home, Primary six   Stair Railings, Within Home, Primary railings safe and in good condition   Once home, are you able to live on one level? Yes   Which level? Main Level   Bathroom Shower/Tub Tub/Shower unit   Equipment Currently Used at Home none   Support System   Support Systems Spouse/Significant Other   Do you have someone available to stay with you one or two nights once you are home? Yes   Medical Clearance   Date of Physical 05/06/25   It is recommended that you call and check with any specialty providers before surgery to see if you need surgical clearance.  Do you see any specialty providers outside of your primary care provider? Yes   Blood   Known Bleeding Disorder or Coagulopathy No   Does the patient have any Gnosticism/cultural preferences related to blood products? No   Education   Has the patient scheduled or completed pre-op total joint education, either in class or online, in the last 12 months? No   Falls Risk   Has the patient been identified as a high falls risk before surgery? (fallen in the last 6 months, history of falls, unsteady gait, or balance issues) No   Did an order get placed to attend outpatient pre-surgery balance evaluation? No

## 2025-05-14 ENCOUNTER — TELEPHONE (OUTPATIENT)
Dept: SURGERY | Facility: CLINIC | Age: 70
End: 2025-05-14
Payer: COMMERCIAL

## 2025-05-15 ENCOUNTER — ANCILLARY PROCEDURE (OUTPATIENT)
Dept: MAMMOGRAPHY | Facility: CLINIC | Age: 70
End: 2025-05-15
Attending: FAMILY MEDICINE
Payer: COMMERCIAL

## 2025-05-15 DIAGNOSIS — Z12.31 VISIT FOR SCREENING MAMMOGRAM: ICD-10-CM

## 2025-05-19 ENCOUNTER — ANESTHESIA EVENT (OUTPATIENT)
Dept: SURGERY | Facility: CLINIC | Age: 70
End: 2025-05-19
Payer: COMMERCIAL

## 2025-05-19 RX ORDER — GABAPENTIN 100 MG/1
100 CAPSULE ORAL
Status: CANCELLED | OUTPATIENT
Start: 2025-05-19

## 2025-05-19 NOTE — ANESTHESIA PREPROCEDURE EVALUATION
Anesthesia Pre-Procedure Evaluation    Patient: Lola Partida   MRN: [id number] : 1955          Procedure : Procedure(s):  ARTHROPLASTY, left SHOULDER, TOTAL versus REVERSE total shoulder, CT guided         Past Medical History:   Diagnosis Date    Anemia, unspecified type 2016    Regularly gives blood. Now on iron regularly     Colon polyp     GERD (gastroesophageal reflux disease)     Hypertension 2017    Rhinitis     Worker's compensation claim administrative problem     DOI 3/23/21  Emp: Sleep number.      Past Surgical History:   Procedure Laterality Date    ARTHROSCOPY KNEE Right     COLONOSCOPY  ?    COLONOSCOPY N/A 2022    Procedure: COLONOSCOPY, FLEXIBLE, WITH LESION REMOVAL USING SNARE;  Surgeon: Valdo Lopez DO;  Location: [facility] OR    COLONOSCOPY WITH CO2 INSUFFLATION N/A 2022    Procedure: COLONOSCOPY, WITH CO2 INSUFFLATION;  Surgeon: Valdo Lopez DO;  Location: [facility] OR    COMBINED ESOPHAGOSCOPY, GASTROSCOPY, DUODENOSCOPY (EGD) WITH CO2 INSUFFLATION N/A 2023    Procedure: ESOPHAGOGASTRODUODENOSCOPY, WITH CO2 INSUFFLATION;  Surgeon: Marli Briceno DO;  Location: [facility] OR    HC TOOTH EXTRACTION W/FORCEP      LASIK      10 years ago    TOTAL KNEE ARTHROPLASTY Right      21    TUBAL LIGATION      vulvar biopsy      LORI III      No Known Allergies   Social History     Tobacco Use    Smoking status: Never     Passive exposure: Never    Smokeless tobacco: Never   Substance Use Topics    Alcohol use: Yes     Comment: per week, 2-3 drinks 1-2 times per week      Wt Readings from Last 1 Encounters:   25 77.1 kg (170 lb)        Anesthesia Evaluation   Pt has had prior anesthetic. Type: MAC, Regional and General.        ROS/MED HX  ENT/Pulmonary:     (+)           allergic rhinitis,                             Neurologic:  - neg neurologic ROS     Cardiovascular:     (+) Dyslipidemia hypertension- -   -  - -                                      METS/Exercise  "Tolerance: >4 METS    Hematologic:     (+)      anemia,          Musculoskeletal:   (+)  arthritis,             GI/Hepatic:     (+) GERD, Asymptomatic on medication,                  Renal/Genitourinary:     (+) renal disease, type: CRI,            Endo:  - neg endo ROS     Psychiatric/Substance Use:     (+)   alcohol abuse (mild use)  Recreational drug usage: Cannabis.    Infectious Disease:  - neg infectious disease ROS     Malignancy:  - neg malignancy ROS     Other:  - neg other ROS    (+)  , H/O Chronic Pain (radicular low back pain),           Physical Exam  Airway  Mallampati: II  TM distance: >3 FB  Neck ROM: full  Mouth opening: >= 4 cm    Cardiovascular - normal exam  Rhythm: regular  Rate: normal rate     Dental   (+) Minor Abnormalities - some fillings, tiny chips      Pulmonary - normal examBreath sounds clear to auscultation        Neurological - normal exam  She appears awake, alert and oriented x3.    Other Findings       OUTSIDE LABS:  CBC:   Lab Results   Component Value Date    WBC 6.0 08/08/2024    WBC 7.5 07/14/2023    HGB 13.2 05/06/2025    HGB 12.7 08/08/2024    HCT 38.8 08/08/2024    HCT 38.6 07/14/2023     08/08/2024     07/14/2023     BMP:   Lab Results   Component Value Date     08/08/2024     07/14/2023    POTASSIUM 4.0 05/06/2025    POTASSIUM 4.2 08/08/2024    CHLORIDE 104 08/08/2024    CHLORIDE 104 07/14/2023    CO2 27 08/08/2024    CO2 25 07/14/2023    BUN 19.0 08/08/2024    BUN 18.8 07/14/2023    CR 1.06 (H) 08/08/2024    CR 0.89 07/14/2023     (H) 08/08/2024    GLC 92 07/14/2023     COAGS: No results found for: \"PTT\", \"INR\", \"FIBR\"  POC: No results found for: \"BGM\", \"HCG\", \"HCGS\"  HEPATIC:   Lab Results   Component Value Date    ALBUMIN 4.3 08/08/2024    PROTTOTAL 6.7 08/08/2024    ALT 19 08/08/2024    AST 20 08/08/2024    ALKPHOS 112 08/08/2024    BILITOTAL 0.6 08/08/2024     OTHER:   Lab Results   Component Value Date    A1C 5.4 03/26/2021    UNRULY " 9.2 08/08/2024    MAG 2.1 03/26/2021    TSH 0.50 08/02/2023       Anesthesia Plan    ASA Status:  2      NPO Status: NPO Appropriate   Anesthesia Type: General.  Airway: oral.  Induction: intravenous.  Maintenance: Balanced, TIVA.   Techniques and Equipment:       - Monitoring Plan: standard ASA monitoring     Consents    Anesthesia Plan(s) and associated risks, benefits, and realistic alternatives discussed. Questions answered and patient/representative(s) expressed understanding.     - Discussed:     - Discussed with:  Patient        - Pt is DNR/DNI Status: no DNR     Blood Consent:      - Discussed with: patient.     - Consented: consented to blood products     Postoperative Care    Pain management: plan for postoperative opioid use, peripheral nerve block, multimodal analgesia.     Comments:                   MONI Heller CRNA    I have reviewed the pertinent notes and labs in the chart from the past 30 days and (re)examined the patient.  Any updates or changes from those notes are reflected in this note.    Clinically Significant Risk Factors Present on Admission                   # Hypertension: Noted on problem list

## 2025-05-20 ENCOUNTER — HOSPITAL ENCOUNTER (OUTPATIENT)
Facility: CLINIC | Age: 70
Discharge: HOME | End: 2025-05-21
Attending: ORTHOPAEDIC SURGERY | Admitting: ORTHOPAEDIC SURGERY
Payer: COMMERCIAL

## 2025-05-20 ENCOUNTER — APPOINTMENT (OUTPATIENT)
Dept: GENERAL RADIOLOGY | Facility: CLINIC | Age: 70
End: 2025-05-20
Attending: ORTHOPAEDIC SURGERY
Payer: COMMERCIAL

## 2025-05-20 ENCOUNTER — ANESTHESIA (OUTPATIENT)
Dept: SURGERY | Facility: CLINIC | Age: 70
End: 2025-05-20
Payer: COMMERCIAL

## 2025-05-20 DIAGNOSIS — M19.012 LOCALIZED OSTEOARTHRITIS OF LEFT SHOULDER: Primary | ICD-10-CM

## 2025-05-20 LAB — GLUCOSE BLDC GLUCOMTR-MCNC: 113 MG/DL (ref 70–99)

## 2025-05-20 PROCEDURE — C1713 ANCHOR/SCREW BN/BN,TIS/BN: HCPCS | Performed by: ORTHOPAEDIC SURGERY

## 2025-05-20 PROCEDURE — 999N000141 HC STATISTIC PRE-PROCEDURE NURSING ASSESSMENT: Performed by: ORTHOPAEDIC SURGERY

## 2025-05-20 PROCEDURE — 250N000011 HC RX IP 250 OP 636: Performed by: NURSE ANESTHETIST, CERTIFIED REGISTERED

## 2025-05-20 PROCEDURE — 250N000025 HC SEVOFLURANE, PER MIN: Performed by: ORTHOPAEDIC SURGERY

## 2025-05-20 PROCEDURE — 271N000001 HC OR GENERAL SUPPLY NON-STERILE: Performed by: ORTHOPAEDIC SURGERY

## 2025-05-20 PROCEDURE — 250N000009 HC RX 250: Performed by: NURSE ANESTHETIST, CERTIFIED REGISTERED

## 2025-05-20 PROCEDURE — 250N000011 HC RX IP 250 OP 636: Performed by: ORTHOPAEDIC SURGERY

## 2025-05-20 PROCEDURE — 360N000077 HC SURGERY LEVEL 4, PER MIN: Performed by: ORTHOPAEDIC SURGERY

## 2025-05-20 PROCEDURE — 23472 RECONSTRUCT SHOULDER JOINT: CPT | Mod: LT | Performed by: ORTHOPAEDIC SURGERY

## 2025-05-20 PROCEDURE — 250N000013 HC RX MED GY IP 250 OP 250 PS 637

## 2025-05-20 PROCEDURE — 258N000003 HC RX IP 258 OP 636

## 2025-05-20 PROCEDURE — 250N000009 HC RX 250: Mod: JW

## 2025-05-20 PROCEDURE — 710N000009 HC RECOVERY PHASE 1, LEVEL 1, PER MIN: Performed by: ORTHOPAEDIC SURGERY

## 2025-05-20 PROCEDURE — 250N000013 HC RX MED GY IP 250 OP 250 PS 637: Performed by: ORTHOPAEDIC SURGERY

## 2025-05-20 PROCEDURE — 250N000009 HC RX 250: Performed by: ORTHOPAEDIC SURGERY

## 2025-05-20 PROCEDURE — 82962 GLUCOSE BLOOD TEST: CPT

## 2025-05-20 PROCEDURE — 23430 REPAIR BICEPS TENDON: CPT | Mod: 51 | Performed by: ORTHOPAEDIC SURGERY

## 2025-05-20 PROCEDURE — C1776 JOINT DEVICE (IMPLANTABLE): HCPCS | Performed by: ORTHOPAEDIC SURGERY

## 2025-05-20 PROCEDURE — 258N000003 HC RX IP 258 OP 636: Performed by: NURSE ANESTHETIST, CERTIFIED REGISTERED

## 2025-05-20 PROCEDURE — 272N000001 HC OR GENERAL SUPPLY STERILE: Performed by: ORTHOPAEDIC SURGERY

## 2025-05-20 PROCEDURE — 999N000065 XR SHOULDER LEFT PORT G/E 2 VIEWS: Mod: LT

## 2025-05-20 PROCEDURE — 370N000017 HC ANESTHESIA TECHNICAL FEE, PER MIN: Performed by: ORTHOPAEDIC SURGERY

## 2025-05-20 DEVICE — IMPLANTABLE DEVICE
Type: IMPLANTABLE DEVICE | Site: SHOULDER | Status: FUNCTIONAL
Brand: COMPREHENSIVE® REVERSE SHOULDER

## 2025-05-20 DEVICE — IMPLANTABLE DEVICE
Type: IMPLANTABLE DEVICE | Site: SHOULDER | Status: FUNCTIONAL
Brand: COMPREHENSIVE REVERSE SHOULDER

## 2025-05-20 DEVICE — IMPLANTABLE DEVICE
Type: IMPLANTABLE DEVICE | Site: SHOULDER | Status: FUNCTIONAL
Brand: COMPREHENSIVE® PROLONG®

## 2025-05-20 DEVICE — IMPLANTABLE DEVICE
Type: IMPLANTABLE DEVICE | Site: SHOULDER | Status: FUNCTIONAL
Brand: COMPREHENSIVE®

## 2025-05-20 DEVICE — IMPLANTABLE DEVICE
Type: IMPLANTABLE DEVICE | Site: SHOULDER | Status: FUNCTIONAL
Brand: COMPREHENSIVE® SHOULDER SYSTEM

## 2025-05-20 RX ORDER — HYDROMORPHONE HCL IN WATER/PF 6 MG/30 ML
0.1 PATIENT CONTROLLED ANALGESIA SYRINGE INTRAVENOUS EVERY 4 HOURS PRN
Status: DISCONTINUED | OUTPATIENT
Start: 2025-05-20 | End: 2025-05-21 | Stop reason: HOSPADM

## 2025-05-20 RX ORDER — OXYCODONE HYDROCHLORIDE 5 MG/1
5-10 TABLET ORAL EVERY 6 HOURS PRN
Qty: 16 TABLET | Refills: 0 | Status: SHIPPED | OUTPATIENT
Start: 2025-05-20

## 2025-05-20 RX ORDER — HYDROMORPHONE HCL IN WATER/PF 6 MG/30 ML
0.4 PATIENT CONTROLLED ANALGESIA SYRINGE INTRAVENOUS EVERY 5 MIN PRN
Status: DISCONTINUED | OUTPATIENT
Start: 2025-05-20 | End: 2025-05-20 | Stop reason: HOSPADM

## 2025-05-20 RX ORDER — PRAVASTATIN SODIUM 10 MG
10 TABLET ORAL DAILY
Status: DISCONTINUED | OUTPATIENT
Start: 2025-05-21 | End: 2025-05-21 | Stop reason: HOSPADM

## 2025-05-20 RX ORDER — METOPROLOL SUCCINATE 50 MG/1
50 TABLET, EXTENDED RELEASE ORAL DAILY
Status: DISCONTINUED | OUTPATIENT
Start: 2025-05-21 | End: 2025-05-21 | Stop reason: HOSPADM

## 2025-05-20 RX ORDER — SODIUM CHLORIDE, SODIUM LACTATE, POTASSIUM CHLORIDE, CALCIUM CHLORIDE 600; 310; 30; 20 MG/100ML; MG/100ML; MG/100ML; MG/100ML
INJECTION, SOLUTION INTRAVENOUS CONTINUOUS
Status: DISCONTINUED | OUTPATIENT
Start: 2025-05-20 | End: 2025-05-20 | Stop reason: HOSPADM

## 2025-05-20 RX ORDER — AMOXICILLIN 250 MG
1-2 CAPSULE ORAL 2 TIMES DAILY
Qty: 30 TABLET | Refills: 0 | Status: SHIPPED | OUTPATIENT
Start: 2025-05-20

## 2025-05-20 RX ORDER — ACETAMINOPHEN 325 MG/1
975 TABLET ORAL ONCE
Status: COMPLETED | OUTPATIENT
Start: 2025-05-20 | End: 2025-05-20

## 2025-05-20 RX ORDER — HYDROMORPHONE HCL IN WATER/PF 6 MG/30 ML
0.2 PATIENT CONTROLLED ANALGESIA SYRINGE INTRAVENOUS EVERY 5 MIN PRN
Status: DISCONTINUED | OUTPATIENT
Start: 2025-05-20 | End: 2025-05-20 | Stop reason: HOSPADM

## 2025-05-20 RX ORDER — ONDANSETRON 2 MG/ML
4 INJECTION INTRAMUSCULAR; INTRAVENOUS EVERY 30 MIN PRN
Status: DISCONTINUED | OUTPATIENT
Start: 2025-05-20 | End: 2025-05-20 | Stop reason: HOSPADM

## 2025-05-20 RX ORDER — CEFAZOLIN SODIUM 1 G/3ML
1 INJECTION, POWDER, FOR SOLUTION INTRAMUSCULAR; INTRAVENOUS EVERY 8 HOURS
Status: COMPLETED | OUTPATIENT
Start: 2025-05-20 | End: 2025-05-21

## 2025-05-20 RX ORDER — BUPIVACAINE HYDROCHLORIDE 5 MG/ML
INJECTION, SOLUTION EPIDURAL; INTRACAUDAL; PERINEURAL PRN
Status: DISCONTINUED | OUTPATIENT
Start: 2025-05-20 | End: 2025-05-20

## 2025-05-20 RX ORDER — ONDANSETRON 2 MG/ML
4 INJECTION INTRAMUSCULAR; INTRAVENOUS EVERY 6 HOURS PRN
Status: DISCONTINUED | OUTPATIENT
Start: 2025-05-20 | End: 2025-05-21 | Stop reason: HOSPADM

## 2025-05-20 RX ORDER — NALOXONE HYDROCHLORIDE 0.4 MG/ML
0.1 INJECTION, SOLUTION INTRAMUSCULAR; INTRAVENOUS; SUBCUTANEOUS
Status: DISCONTINUED | OUTPATIENT
Start: 2025-05-20 | End: 2025-05-20 | Stop reason: HOSPADM

## 2025-05-20 RX ORDER — FENTANYL CITRATE 50 UG/ML
INJECTION, SOLUTION INTRAMUSCULAR; INTRAVENOUS PRN
Status: DISCONTINUED | OUTPATIENT
Start: 2025-05-20 | End: 2025-05-20

## 2025-05-20 RX ORDER — AMLODIPINE BESYLATE 2.5 MG/1
2.5 TABLET ORAL DAILY
Status: DISCONTINUED | OUTPATIENT
Start: 2025-05-21 | End: 2025-05-21 | Stop reason: HOSPADM

## 2025-05-20 RX ORDER — GABAPENTIN 300 MG/1
300 CAPSULE ORAL 3 TIMES DAILY
Status: DISCONTINUED | OUTPATIENT
Start: 2025-05-20 | End: 2025-05-21 | Stop reason: HOSPADM

## 2025-05-20 RX ORDER — TRANEXAMIC ACID 650 MG/1
1950 TABLET ORAL ONCE
Status: COMPLETED | OUTPATIENT
Start: 2025-05-20 | End: 2025-05-20

## 2025-05-20 RX ORDER — DEXAMETHASONE SODIUM PHOSPHATE 4 MG/ML
INJECTION, SOLUTION INTRA-ARTICULAR; INTRALESIONAL; INTRAMUSCULAR; INTRAVENOUS; SOFT TISSUE PRN
Status: DISCONTINUED | OUTPATIENT
Start: 2025-05-20 | End: 2025-05-20

## 2025-05-20 RX ORDER — ONDANSETRON 2 MG/ML
INJECTION INTRAMUSCULAR; INTRAVENOUS PRN
Status: DISCONTINUED | OUTPATIENT
Start: 2025-05-20 | End: 2025-05-20

## 2025-05-20 RX ORDER — GLYCOPYRROLATE 0.2 MG/ML
INJECTION, SOLUTION INTRAMUSCULAR; INTRAVENOUS PRN
Status: DISCONTINUED | OUTPATIENT
Start: 2025-05-20 | End: 2025-05-20

## 2025-05-20 RX ORDER — VANCOMYCIN HYDROCHLORIDE 1 G/20ML
INJECTION, POWDER, LYOPHILIZED, FOR SOLUTION INTRAVENOUS PRN
Status: DISCONTINUED | OUTPATIENT
Start: 2025-05-20 | End: 2025-05-20 | Stop reason: HOSPADM

## 2025-05-20 RX ORDER — SODIUM CHLORIDE, SODIUM LACTATE, POTASSIUM CHLORIDE, CALCIUM CHLORIDE 600; 310; 30; 20 MG/100ML; MG/100ML; MG/100ML; MG/100ML
INJECTION, SOLUTION INTRAVENOUS CONTINUOUS
Status: DISCONTINUED | OUTPATIENT
Start: 2025-05-20 | End: 2025-05-21 | Stop reason: HOSPADM

## 2025-05-20 RX ORDER — FENTANYL CITRATE-0.9 % NACL/PF 10 MCG/ML
PLASTIC BAG, INJECTION (ML) INTRAVENOUS CONTINUOUS PRN
Status: DISCONTINUED | OUTPATIENT
Start: 2025-05-20 | End: 2025-05-20

## 2025-05-20 RX ORDER — NALOXONE HYDROCHLORIDE 0.4 MG/ML
0.2 INJECTION, SOLUTION INTRAMUSCULAR; INTRAVENOUS; SUBCUTANEOUS
Status: DISCONTINUED | OUTPATIENT
Start: 2025-05-20 | End: 2025-05-21 | Stop reason: HOSPADM

## 2025-05-20 RX ORDER — KETAMINE HYDROCHLORIDE 10 MG/ML
INJECTION INTRAMUSCULAR; INTRAVENOUS PRN
Status: DISCONTINUED | OUTPATIENT
Start: 2025-05-20 | End: 2025-05-20

## 2025-05-20 RX ORDER — SENNOSIDES 8.6 MG
325 CAPSULE ORAL DAILY
Qty: 30 TABLET | Refills: 0 | Status: SHIPPED | OUTPATIENT
Start: 2025-05-20

## 2025-05-20 RX ORDER — OXYCODONE HYDROCHLORIDE 5 MG/1
10 TABLET ORAL EVERY 4 HOURS PRN
Status: DISCONTINUED | OUTPATIENT
Start: 2025-05-20 | End: 2025-05-21 | Stop reason: HOSPADM

## 2025-05-20 RX ORDER — CEFAZOLIN SODIUM/WATER 2 G/20 ML
2 SYRINGE (ML) INTRAVENOUS
Status: COMPLETED | OUTPATIENT
Start: 2025-05-20 | End: 2025-05-20

## 2025-05-20 RX ORDER — LIDOCAINE 40 MG/G
CREAM TOPICAL
Status: DISCONTINUED | OUTPATIENT
Start: 2025-05-20 | End: 2025-05-20 | Stop reason: HOSPADM

## 2025-05-20 RX ORDER — OMEPRAZOLE 20 MG/1
20 CAPSULE, DELAYED RELEASE ORAL DAILY
Status: DISCONTINUED | OUTPATIENT
Start: 2025-05-21 | End: 2025-05-20

## 2025-05-20 RX ORDER — AMOXICILLIN 250 MG
1 CAPSULE ORAL 2 TIMES DAILY
Status: DISCONTINUED | OUTPATIENT
Start: 2025-05-20 | End: 2025-05-21 | Stop reason: HOSPADM

## 2025-05-20 RX ORDER — PROPOFOL 10 MG/ML
INJECTION, EMULSION INTRAVENOUS CONTINUOUS PRN
Status: DISCONTINUED | OUTPATIENT
Start: 2025-05-20 | End: 2025-05-20

## 2025-05-20 RX ORDER — LIDOCAINE 40 MG/G
CREAM TOPICAL
Status: DISCONTINUED | OUTPATIENT
Start: 2025-05-20 | End: 2025-05-21 | Stop reason: HOSPADM

## 2025-05-20 RX ORDER — ONDANSETRON 4 MG/1
4 TABLET, ORALLY DISINTEGRATING ORAL EVERY 6 HOURS PRN
Status: DISCONTINUED | OUTPATIENT
Start: 2025-05-20 | End: 2025-05-21 | Stop reason: HOSPADM

## 2025-05-20 RX ORDER — OXYCODONE HYDROCHLORIDE 5 MG/1
5 TABLET ORAL EVERY 4 HOURS PRN
Status: DISCONTINUED | OUTPATIENT
Start: 2025-05-20 | End: 2025-05-21 | Stop reason: HOSPADM

## 2025-05-20 RX ORDER — ONDANSETRON 4 MG/1
4 TABLET, ORALLY DISINTEGRATING ORAL EVERY 30 MIN PRN
Status: DISCONTINUED | OUTPATIENT
Start: 2025-05-20 | End: 2025-05-20 | Stop reason: HOSPADM

## 2025-05-20 RX ORDER — CEFAZOLIN SODIUM/WATER 2 G/20 ML
2 SYRINGE (ML) INTRAVENOUS SEE ADMIN INSTRUCTIONS
Status: DISCONTINUED | OUTPATIENT
Start: 2025-05-20 | End: 2025-05-20 | Stop reason: HOSPADM

## 2025-05-20 RX ORDER — PROCHLORPERAZINE MALEATE 5 MG/1
5 TABLET ORAL EVERY 6 HOURS PRN
Status: DISCONTINUED | OUTPATIENT
Start: 2025-05-20 | End: 2025-05-21 | Stop reason: HOSPADM

## 2025-05-20 RX ORDER — LOSARTAN POTASSIUM 50 MG/1
50 TABLET ORAL DAILY
Status: DISCONTINUED | OUTPATIENT
Start: 2025-05-21 | End: 2025-05-21 | Stop reason: HOSPADM

## 2025-05-20 RX ORDER — SENNOSIDES 8.6 MG
325 CAPSULE ORAL AT BEDTIME
Status: DISCONTINUED | OUTPATIENT
Start: 2025-05-20 | End: 2025-05-21 | Stop reason: HOSPADM

## 2025-05-20 RX ORDER — POLYETHYLENE GLYCOL 3350 17 G/17G
17 POWDER, FOR SOLUTION ORAL DAILY
Status: DISCONTINUED | OUTPATIENT
Start: 2025-05-21 | End: 2025-05-21 | Stop reason: HOSPADM

## 2025-05-20 RX ORDER — PROPOFOL 10 MG/ML
INJECTION, EMULSION INTRAVENOUS PRN
Status: DISCONTINUED | OUTPATIENT
Start: 2025-05-20 | End: 2025-05-20

## 2025-05-20 RX ORDER — NALOXONE HYDROCHLORIDE 0.4 MG/ML
0.4 INJECTION, SOLUTION INTRAMUSCULAR; INTRAVENOUS; SUBCUTANEOUS
Status: DISCONTINUED | OUTPATIENT
Start: 2025-05-20 | End: 2025-05-21 | Stop reason: HOSPADM

## 2025-05-20 RX ORDER — HYDROXYZINE HYDROCHLORIDE 10 MG/1
10 TABLET, FILM COATED ORAL EVERY 6 HOURS PRN
Qty: 30 TABLET | Refills: 0 | Status: SHIPPED | OUTPATIENT
Start: 2025-05-20

## 2025-05-20 RX ORDER — DIPHENHYDRAMINE HCL 12.5 MG/5ML
12.5 SOLUTION ORAL EVERY 6 HOURS PRN
Status: DISCONTINUED | OUTPATIENT
Start: 2025-05-20 | End: 2025-05-21 | Stop reason: HOSPADM

## 2025-05-20 RX ORDER — PANTOPRAZOLE SODIUM 40 MG/1
40 TABLET, DELAYED RELEASE ORAL DAILY
Status: DISCONTINUED | OUTPATIENT
Start: 2025-05-21 | End: 2025-05-21 | Stop reason: HOSPADM

## 2025-05-20 RX ORDER — GABAPENTIN 300 MG/1
300 CAPSULE ORAL ONCE
Status: DISCONTINUED | OUTPATIENT
Start: 2025-05-20 | End: 2025-05-20 | Stop reason: HOSPADM

## 2025-05-20 RX ORDER — ACETAMINOPHEN 325 MG/1
650 TABLET ORAL EVERY 4 HOURS PRN
Qty: 100 TABLET | Refills: 0 | Status: SHIPPED | OUTPATIENT
Start: 2025-05-20

## 2025-05-20 RX ORDER — HYDROXYZINE HYDROCHLORIDE 10 MG/1
10 TABLET, FILM COATED ORAL EVERY 6 HOURS PRN
Status: DISCONTINUED | OUTPATIENT
Start: 2025-05-20 | End: 2025-05-21 | Stop reason: HOSPADM

## 2025-05-20 RX ORDER — HYDROMORPHONE HCL IN WATER/PF 6 MG/30 ML
0.2 PATIENT CONTROLLED ANALGESIA SYRINGE INTRAVENOUS EVERY 4 HOURS PRN
Status: DISCONTINUED | OUTPATIENT
Start: 2025-05-20 | End: 2025-05-21 | Stop reason: HOSPADM

## 2025-05-20 RX ORDER — LIDOCAINE HYDROCHLORIDE 20 MG/ML
INJECTION, SOLUTION INFILTRATION; PERINEURAL PRN
Status: DISCONTINUED | OUTPATIENT
Start: 2025-05-20 | End: 2025-05-20

## 2025-05-20 RX ORDER — METHOCARBAMOL 500 MG/1
250 TABLET ORAL EVERY 6 HOURS PRN
Status: DISCONTINUED | OUTPATIENT
Start: 2025-05-20 | End: 2025-05-21 | Stop reason: HOSPADM

## 2025-05-20 RX ORDER — BISACODYL 10 MG
10 SUPPOSITORY, RECTAL RECTAL DAILY PRN
Status: DISCONTINUED | OUTPATIENT
Start: 2025-05-23 | End: 2025-05-21 | Stop reason: HOSPADM

## 2025-05-20 RX ORDER — ACETAMINOPHEN 325 MG/1
975 TABLET ORAL EVERY 8 HOURS
Status: DISCONTINUED | OUTPATIENT
Start: 2025-05-20 | End: 2025-05-21 | Stop reason: HOSPADM

## 2025-05-20 RX ADMIN — PHENYLEPHRINE HYDROCHLORIDE 100 MCG: 10 INJECTION INTRAVENOUS at 10:02

## 2025-05-20 RX ADMIN — SENNOSIDES AND DOCUSATE SODIUM 1 TABLET: 50; 8.6 TABLET ORAL at 11:49

## 2025-05-20 RX ADMIN — PHENYLEPHRINE HYDROCHLORIDE 100 MCG: 10 INJECTION INTRAVENOUS at 09:17

## 2025-05-20 RX ADMIN — Medication 80 MG: at 07:42

## 2025-05-20 RX ADMIN — PHENYLEPHRINE HYDROCHLORIDE 100 MCG: 10 INJECTION INTRAVENOUS at 10:16

## 2025-05-20 RX ADMIN — CEFAZOLIN 1 G: 1 INJECTION, POWDER, FOR SOLUTION INTRAMUSCULAR; INTRAVENOUS at 18:08

## 2025-05-20 RX ADMIN — ACETAMINOPHEN 975 MG: 325 TABLET ORAL at 21:32

## 2025-05-20 RX ADMIN — MIDAZOLAM 2 MG: 1 INJECTION INTRAMUSCULAR; INTRAVENOUS at 07:14

## 2025-05-20 RX ADMIN — ASPIRIN 325 MG: 325 TABLET ORAL at 21:32

## 2025-05-20 RX ADMIN — BUPIVACAINE HYDROCHLORIDE 2 ML: 5 INJECTION, SOLUTION EPIDURAL; INTRACAUDAL; PERINEURAL at 07:27

## 2025-05-20 RX ADMIN — SODIUM CHLORIDE, POTASSIUM CHLORIDE, SODIUM LACTATE AND CALCIUM CHLORIDE: 600; 310; 30; 20 INJECTION, SOLUTION INTRAVENOUS at 06:35

## 2025-05-20 RX ADMIN — ACETAMINOPHEN 975 MG: 325 TABLET ORAL at 14:04

## 2025-05-20 RX ADMIN — SODIUM CHLORIDE, POTASSIUM CHLORIDE, SODIUM LACTATE AND CALCIUM CHLORIDE: 600; 310; 30; 20 INJECTION, SOLUTION INTRAVENOUS at 08:31

## 2025-05-20 RX ADMIN — GABAPENTIN 300 MG: 300 CAPSULE ORAL at 19:58

## 2025-05-20 RX ADMIN — FENTANYL CITRATE 50 MCG: 50 INJECTION INTRAMUSCULAR; INTRAVENOUS at 07:14

## 2025-05-20 RX ADMIN — KETAMINE HYDROCHLORIDE 10 MG: 10 INJECTION INTRAMUSCULAR; INTRAVENOUS at 08:08

## 2025-05-20 RX ADMIN — GLYCOPYRROLATE 0.1 MG: 0.2 INJECTION, SOLUTION INTRAMUSCULAR; INTRAVENOUS at 09:31

## 2025-05-20 RX ADMIN — ACETAMINOPHEN 975 MG: 325 TABLET, FILM COATED ORAL at 06:28

## 2025-05-20 RX ADMIN — LIDOCAINE HYDROCHLORIDE 0.2 ML: 10 INJECTION, SOLUTION EPIDURAL; INFILTRATION; INTRACAUDAL; PERINEURAL at 06:36

## 2025-05-20 RX ADMIN — BUPIVACAINE HYDROCHLORIDE 5 ML: 5 INJECTION, SOLUTION EPIDURAL; INTRACAUDAL; PERINEURAL at 07:19

## 2025-05-20 RX ADMIN — PHENYLEPHRINE HYDROCHLORIDE 100 MCG: 10 INJECTION INTRAVENOUS at 08:56

## 2025-05-20 RX ADMIN — TRANEXAMIC ACID 1950 MG: 650 TABLET ORAL at 06:28

## 2025-05-20 RX ADMIN — PROPOFOL 180 MG: 10 INJECTION, EMULSION INTRAVENOUS at 07:42

## 2025-05-20 RX ADMIN — ONDANSETRON 4 MG: 2 INJECTION INTRAMUSCULAR; INTRAVENOUS at 08:45

## 2025-05-20 RX ADMIN — GLYCOPYRROLATE 0.1 MG: 0.2 INJECTION, SOLUTION INTRAMUSCULAR; INTRAVENOUS at 09:18

## 2025-05-20 RX ADMIN — GABAPENTIN 300 MG: 300 CAPSULE ORAL at 14:05

## 2025-05-20 RX ADMIN — GLYCOPYRROLATE 0.2 MG: 0.2 INJECTION, SOLUTION INTRAMUSCULAR; INTRAVENOUS at 07:53

## 2025-05-20 RX ADMIN — PHENYLEPHRINE HYDROCHLORIDE 100 MCG: 10 INJECTION INTRAVENOUS at 09:58

## 2025-05-20 RX ADMIN — OXYCODONE 5 MG: 5 TABLET ORAL at 14:04

## 2025-05-20 RX ADMIN — PROPOFOL 100 MCG/KG/MIN: 10 INJECTION, EMULSION INTRAVENOUS at 07:46

## 2025-05-20 RX ADMIN — OXYCODONE 5 MG: 5 TABLET ORAL at 18:14

## 2025-05-20 RX ADMIN — KETAMINE HYDROCHLORIDE 20 MG: 10 INJECTION INTRAMUSCULAR; INTRAVENOUS at 07:57

## 2025-05-20 RX ADMIN — PHENYLEPHRINE HYDROCHLORIDE 100 MCG: 10 INJECTION INTRAVENOUS at 07:55

## 2025-05-20 RX ADMIN — FENTANYL CITRATE 50 MCG: 50 INJECTION INTRAMUSCULAR; INTRAVENOUS at 07:17

## 2025-05-20 RX ADMIN — PHENYLEPHRINE HYDROCHLORIDE 100 MCG: 10 INJECTION INTRAVENOUS at 09:14

## 2025-05-20 RX ADMIN — BUPIVACAINE HYDROCHLORIDE 3 ML: 5 INJECTION, SOLUTION EPIDURAL; INTRACAUDAL; PERINEURAL at 07:25

## 2025-05-20 RX ADMIN — LIDOCAINE HYDROCHLORIDE 80 MG: 20 INJECTION, SOLUTION INFILTRATION; PERINEURAL at 07:42

## 2025-05-20 RX ADMIN — Medication 30 MCG/MIN: at 07:54

## 2025-05-20 RX ADMIN — OXYCODONE 5 MG: 5 TABLET ORAL at 21:32

## 2025-05-20 RX ADMIN — BUPIVACAINE 10 ML: 13.3 INJECTION, SUSPENSION, LIPOSOMAL INFILTRATION at 07:21

## 2025-05-20 RX ADMIN — PROPOFOL 20 MG: 10 INJECTION, EMULSION INTRAVENOUS at 07:44

## 2025-05-20 RX ADMIN — DEXAMETHASONE SODIUM PHOSPHATE 4 MG: 4 INJECTION, SOLUTION INTRA-ARTICULAR; INTRALESIONAL; INTRAMUSCULAR; INTRAVENOUS; SOFT TISSUE at 08:44

## 2025-05-20 RX ADMIN — Medication 2 G: at 10:11

## 2025-05-20 RX ADMIN — PHENYLEPHRINE HYDROCHLORIDE 100 MCG: 10 INJECTION INTRAVENOUS at 10:19

## 2025-05-20 RX ADMIN — Medication 2 G: at 07:33

## 2025-05-20 RX ADMIN — SENNOSIDES AND DOCUSATE SODIUM 1 TABLET: 50; 8.6 TABLET ORAL at 19:58

## 2025-05-20 ASSESSMENT — ACTIVITIES OF DAILY LIVING (ADL)
ADLS_ACUITY_SCORE: 15
ADLS_ACUITY_SCORE: 22
ADLS_ACUITY_SCORE: 15
ADLS_ACUITY_SCORE: 22
ADLS_ACUITY_SCORE: 15
ADLS_ACUITY_SCORE: 22
ADLS_ACUITY_SCORE: 22
ADLS_ACUITY_SCORE: 15
ADLS_ACUITY_SCORE: 22
ADLS_ACUITY_SCORE: 15
ADLS_ACUITY_SCORE: 15
ADLS_ACUITY_SCORE: 22
ADLS_ACUITY_SCORE: 22

## 2025-05-20 ASSESSMENT — COLUMBIA-SUICIDE SEVERITY RATING SCALE - C-SSRS
2. HAVE YOU ACTUALLY HAD ANY THOUGHTS OF KILLING YOURSELF IN THE PAST MONTH?: NO
1. IN THE PAST MONTH, HAVE YOU WISHED YOU WERE DEAD OR WISHED YOU COULD GO TO SLEEP AND NOT WAKE UP?: NO
6. HAVE YOU EVER DONE ANYTHING, STARTED TO DO ANYTHING, OR PREPARED TO DO ANYTHING TO END YOUR LIFE?: NO

## 2025-05-20 NOTE — ANESTHESIA POSTPROCEDURE EVALUATION
Patient: Lola Partida    Procedure: Procedure(s):  ARTHROPLASTY, REVERSE total shoulder, CT guided       Anesthesia Type:  General    Note:  Disposition: Inpatient   Postop Pain Control: Uneventful            Sign Out: Well controlled pain   PONV: No   Neuro/Psych: Uneventful            Sign Out: Acceptable/Baseline neuro status   Airway/Respiratory: Uneventful            Sign Out: Acceptable/Baseline resp. status   CV/Hemodynamics: Uneventful            Sign Out: Acceptable CV status; No obvious hypovolemia; No obvious fluid overload   Other NRE: NONE   DID A NON-ROUTINE EVENT OCCUR? No           Last vitals:  Vitals Value Taken Time   /73 05/20/25 11:20   Temp 36.8  C (98.2  F) 05/20/25 11:10   Pulse 71 05/20/25 11:22   Resp 18 05/20/25 11:22   SpO2 92 % 05/20/25 11:22   Vitals shown include unfiled device data.    Electronically Signed By: MONI Irvin CRNA  May 20, 2025  12:02 PM

## 2025-05-20 NOTE — PLAN OF CARE
"  Problem: Delirium  Goal: Optimal Coping  Outcome: Progressing  Intervention: Optimize Psychosocial Adjustment to Delirium  Recent Flowsheet Documentation  Taken 5/20/2025 1300 by Terri Smith RN  Supportive Measures: active listening utilized  Family/Support System Care:   involvement promoted   presence promoted  Goal: Improved Behavioral Control  Outcome: Progressing  Intervention: Minimize Safety Risk  Recent Flowsheet Documentation  Taken 5/20/2025 1300 by Terri Smith RN  Enhanced Safety Measures:   pain management   Pre/Post Op education on fall prevention  Trust Relationship/Rapport:   care explained   questions encouraged   questions answered  Goal: Improved Attention and Thought Clarity  Outcome: Progressing  Goal: Improved Sleep  Outcome: Progressing     Problem: Adult Inpatient Plan of Care  Goal: Plan of Care Review  Description: The Plan of Care Review/Shift note should be completed every shift.  The Outcome Evaluation is a brief statement about your assessment that the patient is improving, declining, or no change.  This information will be displayed automatically on your shift  note.  Outcome: Progressing  Goal: Patient-Specific Goal (Individualized)  Description: You can add care plan individualizations to a care plan. Examples of Individualization might be:  \"Parent requests to be called daily at 9am for status\", \"I have a hard time hearing out of my right ear\", or \"Do not touch me to wake me up as it startles  me\".  Outcome: Progressing  Goal: Absence of Hospital-Acquired Illness or Injury  Outcome: Progressing  Intervention: Identify and Manage Fall Risk  Recent Flowsheet Documentation  Taken 5/20/2025 1300 by Terri Smith RN  Safety Promotion/Fall Prevention:   activity supervised   assistive device/personal items within reach   clutter free environment maintained   nonskid shoes/slippers when out of bed  Intervention: Prevent Skin Injury  Recent Flowsheet Documentation  Taken " 5/20/2025 1300 by Terri Smith RN  Body Position: supine, head elevated  Intervention: Prevent and Manage VTE (Venous Thromboembolism) Risk  Recent Flowsheet Documentation  Taken 5/20/2025 1300 by Terri Smith RN  VTE Prevention/Management: SCDs on (sequential compression devices)  Intervention: Prevent Infection  Recent Flowsheet Documentation  Taken 5/20/2025 1300 by Terri Smith RN  Infection Prevention:   hand hygiene promoted   rest/sleep promoted  Goal: Optimal Comfort and Wellbeing  Outcome: Progressing  Intervention: Provide Person-Centered Care  Recent Flowsheet Documentation  Taken 5/20/2025 1300 by Terri Smith RN  Trust Relationship/Rapport:   care explained   questions encouraged   questions answered  Goal: Readiness for Transition of Care  Outcome: Progressing     Problem: Shoulder Arthroplasty (Total, Johnnie, Reverse)  Goal: Optimal Coping  Outcome: Progressing  Intervention: Support Psychosocial Response to Surgery and Mobility Changes  Recent Flowsheet Documentation  Taken 5/20/2025 1300 by Terri Smiht RN  Supportive Measures: active listening utilized  Goal: Absence of Bleeding  Outcome: Progressing  Goal: Effective Bowel Elimination  Outcome: Progressing  Goal: Fluid and Electrolyte Balance  Outcome: Progressing  Goal: Optimal Functional Ability  Outcome: Progressing  Intervention: Promote Optimal Functional Status  Recent Flowsheet Documentation  Taken 5/20/2025 1300 by Terri Smith RN  Activity Management:   ambulated in room   ambulated to bathroom  Goal: Absence of Infection Signs and Symptoms  Outcome: Progressing  Goal: Intact Neurovascular Status  Outcome: Progressing  Goal: Anesthesia/Sedation Recovery  Outcome: Progressing  Intervention: Optimize Anesthesia Recovery  Recent Flowsheet Documentation  Taken 5/20/2025 1300 by Terri Smith RN  Safety Promotion/Fall Prevention:   activity supervised   assistive device/personal items within reach   clutter free environment  maintained   nonskid shoes/slippers when out of bed  Administration (IS):   instruction provided, initial   proper technique demonstrated  Level Incentive Spirometer (mL): 1500  Incentive Spirometer Predicted Level (mL): 2000  Number of Repetitions (IS): 3  Patient Tolerance (IS): good  Goal: Optimal Pain Control and Function  Outcome: Progressing  Intervention: Prevent or Manage Pain  Recent Flowsheet Documentation  Taken 5/20/2025 1300 by Terri Smith RN  Diversional Activities: television  Goal: Nausea and Vomiting Relief  Outcome: Progressing  Goal: Effective Urinary Elimination  Outcome: Progressing   Goal Outcome Evaluation:    Patient vital signs are at baseline: Yes  Patient able to ambulate as they were prior to admission or with assist devices provided by therapies during their stay:  Yes  Patient MUST void prior to discharge:  Yes  Patient able to tolerate oral intake:  Yes  Pain has adequate pain control using Oral analgesics:  Yes  Does patient have an identified :  Yes  Has goal D/C date and time been discussed with patient:  Yes

## 2025-05-20 NOTE — INTERVAL H&P NOTE
"I have reviewed the surgical (or preoperative) H&P that is linked to this encounter, and examined the patient. There are no significant changes    Clinical Conditions Present on Arrival:  Clinically Significant Risk Factors Present on Admission                       # Overweight: Estimated body mass index is 26.63 kg/m  as calculated from the following:    Height as of this encounter: 1.702 m (5' 7\").    Weight as of this encounter: 77.1 kg (170 lb).     The History and Physical on patient's chart was personally reviewed today with the patient. there have been no interval changes in patient's history since H+P performed.    History:  Lola Partida is a 70 year old female, left  -hand dominant, with left shoulder pain that started  many years ago. Did a lot of repetitive work 30 years ago that started it all. Worse the past couple of years. She locates pain throughout the shoulder, aggravated with movements, but also pain at rest, night.     Xrays with advanced gleno-humeral osteoarthritis. Seen by Dr. Daria Oconnor, sent to us for discussion of shoulder replacement.     Most recent injections, bilateral, 2/4/2023 with Dr Cristino linder.    X-RAY INTERPRETATION: multiple views left  shoulder (no axillary) previously obtained 2/25/2025 --- Advanced degenerative arthrosis of the glenohumeral joint with bone-on-bone articulation and prominent marginal osteophytes. Mild degenerative arthrosis of the acromioclavicular joint. No acute fracture.            ASSESSMENT: Lola Partida is a 70 year old female, right  -hand dominant with chronic left shoulder pain, advanced gleno-humeral osteoarthritis.        PLAN:   * reviewed xrays with patient. Exam and xrays consistent with chronic, long-standing arthritis. There has been loss of shoulder joint space from wearing of the cartilage, with the growth of bone spurs.  * this is most likely reason for shoulder pain and decreased range of motion  * treatment options depend on how " symptomatic as well has how aggressive patient wants to be. If not overly symptomatic, we can try a cortisone injection as well as Physical Therapy, pain control with tylenol and NSAIDS.  * surgical options would be a  total shoulder arthroplasty, ( versus reverse total shoulder arthroplasty depending on rotator cuff integrity)  * risks and perceived benefits of nonsurgical and surgical options of each discussed.  * the primary goal of surgery is pain relief, with return or improvement of some function secondary as a bonus.      * risks of surgery include, but not limited to, bleeding, infection, pain, scar, damage to adjacent structures (such as nerves, vessels), temporary versus permanent nerve injury, failure to relieve or improve symptoms or function, recurrence of symptoms, stiffness, implant dislocation, implant failure, implant infection, need for further surgery, blood clots, risks of anesthesia and death.     * at this time, she'd like to proceed with surgery.     Patient elects to proceed with planned procedure. Left shoulder primary total shoulder arthroplasty versus reverse total shoulder arthroplasty.    Risks and perceived benefits of surgery again discussed with patient. Patient's questions addressed and answered. Written informed consent obtained and reviewed. Surgical site marked with indelible marker with patient's participation after confirming site with patient.      Luther Renae M.D., M.S.  Dept. of Orthopaedic Surgery  [facility]

## 2025-05-20 NOTE — ANESTHESIA CARE TRANSFER NOTE
Patient: Lola Partida    Procedure: Procedure(s):  ARTHROPLASTY, REVERSE total shoulder, CT guided       Diagnosis: Primary osteoarthritis, left shoulder [M19.012]  Diagnosis Additional Information: No value filed.    Anesthesia Type:   General     Note:    Oropharynx: oropharynx clear of all foreign objects  Level of Consciousness: awake  Oxygen Supplementation: room air    Independent Airway: airway patency satisfactory and stable  Dentition: dentition unchanged  Vital Signs Stable: post-procedure vital signs reviewed and stable  Report to RN Given: handoff report given  Patient transferred to: PACU    Handoff Report: Identifed the Patient, Identified the Reponsible Provider, Reviewed the pertinent medical history, Discussed the surgical course, Reviewed Intra-OP anesthesia mangement and issues during anesthesia, Set expectations for post-procedure period and Allowed opportunity for questions and acknowledgement of understanding      Vitals:  Vitals Value Taken Time   /73 05/20/25 11:20   Temp 36.8  C (98.2  F) 05/20/25 11:10   Pulse 71 05/20/25 11:22   Resp 18 05/20/25 11:22   SpO2 92 % 05/20/25 11:22   Vitals shown include unfiled device data.    Electronically Signed By: MONI Irvin CRNA  May 20, 2025  12:01 PM

## 2025-05-20 NOTE — OR NURSING
Tereso ms rn takes report Suze ribera rn ms called report and  aware of transfer to [id number] with belongings. Message left with .

## 2025-05-20 NOTE — ANESTHESIA PROCEDURE NOTES
Brachial plexus Procedure Note    Pre-Procedure   Staff -        CRNA: Alek Jurado APRN CRNA       Other Anesthesia Staff: Karly Rome       Performed By: CRNA and DESHAUN       Location: pre-op       Procedure Start/Stop Times: 5/20/2025 7:14 AM and 5/20/2025 7:31 AM       Pre-Anesthestic Checklist: patient identified, IV checked, site marked, risks and benefits discussed, informed consent, monitors and equipment checked, pre-op evaluation, at physician/surgeon's request and post-op pain management  Timeout:       Correct Patient: Yes        Correct Procedure: Yes        Correct Site: Yes        Correct Position: Yes        Correct Laterality: Yes        Site Marked: Yes  Procedure Documentation  Procedure: Brachial plexus         Laterality: left       Patient Position: supine       Patient Prep/Sterile Barriers: sterile gloves, mask, patient draped       Skin prep: Chloraprep (interscalene approach).       Needle Type: insulated       Needle Gauge: 21.        Needle Length (millimeters): 100                 Ultrasound guided       1. Ultrasound was used to identify targeted nerve, plexus, vascular marker, or fascial plane and place a needle adjacent to it in real-time.       2. Ultrasound was used to visualize the spread of anesthetic in close proximity to the above referenced structure.       3. A permanent image is entered into the patient's record.       4. The visualized anatomic structures appeared normal.       5. There were no apparent abnormal pathologic findings.    Assessment/Narrative         The placement was negative for: blood aspirated, painful injection and site bleeding       Paresthesias: No.       Bolus given via needle..        Secured via.        Insertion/Infusion Method: Single Shot       Complications: none    Medication(s) Administered   Medication Administration Time: 5/20/2025 7:14 AM      FOR [facility] ([facility]) ONLY:   Pain Team Contact information: please page the Pain  "Team [facility]. Search \"Pain\". During daytime hours, please page the attending first. At night please page the resident first.      "

## 2025-05-20 NOTE — OP NOTE
"DATE OF PROCEDURE:  5/20/2025    ATTENDING SURGEON:  Luther Renae MD.    ASSISTANT: kostas cano     PREOPERATIVE DIAGNOSIS:  left shoulder gleno-humeral osteoarthritis.    POSTOPERATIVE DIAGNOSIS:   left shoulder gleno-humeral osteoarthritis, small supraspinatus tear and partial subscapularis tear    PROCEDURE:    1.  left shoulder reverse total shoulder arthroplasty.  2.  left shoulder open proximal biceps tenodesis.    ANESTHESIA:  General in the beach chair position with preoperative block.    IV FLUIDS:  1400 mL of lactate Ringers.    URINE OUTPUT:  Zero.  No White.    EBL:  200 mL.    ANTIBIOTICS:  Cefazolin 2 g IV prior to incision and tourniquet inflation and repeat 2 g at wound closure.    COMPLICATIONS:  None apparent.    IMPLANTS:  Biomet comprehensive reverse total shoulder arthroplasty system with:  12mm mini Humeral stem,   40mm humeral tray +3 lateral offset with 36mm std poly bearing,  Mini glenoid baseplate with medium augment (posterosuperior)  36mm glenosphere with \"B\" offset    DRAINS:  Hemovac drain from the subdeltoid bursa exiting through the inferolateral arm.    SPECIMENS:  None.    FINDINGS:  Advanced glenohumeral arthrosis with eburnated humeral head with prominent marginal osteophytes.  Degeneration of the glenoid labrum.  Small tear anterior supraspinatus and superior subscapularis. Posterior-superior glenoid wear.  Limited external rotation to approximately 30 degrees and forward flexion to about 90 degrees.      INDICATION OF THE PROCEDURE:  The patient is a pleasant  70 year old female, left  -hand dominant, with left shoulder pain that started  many years ago. Did a lot of repetitive work 30 years ago that started it all. Worse the past couple of years. She locates pain throughout the shoulder, aggravated with movements, but also pain at rest, night. .  The patient has had x-rays that had shown bone-on-bone, glenohumeral osteoarthritis, The patient has done physical therapy as " well as intraarticular inductions and medications without sustained relief.  This is affecting the quality of life.  We have discussed treatment options in the past with continued nonsurgical versus surgical management.  Given that the nonsurgical options are no longer providing them the relief, the patient elected for a surgery. Risks to proceed and benefits of the continued nonsurgical versus surgery were discussed in detail.  Risks of the surgery included but not limited to bleeding, infection, pain, scar, damage to the adjacent structures including nerves and vessels, temporary versus permanent nerve injury, failure to relieve symptoms or improve symptoms or function, recurrence of symptoms, loss of function and stiffness, implant dislocation,  periprosthetic fracture, periprosthetic infection, need for further surgery as well as risk of anesthesia, blood clots and death.  As a quality of life decision, patient elected to proceed.    PROCEDURE:  The patient was identified in the preoperative holding area.  After confirming with the patient, the correct procedure and procedural site, the left shoulder was marked with an indelible marker by me, the attending surgeon.  After again reviewing the risks of procedure and benefits of surgery, questions were addressed.  They elected to proceed.  Written informed consent was obtained and reviewed.    The patient was then taken to the operating room and placed supine on the operating room table.  All bony prominences were well padded and adequately secured.  Anesthesia then did a preoperative block.  This was then followed by general anesthesia.  After adequate general anesthesia, the patient was placed in a beach chair position with the head in a well-padded resting neutral position, right  upper extremity well-padded resting neutral position.   Left  upper extremity was then prepped and draped in the usual sterile fashion.    A time-out was then performed confirming the  correct patient and procedure, procedural site, availability of the instruments and implants, review of the patient's allergies as well as administration of prophylactic antibiotics by operating staff.    At that time, the bony anatomy of the shoulder was outlined.  Any exposed, prepped skin over the shoulder as well as the axilla was covered with sterile Ioban.  A deltopectoral incision was then outlined.  An incision was then made, sharply through the skin.  I then used electrocautery to subcutaneous tissue obtaining hemostasis.  The deltopectoral interval and fascia was encountered.  The cephalic vein was easily identified, and this was retracted laterally  giving us access deep through the deltopectoral interval to the clavipectoral fascia which was incised.  I then used my finger as a probe and bluntly dissected any subdeltoid and subacromial adhesions.  A Roque retractor was then placed in a superior to inferior fashion in the subdeltoid bursa for gentle retraction.  The arm was then externally rotated.  The conjoint tendon was identified.  This revealed  the underlying subscapularis.  The superior portion was partially torn, but the inferior half appeared intact and robust.  I then proceeded to perform a tenotomy through the subscapularis tendon making sure to leave enough remnant tendon attached to lesser tuberosity for later closure.  This was held with #2 nonabsorbable sutures, and in addition to the capsulotomy opening up the shoulder quite nicely.  I then proceeded with the arm in external rotational taking care along the medial humeral neck, used  electrocautery and dissected off the capsule around the inferomedial neck and calcar giving us excellent exposure. I then used a rongeur and removed large humeral osteophytes.    At that time, I then proceeded to take care of the biceps.  There was a large synovial cyst attached to the proximal biceps in its groove which was sharply excised.  The biceps  tendon was then identified.  I did tenodesis to the superior fibers of the pectoral tendon and insertion to the humerus.  I then through the glenohumeral joint capsulotomy, sharply cut the proximal biceps tendon off the labrum using a curved Soares scissors, and retrieved  the proximal biceps tendon through the groove and sharply excised this just proximal to the tenodesis area.    I then proceed back to the humerus.  With the Roque retractor in place, externally rotated, a Darrach retractor was placed over the medial calcar.  A Hohmann retractor was placed superiorly over the humeral head protecting posterior tissues.  I then proceeded to take a starting reamer and opened up a hole within the superior portion of the humeral head just off the articular margin about a centimeter posterior to the  biceps groove in mind with the humeral shaft.  I then proceeded to sequentially hand ream this starting with a 8-mm reamer.  Sequentially reamed until I got what appeared to be good cortical fixation with a 12-mm reamer.  I then proceeded to place the left-sided humeral cutting guide to the reamer handle.  This was set at approximately 30 degrees of version.  This was pinned in place.  I then proceeded to make the humeral head resection cut.   This was a nice cut right at the articular margin and cuff insertion.  The  was then removed.  I then proceeded to sequentially broach starting with a 10-mm up to a 12-mm which appeared to be a good, stable press fit. I then used the rongeur again to resect off any remaining humeral osteophytes or excess bone.    At that time, I then proceeded to the glenoid.  A Fukuda retractor was placed posteriorly off the glenoid with a Batman retractor anteriorly and a sharp Hohmann superiorly.  Degenerative labrum was then sharply excised off the superior, anterior and inferior portion taking care of the axillary nerve.  Some of the anterior capsule was released up to the anterior  aspect of the glenoid to get better exposure and retraction.  Adequate exposure to  the glenoid was obtained.  I then proceeded to take the signature glenoid guide onto the glenoid and this was actually appeared to be quite a nice fit lining up quite nicely with the guide template.  Guide pin was then placed through the reverse arthroplasty guide hole through the glenoid.  It appeared to be in the center position.  I then took the mini baseplate-sized reamer, and flattened off the inferior articular surface which did have some posterior-superior wear. I then used the augment reamer to ream posterosuperior for a medium augment baseplate.   This was then followed by the central reamer for the glenoid baseplate central post.  I then proceeded to plate the mini glenoid baseplate with medium augment posterosuperior using the impacter. This was already a good, tight fit. I then placed the central screw with good purchase. This was followed by the superior  screw, then the anterior and inferior screws.   The wound was then copiously irrigated.  The glenosphere trial was placed.  I then  proceeded to place a humeral tray trial onto the broach. The shoulder was reduced and appeared to be a good stable fit.    I then proceeded back to the glenoid and the trial glenosphere was removed.  The glenoid was then copiously irrigated for final glenosphere placement.  The glenoid baseplate was dried and the implanted glenosphere was placed.  The wound was then irrigated.    I then proceeded back to the humerus.  The humeral broach was removed.  The final humeral stem was then placed, it was a 12mm mini pressfit Humeral stem.  I then proceeded to place the final humeral tray and poly bearing. The shoulder was then reduced. I was able to get range of motion to reaching to behind the patient's head passively across the chest to the  contralateral shoulder to their face, and at least 90 degrees internal and external rotation with the arm  in neutral.  I then proceeded to irrigate with 1 liter of dilute Betadine followed by 2 liters of antibiotic saline.  I then proceeded to close the subscapularis with previous drill holes and sutures to the bone, bringing the subscapularis resisting quite nicely to the remnant tissue on the lesser tuberosity.  Once these #2 nonabsorbable sutures were tied, a side-to-side repair of the subscapularis tendon was then made  with excellent closure.  Wound was then irrigated more.  Hemovac drain was placed in the subdeltoid bursa exiting just distal and lateral to the incision.  Vancomycin 1 g powder was placed into the wound.  The deltopectoral interval was then closed with 0 Vicryl.  The skin was then closed with 2-0 Vicryl followed by a buried 3-0 Monocryl and 3-0 running subcuticular Monocryl.  Dermabond was placed over the skin.  Once this was dried, an Aquacel  waterproof dressing was then placed.  An arm sling was placed as well as a polarcare.  The patient was then awakened and taken to recovery.    The patient will be admitted overnight.  We will get them up walking and some pendulums with PHYSICAL THERAPY in the morning.  Oral pain medications include oxycodone and acetaminophen.  Stool softeners.  Diet will be advanced.  We will limit external rotation to about 20-25 degrees for the  first four to six weeks to allow the subscapularis to heal.  We will see the patient back in two weeks' time for wound check, suture removal or sooner if needed.    There were no apparent complications.    Luther Renae MD, MS  Orthopaedic Surgery  [facility]

## 2025-05-20 NOTE — PROGRESS NOTES
"[place] [facility] [place]    Patient admitted to room 2405 at approximately 1125 via cart from surgery. Patient was accompanied by transport tech.     Verbal SBAR report received from Iva prior to patient arrival.     Patient ambulated to bed with stand-by assist. Patient alert and oriented X 3. The patient is not having any pain.  . Admission vital signs: Blood pressure 122/69, pulse 73, temperature 98.2  F (36.8  C), temperature source Oral, resp. rate 17, height 1.702 m (5' 7\"), weight 77.1 kg (170 lb), SpO2 94%, not currently breastfeeding. Patient was oriented to plan of care, call light, bed controls, tv, telephone, bathroom, and visiting hours.     Risk Assessment    The following safety risks were identified during admission: fall and skin. Yellow risk band applied: YES.     Skin Initial Assessment    This writer admitted this patient and completed a full skin assessment and Jem score in the Adult PCS flowsheet.   Photo documentation of skin problem and/or wound competed via [employer] application (located under Media):  No    Appropriate interventions initiated as needed.     Secondary skin check completed by Terri.         Education    Patient has a [place] to Observation order: No  Observation education completed and documented: N/A      Tereso Maddox RN      " Quality 154 Part A: Falls: Risk Assessment (Should Be Reported With Measure 155.): Falls risk assessment completed and documented in the past 12 months. Quality 110: Preventive Care And Screening: Influenza Immunization: Influenza Immunization Administered during Influenza season Quality 154 Part B: Falls: Risk Screening (Should Be Reported With Measure 155.): Patient screened for future fall risk; documentation of no falls in the past year or only one fall without injury in the past year Quality 226: Preventive Care And Screening: Tobacco Use: Screening And Cessation Intervention: Patient screened for tobacco use and is an ex/non-smoker Quality 111:Pneumonia Vaccination Status For Older Adults: Pneumococcal Vaccination not Administered or Previously Received, Reason not Otherwise Specified Quality 431: Preventive Care And Screening: Unhealthy Alcohol Use - Screening: Patient screened for unhealthy alcohol use using a single question and scores less than 2 times per year Detail Level: Detailed Quality 131: Pain Assessment And Follow-Up: Pain assessment using a standardized tool is documented as negative, no follow-up plan required Quality 155 (Denominator): Falls Plan Of Care: Plan of Care not Documented, Reason not Otherwise Specified Quality 47: Advance Care Plan: Advance care planning not documented, reason not otherwise specified. Quality 402: Tobacco Use And Help With Quitting Among Adolescents: Patient screened for tobacco and never smoked

## 2025-05-20 NOTE — OR NURSING
Pt postop xray being done. Pt denies pain. Vss. Iv patent. Moves hands and fingers bilat. Sling on and ice on left shoulder. Azeb d/I,. Will call report to milagro ribera rn ms.

## 2025-05-20 NOTE — PROGRESS NOTES
Skin affirmation note    Admitting nurse completed full skin assessment, Jem score and Jem interventions. This writer agrees with the initial skin assessment findings.

## 2025-05-20 NOTE — ANESTHESIA PROCEDURE NOTES
Airway       Patient location during procedure: OR       Procedure Start/Stop Times: 5/20/2025 7:46 AM and 5/20/2025 7:46 AM  Staff -        CRNA: Alek Jurado APRN CRNA       Performed By: CRNA  Consent for Airway        Urgency: elective  Indications and Patient Condition       Indications for airway management: santa-procedural       Induction type:intravenous       Mask difficulty assessment: 1 - vent by mask    Final Airway Details       Final airway type: endotracheal airway       Successful airway: ETT - single  Endotracheal Airway Details        ETT size (mm): 6.5       Cuffed: yes       Cuff volume (mL): 6       Successful intubation technique: direct laryngoscopy       DL Blade Type: Azul 2       Grade View of Cords: 2       Position: Right       Measured from: lips       Secured at (cm): 21       Bite block used: None    Post intubation assessment        Placement verified by: capnometry, equal breath sounds and chest rise        Number of attempts at approach: 1       Number of other approaches attempted: 0       Secured with: tape       Ease of procedure: easy       Dentition: Intact and Unchanged    Medication(s) Administered   Medication Administration Time: 5/20/2025 7:46 AM

## 2025-05-20 NOTE — ADDENDUM NOTE
Addendum  created 05/20/25 1207 by Alek Jurado APRN CRNA    Child order released for a procedure order, Clinical Note Signed, Intraprocedure Blocks edited, Intraprocedure Event edited, Intraprocedure Staff edited, SmartForm saved

## 2025-05-21 ENCOUNTER — APPOINTMENT (OUTPATIENT)
Dept: OCCUPATIONAL THERAPY | Facility: CLINIC | Age: 70
End: 2025-05-21
Attending: ORTHOPAEDIC SURGERY
Payer: COMMERCIAL

## 2025-05-21 VITALS
BODY MASS INDEX: 26.68 KG/M2 | DIASTOLIC BLOOD PRESSURE: 60 MMHG | SYSTOLIC BLOOD PRESSURE: 113 MMHG | WEIGHT: 170 LBS | HEART RATE: 60 BPM | TEMPERATURE: 98 F | RESPIRATION RATE: 18 BRPM | OXYGEN SATURATION: 94 % | HEIGHT: 67 IN

## 2025-05-21 PROBLEM — F10.10 ALCOHOL USE DISORDER, MILD, ABUSE: Status: ACTIVE | Noted: 2024-08-08

## 2025-05-21 PROBLEM — N18.31 STAGE 3A CHRONIC KIDNEY DISEASE (H): Status: ACTIVE | Noted: 2024-09-10

## 2025-05-21 PROBLEM — I10 HYPERTENSION GOAL BP (BLOOD PRESSURE) < 140/90: Status: ACTIVE | Noted: 2017-08-03

## 2025-05-21 LAB
FASTING STATUS PATIENT QL REPORTED: YES
GLUCOSE SERPL-MCNC: 116 MG/DL (ref 70–99)
HGB BLD-MCNC: 9.9 G/DL (ref 11.7–15.7)
MCV RBC AUTO: 94 FL (ref 78–100)

## 2025-05-21 PROCEDURE — 85018 HEMOGLOBIN: CPT | Performed by: ORTHOPAEDIC SURGERY

## 2025-05-21 PROCEDURE — 250N000013 HC RX MED GY IP 250 OP 250 PS 637: Performed by: ORTHOPAEDIC SURGERY

## 2025-05-21 PROCEDURE — 82947 ASSAY GLUCOSE BLOOD QUANT: CPT | Performed by: ORTHOPAEDIC SURGERY

## 2025-05-21 PROCEDURE — 97110 THERAPEUTIC EXERCISES: CPT | Mod: GO

## 2025-05-21 PROCEDURE — 250N000011 HC RX IP 250 OP 636: Performed by: ORTHOPAEDIC SURGERY

## 2025-05-21 PROCEDURE — 97165 OT EVAL LOW COMPLEX 30 MIN: CPT | Mod: GO

## 2025-05-21 PROCEDURE — 97535 SELF CARE MNGMENT TRAINING: CPT | Mod: GO

## 2025-05-21 PROCEDURE — 36415 COLL VENOUS BLD VENIPUNCTURE: CPT | Performed by: ORTHOPAEDIC SURGERY

## 2025-05-21 RX ADMIN — GABAPENTIN 300 MG: 300 CAPSULE ORAL at 08:09

## 2025-05-21 RX ADMIN — LOSARTAN POTASSIUM 50 MG: 50 TABLET, FILM COATED ORAL at 08:09

## 2025-05-21 RX ADMIN — AMLODIPINE BESYLATE 2.5 MG: 2.5 TABLET ORAL at 08:09

## 2025-05-21 RX ADMIN — PANTOPRAZOLE SODIUM 40 MG: 40 TABLET, DELAYED RELEASE ORAL at 08:09

## 2025-05-21 RX ADMIN — ACETAMINOPHEN 975 MG: 325 TABLET ORAL at 05:54

## 2025-05-21 RX ADMIN — METOPROLOL SUCCINATE 50 MG: 50 TABLET, EXTENDED RELEASE ORAL at 08:09

## 2025-05-21 RX ADMIN — CEFAZOLIN 1 G: 1 INJECTION, POWDER, FOR SOLUTION INTRAMUSCULAR; INTRAVENOUS at 01:58

## 2025-05-21 RX ADMIN — SENNOSIDES AND DOCUSATE SODIUM 1 TABLET: 50; 8.6 TABLET ORAL at 08:09

## 2025-05-21 RX ADMIN — PRAVASTATIN SODIUM 10 MG: 10 TABLET ORAL at 08:12

## 2025-05-21 RX ADMIN — OXYCODONE 5 MG: 5 TABLET ORAL at 05:58

## 2025-05-21 RX ADMIN — POLYETHYLENE GLYCOL 3350 17 G: 17 POWDER, FOR SOLUTION ORAL at 08:09

## 2025-05-21 RX ADMIN — OXYCODONE 5 MG: 5 TABLET ORAL at 10:49

## 2025-05-21 ASSESSMENT — ACTIVITIES OF DAILY LIVING (ADL)
ADLS_ACUITY_SCORE: 22

## 2025-05-21 NOTE — MEDICATION SCRIBE - ADMISSION MEDICATION HISTORY
Medication Scribe Admission Medication History    Admission medication history is complete. The information provided in this note is only as accurate as the sources available at the time of the update.    Information Source(s): Patient and CareEverywhere/SureScripts via phone    Pertinent Information: PTA med list reviewed with patient by room phone.    Changes made to PTA medication list:  Added: None  Deleted: Calcium + D  Changed: Magnesium and Potassium from taking daily to bid.  Updated dose and frequency for Vitamin C and D.    Allergies reviewed with patient and updates made in EHR: yes, no allergies.    Medication History Completed By: Agueda Vivas 5/20/2025 9:55 PM    PTA Med List   Medication Sig Last Dose/Taking    amLODIPine (NORVASC) 2.5 MG tablet Take 1 tablet (2.5 mg) by mouth daily. 5/20/2025 Morning    amoxicillin (AMOXIL) 500 MG capsule Take four capsules by mouth 1 hour prior to dental procedure. More than a month    Ascorbic Acid (VITAMIN C) 500 MG CAPS Take 1 capsule by mouth every evening. Past Week Evening    calcium 600 MG tablet Take 1 tablet by mouth 2 times daily Past Week    cyanocobalamin (VITAMIN B-12) 100 MCG tablet Take 100 mcg by mouth daily. Past Week Morning    gabapentin (NEURONTIN) 300 MG capsule Take 1 capsule (300 mg) by mouth 3 times daily. 5/20/2025 Morning    losartan (COZAAR) 50 MG tablet Take 1 tablet (50 mg) by mouth daily. 5/20/2025 Morning    magnesium 250 MG tablet Take 1 tablet by mouth 2 times daily. Past Week Evening    metoprolol succinate ER (TOPROL XL) 50 MG 24 hr tablet Take 1 tablet (50 mg) by mouth daily. 5/20/2025 Morning    omeprazole (PRILOSEC) 20 MG DR capsule Take 1 capsule (20 mg) by mouth daily. 5/20/2025 Morning    potassium gluconate 2.5 MEQ tablet Take 90 mEq by mouth 2 times daily. Past Week Evening    pravastatin (PRAVACHOL) 10 MG tablet Take 1 tablet (10 mg) by mouth daily. 5/20/2025 Morning    Vitamin D3 (CHOLECALCIFEROL) 25 mcg (1000 units)  tablet Take 1 tablet by mouth daily. Past Week Morning

## 2025-05-21 NOTE — PROGRESS NOTES
[place] [facility] DISCHARGE NOTE    Patient discharged to home at 10:53 AM via ambulation. Accompanied by spouse and staff. Discharge instructions reviewed with patient, opportunity offered to ask questions. Prescriptions sent to patients preferred pharmacy. All belongings sent with patient.    Tereso Maddox RN

## 2025-05-21 NOTE — PROGRESS NOTES
Occupational Therapy Discharge Summary    Reason for therapy discharge:    All goals and outcomes met, no further needs identified.    Progress towards therapy goal(s). See goals on Care Plan in [facility] electronic health record for goal details.  Goals met    Therapy recommendation(s):    No further OT needs. Pt plans to follow-up with OP PT once indicated by ortho team.

## 2025-05-21 NOTE — PROGRESS NOTES
"[facility] DAILY PROGRESS NOTE      History: Lola Partida is a 70 year old female POD # 1 s/p Procedure(s):  ARTHROPLASTY, REVERSE total shoulder, CT guided - lefton 2025    Interval events: none      SUBJECTIVE:  Patient states pain is well controlled with prescribed medications. Feels the block wearing off. Tolerating diet. Voiding spontaneously. Denies numbness or tingling in affected extremity. Denies nausea or vomiting. Denies chest pain, shortness of breath. Has been ambulating.     OBJECTIVE:  /64 (BP Location: Right arm)   Pulse 72   Temp 98  F (36.7  C) (Oral)   Resp 16   Ht 1.702 m (5' 7\")   Wt 77.1 kg (170 lb)   SpO2 92%   BMI 26.63 kg/m      Temp (24hrs), Av.1  F (36.7  C), Min:97.7  F (36.5  C), Max:98.3  F (36.8  C)      General:   Awake, alert, oriented. NAD  Breathing on room air without distress  Sitting upright in bed.    left Upper Extremity:    Incision is covered with aquacel, drain in place.  Intact sensation to light touch in median, radial, ulnar and axillary nerve distributions.  Intact epl, fpl, fdp, fds, edc, wrist flexion/extension, biceps, triceps, deltoid  fingers warm and well perfused w/ brisk capillary refill, palpable radial pulse  Arm swollen, soft and tender      Invalid input(s): \"HEMOGLOBIN\"  No results for input(s): \"INR\" in the last 47155 hours.  Recent Labs   Lab Test 25  1137 24  0942 23  1320 22  1305   POTASSIUM 4.0 4.2 4.3 4.3   CHLORIDE  --  104 104 111*   ANIONGAP  --  11 13 6         PT:  pending today           ASSESSMENT: Lola Partida is a 70 year old female POD # 1 s/p Procedure(s):  ARTHROPLASTY, REVERSE total shoulder, CT guided - left on 2025, doing well.    PLAN:  RN to remove drain prior to discharge today  Physical Therapy, passive range of motion. Limit external rotation to 20 degrees  Pain control  Iv fluids until good oral intake  Anticipated discharge home today.  Return to clinic 2 weeks for " wound check.    Luther Renae M.D., M.S.  Dept. of Orthopaedic Surgery  [facility]

## 2025-05-21 NOTE — PLAN OF CARE
Assumed care of patient [id number].    Patient vital signs are at baseline: Yes  Patient able to ambulate as they were prior to admission or with assist devices provided by therapies during their stay:  Yes  Patient MUST void prior to discharge:  Yes  Patient able to tolerate oral intake:  Yes  Pain has adequate pain control using Oral analgesics:  Yes  Does patient have an identified :  Yes  Has goal D/C date and time been discussed with patient:  Yes        Complaints of pain in left thumb. Patient describes it as a numb ache. Rated it an 8/10 prior to pain med at 2132.

## 2025-05-21 NOTE — CONSULTS
Care Management Note:    Care Management team received referral from Ortho team to assist pt with discharge planning services post surgical services.    Per IDT rounds, EMR review, and/or discussion with PT/OT staff, it has been determined that pt will discharge to home and attend outpatient therapy services.    [facility] provider group aware of plans.      Care Management will close referral at this time.    GEORGE Denton

## 2025-05-21 NOTE — PROGRESS NOTES
[facility] Medicine Progress Note  Date of Service: 05/21/2025    Assessment & Plan   Lola Partida is a 70 year old female who presented on 5/20/2025 for scheduled Procedure(s):  ARTHROPLASTY, REVERSE total shoulder, CT guided by Luther Renae MD and is being followed by the hospital medicine service for co-management of acute and/or chronic perioperative medical problems.      S/p Procedure(s):  ARTHROPLASTY, REVERSE total shoulder, CT guided   1 Day Post-Op   Hemoglobin 13.2 ?  9.9.    - pain control, wound cares, physical therapy, occupational therapy and DVT prophylaxis per orthopedic surgery service    Hypertension   Normotensive POD1.   - continue PTA amlodipine 2.5mg daily   - Continue PTA losartan 50mg daily   - Continue PTA metoprolol succinate 50mg daily     Esophageal reflux  Stable POD1.   - Continue PTA PPI    Chronic radicular low back pain  Stable POD1.   - Continue Pta gabapentin 300mg TID    Stage 3a chronic kidney disease   Noted in history. Baseline GFR appears around 75 prior to 2023, then 57 on recheck in 2024.   - Continue regular outpatient follow up with PCP for ongoing surveillance.     DVT Prophylaxis: as per orthopedic surgery service - asa 325mg daily   Code Status: No Order    Lines: PIV   White catheter: no      Discussion/Disposition: Medically, the patient appears to be recovering well post-operatively. The patient must be seen and cleared by orthopedics, physical therapy, and occupational therapy prior to discharge.      Medically Ready for Discharge: Anticipated Today    Medical Decision Making   Moderate complexity     30 MINUTES SPENT BY ME on the date of service doing chart review, history, exam, documentation & further activities per the note.        I have discussed, or will be discussing, the patient with hospitalist physician, Dr. Arun Benton.     Jaleesa Spencer PA-C     Interval History   Doing very well this AM. Denies  lightheadedness or dizziness with ambulation. Denies chest pain, dyspnea. Tolerating PO without abdominal pain or nausea. Urinating without difficulty. Passing gas. No BM yet.     Physical Exam   Temp:  [97.7  F (36.5  C)-98.3  F (36.8  C)] 98  F (36.7  C)  Pulse:  [65-81] 72  Resp:  [16-22] 16  BP: (115-147)/(62-74) 120/64  SpO2:  [92 %-96 %] 92 %    Weights:   Vitals:    05/20/25 0601   Weight: 77.1 kg (170 lb)    Body mass index is 26.63 kg/m .    Constitutional: alert and oriented x3, sitting in chair, appears comfortable, nontoxic  CV: regular rate & rhythm, no murmurs, rubs, or gallops. Radial pulse 2+.  No pitting LE edema.   Respiratory: CTA bilaterally, respirations unlabored on room air.   GI: Bowel sounds present throughout. Abdomen soft, nontender to palpation, nondistended.   Skin: Warm and dry. Surgical site exam deferred to orthopedics.  Musculoskeletal: Normal muscle bulk. Remainder of MSK exam deferred to orthopedics.   Neuro: distal sensation intact to lower extremities bilaterally, speech intact, interacting appropriately.     Data   Recent Labs   Lab 05/21/25  0611 05/20/25  0635   HGB 9.9*  --    MCV 94  --    GLC  --  113*     Imaging  Recent Results (from the past 24 hours)   XR Shoulder Left Port G/E 2 Views    Narrative    EXAM: XR SHOULDER LEFT PORT G/E 2 VIEWS  LOCATION: [facility]  DATE: 5/20/2025    INDICATION: Status post surgery  COMPARISON: Radiograph 02/18/2025      Impression    IMPRESSION: There is a new left reverse total shoulder arthroplasty in expected position. No periprosthetic fracture. Expected soft tissue gas.      I reviewed all new labs and imaging results over the last 24 hours.    Medications   Current Facility-Administered Medications   Medication Dose Route Frequency Provider Last Rate Last Admin    lactated ringers infusion   Intravenous Continuous Luther Renae MD   Stopped at 05/20/25 1956     Current Facility-Administered  Medications   Medication Dose Route Frequency Provider Last Rate Last Admin    acetaminophen (TYLENOL) tablet 975 mg  975 mg Oral Q8H Luther Renae MD   975 mg at 05/21/25 0554    amLODIPine (NORVASC) tablet 2.5 mg  2.5 mg Oral Daily Luther Renae MD        aspirin (ASA) EC tablet 325 mg  325 mg Oral At Bedtime Luther Renae MD   325 mg at 05/20/25 2132    gabapentin (NEURONTIN) capsule 300 mg  300 mg Oral TID Luther Renae MD   300 mg at 05/20/25 1958    losartan (COZAAR) tablet 50 mg  50 mg Oral Daily Luther Renae MD        metoprolol succinate ER (TOPROL XL) 24 hr tablet 50 mg  50 mg Oral Daily Luther Renae MD        pantoprazole (PROTONIX) EC tablet 40 mg  40 mg Oral Daily Luther Renae MD        polyethylene glycol (MIRALAX) Packet 17 g  17 g Oral Daily Luther Renae MD        pravastatin (PRAVACHOL) tablet 10 mg  10 mg Oral Daily Luther Renae MD        senna-docusate (SENOKOT-S/PERICOLACE) 8.6-50 MG per tablet 1 tablet  1 tablet Oral BID Luther Renae MD   1 tablet at 05/20/25 1958    sodium chloride (PF) 0.9% PF flush 3 mL  3 mL Intracatheter Q8H [facility] Luther Renae MD   3 mL at 05/21/25 0554     Jaleesa Spencer PA-C

## 2025-05-21 NOTE — PLAN OF CARE
Problem: Adult Inpatient Plan of Care  Goal: Plan of Care Review  Outcome: Progressing  Flowsheets (Taken 5/21/2025 0332)  Overall Patient Progress: improving     Problem: Shoulder Arthroplasty (Total, Johnnie, Reverse)  Goal: Optimal Functional Ability  Outcome: Progressing  Intervention: Promote Optimal Functional Status  Recent Flowsheet Documentation  Taken 5/21/2025 0322 by Quin John RN  Activity Management: activity adjusted per tolerance  Goal: Optimal Pain Control and Function  Outcome: Progressing  Intervention: Prevent or Manage Pain  Recent Flowsheet Documentation  Taken 5/21/2025 0201 by Quin John RN  Pain Management Interventions: cold applied   Goal Outcome Evaluation:           Overall Patient Progress: improvingOverall Patient Progress: improving     Pt is A&O, pleasant and cooperative. Pt is taking in po without difficulty, voiding without difficulty.  Pt has been up in room with stand by assist.  Pt has had no complaints of pain.  Pt appears to have slept well, wakes easily. Pt has drain with dark red blood drainage. Call light is in reach.

## 2025-05-21 NOTE — PROGRESS NOTES
05/21/25 0900   Appointment Info   Signing Clinician's Name / Credentials (OT) Chantal Blood, OTR/L   Quick Adds   Quick Adds [facility] Auth & Certification   Living Environment   People in Home spouse   Current Living Arrangements house   Home Accessibility no concerns   Living Environment Comments states spouse can assist upon discharge.   Self-Care   Equipment Currently Used at Home none   Fall history within last six months no   Activity/Exercise/Self-Care Comment at baseline: independent with ADLs/IADLs and related mobility. Pt is right hand dominant.   General Information   Onset of Illness/Injury or Date of Surgery 05/20/25   Referring Physician Luther Renae MD   Patient/Family Therapy Goal Statement (OT) to return home- planning OP PT.   Additional Occupational Profile Info/Pertinent History of Current Problem left shoulder reverse total shoulder arthroplasty.   Existing Precautions/Restrictions shoulder   Left Upper Extremity (Weight-bearing Status) non weight-bearing (NWB)  (sling on.)   General Observations and Info Limit ER to 20 degrees. Okay to complete pendulums. Sling at all times except during exercises and hygiene/skin checks.   Cognitive Status Examination   Orientation Status orientation to person, place and time   Pain Assessment   Patient Currently in Pain Yes, see Vital Sign flowsheet  (appears managed. Block has worn off.)   Bed Mobility   Comment (Bed Mobility) independent   Transfers   Transfer Comments independent   Balance   Balance Comments ambulates throughout room independently with no issues.   Activities of Daily Living   BADL Assessment/Intervention upper body dressing;lower body dressing   Upper Body Dressing Assessment/Training   Comment, (Upper Body Dressing) provided education on techniques- pt declines formal practice. States spouse can assist. Drain still intact as well.   Lower Body Dressing Assessment/Training   Comment, (Lower Body Dressing) Provided education on  techniques- pt declines formal practice- states spouse can assist.   Clinical Impression   Criteria for Skilled Therapeutic Interventions Met (OT) Yes, treatment indicated   OT Diagnosis decreased independence with ADLs   Influenced by the following impairments shoulder precautions.   OT Problem List-Impairments impacting ADL problems related to;post-surgical precautions;pain;range of motion (ROM)   Assessment of Occupational Performance 1-3 Performance Deficits   Identified Performance Deficits dressing, general use of L UE   Planned Therapy Interventions (OT) ADL retraining;home program guidelines   Clinical Decision Making Complexity (OT) problem focused assessment/low complexity   Risk & Benefits of therapy have been explained patient;participants included;participants voiced agreement with care plan;current/potential barriers reviewed;risks/benefits reviewed;care plan/treatment goals reviewed;evaluation/treatment results reviewed   OT Total Evaluation Time   OT Eval, Low Complexity Minutes (29383) 10   Therapy Certification   Start of Care Date 05/21/25   Certification date from 05/21/25   Certification date to 05/21/25   Medical Diagnosis s/p reverse L TSA   [facility] Authorization Information   Condition type Initial onset (within last 3 months)   Cause of current episode Post Surgical   Type of surgery 5-Joint Replacement   Nature of treatment Rehabilitative   Functional ability Moderate functional limitations   Documented POC (choose all that apply) Measurable short and long term/discharge treatment goals related to physical and functional deficits.;Frequency of treatment visits and treatment activities to address deficit areas.;Patient agrees to program participation including home program   Briefly describe symptoms Limited use of L UE d/t surgery   How did the symptoms start chronic pain in L shoulder leading to reverse TSA   Last 24H: avg pain/symptom intensity  4/10   Past wk: avg pain/symptom intensity 6/10    Frequency of Symptoms Frequently (51-75% of the time)   Symptom impact on ADLs Moderately   Condition change since eval N/A (first visit)   General health reported by patient Good   OT Goals   Therapy Frequency (OT) One time eval and treatment   OT Predicted Duration/Target Date for Goal Attainment 05/21/25   OT Goals OT Goal 1;OT Goal 2   OT: Goal 1 Pt will verbalize understanding shoulder precautions and implications for ADLs and techniques to maintain precautions.  (goal met)   OT: Goal 2 Pt will verbalize understanding to post-op shoulder HEP and recommended frequency/reps to maintain joint integrity for ADLs and in prep for OP PT   Interventions   Interventions Quick Adds Self-Care/Home Management;Therapeutic Procedures/Exercise   Self-Care/Home Management   Self-Care/Home Mgmt/ADL, Compensatory, Meal Prep Minutes (13929) 13   Symptoms Noted During/After Treatment (Meal Preparation/Planning Training) none   Treatment Detail/Skilled Intervention Educated pt on shoulder precautions and implications for ADLs. Educated pt on UB/LB dressing techniques and movements to avoid during task. Educated pt on techniques to complete shower safely. Educated pt on fit of sling and techniques to adjust. Min A to adjust sling. Handout of information provided- pt with no further ADL/IADL concerns.   Therapeutic Procedures/Exercise   Therapeutic Procedure: strength, endurance, ROM, flexibillity minutes (19822) 10   Treatment Detail/Skilled Intervention Issued post-op shoulder HEP. Exercises not complete today d/t pain. provided demonstration on each exercise and provided education on recommended frequency/reps. Pt verbalizes understanding. Educated pt on recommendation OP PT follow-up once ortho MD simons. Pt verbalizes understanding.   OT Discharge Planning   OT Plan 1x treatment.   OT Discharge Recommendation (DC Rec) home with assist  (planning OP PT)   OT Rationale for DC Rec moving well and has good support at home.   OT  Brief overview of current status independent. likely will need assist with dressing- pt states spouse can assist   OT Total Distance Amb During Session (feet) 50   Total Session Time   Timed Code Treatment Minutes 23   Total Session Time (sum of timed and untimed services) 33     [facility]                                                                                   OUTPATIENT OCCUPATIONAL THERAPY    PLAN OF TREATMENT FOR OUTPATIENT REHABILITATION   Patient's Last Name, First Name, Lola Reed YOB: 1955   Provider's Name   [facility]   Medical Record No.  [id number]     Onset Date: 05/20/25 Start of Care Date: 05/21/25     Medical Diagnosis:  s/p reverse L TSA               OT Diagnosis:  decreased independence with ADLs Certification Dates:  From: 05/21/25  To: 05/21/25     See note for plan of treatment, functional goals, and certification details.    I CERTIFY THE NEED FOR THESE SERVICES FURNISHED UNDER        THIS PLAN OF TREATMENT AND WHILE UNDER MY CARE (Physician co-signature of this document indicates review and certification of the therapy plan).

## 2025-05-27 ASSESSMENT — ACTIVITIES OF DAILY LIVING (ADL)
PLACING_AN_OBJECT_ON_A_HIGH_SHELF: 3
PLEASE_INDICATE_YOR_PRIMARY_REASON_FOR_REFERRAL_TO_THERAPY:: SHOULDER
REMOVING_SOMETHING_FROM_YOUR_BACK_POCKET: 4
WASHING_YOUR_HAIR?: 4
PUTTING_ON_AN_UNDERSHIRT_OR_A_PULLOVER_SWEATER: 6
AT_ITS_WORST?: 7
PUTTING_ON_YOUR_PANTS: 7
PUTTING_ON_A_SHIRT_THAT_BUTTONS_DOWN_THE_FRONT: 6
WASHING_YOUR_BACK: 10

## 2025-05-29 ENCOUNTER — THERAPY VISIT (OUTPATIENT)
Dept: PHYSICAL THERAPY | Facility: CLINIC | Age: 70
End: 2025-05-29
Attending: ORTHOPAEDIC SURGERY
Payer: COMMERCIAL

## 2025-05-29 DIAGNOSIS — G89.29 CHRONIC LEFT SHOULDER PAIN: Primary | ICD-10-CM

## 2025-05-29 DIAGNOSIS — M19.012 LOCALIZED OSTEOARTHRITIS OF LEFT SHOULDER: ICD-10-CM

## 2025-05-29 DIAGNOSIS — Z96.612 HISTORY OF TOTAL REPLACEMENT OF LEFT SHOULDER JOINT: ICD-10-CM

## 2025-05-29 DIAGNOSIS — M25.512 CHRONIC LEFT SHOULDER PAIN: Primary | ICD-10-CM

## 2025-05-29 NOTE — PROGRESS NOTES
PHYSICAL THERAPY EVALUATION  Type of Visit: Evaluation       Fall Risk Screen:  Have you fallen 2 or more times in the past year?: No  Have you fallen and had an injury in the past year?: No  Is patient receiving Physical Therapy Services?: Yes (shoulder)    Subjective      Condition type:  Chronic (continuous duration <3 months)  Cause of current episode:  Post Surgical  Type of surgery: 5-Joint Replacement  Nature of treatment:  Rehabilitative  Functional ability:  Severe functional limitations  Documented POC (choose all that apply):  Measurable short and long term/discharge treatment goals related to physical and functional deficits.;Frequency of treatment visits and treatment activities to address deficit areas.;Patient agrees to program participation including home program  Briefly describe symptoms:  S/P L TSA  How did the symptoms start:  chronic over time  Average pain/intensity last 24 hours:  6/10  Average pain/intensity past week:  3/10  Frequency of Symptoms:  Constantly (% of the time)  Symptom impact on ADLs:  Extremely  Condition change since eval:  N/A (first visit)  General health reported by patient:  Good      Presenting condition or subjective complaint: shoulder surgergy.  Replacement on L.  Pain has increased in the past few days.  Date of onset: 05/20/25 (DOS)    Relevant medical history:     Dates & types of surgery: right knee replacement 4-19-19 and left shoulder surgery 5-20-25    Prior diagnostic imaging/testing results: CT scan     Prior therapy history for the same diagnosis, illness or injury: No      Prior Level of Function  Transfers: Independent  Ambulation: Independent  ADL: Independent  IADL:     Living Environment  Social support: With a significant other or spouse   Type of home: House   Stairs to enter the home: Yes 5 Is there a railing: Yes     Ramp: No   Stairs inside the home: Yes 10 Is there a railing: Yes     Help at home: Self Cares (home health aide/personal care  attendant, family, etc); Home management tasks (cooking, cleaning); Home and Yard maintenance tasks; Assist for driving and community activities  Equipment owned: Four-point cane; Crutches; Grab bars     Employment: Yes [employer]  Hobbies/Interests:      Patient goals for therapy: na    Pain assessment:      Objective   SHOULDER EVALUATION  PAIN: Pain Level at Rest: 1/10  Pain Level with Use: 6/10  Pain Location: shoulder  Pain Quality: Aching  Pain Frequency: constant  Pain is Worst: nighttime  Pain is Exacerbated By: dressing  Pain is Relieved By: cold  Pain Progression: Unchanged  INTEGUMENTARY (edema, incisions): incision currently covered  POSTURE: sling on L;  elevated L shoulder with guarding posture  GAIT:   Weightbearing Status:   Assistive Device(s):   Gait Deviations:   BALANCE/PROPRIOCEPTION:   WEIGHTBEARING ALIGNMENT:   ROM:   (Degrees) Left AROM Left PROM Right AROM  Right PROM   Shoulder Flexion  36  115 with pain  noted   Shoulder Extension       Shoulder Abduction  53     Shoulder Adduction       Shoulder Internal Rotation       Shoulder External Rotation       Shoulder Horizontal Abduction       Shoulder Horizontal Adduction       Shoulder Flexion ER       Shoulder Flexion IR       Elbow Extension       Elbow Flexion       Pain:   End feel:     STRENGTH: not assessed  FLEXIBILITY:   SPECIAL TESTS:   PALPATION: diffuse tenderness in L shoulder;  tender L UT and c-spine  JOINT MOBILITY:   CERVICAL SCREEN:     Assessment & Plan   CLINICAL IMPRESSIONS  Medical Diagnosis: localized OA L shoulder    Treatment Diagnosis: L TSA   Impression/Assessment: Patient is a 70 year old female with L shoulder complaints.  The following significant findings have been identified: Pain, Decreased ROM/flexibility, Decreased joint mobility, Decreased strength, Impaired muscle performance, Decreased activity tolerance, and Impaired posture. These impairments interfere with their ability to perform self care  tasks, work tasks, recreational activities, household chores, driving , household mobility, and community mobility as compared to previous level of function.     Clinical Decision Making (Complexity):  Clinical Presentation: Stable/Uncomplicated  Clinical Presentation Rationale: based on medical and personal factors listed in PT evaluation  Clinical Decision Making (Complexity): Low complexity    PLAN OF CARE  Treatment Interventions:  Interventions: Manual Therapy, Neuromuscular Re-education, Therapeutic Activity, Therapeutic Exercise    Long Term Goals     PT Goal 1  Goal Identifier: L TSA  Goal Description: Pt able to reach overhead with 1/10 pain  Rationale: to maximize safety and independence within the home;to maximize safety and independence with performance of ADLs and functional tasks;to maximize safety and independence within the community;to maximize safety and independence with transportation;to maximize safety and independence with self cares  Target Date: 08/21/25      Frequency of Treatment: 1x/ week  Duration of Treatment: 12 weeks    Recommended Referrals to Other Professionals:   Education Assessment:   Learner/Method: Patient;Demonstration;Pictures/Video    Risks and benefits of evaluation/treatment have been explained.   Patient/Family/caregiver agrees with Plan of Care.     Evaluation Time:     PT Eval, Low Complexity Minutes (95233): 15       Signing Clinician: Pedro Spangler, PT        [facility]                                                                                   OUTPATIENT PHYSICAL THERAPY      PLAN OF TREATMENT FOR OUTPATIENT REHABILITATION   Patient's Last Name, First Name, Lola Reed YOB: 1955   Provider's Name   [facility]   Medical Record No.  [id number]     Onset Date: 05/20/25 (DOS)  Start of Care Date: 05/29/25     Medical Diagnosis:  localized OA L shoulder      PT Treatment  Diagnosis:  L TSA Plan of Treatment  Frequency/Duration: 1x/ week/ 12 weeks    Certification date from 05/29/25 to 08/21/25         See note for plan of treatment details and functional goals     Pedro Spangler, PT                         I CERTIFY THE NEED FOR THESE SERVICES FURNISHED UNDER        THIS PLAN OF TREATMENT AND WHILE UNDER MY CARE     (Physician attestation of this document indicates review and certification of the therapy plan).              Referring Provider:  Luther Renae    Initial Assessment  See Epic Evaluation- Start of Care Date: 05/29/25

## 2025-06-03 NOTE — PROGRESS NOTES
CHIEF COMPLAINT:   Chief Complaint   Patient presents with    Left Shoulder - Total Joint Post-op     DOS 5/20/25, 2.5wk s/p. Reverse total shoulder arthroplasty. Patient notes her shoulder was doing pretty good but she slipped and fell on Tuesday. She thinks she missed her shoulder but has an injury to her left knee and left eye. She has remained in sling. She has had one physical therapy session. She had more pain before the fall. She will get sharp shooting pain that goes down to her thumb. She will get tingling in her thumb. This was prior to fall.          SURGICAL PROCEDURE: left  reverse total shoulder arthroplasty.  DATE OF PROCEDURE: 5/20/2025      HISTORY OF PRESENT ILLNESS    Lola Partida is a 70 year old female seen for postoperative follow-up of a left reverse total shoulder arthroplasty  that occurred 2 weeks ago. Since surgery, pain has been improving. However, she did have a slip and fall on this past Tuesday. She thinks she didn't injure the shoulder, but did injure the left knee and eye. She has remained in the sling.. Denies fevers, chills, night sweats. No wound problems. Compliant with weight bearing restrictions and elevation.  Denies numbness or tingling. Has gone to therapy once.    She will get some sharp shooting pain that goes from the elbow to the thumb with some tingling in the thumb when she straightens out the elbow.      Present symptoms: moderate pain, moderate swelling.    Pain severity: 7/10  Pain quality: dull and aching  Frequency of symptoms: are constant  Exacerbating Factors: movements.  Relieving Factors: pain medications.     Other PMH:  has a past medical history of Anemia, unspecified type (05/27/2016), Colon polyp, GERD (gastroesophageal reflux disease), Hypertension (08/01/2017), Rhinitis, and Worker's compensation claim administrative problem.    She has no past medical history of Arthritis, Cancer (H), Cerebral infarction (H), Complication of anesthesia, Congestive  heart failure (H), COPD (chronic obstructive pulmonary disease) (H), Depressive disorder, Diabetes (H), Heart disease, History of blood transfusion, Sleep apnea, Thyroid disease, or Uncomplicated asthma.  Patient Active Problem List   Diagnosis    Chronic rhinitis    Esophageal reflux    Menopause    Menopausal syndrome (hot flashes)    Bilateral leg cramps    Chronic radicular low back pain    Hypertension goal BP (blood pressure) < 140/90    Status post total right knee replacement    Status post total left knee replacement    Alcohol use disorder, mild, abuse    Stage 3a chronic kidney disease (H)    Arthritis of shoulder region, left, degenerative    Chronic left shoulder pain    History of total replacement of left shoulder joint       Past surgical History:  has a past surgical history that includes tubal ligation; TOOTH EXTRACTION W/FORCEP; Arthroscopy knee (Right); vulvar biopsy (2005); Lasik; colonoscopy (?); Total Knee Arthroplasty (Right); Colonoscopy with CO2 insufflation (N/A, 7/13/2022); Colonoscopy (N/A, 7/13/2022); Combined Esophagoscopy, Gastroscopy, Duodenoscopy (Egd) With Co2 Insufflation (N/A, 4/21/2023); Arthroplasty shoulder (Left, 5/20/2025); and Repair tendon biceps proximal upper extremity (Left, 5/20/2025).    Medications:   Current Outpatient Medications:     acetaminophen (TYLENOL) 325 MG tablet, Take 2 tablets (650 mg) by mouth every 4 hours as needed for other (mild pain)., Disp: 100 tablet, Rfl: 0    amLODIPine (NORVASC) 2.5 MG tablet, Take 1 tablet (2.5 mg) by mouth daily., Disp: 90 tablet, Rfl: 1    amoxicillin (AMOXIL) 500 MG capsule, Take four capsules by mouth 1 hour prior to dental procedure., Disp: 4 capsule, Rfl: 4    Ascorbic Acid (VITAMIN C) 500 MG CAPS, Take 1 capsule by mouth every evening., Disp: , Rfl:     aspirin (ASA) 325 MG EC tablet, Take 1 tablet (325 mg) by mouth daily., Disp: 30 tablet, Rfl: 0    calcium 600 MG tablet, Take 1 tablet by mouth 2 times daily, Disp: ,  "Rfl:     cyanocobalamin (VITAMIN B-12) 100 MCG tablet, Take 100 mcg by mouth daily., Disp: , Rfl:     gabapentin (NEURONTIN) 300 MG capsule, Take 1 capsule (300 mg) by mouth 3 times daily., Disp: 270 capsule, Rfl: 1    hydrOXYzine HCl (ATARAX) 10 MG tablet, Take 1 tablet (10 mg) by mouth every 6 hours as needed for itching or anxiety (with pain, moderate pain)., Disp: 30 tablet, Rfl: 0    losartan (COZAAR) 50 MG tablet, Take 1 tablet (50 mg) by mouth daily., Disp: 90 tablet, Rfl: 0    magnesium 250 MG tablet, Take 1 tablet by mouth 2 times daily., Disp: , Rfl:     metoprolol succinate ER (TOPROL XL) 50 MG 24 hr tablet, Take 1 tablet (50 mg) by mouth daily., Disp: 90 tablet, Rfl: 0    omeprazole (PRILOSEC) 20 MG DR capsule, Take 1 capsule (20 mg) by mouth daily., Disp: 90 capsule, Rfl: 0    oxyCODONE (ROXICODONE) 5 MG tablet, Take 1-2 tablets (5-10 mg) by mouth every 6 hours as needed for moderate to severe pain., Disp: 16 tablet, Rfl: 0    potassium gluconate 2.5 MEQ tablet, Take 90 mEq by mouth 2 times daily., Disp: , Rfl:     pravastatin (PRAVACHOL) 10 MG tablet, Take 1 tablet (10 mg) by mouth daily., Disp: 90 tablet, Rfl: 0    senna-docusate (SENOKOT-S/PERICOLACE) 8.6-50 MG tablet, Take 1-2 tablets by mouth 2 times daily. Take while on oral narcotics to prevent or treat constipation., Disp: 30 tablet, Rfl: 0    Vitamin D3 (CHOLECALCIFEROL) 25 mcg (1000 units) tablet, Take 1 tablet by mouth daily., Disp: , Rfl:     Allergies: No Known Allergies    REVIEW OF SYSTEMS:  CONSTITUTIONAL:NEGATIVE for fever, chills, change in weight  INTEGUMENTARY/SKIN: NEGATIVE for worrisome rashes, moles or lesions  MUSCULOSKELETAL:See HPI above  NEURO: NEGATIVE for weakness, dizziness or paresthesias      PHYSICAL EXAM:  Ht 1.696 m (5' 6.77\")   Wt 78.3 kg (172 lb 9.6 oz)   BMI 27.22 kg/m     GENERAL APPEARANCE: healthy, alert, no distress   SKIN: no suspicious lesions or rashes  NEURO: Normal strength and tone, mentation intact and " speech normal  PSYCH:  mentation appears normal and affect normal  RESPIRATORY: No increased work of breathing.      left UPPER EXTREMITY:  Wound / Incision: deltopectoral incision healing well.  Inspection: swelling, ecchymosis, no deformity  Palpation: diffuse tender to palpation about the shoulder, elbow, volar forearm.    There is no erythema of the surrounding skin.  There is no maceration of the skin.  There is no gross deformity in the area.    Sensation intact to light touch in median, radial, ulnar and axillary nerve distributions  Palpable 2+ radial pulse, brisk capillary refill to all fingers, wwp  Intact epl fpl fdp edc wrist flexion/extension biceps triceps deltoid         X-RAY:  1 views left shoulder from 6/6/2025 were reviewed in clinic today. Reverse total shoulder arthroplasty components in place without evidence of fracture, dislocation, loosening or failure.          Impression: 70 year old female  2 weeks  postoperative reverse total shoulder arthroplasty  left side, doing well.    Plan:   Images reviewed  I don't see anything injured on xrays.  Weight bearing status: non weight bearing   Pain control: over the counter as needed .  Will escribe oxycodone and hydroxyzine to our pharmacy here today.  Immobilization: sling x4 weeks.  Physical Therapy, home exercise program. Limit external rotation to 20 degrees for a total of 6 weeks postoperative. Ok for range of motion in all other planes per comfort.  Elevation of affected extremity  Images: 1 view shoulder  Return to clinic in 4 weeks, or sooner as needed.      Luther Renae M.D., M.S.  Dept. of Orthopaedic Surgery  [facility]

## 2025-06-06 ENCOUNTER — OFFICE VISIT (OUTPATIENT)
Dept: ORTHOPEDICS | Facility: CLINIC | Age: 70
End: 2025-06-06
Payer: COMMERCIAL

## 2025-06-06 ENCOUNTER — ANCILLARY PROCEDURE (OUTPATIENT)
Dept: GENERAL RADIOLOGY | Facility: CLINIC | Age: 70
End: 2025-06-06
Attending: ORTHOPAEDIC SURGERY
Payer: COMMERCIAL

## 2025-06-06 VITALS — WEIGHT: 172.6 LBS | BODY MASS INDEX: 27.09 KG/M2 | HEIGHT: 67 IN

## 2025-06-06 DIAGNOSIS — M19.012 LOCALIZED OSTEOARTHRITIS OF LEFT SHOULDER: ICD-10-CM

## 2025-06-06 DIAGNOSIS — Z96.612 STATUS POST REVERSE TOTAL REPLACEMENT OF LEFT SHOULDER: Primary | ICD-10-CM

## 2025-06-06 DIAGNOSIS — Z96.612 STATUS POST REVERSE TOTAL REPLACEMENT OF LEFT SHOULDER: ICD-10-CM

## 2025-06-06 PROCEDURE — 1125F AMNT PAIN NOTED PAIN PRSNT: CPT | Performed by: ORTHOPAEDIC SURGERY

## 2025-06-06 PROCEDURE — 73020 X-RAY EXAM OF SHOULDER: CPT | Mod: TC | Performed by: RADIOLOGY

## 2025-06-06 PROCEDURE — 99024 POSTOP FOLLOW-UP VISIT: CPT | Performed by: ORTHOPAEDIC SURGERY

## 2025-06-06 RX ORDER — OXYCODONE HYDROCHLORIDE 5 MG/1
5-10 TABLET ORAL EVERY 6 HOURS PRN
Qty: 12 TABLET | Refills: 0 | Status: SHIPPED | OUTPATIENT
Start: 2025-06-06

## 2025-06-06 RX ORDER — HYDROXYZINE HYDROCHLORIDE 10 MG/1
10 TABLET, FILM COATED ORAL EVERY 6 HOURS PRN
Qty: 30 TABLET | Refills: 0 | Status: SHIPPED | OUTPATIENT
Start: 2025-06-06

## 2025-06-06 ASSESSMENT — PAIN SCALES - GENERAL: PAINLEVEL_OUTOF10: SEVERE PAIN (7)

## 2025-06-06 NOTE — LETTER
6/6/2025      Lola Partida  [address] [address]      Dear Colleague,    Thank you for referring your patient, Loal Partida, to the [facility]. Please see a copy of my visit note below.    CHIEF COMPLAINT:   Chief Complaint   Patient presents with     Left Shoulder - Total Joint Post-op     DOS 5/20/25, 2.5wk s/p. Reverse total shoulder arthroplasty. Patient notes her shoulder was doing pretty good but she slipped and fell on Tuesday. She thinks she missed her shoulder but has an injury to her left knee and left eye. She has remained in sling. She has had one physical therapy session. She had more pain before the fall. She will get sharp shooting pain that goes down to her thumb. She will get tingling in her thumb. This was prior to fall.          SURGICAL PROCEDURE: left  reverse total shoulder arthroplasty.  DATE OF PROCEDURE: 5/20/2025      HISTORY OF PRESENT ILLNESS    Lola Partida is a 70 year old female seen for postoperative follow-up of a left reverse total shoulder arthroplasty  that occurred 2 weeks ago. Since surgery, pain has been improving. However, she did have a slip and fall on this past Tuesday. She thinks she didn't injure the shoulder, but did injure the left knee and eye. She has remained in the sling.. Denies fevers, chills, night sweats. No wound problems. Compliant with weight bearing restrictions and elevation.  Denies numbness or tingling. Has gone to therapy once.    She will get some sharp shooting pain that goes from the elbow to the thumb with some tingling in the thumb when she straightens out the elbow.      Present symptoms: moderate pain, moderate swelling.    Pain severity: 7/10  Pain quality: dull and aching  Frequency of symptoms: are constant  Exacerbating Factors: movements.  Relieving Factors: pain medications.     Other PMH:  has a past medical history of Anemia, unspecified type (05/27/2016), Colon polyp, GERD  (gastroesophageal reflux disease), Hypertension (08/01/2017), Rhinitis, and Worker's compensation claim administrative problem.    She has no past medical history of Arthritis, Cancer (H), Cerebral infarction (H), Complication of anesthesia, Congestive heart failure (H), COPD (chronic obstructive pulmonary disease) (H), Depressive disorder, Diabetes (H), Heart disease, History of blood transfusion, Sleep apnea, Thyroid disease, or Uncomplicated asthma.  Patient Active Problem List   Diagnosis     Chronic rhinitis     Esophageal reflux     Menopause     Menopausal syndrome (hot flashes)     Bilateral leg cramps     Chronic radicular low back pain     Hypertension goal BP (blood pressure) < 140/90     Status post total right knee replacement     Status post total left knee replacement     Alcohol use disorder, mild, abuse     Stage 3a chronic kidney disease (H)     Arthritis of shoulder region, left, degenerative     Chronic left shoulder pain     History of total replacement of left shoulder joint       Past surgical History:  has a past surgical history that includes tubal ligation; TOOTH EXTRACTION W/FORCEP; Arthroscopy knee (Right); vulvar biopsy (2005); Lasik; colonoscopy (?); Total Knee Arthroplasty (Right); Colonoscopy with CO2 insufflation (N/A, 7/13/2022); Colonoscopy (N/A, 7/13/2022); Combined Esophagoscopy, Gastroscopy, Duodenoscopy (Egd) With Co2 Insufflation (N/A, 4/21/2023); Arthroplasty shoulder (Left, 5/20/2025); and Repair tendon biceps proximal upper extremity (Left, 5/20/2025).    Medications:   Current Outpatient Medications:      acetaminophen (TYLENOL) 325 MG tablet, Take 2 tablets (650 mg) by mouth every 4 hours as needed for other (mild pain)., Disp: 100 tablet, Rfl: 0     amLODIPine (NORVASC) 2.5 MG tablet, Take 1 tablet (2.5 mg) by mouth daily., Disp: 90 tablet, Rfl: 1     amoxicillin (AMOXIL) 500 MG capsule, Take four capsules by mouth 1 hour prior to dental procedure., Disp: 4 capsule, Rfl:  4     Ascorbic Acid (VITAMIN C) 500 MG CAPS, Take 1 capsule by mouth every evening., Disp: , Rfl:      aspirin (ASA) 325 MG EC tablet, Take 1 tablet (325 mg) by mouth daily., Disp: 30 tablet, Rfl: 0     calcium 600 MG tablet, Take 1 tablet by mouth 2 times daily, Disp: , Rfl:      cyanocobalamin (VITAMIN B-12) 100 MCG tablet, Take 100 mcg by mouth daily., Disp: , Rfl:      gabapentin (NEURONTIN) 300 MG capsule, Take 1 capsule (300 mg) by mouth 3 times daily., Disp: 270 capsule, Rfl: 1     hydrOXYzine HCl (ATARAX) 10 MG tablet, Take 1 tablet (10 mg) by mouth every 6 hours as needed for itching or anxiety (with pain, moderate pain)., Disp: 30 tablet, Rfl: 0     losartan (COZAAR) 50 MG tablet, Take 1 tablet (50 mg) by mouth daily., Disp: 90 tablet, Rfl: 0     magnesium 250 MG tablet, Take 1 tablet by mouth 2 times daily., Disp: , Rfl:      metoprolol succinate ER (TOPROL XL) 50 MG 24 hr tablet, Take 1 tablet (50 mg) by mouth daily., Disp: 90 tablet, Rfl: 0     omeprazole (PRILOSEC) 20 MG DR capsule, Take 1 capsule (20 mg) by mouth daily., Disp: 90 capsule, Rfl: 0     oxyCODONE (ROXICODONE) 5 MG tablet, Take 1-2 tablets (5-10 mg) by mouth every 6 hours as needed for moderate to severe pain., Disp: 16 tablet, Rfl: 0     potassium gluconate 2.5 MEQ tablet, Take 90 mEq by mouth 2 times daily., Disp: , Rfl:      pravastatin (PRAVACHOL) 10 MG tablet, Take 1 tablet (10 mg) by mouth daily., Disp: 90 tablet, Rfl: 0     senna-docusate (SENOKOT-S/PERICOLACE) 8.6-50 MG tablet, Take 1-2 tablets by mouth 2 times daily. Take while on oral narcotics to prevent or treat constipation., Disp: 30 tablet, Rfl: 0     Vitamin D3 (CHOLECALCIFEROL) 25 mcg (1000 units) tablet, Take 1 tablet by mouth daily., Disp: , Rfl:     Allergies: No Known Allergies    REVIEW OF SYSTEMS:  CONSTITUTIONAL:NEGATIVE for fever, chills, change in weight  INTEGUMENTARY/SKIN: NEGATIVE for worrisome rashes, moles or lesions  MUSCULOSKELETAL:See HPI above  NEURO:  "NEGATIVE for weakness, dizziness or paresthesias      PHYSICAL EXAM:  Ht 1.696 m (5' 6.77\")   Wt 78.3 kg (172 lb 9.6 oz)   BMI 27.22 kg/m     GENERAL APPEARANCE: healthy, alert, no distress   SKIN: no suspicious lesions or rashes  NEURO: Normal strength and tone, mentation intact and speech normal  PSYCH:  mentation appears normal and affect normal  RESPIRATORY: No increased work of breathing.      left UPPER EXTREMITY:  Wound / Incision: deltopectoral incision healing well.  Inspection: swelling, ecchymosis, no deformity  Palpation: diffuse tender to palpation about the shoulder, elbow, volar forearm.    There is no erythema of the surrounding skin.  There is no maceration of the skin.  There is no gross deformity in the area.    Sensation intact to light touch in median, radial, ulnar and axillary nerve distributions  Palpable 2+ radial pulse, brisk capillary refill to all fingers, wwp  Intact epl fpl fdp edc wrist flexion/extension biceps triceps deltoid         X-RAY:  1 views left shoulder from 6/6/2025 were reviewed in clinic today. Reverse total shoulder arthroplasty components in place without evidence of fracture, dislocation, loosening or failure.          Impression: 70 year old female  2 weeks  postoperative reverse total shoulder arthroplasty  left side, doing well.    Plan:   Images reviewed  I don't see anything injured on xrays.  Weight bearing status: non weight bearing   Pain control: over the counter as needed .  Will escribe oxycodone and hydroxyzine to our pharmacy here today.  Immobilization: sling x4 weeks.  Physical Therapy, home exercise program. Limit external rotation to 20 degrees for a total of 6 weeks postoperative. Ok for range of motion in all other planes per comfort.  Elevation of affected extremity  Images: 1 view shoulder  Return to clinic in 4 weeks, or sooner as needed.      Luther Renae M.D., M.S.  Dept. of Orthopaedic Surgery  [facility]     Again, thank you " for allowing me to participate in the care of your patient.        Sincerely,        Luther Renae MD    Electronically signed

## 2025-06-13 ENCOUNTER — THERAPY VISIT (OUTPATIENT)
Age: 70
End: 2025-06-13
Payer: COMMERCIAL

## 2025-06-13 DIAGNOSIS — Z96.612 HISTORY OF TOTAL REPLACEMENT OF LEFT SHOULDER JOINT: ICD-10-CM

## 2025-06-13 DIAGNOSIS — M25.512 CHRONIC LEFT SHOULDER PAIN: Primary | ICD-10-CM

## 2025-06-13 DIAGNOSIS — G89.29 CHRONIC LEFT SHOULDER PAIN: Primary | ICD-10-CM

## 2025-06-13 PROCEDURE — 97110 THERAPEUTIC EXERCISES: CPT | Mod: GP

## 2025-07-11 ENCOUNTER — ANCILLARY PROCEDURE (OUTPATIENT)
Dept: GENERAL RADIOLOGY | Facility: CLINIC | Age: 70
End: 2025-07-11
Attending: ORTHOPAEDIC SURGERY
Payer: COMMERCIAL

## 2025-07-11 DIAGNOSIS — Z96.612 STATUS POST REVERSE TOTAL REPLACEMENT OF LEFT SHOULDER: ICD-10-CM

## 2025-07-11 PROCEDURE — 73020 X-RAY EXAM OF SHOULDER: CPT | Mod: TC | Performed by: RADIOLOGY

## 2025-07-30 DIAGNOSIS — I12.9 BENIGN HYPERTENSION WITH CKD (CHRONIC KIDNEY DISEASE) STAGE III (H): ICD-10-CM

## 2025-07-30 DIAGNOSIS — N18.30 BENIGN HYPERTENSION WITH CKD (CHRONIC KIDNEY DISEASE) STAGE III (H): ICD-10-CM

## 2025-07-30 RX ORDER — METOPROLOL SUCCINATE 50 MG/1
50 TABLET, EXTENDED RELEASE ORAL DAILY
Qty: 90 TABLET | Refills: 2 | Status: SHIPPED | OUTPATIENT
Start: 2025-07-30

## 2025-08-17 DIAGNOSIS — G89.29 CHRONIC RADICULAR LOW BACK PAIN: ICD-10-CM

## 2025-08-17 DIAGNOSIS — I12.9 BENIGN HYPERTENSION WITH CKD (CHRONIC KIDNEY DISEASE) STAGE III (H): ICD-10-CM

## 2025-08-17 DIAGNOSIS — M54.16 CHRONIC RADICULAR LOW BACK PAIN: ICD-10-CM

## 2025-08-17 DIAGNOSIS — R25.2 BILATERAL LEG CRAMPS: ICD-10-CM

## 2025-08-17 DIAGNOSIS — N18.30 BENIGN HYPERTENSION WITH CKD (CHRONIC KIDNEY DISEASE) STAGE III (H): ICD-10-CM

## 2025-08-18 ENCOUNTER — DOCUMENTATION ONLY (OUTPATIENT)
Dept: FAMILY MEDICINE | Facility: CLINIC | Age: 70
End: 2025-08-18
Payer: COMMERCIAL

## 2025-08-18 DIAGNOSIS — I10 HYPERTENSION GOAL BP (BLOOD PRESSURE) < 140/90: Primary | ICD-10-CM

## 2025-08-18 DIAGNOSIS — N18.31 STAGE 3A CHRONIC KIDNEY DISEASE (H): ICD-10-CM

## 2025-08-18 RX ORDER — GABAPENTIN 300 MG/1
300 CAPSULE ORAL 3 TIMES DAILY
Qty: 300 CAPSULE | Refills: 2 | Status: SHIPPED | OUTPATIENT
Start: 2025-08-18

## 2025-08-18 RX ORDER — AMLODIPINE BESYLATE 2.5 MG/1
2.5 TABLET ORAL DAILY
Qty: 100 TABLET | Refills: 2 | Status: SHIPPED | OUTPATIENT
Start: 2025-08-18

## 2025-08-22 PROBLEM — G89.29 CHRONIC LEFT SHOULDER PAIN: Status: RESOLVED | Noted: 2025-05-29 | Resolved: 2025-08-22

## 2025-08-22 PROBLEM — Z96.612 HISTORY OF TOTAL REPLACEMENT OF LEFT SHOULDER JOINT: Status: RESOLVED | Noted: 2025-05-29 | Resolved: 2025-08-22

## 2025-08-22 PROBLEM — M25.512 CHRONIC LEFT SHOULDER PAIN: Status: RESOLVED | Noted: 2025-05-29 | Resolved: 2025-08-22

## 2025-08-29 ENCOUNTER — ANCILLARY PROCEDURE (OUTPATIENT)
Dept: GENERAL RADIOLOGY | Facility: CLINIC | Age: 70
End: 2025-08-29
Attending: ORTHOPAEDIC SURGERY
Payer: COMMERCIAL

## 2025-08-29 DIAGNOSIS — Z96.612 STATUS POST REVERSE TOTAL REPLACEMENT OF LEFT SHOULDER: ICD-10-CM

## 2025-08-29 PROCEDURE — 73020 X-RAY EXAM OF SHOULDER: CPT | Mod: TC | Performed by: RADIOLOGY

## 2025-09-01 DIAGNOSIS — E78.5 HYPERLIPIDEMIA, UNSPECIFIED HYPERLIPIDEMIA TYPE: ICD-10-CM

## 2025-09-01 DIAGNOSIS — K21.9 GASTROESOPHAGEAL REFLUX DISEASE, UNSPECIFIED WHETHER ESOPHAGITIS PRESENT: ICD-10-CM

## 2025-09-01 RX ORDER — PRAVASTATIN SODIUM 10 MG
10 TABLET ORAL DAILY
Qty: 90 TABLET | Refills: 0 | Status: SHIPPED | OUTPATIENT
Start: 2025-09-01

## 2025-09-01 RX ORDER — OMEPRAZOLE 20 MG/1
20 CAPSULE, DELAYED RELEASE ORAL DAILY
Qty: 90 CAPSULE | Refills: 0 | Status: SHIPPED | OUTPATIENT
Start: 2025-09-01

## (undated) DEVICE — PIN STEINMANN 1/8" THRD 406669

## (undated) DEVICE — SU MONOCRYL 3-0 SH 27" Y316H

## (undated) DEVICE — PAD FLOOR SURGISAFE

## (undated) DEVICE — GOWN XLG DISP 9545

## (undated) DEVICE — DRAPE SHOULDER PACK SPLITS 29365

## (undated) DEVICE — SU ETHIBOND 1 CT-1 30" X425H

## (undated) DEVICE — ESU ELEC BLADE 4" COATED

## (undated) DEVICE — ESU PENCIL SMOKE EVAC W/ROCKER SWITCH 0703-047-000

## (undated) DEVICE — SU ETHIBOND 5 V-37 4X30" MB66G

## (undated) DEVICE — SUCTION MANIFOLD NEPTUNE 2 SYS 4 PORT 0702-020-000

## (undated) DEVICE — IMM KIT SHOULDER TMAX MASK FACE 7210559

## (undated) DEVICE — DRAPE SHEET REV FOLD 3/4 9349

## (undated) DEVICE — Device

## (undated) DEVICE — HOOD T4 PROTECTIVE STERI FACE SHIELD 400-800

## (undated) DEVICE — SOL WATER IRRIG 1000ML BOTTLE 07139-09

## (undated) DEVICE — GLOVE BIOGEL PI MICRO SZ 7.5 48575

## (undated) DEVICE — GLOVE BIOGEL PI MICRO INDICATOR UNDERGLOVE SZ 8.0 48980

## (undated) DEVICE — DRSG STERI STRIP 1/2X4" R1547

## (undated) DEVICE — SU MONOCRYL 3-0 PS-2 27" Y427H

## (undated) DEVICE — SLING ARM LG 79-99157

## (undated) DEVICE — GOWN IMPERVIOUS SPECIALTY XLG/XLONG 32474

## (undated) DEVICE — PACK SHOULDER

## (undated) DEVICE — SU VICRYL 0 CT-1 36" J946H

## (undated) DEVICE — SU MONOCRYL 2-0 CT-1 36" UND Y945H

## (undated) DEVICE — DRSG AQUACEL AG 3.5X9.75" HYDROFIBER 412011

## (undated) DEVICE — SUCTION TIP YANKAUER STR K87

## (undated) DEVICE — BLADE SAW SAGITTAL STRK 18X90X1.37MM HD SYS 6 6118-137-090

## (undated) DEVICE — SUCTION IRR SYSTEM W/TIP INTERPULSE

## (undated) DEVICE — SU NDL MAYO 1824-4

## (undated) DEVICE — SPONGE LAP 18X18" 1515

## (undated) DEVICE — PREP CHLORAPREP 26ML TINTED ORANGE  260815

## (undated) DEVICE — BLADE KNIFE SURG 10 371110

## (undated) DEVICE — GLOVE BIOGEL PI MICRO SZ 8.0 48580

## (undated) DEVICE — GLOVE BIOGEL PI MICRO INDICATOR UNDERGLOVE SZ 7.5 48975

## (undated) DEVICE — DRAPE IOBAN INCISE 23X17" 6650EZ

## (undated) DEVICE — BIT DRILL BIOMET PERIPHERAL SCR 2.7MM SS 405889

## (undated) DEVICE — BONE CLEANING TIP INTERPULSE  0210-010-000

## (undated) DEVICE — DRAPE BACK TABLE  44X90" 8377

## (undated) DEVICE — BIT DRILL BIOMET CENTRAL SCR 3.2MM SS 405883

## (undated) DEVICE — SU VICRYL 2-0 CT-1 36" UND J945H

## (undated) DEVICE — DRILL ZIM COMPR AUG 2.7MM 110040202

## (undated) DEVICE — DRSG DRAIN 4X4" 7086

## (undated) DEVICE — SU DERMABOND ADVANCED .7ML DNX12

## (undated) RX ORDER — ONDANSETRON 2 MG/ML
INJECTION INTRAMUSCULAR; INTRAVENOUS
Status: DISPENSED
Start: 2025-05-20

## (undated) RX ORDER — BUPIVACAINE HYDROCHLORIDE 5 MG/ML
INJECTION, SOLUTION EPIDURAL; INTRACAUDAL; PERINEURAL
Status: DISPENSED
Start: 2025-05-20

## (undated) RX ORDER — PROPOFOL 10 MG/ML
INJECTION, EMULSION INTRAVENOUS
Status: DISPENSED
Start: 2025-05-20

## (undated) RX ORDER — PHENYLEPHRINE HYDROCHLORIDE 10 MG/ML
INJECTION INTRAVENOUS
Status: DISPENSED
Start: 2025-05-20

## (undated) RX ORDER — GLYCOPYRROLATE 0.2 MG/ML
INJECTION, SOLUTION INTRAMUSCULAR; INTRAVENOUS
Status: DISPENSED
Start: 2025-05-20

## (undated) RX ORDER — FENTANYL CITRATE 50 UG/ML
INJECTION, SOLUTION INTRAMUSCULAR; INTRAVENOUS
Status: DISPENSED
Start: 2023-04-21

## (undated) RX ORDER — FENTANYL CITRATE 50 UG/ML
INJECTION, SOLUTION INTRAMUSCULAR; INTRAVENOUS
Status: DISPENSED
Start: 2022-07-13

## (undated) RX ORDER — CEFAZOLIN SODIUM/WATER 2 G/20 ML
SYRINGE (ML) INTRAVENOUS
Status: DISPENSED
Start: 2025-05-20

## (undated) RX ORDER — FENTANYL CITRATE-0.9 % NACL/PF 10 MCG/ML
PLASTIC BAG, INJECTION (ML) INTRAVENOUS
Status: DISPENSED
Start: 2025-05-20

## (undated) RX ORDER — TRANEXAMIC ACID 650 MG/1
TABLET ORAL
Status: DISPENSED
Start: 2025-05-20

## (undated) RX ORDER — DEXAMETHASONE SODIUM PHOSPHATE 4 MG/ML
INJECTION, SOLUTION INTRA-ARTICULAR; INTRALESIONAL; INTRAMUSCULAR; INTRAVENOUS; SOFT TISSUE
Status: DISPENSED
Start: 2025-05-20

## (undated) RX ORDER — ROPIVACAINE HYDROCHLORIDE 5 MG/ML
INJECTION, SOLUTION EPIDURAL; INFILTRATION; PERINEURAL
Status: DISPENSED
Start: 2025-05-20

## (undated) RX ORDER — VANCOMYCIN HYDROCHLORIDE 1 G/20ML
INJECTION, POWDER, LYOPHILIZED, FOR SOLUTION INTRAVENOUS
Status: DISPENSED
Start: 2025-05-20

## (undated) RX ORDER — FENTANYL CITRATE 50 UG/ML
INJECTION, SOLUTION INTRAMUSCULAR; INTRAVENOUS
Status: DISPENSED
Start: 2025-05-20

## (undated) RX ORDER — CEFAZOLIN SODIUM 1 G/3ML
INJECTION, POWDER, FOR SOLUTION INTRAMUSCULAR; INTRAVENOUS
Status: DISPENSED
Start: 2025-05-20

## (undated) RX ORDER — GABAPENTIN 300 MG/1
CAPSULE ORAL
Status: DISPENSED
Start: 2025-05-20

## (undated) RX ORDER — ACETAMINOPHEN 325 MG/1
TABLET ORAL
Status: DISPENSED
Start: 2025-05-20

## (undated) RX ORDER — LIDOCAINE HYDROCHLORIDE 10 MG/ML
INJECTION, SOLUTION EPIDURAL; INFILTRATION; INTRACAUDAL; PERINEURAL
Status: DISPENSED
Start: 2025-05-20